# Patient Record
Sex: MALE | Race: BLACK OR AFRICAN AMERICAN | NOT HISPANIC OR LATINO | URBAN - METROPOLITAN AREA
[De-identification: names, ages, dates, MRNs, and addresses within clinical notes are randomized per-mention and may not be internally consistent; named-entity substitution may affect disease eponyms.]

---

## 2017-11-01 PROBLEM — Z00.00 ENCOUNTER FOR PREVENTIVE HEALTH EXAMINATION: Status: ACTIVE | Noted: 2017-11-01

## 2018-10-30 ENCOUNTER — INPATIENT (INPATIENT)
Facility: HOSPITAL | Age: 83
LOS: 1 days | Discharge: HOME | DRG: 243 | End: 2018-11-01
Attending: INTERNAL MEDICINE | Admitting: INTERNAL MEDICINE
Payer: MEDICARE

## 2018-10-30 VITALS
SYSTOLIC BLOOD PRESSURE: 144 MMHG | RESPIRATION RATE: 16 BRPM | TEMPERATURE: 98 F | DIASTOLIC BLOOD PRESSURE: 61 MMHG | OXYGEN SATURATION: 97 % | HEART RATE: 35 BPM

## 2018-10-30 DIAGNOSIS — R00.1 BRADYCARDIA, UNSPECIFIED: ICD-10-CM

## 2018-10-30 DIAGNOSIS — I44.2 ATRIOVENTRICULAR BLOCK, COMPLETE: ICD-10-CM

## 2018-10-30 DIAGNOSIS — R63.8 OTHER SYMPTOMS AND SIGNS CONCERNING FOOD AND FLUID INTAKE: ICD-10-CM

## 2018-10-30 DIAGNOSIS — Z98.41 CATARACT EXTRACTION STATUS, RIGHT EYE: Chronic | ICD-10-CM

## 2018-10-30 DIAGNOSIS — Z98.42 CATARACT EXTRACTION STATUS, LEFT EYE: Chronic | ICD-10-CM

## 2018-10-30 DIAGNOSIS — Z90.79 ACQUIRED ABSENCE OF OTHER GENITAL ORGAN(S): Chronic | ICD-10-CM

## 2018-10-30 DIAGNOSIS — R60.0 LOCALIZED EDEMA: ICD-10-CM

## 2018-10-30 DIAGNOSIS — R74.8 ABNORMAL LEVELS OF OTHER SERUM ENZYMES: ICD-10-CM

## 2018-10-30 DIAGNOSIS — J45.909 UNSPECIFIED ASTHMA, UNCOMPLICATED: ICD-10-CM

## 2018-10-30 DIAGNOSIS — N40.0 BENIGN PROSTATIC HYPERPLASIA WITHOUT LOWER URINARY TRACT SYMPTOMS: ICD-10-CM

## 2018-10-30 DIAGNOSIS — I24.8 OTHER FORMS OF ACUTE ISCHEMIC HEART DISEASE: ICD-10-CM

## 2018-10-30 DIAGNOSIS — E11.9 TYPE 2 DIABETES MELLITUS WITHOUT COMPLICATIONS: ICD-10-CM

## 2018-10-30 DIAGNOSIS — Z91.89 OTHER SPECIFIED PERSONAL RISK FACTORS, NOT ELSEWHERE CLASSIFIED: ICD-10-CM

## 2018-10-30 DIAGNOSIS — D72.829 ELEVATED WHITE BLOOD CELL COUNT, UNSPECIFIED: ICD-10-CM

## 2018-10-30 LAB
ALBUMIN SERPL ELPH-MCNC: 4.2 G/DL — SIGNIFICANT CHANGE UP (ref 3.3–5)
ALP SERPL-CCNC: 39 U/L — LOW (ref 40–120)
ALT FLD-CCNC: 24 U/L — SIGNIFICANT CHANGE UP (ref 10–45)
ANION GAP SERPL CALC-SCNC: 18 MMOL/L — HIGH (ref 5–17)
APTT BLD: 30.5 SEC — SIGNIFICANT CHANGE UP (ref 27.5–37.4)
AST SERPL-CCNC: 33 U/L — SIGNIFICANT CHANGE UP (ref 10–40)
BASOPHILS NFR BLD AUTO: 0.1 % — SIGNIFICANT CHANGE UP (ref 0–2)
BILIRUB SERPL-MCNC: 0.5 MG/DL — SIGNIFICANT CHANGE UP (ref 0.2–1.2)
BLD GP AB SCN SERPL QL: NEGATIVE — SIGNIFICANT CHANGE UP
BUN SERPL-MCNC: 24 MG/DL — HIGH (ref 7–23)
CALCIUM SERPL-MCNC: 9.7 MG/DL — SIGNIFICANT CHANGE UP (ref 8.4–10.5)
CHLORIDE SERPL-SCNC: 86 MMOL/L — LOW (ref 96–108)
CK MB CFR SERPL CALC: 5.3 NG/ML — SIGNIFICANT CHANGE UP (ref 0–6.7)
CK MB CFR SERPL CALC: 6.4 NG/ML — SIGNIFICANT CHANGE UP (ref 0–6.7)
CK SERPL-CCNC: 283 U/L — HIGH (ref 30–200)
CK SERPL-CCNC: 332 U/L — HIGH (ref 30–200)
CO2 SERPL-SCNC: 25 MMOL/L — SIGNIFICANT CHANGE UP (ref 22–31)
CREAT SERPL-MCNC: 1.27 MG/DL — SIGNIFICANT CHANGE UP (ref 0.5–1.3)
EOSINOPHIL NFR BLD AUTO: 0.8 % — SIGNIFICANT CHANGE UP (ref 0–6)
GLUCOSE SERPL-MCNC: 202 MG/DL — HIGH (ref 70–99)
HCT VFR BLD CALC: 35.6 % — LOW (ref 39–50)
HGB BLD-MCNC: 12.4 G/DL — LOW (ref 13–17)
INR BLD: 1.18 — HIGH (ref 0.88–1.16)
LYMPHOCYTES # BLD AUTO: 7.9 % — LOW (ref 13–44)
MAGNESIUM SERPL-MCNC: 1.6 MG/DL — SIGNIFICANT CHANGE UP (ref 1.6–2.6)
MCHC RBC-ENTMCNC: 31.2 PG — SIGNIFICANT CHANGE UP (ref 27–34)
MCHC RBC-ENTMCNC: 34.8 G/DL — SIGNIFICANT CHANGE UP (ref 32–36)
MCV RBC AUTO: 89.7 FL — SIGNIFICANT CHANGE UP (ref 80–100)
MONOCYTES NFR BLD AUTO: 8.1 % — SIGNIFICANT CHANGE UP (ref 2–14)
NEUTROPHILS NFR BLD AUTO: 83.1 % — HIGH (ref 43–77)
PLATELET # BLD AUTO: 173 K/UL — SIGNIFICANT CHANGE UP (ref 150–400)
POTASSIUM SERPL-MCNC: 4.6 MMOL/L — SIGNIFICANT CHANGE UP (ref 3.5–5.3)
POTASSIUM SERPL-SCNC: 4.6 MMOL/L — SIGNIFICANT CHANGE UP (ref 3.5–5.3)
PROT SERPL-MCNC: 7.3 G/DL — SIGNIFICANT CHANGE UP (ref 6–8.3)
PROTHROM AB SERPL-ACNC: 13.1 SEC — HIGH (ref 9.8–12.7)
RBC # BLD: 3.97 M/UL — LOW (ref 4.2–5.8)
RBC # FLD: 12.3 % — SIGNIFICANT CHANGE UP (ref 10.3–16.9)
RH IG SCN BLD-IMP: POSITIVE — SIGNIFICANT CHANGE UP
SODIUM SERPL-SCNC: 129 MMOL/L — LOW (ref 135–145)
TROPONIN T SERPL-MCNC: 0.04 NG/ML — CRITICAL HIGH (ref 0–0.01)
TROPONIN T SERPL-MCNC: 0.05 NG/ML — CRITICAL HIGH (ref 0–0.01)
WBC # BLD: 10.6 K/UL — HIGH (ref 3.8–10.5)
WBC # FLD AUTO: 10.6 K/UL — HIGH (ref 3.8–10.5)

## 2018-10-30 PROCEDURE — 93010 ELECTROCARDIOGRAM REPORT: CPT

## 2018-10-30 PROCEDURE — 99291 CRITICAL CARE FIRST HOUR: CPT

## 2018-10-30 PROCEDURE — 93306 TTE W/DOPPLER COMPLETE: CPT | Mod: 26

## 2018-10-30 PROCEDURE — 33208 INSRT HEART PM ATRIAL & VENT: CPT | Mod: KX

## 2018-10-30 PROCEDURE — 71045 X-RAY EXAM CHEST 1 VIEW: CPT | Mod: 26

## 2018-10-30 RX ORDER — IPRATROPIUM/ALBUTEROL SULFATE 18-103MCG
3 AEROSOL WITH ADAPTER (GRAM) INHALATION EVERY 6 HOURS
Qty: 0 | Refills: 0 | Status: DISCONTINUED | OUTPATIENT
Start: 2018-10-30 | End: 2018-11-01

## 2018-10-30 RX ORDER — VANCOMYCIN HCL 1 G
2250 VIAL (EA) INTRAVENOUS ONCE
Qty: 0 | Refills: 0 | Status: COMPLETED | OUTPATIENT
Start: 2018-10-31 | End: 2018-10-31

## 2018-10-30 RX ORDER — CHLORHEXIDINE GLUCONATE 213 G/1000ML
1 SOLUTION TOPICAL
Qty: 0 | Refills: 0 | Status: DISCONTINUED | OUTPATIENT
Start: 2018-10-30 | End: 2018-11-01

## 2018-10-30 RX ORDER — FINASTERIDE 5 MG/1
5 TABLET, FILM COATED ORAL DAILY
Qty: 0 | Refills: 0 | Status: DISCONTINUED | OUTPATIENT
Start: 2018-10-30 | End: 2018-11-01

## 2018-10-30 RX ORDER — MAGNESIUM SULFATE 500 MG/ML
2 VIAL (ML) INJECTION ONCE
Qty: 0 | Refills: 0 | Status: COMPLETED | OUTPATIENT
Start: 2018-10-30 | End: 2018-10-30

## 2018-10-30 RX ORDER — INSULIN LISPRO 100/ML
VIAL (ML) SUBCUTANEOUS
Qty: 0 | Refills: 0 | Status: DISCONTINUED | OUTPATIENT
Start: 2018-10-30 | End: 2018-11-01

## 2018-10-30 RX ORDER — BUDESONIDE AND FORMOTEROL FUMARATE DIHYDRATE 160; 4.5 UG/1; UG/1
2 AEROSOL RESPIRATORY (INHALATION)
Qty: 0 | Refills: 0 | Status: DISCONTINUED | OUTPATIENT
Start: 2018-10-30 | End: 2018-11-01

## 2018-10-30 RX ORDER — HEPARIN SODIUM 5000 [USP'U]/ML
7500 INJECTION INTRAVENOUS; SUBCUTANEOUS EVERY 8 HOURS
Qty: 0 | Refills: 0 | Status: DISCONTINUED | OUTPATIENT
Start: 2018-10-30 | End: 2018-10-30

## 2018-10-30 RX ORDER — DEXTROSE 50 % IN WATER 50 %
25 SYRINGE (ML) INTRAVENOUS ONCE
Qty: 0 | Refills: 0 | Status: DISCONTINUED | OUTPATIENT
Start: 2018-10-30 | End: 2018-11-01

## 2018-10-30 RX ORDER — SODIUM CHLORIDE 9 MG/ML
1000 INJECTION, SOLUTION INTRAVENOUS
Qty: 0 | Refills: 0 | Status: DISCONTINUED | OUTPATIENT
Start: 2018-10-30 | End: 2018-11-01

## 2018-10-30 RX ORDER — FUROSEMIDE 40 MG
40 TABLET ORAL ONCE
Qty: 0 | Refills: 0 | Status: COMPLETED | OUTPATIENT
Start: 2018-10-30 | End: 2018-10-30

## 2018-10-30 RX ORDER — DEXTROSE 50 % IN WATER 50 %
15 SYRINGE (ML) INTRAVENOUS ONCE
Qty: 0 | Refills: 0 | Status: DISCONTINUED | OUTPATIENT
Start: 2018-10-30 | End: 2018-11-01

## 2018-10-30 RX ORDER — DEXTROSE 50 % IN WATER 50 %
12.5 SYRINGE (ML) INTRAVENOUS ONCE
Qty: 0 | Refills: 0 | Status: DISCONTINUED | OUTPATIENT
Start: 2018-10-30 | End: 2018-11-01

## 2018-10-30 RX ORDER — GLUCAGON INJECTION, SOLUTION 0.5 MG/.1ML
1 INJECTION, SOLUTION SUBCUTANEOUS ONCE
Qty: 0 | Refills: 0 | Status: DISCONTINUED | OUTPATIENT
Start: 2018-10-30 | End: 2018-11-01

## 2018-10-30 RX ADMIN — Medication 50 GRAM(S): at 10:02

## 2018-10-30 RX ADMIN — Medication 40 MILLIGRAM(S): at 14:04

## 2018-10-30 RX ADMIN — Medication 3 MILLILITER(S): at 14:42

## 2018-10-30 RX ADMIN — Medication 2: at 21:38

## 2018-10-30 NOTE — H&P ADULT - PROBLEM SELECTOR PLAN 5
Hx of BPH on finasteride  - c/w home finasteride Pt w/ leukocytosis to 10.3 with predominant neutrophilia. Likely reactive in the setting of acute onset of complete heart block.  - CXR without obvious infiltrates, no urinary sxs, no obvious signs of infection  - continue to trend

## 2018-10-30 NOTE — H&P ADULT - PROBLEM SELECTOR PLAN 9
F: tolerating PO, no IVF  E: replete K<4, Mg<2  N: NPO for possible PPM placement    VTE Prophylaxis: SCDs  C: Full Code  D: CCU

## 2018-10-30 NOTE — CONSULT NOTE ADULT - SUBJECTIVE AND OBJECTIVE BOX
HPI:  89 year old male with asthma and non-insulin dependent DM, who presented to the ER this am with worsening weakness x2 weeks, found to be in complete heart block.    He states his ambulation is limited by knee pain and he mostly just walks <1 block.  He states for the past two weeks he has been more tired; but today he was unable to tie his shoelaces secondary to extreme fatigue.  He denies any SOB, chest pain, palpitations, syncope, near syncope.  He has mild dizziness.  He had an EKG two months ago with his PMD and was never told he had a slow heart rate or needed a pacemaker. 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   In the ER he was found to be in complete heart block with QRS 166ms RBBB morphology.  He denies any stents, stroke, MI, thyroid disorder, recent travel.  He does not know his medications, but his daughters state he does not take any heart medications at home       PAST MEDICAL & SURGICAL HISTORY:  Prostate disorder  Asthma  Diabetes  History of right cataract surgery  History of left cataract surgery  S/P TURP      Family history of diabetes mellitus (Sibling)      Social History:  no smoking, drugs, ETOH     Inpatient Medications:   ALBUTerol/ipratropium for Nebulization 3 milliLiter(s) Nebulizer every 6 hours PRN  buDESOnide 160 MICROgram(s)/formoterol 4.5 MICROgram(s) Inhaler 2 Puff(s) Inhalation two times a day  insulin lispro (HumaLOG) corrective regimen sliding scale   SubCutaneous Before meals and at bedtime      Allergy = lisinopril (Angioedema)      ROS:   CONSTITUTIONAL: No fever, weight loss + fatigue  EYES: Pt denies  RESPIRATORY: No cough, wheezing, chills or hemoptysis; No Shortness of Breath  CARDIOVASCULAR: see HPI  NEUROLOGICAL: Pt denies  SKIN: Pt denies   PSYCHIATRIC: Pt denies  HEME/LYMPH: Pt denies    PHYSICAL:  T(C): 36 (10-30-18 @ 10:17), Max: 36.6 (10-30-18 @ 08:00)  HR: 28 (10-30-18 @ 12:00) (28 - 35)  BP: 108/54 (10-30-18 @ 12:00) (108/54 - 146/66)  RR: 27 (10-30-18 @ 12:00) (16 - 35)  SpO2: 94% (10-30-18 @ 12:00) (94% - 97%)     Appearance: NAD  Cardiovascular: bradycardia  Respiratory: Lungs clear to auscultation	   Neurologic: A&O x 3, No deficit noted  Extremities: No edema       LABS:                        12.4   10.6  )-----------( 173      ( 30 Oct 2018 08:55 )             35.6     129<L>  |  86<L>  |  24<H>  ----------------------------<  202<H>  4.6   |  25  |  1.27    Ca    9.7         Mg     1.6      TPro  7.3  /  Alb  4.2  /  TBili  0.5  /  DBili  x   /  AST  33  /  ALT  24  /  AlkPhos  39<L>   PT: 13.1 sec;   INR: 1.18     PTT:30.5 sec  TSH WNL  Troponin .05     EKG: CHB, wide ventricular escape at 30    Telemetry: just got to CCU    ECHO:   pending    Prior EP procedures: denies     Assessment Plan:  89 year old male with asthma and non-insulin dependent DM, who presented to the ER this am with worsening weakness x2 weeks, found to be in complete heart block.  He has a history of RBBB and LAFB and we discussed that this is likely natural degeneration of his electrical system.  He has never experienced syncope, but admits to fatigue and dizziness.  He is on no medications that could be exacerbating this.  He has had wide QRS and would recommend a TVP.  Echo to be done to evaluate for any structural heart disease.  We have recommended a permanent pacemaker and he is in agreement with this and we anticipate doing this later today pending the schedule.  We discussed the procedure in detail including risks, benefits and alternatives.  Please facilitate that his echocardiogram gets done prior to this.  Please call [phone] with any questions or concerns.

## 2018-10-30 NOTE — H&P ADULT - PROBLEM SELECTOR PLAN 10
1) PCP Contacted on Admission: (Y/N) --> Name & Phone #:PCP: Dr. Mayito Santos   Office number: [phone]   Cell number: [phone]  2) Date of Contact with PCP: 10/30  3) PCP Contacted at Discharge: (Y/N)  4) Summary of Handoff Given to PCP:   5) Post-Discharge Appointment Date and Location:

## 2018-10-30 NOTE — H&P ADULT - NSHPSOCIALHISTORY_GEN_ALL_CORE
Previous hx of cigar smoking. Hasn't smoked in at least 20 years. Denies cigarette use. Drinks an occasional glass of wine with his daughter. Denies illicit drug use. Lives with his daughter. Does not have a home health aide although his daughter would like one as the burden of his health is too much for her. He walks with a cane.

## 2018-10-30 NOTE — H&P ADULT - NSHPPHYSICALEXAM_GEN_ALL_CORE
.  VITAL SIGNS:  T(C): 36.6 (10-30-18 @ 08:00), Max: 36.6 (10-30-18 @ 08:00)  T(F): 97.8 (10-30-18 @ 08:00), Max: 97.8 (10-30-18 @ 08:00)  HR: 35 (10-30-18 @ 09:04) (35 - 35)  BP: 146/66 (10-30-18 @ 09:04) (144/61 - 146/66)  BP(mean): --  RR: 16 (10-30-18 @ 09:04) (16 - 16)  SpO2: 97% (10-30-18 @ 09:04) (97% - 97%)  Wt(kg): --    PHYSICAL EXAM:    Constitutional: WDWN resting comfortably in bed; NAD  Head: NC/AT  Eyes: PERRL, EOMI, anicteric sclera  ENT: no nasal discharge; uvula midline, no oropharyngeal erythema or exudates; MMM  Neck: supple; no JVD or thyromegaly  Respiratory: CTA B/L; no W/R/R, no retractions  Cardiac: +S1/S2; RRR; no M/R/G; PMI non-displaced  Gastrointestinal: soft, NT/ND; no rebound or guarding; +BSx4  Genitourinary: normal external genitalia  Back: spine midline, no bony tenderness or step-offs; no CVAT B/L  Extremities: WWP, no clubbing or cyanosis; no peripheral edema. Capillary refill <2 sec  Musculoskeletal: NROM x4; no joint swelling, tenderness or erythema  Vascular: 2+ radial, femoral, DP/PT pulses B/L  Dermatologic: skin warm, dry and intact; no rashes, wounds, or scars  Lymphatic: no submandibular or cervical LAD  Neurologic: AAOx3; CNII-XII grossly intact; no focal deficits  Psychiatric: affect and characteristics of appearance, verbalizations, behaviors are appropriate .  VITAL SIGNS:  T(C): 36.6 (10-30-18 @ 08:00), Max: 36.6 (10-30-18 @ 08:00)  T(F): 97.8 (10-30-18 @ 08:00), Max: 97.8 (10-30-18 @ 08:00)  HR: 35 (10-30-18 @ 09:04) (35 - 35)  BP: 146/66 (10-30-18 @ 09:04) (144/61 - 146/66)  BP(mean): --  RR: 16 (10-30-18 @ 09:04) (16 - 16)  SpO2: 97% (10-30-18 @ 09:04) (97% - 97%)  Wt(kg): --    PHYSICAL EXAM:    Constitutional: Elderly male laying in bed in NAD  Head: NC/AT  Eyes: PERRL, EOMI, anicteric sclera  ENT: no nasal discharge; uvula midline, no oropharyngeal erythema or exudates; MMM  Neck: supple; no JVD or thyromegaly  Respiratory: CTA B/L; no W/R/R, no retractions  Cardiac: +S1/S2; RRR; no M/R/G; PMI non-displaced  Gastrointestinal: soft, NT/ND; no rebound or guarding; +BSx4  Genitourinary: normal external genitalia  Back: spine midline, no bony tenderness or step-offs; no CVAT B/L  Extremities: WWP, no clubbing or cyanosis; no peripheral edema. Capillary refill <2 sec  Musculoskeletal: NROM x4; no joint swelling, tenderness or erythema  Vascular: 2+ radial, femoral, DP/PT pulses B/L  Dermatologic: skin warm, dry and intact; no rashes, wounds, or scars  Lymphatic: no submandibular or cervical LAD  Neurologic: AAOx3; CNII-XII grossly intact; no focal deficits  Psychiatric: affect and characteristics of appearance, verbalizations, behaviors are appropriate .  VITAL SIGNS:  T(C): 36.6 (10-30-18 @ 08:00), Max: 36.6 (10-30-18 @ 08:00)  T(F): 97.8 (10-30-18 @ 08:00), Max: 97.8 (10-30-18 @ 08:00)  HR: 35 (10-30-18 @ 09:04) (35 - 35)  BP: 146/66 (10-30-18 @ 09:04) (144/61 - 146/66)  BP(mean): --  RR: 16 (10-30-18 @ 09:04) (16 - 16)  SpO2: 97% (10-30-18 @ 09:04) (97% - 97%)  Wt(kg): --    PHYSICAL EXAM:    Constitutional: Elderly obese male laying in bed in NAD  Head: NC/AT  Eyes: PERRL, EOMI, anicteric sclera  ENT: nares uneven, no nasal discharge; uvula midline, no oropharyngeal erythema or exudates; MMM  Neck: supple; no JVD or thyromegaly  Respiratory: CTA B/L; no W/R/R, no retractions  Cardiac: bradycardic, +S1/S2; RRR; no M/R/G; PMI non-displaced  Gastrointestinal: protuberant, soft, NT; no rebound or guarding; +BSx4  Back: spine midline, no bony tenderness or step-offs; no CVAT B/L  Extremities: WWP, no clubbing or cyanosis; 2+ peripheral edema. Capillary refill <2 sec  Musculoskeletal: NROM x4; no joint swelling, tenderness or erythema  Vascular: 1+ radial, femoral, DP/PT pulses B/L  Dermatologic: skin warm, dry and intact; no rashes, wounds, or scars  Lymphatic: no submandibular or cervical LAD  Neurologic: AAOx3; CNII-XII grossly intact; no focal deficits  Psychiatric: affect and characteristics of appearance, verbalizations, behaviors are appropriate

## 2018-10-30 NOTE — PROCEDURE NOTE - PROCEDURE
<<-----Click on this checkbox to enter Procedure Transvenous cardiac pacing  10/30/2018    Active  [facility]

## 2018-10-30 NOTE — H&P ADULT - PROBLEM SELECTOR PLAN 3
Pt has hx of T2 DM on metformin at home  - holding metformin; will c/w ISS  - f/u HbA1C Pt has hx of T2 DM on metformin at home. Baseline A1C 7% on 8/22/18  - A1C today 6.3%  - holding metformin; will c/w ISS

## 2018-10-30 NOTE — PATIENT PROFILE ADULT - DO YOU FEEL UNSAFE AT SCHOOL?
no
How Severe Is It?: mild
Is This A New Presentation, Or A Follow-Up?: Dandruff
Additional History: Last visit was on 07/2015.  Patient was prescribed Desonide and Ketoconazole

## 2018-10-30 NOTE — H&P ADULT - NSHPLABSRESULTS_GEN_ALL_CORE
.  LABS:                         12.4   10.6  )-----------( 173      ( 30 Oct 2018 08:55 )             35.6                         RADIOLOGY, EKG & ADDITIONAL TESTS: Reviewed. .  LABS:                         12.4   10.6  )-----------( 173      ( 30 Oct 2018 08:55 )             35.6     10-30    129<L>  |  86<L>  |  24<H>  ----------------------------<  202<H>  4.6   |  25  |  1.27    Ca    9.7      30 Oct 2018 08:55  Mg     1.6     10-30    TPro  7.3  /  Alb  4.2  /  TBili  0.5  /  DBili  x   /  AST  33  /  ALT  24  /  AlkPhos  39<L>  10-30    PT/INR - ( 30 Oct 2018 08:55 )   PT: 13.1 sec;   INR: 1.18          PTT - ( 30 Oct 2018 08:55 )  PTT:30.5 sec    CARDIAC MARKERS ( 30 Oct 2018 08:55 )  x     / 0.05 ng/mL / 332 U/L / x     / 6.4 ng/mL            RADIOLOGY, EKG & ADDITIONAL TESTS:  EC BPM. AV mar dissociation with complete heart block

## 2018-10-30 NOTE — ED PROVIDER NOTE - OBJECTIVE STATEMENT
89 y/o M hx of BPH, asthma and non-insulin dependent DM presents with weakness x1 day. According to the patient, he felt somewhat fatigued last night with mild SOB and he tried using his inhaler with no improvement. Upon awakening today pt felt increased weakness and fatigue without CP, palpitations, SOB, HA, dizziness, N/V/D/C and so called his daughter who brought pt to the ED for further evaluation.

## 2018-10-30 NOTE — ED PROVIDER NOTE - MEDICAL DECISION MAKING DETAILS
89 y/o M hx of BPH, asthma and non-insulin dependent DM presents with weakness x1 day. According to the patient, he felt somewhat fatigued last night with mild SOB and he tried using his inhaler with no improvement. Upon awakening today pt felt increased weakness and fatigue without CP, palpitations, SOB, HA, dizziness, N/V/D/C and so called his daughter who brought pt to the ED for further evaluation. ECG was performed which showed complete heart block. Pacer pads applied in ED, but pt with normal BPs and not requiring external pacing. CCU fellow called and patient admitted to CCU for further management.

## 2018-10-30 NOTE — H&P ADULT - PROBLEM SELECTOR PLAN 4
Patient w/ hx of asthma on advair  - c/w home advair  - duonebs PRN Pt with elevated troponin to 0.05. Likely type 2 in response demand ischemia from heart block  - continue to trend

## 2018-10-30 NOTE — H&P ADULT - PROBLEM SELECTOR PLAN 7
1) PCP Contacted on Admission: (Y/N) --> Name & Phone #:PCP: Dr. Mayito Santos   Office number: [phone]   Cell number: [phone]  2) Date of Contact with PCP: 10/30  3) PCP Contacted at Discharge: (Y/N)  4) Summary of Handoff Given to PCP:   5) Post-Discharge Appointment Date and Location: Patient w/ hx of asthma on advair  - c/w home advair  - duonebs PRN

## 2018-10-30 NOTE — H&P ADULT - ATTENDING COMMENTS
89M w/ Asthma, DM and BPH p/w weakness -> found to be in CHB, currently HD stable and asymptomatic    -Pt. w/ new onset CHB; denies lightheadedness or syncope; No evidence of significant end organ dysfunction; Currently has a Ventricular escape in mid 30s; No identifiable reversible causes at this time; EP consulted for possible PPM today  -Mild elevation in [place]; No ischemic changes on ekg and no CP; Will trend to peak; c/w ASA and Lipitor  -TTE today  -check TSH, Lipid panel and Hgb A1C  -Nebs PRN  -NPO  -DVT PPx: HSQ    Bull Tuttle MD  CCU Attending

## 2018-10-30 NOTE — ED PROVIDER NOTE - PROGRESS NOTE DETAILS
metfirmin, finasteride, advair, lasix 20 mg  flovent flonase In the ED, vitals T-97.8, HR-35, BP- 144/61, RR-16, SpO2-97%. ECG was performed which showed complete heart block. Pacer pads applied in ED, but pt with normal BPs and not requiring external pacing. CCU fellow called and patient admitted to CCU for further management.

## 2018-10-30 NOTE — ED ADULT NURSE NOTE - NSIMPLEMENTINTERV_GEN_ALL_ED
Implemented All Fall with Harm Risk Interventions:  [place] to call system. Call bell, personal items and telephone within reach. Instruct patient to call for assistance. Room bathroom lighting operational. Non-slip footwear when patient is off stretcher. Physically safe environment: no spills, clutter or unnecessary equipment. Stretcher in lowest position, wheels locked, appropriate side rails in place. Provide visual cue, wrist band, yellow gown, etc. Monitor gait and stability. Monitor for mental status changes and reorient to person, place, and time. Review medications for side effects contributing to fall risk. Reinforce activity limits and safety measures with patient and family. Provide visual clues: red socks.

## 2018-10-30 NOTE — H&P ADULT - HISTORY OF PRESENT ILLNESS
89 y/o M hx of BPH, asthma, and non-insulin dependent DM presents with weakness x1 day. According to the patient, he felt somewhat off last night and upon awakening today felt weak and tired. He has never felt like this before. He was brought to the ED by his daughter.    In the ED, vitals T-97.8, HR-35, BP- 144/61, RR-16, SpO2-97%. ECG was performed which showed complete heart block. Patient was transferred to CCU for further management.          PCP: Dr. Mayito Santos   Office number: [phone]   Cell number: [phone] 89 y/o M hx of BPH, asthma, and non-insulin dependent DM presents with weakness x1 day. According to the patient, he felt somewhat off last night and upon awakening today felt weak and tired. He has never felt like this before. He was brought to the ED by his daughter.    In the ED, vitals T-97.8, HR-35, BP- 144/61, RR-16, SpO2-97%. ECG was performed which showed complete heart block. Patient was transferred to CCU for further management. In CCU, PCP was contacted who provided further information. Pt has no hx of cardiac disease. Recent ECG this year showed NSR with bifascicular block, RBBB and LA deviation with HR 82. He was hospitalized in April at [facility] for cough where he was found to have an elevated troponin thought to be 2/2 Type 2 MI from demand ischemia           PCP: Dr. Mayito Santos   Office number: [phone]   Cell number: [phone] 89 y/o M hx of BPH, asthma and non-insulin dependent DM presents with weakness x1 day. According to the patient, he felt somewhat fatigued last night and he tried using his inhaler with no improvement. Upon awakening today felt increased weakness and fatigue without CP, palpitations, SOB, HA, dizziness, N/V/D/C. He has never felt like this before. He was brought to the ED by his daughter.    In the ED, vitals T-97.8, HR-35, BP- 144/61, RR-16, SpO2-97%. ECG was performed which showed complete heart block. Patient was transferred to CCU for further management. In CCU, patient denies fatigue or weakness. He continues to be bradycardic in the 30s-40 with no acute complaints or changes in mentation. PCP was contacted who provided further information. Recent ECG in September showed NSR with bifascicular block, RBBB and LA deviation with HR 82. He was hospitalized in April at [facility] for cough where he was found to have an elevated troponin thought to be 2/2 Type 2 MI from demand ischemia from respiratory infection. Echo at that time showed no abnormalities. Stress test in 2013 was negative.

## 2018-10-30 NOTE — H&P ADULT - PROBLEM SELECTOR PLAN 1
Pt w/ AV dissociation w/ complete heart block on ECG. No hx of beta blocker or calcium channel blocker usage. Current HR 40 bpm  - f/u EP recs  - NPO for possible PPM placement; if PPM does not occur today, may need TVP placed  - continue to monitor Pt w/ AV dissociation w/ complete heart block on ECG. No hx of beta blocker or calcium channel blocker usage. No electrolyte abnormalities. Current HR 40 bpm  - f/u EP recs  - NPO for possible PPM placement; if PPM does not occur today, may need TVP placed  - pacemaker pads on patient at all times  - f/u TTE today  - continue to monitor

## 2018-10-30 NOTE — H&P ADULT - PROBLEM SELECTOR PLAN 6
F: tolerating PO, no IVF  E: replete K<4, Mg<2  N: Dash/TLC    VTE Prophylaxis: SCDs  C: Full Code  D: CCU Pt w/ B/L LE edema on exam. On Lasix 20mg qd at home. No hx of CHF  - c/w lasix  - f/u TTE    #hyponatremia  - pt w/ sodium to 129 on admission likely in the setting of hypervolemic hyponatremia and chronic lasix usage  - continue to monitor Pt w/ B/L LE edema on exam. On Lasix 20mg qd at home. No hx of CHF  - holding lasix 2/2 bradycardia; will restart as tolerated  - f/u TTE    #hyponatremia  - pt w/ sodium to 129 on admission likely in the setting of hypervolemic hyponatremia and chronic lasix usage  - holding lasix  - continue to monitor

## 2018-10-31 ENCOUNTER — TRANSCRIPTION ENCOUNTER (OUTPATIENT)
Age: 83
End: 2018-10-31

## 2018-10-31 PROBLEM — Z00.00 ENCOUNTER FOR PREVENTIVE HEALTH EXAMINATION: Noted: 2018-10-31

## 2018-10-31 LAB
ANION GAP SERPL CALC-SCNC: 9 MMOL/L — SIGNIFICANT CHANGE UP (ref 5–17)
BASOPHILS NFR BLD AUTO: 0.1 % — SIGNIFICANT CHANGE UP (ref 0–2)
BUN SERPL-MCNC: 22 MG/DL — SIGNIFICANT CHANGE UP (ref 7–23)
CALCIUM SERPL-MCNC: 9.4 MG/DL — SIGNIFICANT CHANGE UP (ref 8.4–10.5)
CHLORIDE SERPL-SCNC: 96 MMOL/L — SIGNIFICANT CHANGE UP (ref 96–108)
CHOLEST SERPL-MCNC: 153 MG/DL — SIGNIFICANT CHANGE UP (ref 10–199)
CO2 SERPL-SCNC: 30 MMOL/L — SIGNIFICANT CHANGE UP (ref 22–31)
CREAT SERPL-MCNC: 1.03 MG/DL — SIGNIFICANT CHANGE UP (ref 0.5–1.3)
EOSINOPHIL NFR BLD AUTO: 1.4 % — SIGNIFICANT CHANGE UP (ref 0–6)
GLUCOSE SERPL-MCNC: 126 MG/DL — HIGH (ref 70–99)
HCT VFR BLD CALC: 36.1 % — LOW (ref 39–50)
HDLC SERPL-MCNC: 74 MG/DL — SIGNIFICANT CHANGE UP
HGB BLD-MCNC: 12.2 G/DL — LOW (ref 13–17)
LIPID PNL WITH DIRECT LDL SERPL: 65 MG/DL — SIGNIFICANT CHANGE UP
LYMPHOCYTES # BLD AUTO: 13.6 % — SIGNIFICANT CHANGE UP (ref 13–44)
MAGNESIUM SERPL-MCNC: 1.9 MG/DL — SIGNIFICANT CHANGE UP (ref 1.6–2.6)
MCHC RBC-ENTMCNC: 31 PG — SIGNIFICANT CHANGE UP (ref 27–34)
MCHC RBC-ENTMCNC: 33.8 G/DL — SIGNIFICANT CHANGE UP (ref 32–36)
MCV RBC AUTO: 91.6 FL — SIGNIFICANT CHANGE UP (ref 80–100)
MONOCYTES NFR BLD AUTO: 11.8 % — SIGNIFICANT CHANGE UP (ref 2–14)
NEUTROPHILS NFR BLD AUTO: 73.1 % — SIGNIFICANT CHANGE UP (ref 43–77)
PLATELET # BLD AUTO: 156 K/UL — SIGNIFICANT CHANGE UP (ref 150–400)
POTASSIUM SERPL-MCNC: 4.6 MMOL/L — SIGNIFICANT CHANGE UP (ref 3.5–5.3)
POTASSIUM SERPL-SCNC: 4.6 MMOL/L — SIGNIFICANT CHANGE UP (ref 3.5–5.3)
RBC # BLD: 3.94 M/UL — LOW (ref 4.2–5.8)
RBC # FLD: 13.1 % — SIGNIFICANT CHANGE UP (ref 10.3–16.9)
SODIUM SERPL-SCNC: 135 MMOL/L — SIGNIFICANT CHANGE UP (ref 135–145)
TOTAL CHOLESTEROL/HDL RATIO MEASUREMENT: 2.1 RATIO — LOW (ref 3.4–9.6)
TRIGL SERPL-MCNC: 68 MG/DL — SIGNIFICANT CHANGE UP (ref 10–149)
WBC # BLD: 8.8 K/UL — SIGNIFICANT CHANGE UP (ref 3.8–10.5)
WBC # FLD AUTO: 8.8 K/UL — SIGNIFICANT CHANGE UP (ref 3.8–10.5)

## 2018-10-31 PROCEDURE — 71045 X-RAY EXAM CHEST 1 VIEW: CPT | Mod: 26

## 2018-10-31 RX ORDER — FUROSEMIDE 40 MG
40 TABLET ORAL DAILY
Qty: 0 | Refills: 0 | Status: DISCONTINUED | OUTPATIENT
Start: 2018-10-31 | End: 2018-11-01

## 2018-10-31 RX ORDER — ASPIRIN/CALCIUM CARB/MAGNESIUM 324 MG
81 TABLET ORAL DAILY
Qty: 0 | Refills: 0 | Status: DISCONTINUED | OUTPATIENT
Start: 2018-10-31 | End: 2018-11-01

## 2018-10-31 RX ORDER — MAGNESIUM SULFATE 500 MG/ML
1 VIAL (ML) INJECTION ONCE
Qty: 0 | Refills: 0 | Status: COMPLETED | OUTPATIENT
Start: 2018-10-31 | End: 2018-10-31

## 2018-10-31 RX ORDER — HEPARIN SODIUM 5000 [USP'U]/ML
7500 INJECTION INTRAVENOUS; SUBCUTANEOUS EVERY 8 HOURS
Qty: 0 | Refills: 0 | Status: DISCONTINUED | OUTPATIENT
Start: 2018-11-01 | End: 2018-11-01

## 2018-10-31 RX ADMIN — Medication 200 MILLIGRAM(S): at 05:17

## 2018-10-31 RX ADMIN — BUDESONIDE AND FORMOTEROL FUMARATE DIHYDRATE 2 PUFF(S): 160; 4.5 AEROSOL RESPIRATORY (INHALATION) at 18:11

## 2018-10-31 RX ADMIN — CHLORHEXIDINE GLUCONATE 1 APPLICATION(S): 213 SOLUTION TOPICAL at 05:15

## 2018-10-31 RX ADMIN — Medication 100 GRAM(S): at 07:49

## 2018-10-31 RX ADMIN — FINASTERIDE 5 MILLIGRAM(S): 5 TABLET, FILM COATED ORAL at 11:24

## 2018-10-31 RX ADMIN — Medication 40 MILLIGRAM(S): at 09:44

## 2018-10-31 RX ADMIN — Medication 1: at 11:32

## 2018-10-31 RX ADMIN — Medication 81 MILLIGRAM(S): at 12:26

## 2018-10-31 RX ADMIN — Medication 1: at 16:52

## 2018-10-31 NOTE — PROGRESS NOTE ADULT - PROBLEM SELECTOR PLAN 10
1) PCP Contacted on Admission: (Y/N) --> Name & Phone #:PCP: Dr. Mayito Santos   Office number: [phone]   Cell number: [phone]  2) Date of Contact with PCP: 10/30  3) PCP Contacted at Discharge: (Y/N)  4) Summary of Handoff Given to PCP:   5) Post-Discharge Appointment Date and Location:

## 2018-10-31 NOTE — DISCHARGE NOTE ADULT - PATIENT PORTAL LINK FT
You can access the [facility] Patient Portal, offered by [facility], by registering with the following website: [email or web address]

## 2018-10-31 NOTE — PROGRESS NOTE ADULT - SUBJECTIVE AND OBJECTIVE BOX
CCU to [facility] TRANSFER ACCEPTANCE NOTE    HOSPITAL COURSE:  89M with PMH of BPH, asthma, DM2, who presented with weakness. Found to have CHB on ECG with stable BPs and was admitted to CCU for further mgmt. TVP was placed for temporary pacing. PPM was placed 10/30 without complication and TVP was removed. HR improved and symptoms improved. While in CCU, noted to have pulmonary congestion on CXR for which patient was diuresed with IV Lasix w improvement.  Patient is now stable for stepdown to [facility] for further tele monitoring.    SUBJECTIVE / INTERVAL HPI: Patient seen and examined at bedside. No complaints. Denies palpitations, weakness, fatigue, dizziness, HA, CP, SOB, N/V/D/C.     VITAL SIGNS:  Vital Signs Last 24 Hrs  T(C): 37 (31 Oct 2018 22:13), Max: 37 (31 Oct 2018 22:13)  T(F): 98.6 (31 Oct 2018 22:13), Max: 98.6 (31 Oct 2018 22:13)  HR: 82 (01 Nov 2018 00:03) (72 - 84)  BP: 161/73 (01 Nov 2018 00:03) (115/62 - 172/71)  BP(mean): 116 (01 Nov 2018 00:03) (73 - 116)  RR: 15 (01 Nov 2018 00:03) (15 - 29)  SpO2: 98% (01 Nov 2018 00:03) (92% - 99%)    PHYSICAL EXAM:  General: WDWN  HEENT: NC/AT; PERRL, anicteric sclera; MMM, adentulous  Neck: supple  Cardiovascular: +S1/S2; RRR, no MRG, PPM CDI  Respiratory: CTA B/L; no W/R/R  Gastrointestinal: soft, NT/ND; +BSx4  Extremities: WWP; no edema, clubbing or cyanosis  Vascular: 2+ radial, DP/PT pulses B/L  Neurological: AAOx3; no focal deficits    MEDICATIONS:  MEDICATIONS  (STANDING):  aspirin enteric coated 81 milliGRAM(s) Oral daily  buDESOnide 160 MICROgram(s)/formoterol 4.5 MICROgram(s) Inhaler 2 Puff(s) Inhalation two times a day  dextrose 5%. 1000 milliLiter(s) (50 mL/Hr) IV Continuous <Continuous>  dextrose 50% Injectable 12.5 Gram(s) IV Push once  dextrose 50% Injectable 25 Gram(s) IV Push once  dextrose 50% Injectable 25 Gram(s) IV Push once  finasteride 5 milliGRAM(s) Oral daily  furosemide    Tablet 40 milliGRAM(s) Oral daily  heparin  Injectable 7500 Unit(s) SubCutaneous every 8 hours  insulin lispro (HumaLOG) corrective regimen sliding scale   SubCutaneous Before meals and at bedtime    MEDICATIONS  (PRN):  ALBUTerol/ipratropium for Nebulization 3 milliLiter(s) Nebulizer every 6 hours PRN Shortness of Breath and/or Wheezing  dextrose 40% Gel 15 Gram(s) Oral once PRN Blood Glucose LESS THAN 70 milliGRAM(s)/deciliter  glucagon  Injectable 1 milliGRAM(s) IntraMuscular once PRN Glucose LESS THAN 70 milligrams/deciliter      ALLERGIES:  lisinopril (Angioedema)      LABS:                        12.2   8.8   )-----------( 156      ( 31 Oct 2018 05:07 )             36.1     10-31    135  |  96  |  22  ----------------------------<  126<H>  4.6   |  30  |  1.03    Ca    9.4      31 Oct 2018 05:06  Mg     1.9     10-31    TPro  7.3  /  Alb  4.2  /  TBili  0.5  /  DBili  x   /  AST  33  /  ALT  24  /  AlkPhos  39<L>  10-30    PT/INR - ( 30 Oct 2018 08:55 )   PT: 13.1 sec;   INR: 1.18     PTT - ( 30 Oct 2018 08:55 )  PTT:30.5 sec    POCT Blood Glucose.: 134 mg/dL (31 Oct 2018 21:25)      10-30-18 @ 07:01  -  10-31-18 @ 07:00  --------------------------------------------------------  IN: 0 mL / OUT: 3700 mL / NET: -3700 mL    10-31-18 @ 07:01  -  11-01-18 @ 01:35  --------------------------------------------------------  IN: 1060 mL / OUT: 2800 mL / NET: -1740 mL          RADIOLOGY & ADDITIONAL TESTS: Reviewed.

## 2018-10-31 NOTE — PROGRESS NOTE ADULT - PROBLEM SELECTOR PLAN 9
F: tolerating PO, no IVF  E: replete K<4, Mg<2  N: NPO for possible PPM placement    VTE Prophylaxis: SCDs  C: Full Code  D: CCU F: tolerating PO, no IVF  E: replete K<4, Mg<2  N: NPO for possible PPM placement    VTE Prophylaxis: SCDs; holding HSQ until >24 hours post PPM  C: Full Code  D: CCU

## 2018-10-31 NOTE — DISCHARGE NOTE ADULT - HOSPITAL COURSE
89 y/o M hx of BPH, asthma and non-insulin dependent DM presented with weakness x1 day.  Upon arrival to ED, vitals T-97.8, HR-35, BP- 144/61, RR-16, SpO2-97%. ECG was performed which showed complete heart block and patient was admitted to CCU for further management. In CCU, he denied fatigue, weakness, HA, dizziness or lightheadedness, CP, palpitations, Abd pain, N/V/D/C . He continued to be bradycardic in the 30s-40 with no acute complaints or changes in mentation. TVP was placed for temporary pacing. PPM was placed 10/30 and TVP was removed. HR and symptoms improved. Patient is stable for discharge with followup

## 2018-10-31 NOTE — PROGRESS NOTE ADULT - ATTENDING COMMENTS
89M w/ Asthma, DM and BPH p/w weakness -> found to be in CHB, currently HD stable and asymptomatic    -Pt. is s/p dual-chamber PPM yesterday, uncomplicated and tolerated well; Site is C/D/I  -c/w ASA/Lipitor  -Nebs PRN  -DASH diet  -PT/OT  -Dispo - Step-down to Cardiac Tele w/ possible d/c home tmrw vs Friday depending on needs  -DVT PPx: HSQ    Bull Tuttle MD  CCU Attending

## 2018-10-31 NOTE — DISCHARGE NOTE ADULT - CARE PLAN
Principal Discharge DX:	Complete heart block  Goal:	Prevent recurrence  Assessment and plan of treatment:	You came into the hospital because you were feeling weak and tired. You were found to have a very low heart rate. An EKG was performed which showed that you were in complete heart block. You had a temporary pacemaker placed to keep your heart rate up until your permanent pacemaker was placed.  Your heart is now beating at the correct rate and your symptoms have improved.     For proper healing of your pacemaker, please do not perform heavy lifting >5lbs with the left arm or lift the left arm above the level of the shoulder for six weeks. Please also do not get an MRI for six weeks. You may shower, but avoid tub baths for one month.  Secondary Diagnosis:	Diabetes  Assessment and plan of treatment:	Continue with your home medications. Your A1C this hospitalization was 6.3%.  Secondary Diagnosis:	Asthma  Assessment and plan of treatment:	Continue with your home medications  Secondary Diagnosis:	BPH (benign prostatic hyperplasia)  Assessment and plan of treatment:	Continue with your home medication Principal Discharge DX:	Complete heart block  Goal:	Prevent recurrence  Assessment and plan of treatment:	You came into the hospital because you were feeling weak and tired. You were found to have a very low heart rate. An EKG was performed which showed that you were in complete heart block. You had a temporary pacemaker placed to keep your heart rate up until your permanent pacemaker was placed.  Your heart is now beating at the correct rate and your symptoms have improved.     For proper healing of your pacemaker, please do not perform heavy lifting >5lbs with the left arm or lift the left arm above the level of the shoulder for six weeks. Please also do not get an MRI for six weeks. You may shower, but avoid tub baths for one month.  Secondary Diagnosis:	Diabetes  Assessment and plan of treatment:	Continue with your home medication of Metformin. Your A1C this hospitalization was 6.3%.  Secondary Diagnosis:	Asthma  Assessment and plan of treatment:	Continue with your home medications.  Secondary Diagnosis:	BPH (benign prostatic hyperplasia)  Assessment and plan of treatment:	Continue with your home medication of Finasteride.

## 2018-10-31 NOTE — PROGRESS NOTE ADULT - PROBLEM SELECTOR PLAN 8
1) PCP Contacted on Admission: (Y/N) --> Name & Phone #:PCP: Dr. Mayito Santos   Office number: [phone]   Cell number: [phone]  2) Date of Contact with PCP: 10/30  3) PCP Contacted at Discharge: (Y/N)  4) Summary of Handoff Given to PCP:   5) Post-Discharge Appointment Date and Location:

## 2018-10-31 NOTE — PROGRESS NOTE ADULT - SUBJECTIVE AND OBJECTIVE BOX
Overnight Events: Patient was noted to have pulmonary congestion on CXR and was given lasix 40 IVP x1. TVP was placed for temporary pacing. PPM was placed by EP yesterday afternoon with no complications.  Subjective: Patient was seen and examined at bedside. He has no complaints. He says his chicken last night was the best chicken he's ever had. Denies weakness, fatigue, HA, CP, palpitations, SOB, N/V/D/C.     VITALS  Vital Signs Last 24 Hrs  T(C): 36.5 (31 Oct 2018 06:02), Max: 36.5 (31 Oct 2018 06:02)  T(F): 97.7 (31 Oct 2018 06:02), Max: 97.7 (31 Oct 2018 06:02)  HR: 74 (31 Oct 2018 07:00) (28 - 82)  BP: 119/66 (31 Oct 2018 07:00) (108/54 - 159/65)  BP(mean): 89 (31 Oct 2018 07:00) (59 - 103)  RR: 23 (31 Oct 2018 07:00) (16 - 45)  SpO2: 97% (31 Oct 2018 07:00) (93% - 99%)    I&O's Summary    30 Oct 2018 07:01  -  31 Oct 2018 07:00  --------------------------------------------------------  IN: 0 mL / OUT: 3700 mL / NET: -3700 mL        CAPILLARY BLOOD GLUCOSE      POCT Blood Glucose.: 138 mg/dL (31 Oct 2018 06:32)  POCT Blood Glucose.: 211 mg/dL (30 Oct 2018 21:32)  POCT Blood Glucose.: 162 mg/dL (30 Oct 2018 15:59)  POCT Blood Glucose.: 151 mg/dL (30 Oct 2018 13:34)      PHYSICAL EXAM  General: Elderly obese male laying in bed in NAD  HEENT: PERRL/ EOMI, no scleral icterus, no ptosis, MMM, JVD, no thyromegaly; no teeth (pt does not have dentures with him)  Respiratory: CTA b/l, no wheezes, rales or rhonchi  Chest: PPM in place with dressing CDI; mild pain to palpation; no warmth or erythema.  Cardiovascular: Regular, +S1 + S2  Abdomen: protuberant, soft, NT, normoactive bowel sounds, no rebound, no guarding, no suprapubic tenderness  Extremities: No cyanosis, no clubbing, 2+ pitting edema, pulses equal, no calf tenderness  Skin: No rashes  Neurological: AAOx3; CN II-XII grossly intact, follows commands, moves all extremities    MEDICATIONS  (STANDING):  buDESOnide 160 MICROgram(s)/formoterol 4.5 MICROgram(s) Inhaler 2 Puff(s) Inhalation two times a day  chlorhexidine 2% Cloths 1 Application(s) Topical <User Schedule>  dextrose 5%. 1000 milliLiter(s) (50 mL/Hr) IV Continuous <Continuous>  dextrose 50% Injectable 12.5 Gram(s) IV Push once  dextrose 50% Injectable 25 Gram(s) IV Push once  dextrose 50% Injectable 25 Gram(s) IV Push once  finasteride 5 milliGRAM(s) Oral daily  insulin lispro (HumaLOG) corrective regimen sliding scale   SubCutaneous Before meals and at bedtime    MEDICATIONS  (PRN):  ALBUTerol/ipratropium for Nebulization 3 milliLiter(s) Nebulizer every 6 hours PRN Shortness of Breath and/or Wheezing  dextrose 40% Gel 15 Gram(s) Oral once PRN Blood Glucose LESS THAN 70 milliGRAM(s)/deciliter  glucagon  Injectable 1 milliGRAM(s) IntraMuscular once PRN Glucose LESS THAN 70 milligrams/deciliter      LABS                        12.2   8.8   )-----------( 156      ( 31 Oct 2018 05:07 )             36.1     10-31    135  |  96  |  22  ----------------------------<  126<H>  4.6   |  30  |  1.03    Ca    9.4      31 Oct 2018 05:06  Mg     1.9     10-31    TPro  7.3  /  Alb  4.2  /  TBili  0.5  /  DBili  x   /  AST  33  /  ALT  24  /  AlkPhos  39<L>  10-30    LIVER FUNCTIONS - ( 30 Oct 2018 08:55 )  Alb: 4.2 g/dL / Pro: 7.3 g/dL / ALK PHOS: 39 U/L / ALT: 24 U/L / AST: 33 U/L / GGT: x           PT/INR - ( 30 Oct 2018 08:55 )   PT: 13.1 sec;   INR: 1.18          PTT - ( 30 Oct 2018 08:55 )  PTT:30.5 sec    CARDIAC MARKERS ( 30 Oct 2018 15:00 )  x     / 0.04 ng/mL / 283 U/L / x     / 5.3 ng/mL  CARDIAC MARKERS ( 30 Oct 2018 08:55 )  x     / 0.05 ng/mL / 332 U/L / x     / 6.4 ng/mL        Radiology and other tests: Reviewed Hospital Course:   89 y/o M hx of BPH, asthma and non-insulin dependent DM presents with weakness x1 day.  Upon arrival to ED, vitals T-97.8, HR-35, BP- 144/61, RR-16, SpO2-97%. ECG was performed which showed complete heart block and patient was admitted to CCU for further management. In CCU, he denied fatigue, weakness, HA, dizziness or lightheadedness, CP, palpitations, Abd pain, N/V/D/C . He continued to be bradycardic in the 30s-40 with no acute complaints or changes in mentation. TVP was placed for temporary pacing. PPM was placed 10/30 pm and TVP was removed. HR improved. Patient is stable for stepdown.    Overnight Events: Patient was noted to have pulmonary congestion on CXR and was given lasix 40 IVP x1. TVP was placed for temporary pacing. PPM was placed by EP yesterday afternoon with no complications.  Subjective: Patient was seen and examined at bedside. He has no complaints. He says his chicken last night was the best chicken he's ever had. Denies weakness, fatigue, HA, CP, palpitations, SOB, N/V/D/C.     VITALS  Vital Signs Last 24 Hrs  T(C): 36.5 (31 Oct 2018 06:02), Max: 36.5 (31 Oct 2018 06:02)  T(F): 97.7 (31 Oct 2018 06:02), Max: 97.7 (31 Oct 2018 06:02)  HR: 74 (31 Oct 2018 07:) (28 - 82)  BP: 119/66 (31 Oct 2018 07:00) (108/54 - 159/65)  BP(mean): 89 (31 Oct 2018 07:00) (59 - 103)  RR: 23 (31 Oct 2018 07:00) (16 - 45)  SpO2: 97% (31 Oct 2018 07:00) (93% - 99%)    I&O's Summary    30 Oct 2018 07:01  -  31 Oct 2018 07:00  --------------------------------------------------------  IN: 0 mL / OUT: 3700 mL / NET: -3700 mL        CAPILLARY BLOOD GLUCOSE      POCT Blood Glucose.: 138 mg/dL (31 Oct 2018 06:32)  POCT Blood Glucose.: 211 mg/dL (30 Oct 2018 21:32)  POCT Blood Glucose.: 162 mg/dL (30 Oct 2018 15:59)  POCT Blood Glucose.: 151 mg/dL (30 Oct 2018 13:34)      PHYSICAL EXAM  General: Elderly obese male laying in bed in NAD  HEENT: PERRL/ EOMI, no scleral icterus, no ptosis, MMM, JVD, no thyromegaly; no teeth (pt does not have dentures with him); R cordis in place with dressing CDI  Respiratory: CTA b/l, no wheezes, rales or rhonchi  Chest: PPM in place with dressing CDI; mild pain to palpation; no warmth or erythema.  Cardiovascular: Regular, +S1 + S2  Abdomen: protuberant, soft, NT, normoactive bowel sounds, no rebound, no guarding, no suprapubic tenderness  Extremities: No cyanosis, no clubbing, 2+ pitting edema, pulses equal, no calf tenderness  Skin: No rashes  Neurological: AAOx3; CN II-XII grossly intact, follows commands, moves all extremities    MEDICATIONS  (STANDING):  buDESOnide 160 MICROgram(s)/formoterol 4.5 MICROgram(s) Inhaler 2 Puff(s) Inhalation two times a day  chlorhexidine 2% Cloths 1 Application(s) Topical <User Schedule>  dextrose 5%. 1000 milliLiter(s) (50 mL/Hr) IV Continuous <Continuous>  dextrose 50% Injectable 12.5 Gram(s) IV Push once  dextrose 50% Injectable 25 Gram(s) IV Push once  dextrose 50% Injectable 25 Gram(s) IV Push once  finasteride 5 milliGRAM(s) Oral daily  insulin lispro (HumaLOG) corrective regimen sliding scale   SubCutaneous Before meals and at bedtime    MEDICATIONS  (PRN):  ALBUTerol/ipratropium for Nebulization 3 milliLiter(s) Nebulizer every 6 hours PRN Shortness of Breath and/or Wheezing  dextrose 40% Gel 15 Gram(s) Oral once PRN Blood Glucose LESS THAN 70 milliGRAM(s)/deciliter  glucagon  Injectable 1 milliGRAM(s) IntraMuscular once PRN Glucose LESS THAN 70 milligrams/deciliter      LABS                        12.2   8.8   )-----------( 156      ( 31 Oct 2018 05:07 )             36.1     10-31    135  |  96  |  22  ----------------------------<  126<H>  4.6   |  30  |  1.03    Ca    9.4      31 Oct 2018 05:06  Mg     1.9     10    TPro  7.3  /  Alb  4.2  /  TBili  0.5  /  DBili  x   /  AST  33  /  ALT  24  /  AlkPhos  39<L>  10-30    LIVER FUNCTIONS - ( 30 Oct 2018 08:55 )  Alb: 4.2 g/dL / Pro: 7.3 g/dL / ALK PHOS: 39 U/L / ALT: 24 U/L / AST: 33 U/L / GGT: x           PT/INR - ( 30 Oct 2018 08:55 )   PT: 13.1 sec;   INR: 1.18          PTT - ( 30 Oct 2018 08:55 )  PTT:30.5 sec    CARDIAC MARKERS ( 30 Oct 2018 15:00 )  x     / 0.04 ng/mL / 283 U/L / x     / 5.3 ng/mL  CARDIAC MARKERS ( 30 Oct 2018 08:55 )  x     / 0.05 ng/mL / 332 U/L / x     / 6.4 ng/mL        Radiology and other tests:   EC BPM, atrial sense, RV paced.   CXR: PPM with R Atrial and R ventricular leads; decreased pulm vascular congestion compared to previous day

## 2018-10-31 NOTE — PROGRESS NOTE ADULT - PROBLEM SELECTOR PLAN 1
RESOLVED. Pt w/ AV dissociation w/ complete heart block on admission. S/p PPM 10/30 with resolution of bradycardia. Now asymptomatic.   - continue to monitor  -

## 2018-10-31 NOTE — DISCHARGE NOTE ADULT - CARE PROVIDER_API CALL
Elin Brown), Cardiac Electrophysiology; Cardiovascular Disease; Internal Medicine  [address] [place] [address]  Phone: [phone]  Fax: [phone]    Mayito Santos  Phone: [phone]  Fax: (       -

## 2018-10-31 NOTE — PROCEDURE NOTE - ADDITIONAL PROCEDURE DETAILS
Normal post-procedure device check. Lead measurements appropriate for set parameters. This patient is AP 0%,  100%. No AT/AF.    Pocket incision dressing removed. Incision edges approximated without bleeding or hematoma. Dermabond in place. CXR shows good lead placement and no pneumothorax.     Discussed home care instructions, including no heavy lifting >5lbs with the left arm, no lifting the left arm above the level of the shoulder and no MRI for six weeks. Discussed that he may shower, but avoid tub baths for one month.     Please have the patient follow-up with Dr. Brown on Normal post-procedure device check. Lead measurements appropriate for set parameters. This patient is AP 0%,  100%. No AT/AF.    Pocket incision dressing removed. Incision edges approximated without bleeding or hematoma. Dermabond in place. CXR shows good lead placement and no pneumothorax.     Discussed home care instructions, including no heavy lifting >5lbs with the left arm, no lifting the left arm above the level of the shoulder and no MRI for six weeks. Discussed that he may shower, but avoid tub baths for one month.     Please have the patient follow-up with Dr. Brown on November 19 at 2:10 pm.

## 2018-10-31 NOTE — DISCHARGE NOTE ADULT - PLAN OF CARE
Prevent recurrence You came into the hospital because you were feeling weak and tired. You were found to have a very low heart rate. An EKG was performed which showed that you were in complete heart block. You had a temporary pacemaker placed to keep your heart rate up until your permanent pacemaker was placed.  Your heart is now beating at the correct rate and your symptoms have improved.     For proper healing of your pacemaker, please do not perform heavy lifting >5lbs with the left arm or lift the left arm above the level of the shoulder for six weeks. Please also do not get an MRI for six weeks. You may shower, but avoid tub baths for one month. Continue with your home medications. Your A1C this hospitalization was 6.3%. Continue with your home medications Continue with your home medication Continue with your home medication of Metformin. Your A1C this hospitalization was 6.3%. Continue with your home medications. Continue with your home medication of Finasteride.

## 2018-10-31 NOTE — DISCHARGE NOTE ADULT - PROVIDER TOKENS
TOKEN:'[id number]',FREE:[LAST:[Danielle],FIRST:[Mayito],PHONE:[phone]],FAX:[(   )    -]]

## 2018-10-31 NOTE — PROGRESS NOTE ADULT - PROBLEM SELECTOR PLAN 1
RESOLVED. Pt w/ AV dissociation w/ complete heart block on ECG on admission. No hx of beta blocker or calcium channel blocker usage. No electrolyte abnormalities. HR on admission 25-40 bmp  - PPM placed 10/30  - pt now with HR 80  - continue to monitor RESOLVED. Pt w/ AV dissociation w/ complete heart block on ECG on admission. No hx of beta blocker or calcium channel blocker usage. No electrolyte abnormalities. HR on admission 25-40 bmp  - PPM placed 10/30  - pt now with HR 80  - received 1 dose of vanc 10/31 as post procedure prophylaxis  - continue to monitor

## 2018-11-01 VITALS — TEMPERATURE: 98 F

## 2018-11-01 LAB
ANION GAP SERPL CALC-SCNC: 12 MMOL/L — SIGNIFICANT CHANGE UP (ref 5–17)
BUN SERPL-MCNC: 18 MG/DL — SIGNIFICANT CHANGE UP (ref 7–23)
CALCIUM SERPL-MCNC: 9.8 MG/DL — SIGNIFICANT CHANGE UP (ref 8.4–10.5)
CHLORIDE SERPL-SCNC: 92 MMOL/L — LOW (ref 96–108)
CO2 SERPL-SCNC: 31 MMOL/L — SIGNIFICANT CHANGE UP (ref 22–31)
CREAT SERPL-MCNC: 1 MG/DL — SIGNIFICANT CHANGE UP (ref 0.5–1.3)
GLUCOSE SERPL-MCNC: 182 MG/DL — HIGH (ref 70–99)
HCT VFR BLD CALC: 38.6 % — LOW (ref 39–50)
HGB BLD-MCNC: 13 G/DL — SIGNIFICANT CHANGE UP (ref 13–17)
MCHC RBC-ENTMCNC: 31.3 PG — SIGNIFICANT CHANGE UP (ref 27–34)
MCHC RBC-ENTMCNC: 33.7 G/DL — SIGNIFICANT CHANGE UP (ref 32–36)
MCV RBC AUTO: 92.8 FL — SIGNIFICANT CHANGE UP (ref 80–100)
PLATELET # BLD AUTO: 167 K/UL — SIGNIFICANT CHANGE UP (ref 150–400)
POTASSIUM SERPL-MCNC: 4.3 MMOL/L — SIGNIFICANT CHANGE UP (ref 3.5–5.3)
POTASSIUM SERPL-SCNC: 4.3 MMOL/L — SIGNIFICANT CHANGE UP (ref 3.5–5.3)
RBC # BLD: 4.16 M/UL — LOW (ref 4.2–5.8)
RBC # FLD: 13.2 % — SIGNIFICANT CHANGE UP (ref 10.3–16.9)
SODIUM SERPL-SCNC: 135 MMOL/L — SIGNIFICANT CHANGE UP (ref 135–145)
WBC # BLD: 10.9 K/UL — HIGH (ref 3.8–10.5)
WBC # FLD AUTO: 10.9 K/UL — HIGH (ref 3.8–10.5)

## 2018-11-01 PROCEDURE — 99238 HOSP IP/OBS DSCHRG MGMT 30/<: CPT

## 2018-11-01 PROCEDURE — 71045 X-RAY EXAM CHEST 1 VIEW: CPT | Mod: 26

## 2018-11-01 RX ORDER — ASPIRIN/CALCIUM CARB/MAGNESIUM 324 MG
1 TABLET ORAL
Qty: 0 | Refills: 0 | DISCHARGE
Start: 2018-11-01

## 2018-11-01 RX ADMIN — BUDESONIDE AND FORMOTEROL FUMARATE DIHYDRATE 2 PUFF(S): 160; 4.5 AEROSOL RESPIRATORY (INHALATION) at 06:39

## 2018-11-01 RX ADMIN — Medication 81 MILLIGRAM(S): at 11:32

## 2018-11-01 RX ADMIN — FINASTERIDE 5 MILLIGRAM(S): 5 TABLET, FILM COATED ORAL at 11:32

## 2018-11-01 RX ADMIN — Medication 1: at 07:24

## 2018-11-01 RX ADMIN — Medication 1: at 11:32

## 2018-11-01 RX ADMIN — Medication 40 MILLIGRAM(S): at 06:39

## 2018-11-01 RX ADMIN — HEPARIN SODIUM 7500 UNIT(S): 5000 INJECTION INTRAVENOUS; SUBCUTANEOUS at 06:39

## 2018-11-01 NOTE — PROGRESS NOTE ADULT - PROBLEM SELECTOR PLAN 7
·  Problem: Transition of care performed with sharing of clinical summary.  Plan: 1) PCP Contacted on Admission: (Y/N) --> Name & Phone #:PCP: Dr. Mayito Santos   Office number: [phone]   Cell number: [phone]  2) Date of Contact with PCP: 10/30  3) PCP Contacted at Discharge: (Y/N)  4) Summary of Handoff Given to PCP:   5) Post-Discharge Appointment Date and Location
F: tolerating PO, no IVF  E: replete K<4, Mg<2  N:   VTE Prophylaxis: SCDs; HSQ  C: Full Code  D: Tele
Patient w/ hx of asthma on advair  - c/w home advair  - duonebs PRN

## 2018-11-01 NOTE — PHYSICAL THERAPY INITIAL EVALUATION ADULT - ADDITIONAL COMMENTS
Patient lives with family with apt building with 1 flight to enter. Patient endorses use of SC at baseline for ADL's and ambulation.

## 2018-11-01 NOTE — PROGRESS NOTE ADULT - PROBLEM SELECTOR PLAN 6
F: None  E: Replete PRN  N: DASH/TLC consistent carb
Hx of BPH on finasteride. S/p TURP.  - c/w home finasteride 5mg qd
Pt w/ B/L LE edema on exam. On Lasix 40mg qd at home. No hx of CHF  - will restart lasix 40 PO qd   - TTE performed 2/2018 w/ EF 55%  - TTE this admission poor quality w/ likely normal EF    #hyponatremia  RESOLVED  - pt w/ sodium to 129 on admission likely in the setting of hypervolemic hyponatremia and chronic lasix usage  - received lasix 40 IVP x1  - Na now 135  - continue to monitor

## 2018-11-01 NOTE — PROGRESS NOTE ADULT - PROBLEM SELECTOR PLAN 5
C/w Finasteride 5mg daily
Patient w/ hx of asthma on Advair  - c/w Symbicort  - Duonebs PRN
RESOLVED. Pt w/ leukocytosis to 10.3 on admission with predominant neutrophilia. Likely reactive in the setting of acute onset of complete heart block.  - CXR without obvious infiltrates, no urinary sxs, no obvious signs of infection  - WBC 8.8 today  - continue to trend

## 2018-11-01 NOTE — PROGRESS NOTE ADULT - PROBLEM SELECTOR PROBLEM 6
Nutrition, metabolism, and development symptoms
BPH (benign prostatic hyperplasia)
Lower extremity edema

## 2018-11-01 NOTE — PROGRESS NOTE ADULT - PROBLEM SELECTOR PLAN 3
A1C of 6.3.   -C/w ISS   -Titrate insulin as required
Pt has hx of T2 DM on metformin at home. Baseline A1C 7% on 8/22/18  - A1C this admission 6.3%  - holding metformin; will c/w ISS
RESOLVED. Pt with elevated troponin to 0.05 which peaked. Likely type 2 MI 2/2 demand ischemia from heart block.

## 2018-11-01 NOTE — PHYSICAL THERAPY INITIAL EVALUATION ADULT - CRITERIA FOR SKILLED THERAPEUTIC INTERVENTIONS
therapy frequency/anticipated discharge recommendation/rehab potential/anticipated equipment needs at discharge/predicted duration of therapy intervention/impairments found/functional limitations in following categories/risk reduction/prevention

## 2018-11-01 NOTE — PROGRESS NOTE ADULT - PROBLEM SELECTOR PLAN 2
Patient w/ baseline 1+ LE edema on Lasix 40mg daily at home. No reported hx of CHF.   -Echo w/ LVH, abnormal septal motion consistent w/ abnormal conduction. Normal LV wall motion. LVEF normal. RV size and function normal. LA and RA normal size. Calcified aortic valve. Mild AR, no aortic stenosis. Normal mitral valve. Normal tricuspid valve. The pulmonary artery systolic pressure is estimated to be   39mmHg.Structurally normal pulmonic valve. There is trace pulmonic regurgitation  -Continue w/ Lasix 40mg daily
DM2 on metformin at home. A1C 6.3%  - c/w ISS  - monitor FSG, adjust insulin regimen
RESOLVED. Pt with symptomatic bradycardia in the setting of complete heart block.  - s/p PPM placement w/ resolution as above

## 2018-11-01 NOTE — PROGRESS NOTE ADULT - ASSESSMENT
89 y/o M w/ PMHx notable for BPH, DM2, well controlled asthma who was admitted to CCU for complete heart block now s/p PPM placement on 10/31 and stepped down to tele.
89 y/o M hx of BPH, asthma, and non-insulin dependent DM admitted to CCU for complete heart block s/p PPM placement 10/30
89M with PMH of BPH, asthma, DM2, who was admitted to CCU for CHB s/p PPM placement 10/30, now stable for stepdown to tele.

## 2018-11-01 NOTE — PROGRESS NOTE ADULT - PROBLEM SELECTOR PLAN 4
Well controlled  -C/w Duonebs PRN  -C/w Symbicort BID
Baseline LE edema. On Lasix 40mg qd at home. No hx of CHF.  - TTE performed 2/2018 w/ EF 55%, TTE this admission poor quality   - c/w home Lasix 40mg daily
RESOLVED. Pt with elevated troponin to 0.05. Likely type 2 in response demand ischemia from heart block  - peaked and trended down to 0.04  - no need to continue trending

## 2018-11-01 NOTE — PROGRESS NOTE ADULT - PROBLEM SELECTOR PLAN 1
Present on admission. Patient w/ AV dissociation w/ complete heart block on admission. Status post PPM on 10/30 with resolution of bradycardia. Now in normal sinus rhythm and asymptomatic.   -PPM site clean    #Elevated troponin  -Resolved. Pt w/ troponin of 0.05 on admssion. Etiology likely secondary to demand in the setting of CHD  -Trops resolved 0.05-->0.04

## 2018-11-01 NOTE — PROGRESS NOTE ADULT - PROBLEM SELECTOR PROBLEM 7
Transition of care performed with sharing of clinical summary
Asthma
Nutrition, metabolism, and development symptoms

## 2018-11-01 NOTE — PROGRESS NOTE ADULT - SUBJECTIVE AND OBJECTIVE BOX
INTERVAL HPI/OVERNIGHT EVENTS: No acute events overnight.     Subjective: Patient seen and examined at bedside. No acute distress. Patient reports that he is feeling well. Complaining of soreness in right PPM placement. Denies chest pain, shortness of breath, abdominal pain, nausea, vomiting, fever/chills.     VITAL SIGNS:  T(F): 98.9 (11-01-18 @ 05:40)  HR: 90 (11-01-18 @ 05:27)  BP: 112/63 (11-01-18 @ 05:27)  RR: 15 (11-01-18 @ 00:03)  SpO2: 98% (11-01-18 @ 00:03)  Wt(kg): --    PHYSICAL EXAM:  General: Obese,  male sitting in chair comfortably. NAD   HEENT: Normocephalic, Atraumtic. PERRL. MMM  Respiratory: Normal breath sounds b/l. No wheezes, crackles, rhonchi.  Cardiovascular: Normal S1 and S2. RRR. No rubs, murmurs, gallops. Right PPM site clean/dry/intact. Slight erythema at suture site. No drainage. Slight tenderness to palpation.  Gastrointestinal: Soft, obese, NT, ND. Normal BS.    Extremities: Trace LE edema. 1+.   Skin: No rashes or ulcers. No clubbing/cyanosis.   Vascular: 2+ dorsalis pedis pulses b/l  Neurological: No focal deficits. AAOx3.       MEDICATIONS  (STANDING):  aspirin enteric coated 81 milliGRAM(s) Oral daily  buDESOnide 160 MICROgram(s)/formoterol 4.5 MICROgram(s) Inhaler 2 Puff(s) Inhalation two times a day  dextrose 5%. 1000 milliLiter(s) (50 mL/Hr) IV Continuous <Continuous>  dextrose 50% Injectable 12.5 Gram(s) IV Push once  dextrose 50% Injectable 25 Gram(s) IV Push once  dextrose 50% Injectable 25 Gram(s) IV Push once  finasteride 5 milliGRAM(s) Oral daily  furosemide    Tablet 40 milliGRAM(s) Oral daily  heparin  Injectable 7500 Unit(s) SubCutaneous every 8 hours  insulin lispro (HumaLOG) corrective regimen sliding scale   SubCutaneous Before meals and at bedtime    MEDICATIONS  (PRN):  ALBUTerol/ipratropium for Nebulization 3 milliLiter(s) Nebulizer every 6 hours PRN Shortness of Breath and/or Wheezing  dextrose 40% Gel 15 Gram(s) Oral once PRN Blood Glucose LESS THAN 70 milliGRAM(s)/deciliter  glucagon  Injectable 1 milliGRAM(s) IntraMuscular once PRN Glucose LESS THAN 70 milligrams/deciliter      Allergies    lisinopril (Angioedema)    Intolerances        LABS:                        12.2   8.8   )-----------( 156      ( 31 Oct 2018 05:07 )             36.1     10-31    135  |  96  |  22  ----------------------------<  126<H>  4.6   |  30  |  1.03    Ca    9.4      31 Oct 2018 05:06  Mg     1.9     10-31    TPro  7.3  /  Alb  4.2  /  TBili  0.5  /  DBili  x   /  AST  33  /  ALT  24  /  AlkPhos  39<L>  10-30    PT/INR - ( 30 Oct 2018 08:55 )   PT: 13.1 sec;   INR: 1.18          PTT - ( 30 Oct 2018 08:55 )  PTT:30.5 sec      RADIOLOGY & ADDITIONAL TESTS: Reviewed.

## 2018-11-05 RX ORDER — FLUTICASONE PROPIONATE 50 MCG
1 SPRAY, SUSPENSION NASAL
Qty: 0 | Refills: 0 | COMMUNITY

## 2018-11-05 RX ORDER — FAMOTIDINE 10 MG/ML
1 INJECTION INTRAVENOUS
Qty: 0 | Refills: 0 | COMMUNITY

## 2018-11-05 RX ORDER — ASPIRIN/CALCIUM CARB/MAGNESIUM 324 MG
1 TABLET ORAL
Qty: 0 | Refills: 0 | COMMUNITY

## 2018-11-07 DIAGNOSIS — Z79.84 LONG TERM (CURRENT) USE OF ORAL HYPOGLYCEMIC DRUGS: ICD-10-CM

## 2018-11-07 DIAGNOSIS — Z98.890 OTHER SPECIFIED POSTPROCEDURAL STATES: ICD-10-CM

## 2018-11-07 DIAGNOSIS — E87.1 HYPO-OSMOLALITY AND HYPONATREMIA: ICD-10-CM

## 2018-11-07 DIAGNOSIS — E11.9 TYPE 2 DIABETES MELLITUS WITHOUT COMPLICATIONS: ICD-10-CM

## 2018-11-07 DIAGNOSIS — Z87.891 PERSONAL HISTORY OF NICOTINE DEPENDENCE: ICD-10-CM

## 2018-11-07 DIAGNOSIS — I21.A1 MYOCARDIAL INFARCTION TYPE 2: ICD-10-CM

## 2018-11-07 DIAGNOSIS — I44.2 ATRIOVENTRICULAR BLOCK, COMPLETE: ICD-10-CM

## 2018-11-07 DIAGNOSIS — E66.01 MORBID (SEVERE) OBESITY DUE TO EXCESS CALORIES: ICD-10-CM

## 2018-11-07 DIAGNOSIS — I25.2 OLD MYOCARDIAL INFARCTION: ICD-10-CM

## 2018-11-07 DIAGNOSIS — J45.909 UNSPECIFIED ASTHMA, UNCOMPLICATED: ICD-10-CM

## 2018-11-19 ENCOUNTER — APPOINTMENT (OUTPATIENT)
Dept: HEART AND VASCULAR | Facility: CLINIC | Age: 83
End: 2018-11-19
Payer: MEDICARE

## 2018-11-19 DIAGNOSIS — J45.909 UNSPECIFIED ASTHMA, UNCOMPLICATED: ICD-10-CM

## 2018-11-19 DIAGNOSIS — E11.9 TYPE 2 DIABETES MELLITUS W/OUT COMPLICATIONS: ICD-10-CM

## 2018-11-19 DIAGNOSIS — N40.0 BENIGN PROSTATIC HYPERPLASIA WITHOUT LOWER URINARY TRACT SYMPMS: ICD-10-CM

## 2018-11-19 DIAGNOSIS — I44.2 ATRIOVENTRICULAR BLOCK, COMPLETE: ICD-10-CM

## 2018-11-19 PROBLEM — N42.9 DISORDER OF PROSTATE, UNSPECIFIED: Chronic | Status: ACTIVE | Noted: 2018-10-30

## 2018-11-19 PROCEDURE — 93280 PM DEVICE PROGR EVAL DUAL: CPT

## 2018-11-19 PROCEDURE — 99024 POSTOP FOLLOW-UP VISIT: CPT

## 2018-11-21 PROCEDURE — 94640 AIRWAY INHALATION TREATMENT: CPT

## 2018-11-21 PROCEDURE — 83735 ASSAY OF MAGNESIUM: CPT

## 2018-11-21 PROCEDURE — C1769: CPT

## 2018-11-21 PROCEDURE — 99285 EMERGENCY DEPT VISIT HI MDM: CPT | Mod: 25

## 2018-11-21 PROCEDURE — 85025 COMPLETE CBC W/AUTO DIFF WBC: CPT

## 2018-11-21 PROCEDURE — 86901 BLOOD TYPING SEROLOGIC RH(D): CPT

## 2018-11-21 PROCEDURE — 86900 BLOOD TYPING SEROLOGIC ABO: CPT

## 2018-11-21 PROCEDURE — 71045 X-RAY EXAM CHEST 1 VIEW: CPT

## 2018-11-21 PROCEDURE — C1713: CPT

## 2018-11-21 PROCEDURE — 93005 ELECTROCARDIOGRAM TRACING: CPT

## 2018-11-21 PROCEDURE — 85027 COMPLETE CBC AUTOMATED: CPT

## 2018-11-21 PROCEDURE — C1898: CPT

## 2018-11-21 PROCEDURE — 85730 THROMBOPLASTIN TIME PARTIAL: CPT

## 2018-11-21 PROCEDURE — 86850 RBC ANTIBODY SCREEN: CPT

## 2018-11-21 PROCEDURE — 80048 BASIC METABOLIC PNL TOTAL CA: CPT

## 2018-11-21 PROCEDURE — 82553 CREATINE MB FRACTION: CPT

## 2018-11-21 PROCEDURE — 80061 LIPID PANEL: CPT

## 2018-11-21 PROCEDURE — 83036 HEMOGLOBIN GLYCOSYLATED A1C: CPT

## 2018-11-21 PROCEDURE — 84443 ASSAY THYROID STIM HORMONE: CPT

## 2018-11-21 PROCEDURE — 84484 ASSAY OF TROPONIN QUANT: CPT

## 2018-11-21 PROCEDURE — 93306 TTE W/DOPPLER COMPLETE: CPT

## 2018-11-21 PROCEDURE — 85610 PROTHROMBIN TIME: CPT

## 2018-11-21 PROCEDURE — C1785: CPT

## 2018-11-21 PROCEDURE — 82962 GLUCOSE BLOOD TEST: CPT

## 2018-11-21 PROCEDURE — 80053 COMPREHEN METABOLIC PANEL: CPT

## 2018-11-21 PROCEDURE — 82550 ASSAY OF CK (CPK): CPT

## 2018-11-21 PROCEDURE — 36415 COLL VENOUS BLD VENIPUNCTURE: CPT

## 2018-11-21 PROCEDURE — C1894: CPT

## 2018-11-24 PROBLEM — I44.2 COMPLETE HEART BLOCK: Status: ACTIVE | Noted: 2018-11-24

## 2018-11-24 PROBLEM — E11.9 DIABETES MELLITUS, TYPE II: Status: ACTIVE | Noted: 2018-11-24

## 2018-11-24 PROBLEM — J45.909 ASTHMA: Status: ACTIVE | Noted: 2018-11-24

## 2018-11-24 PROBLEM — N40.0 BPH (BENIGN PROSTATIC HYPERPLASIA): Status: ACTIVE | Noted: 2018-11-24

## 2018-11-24 RX ORDER — METFORMIN HYDROCHLORIDE 625 MG/1
TABLET ORAL
Refills: 0 | Status: ACTIVE | COMMUNITY

## 2018-11-24 RX ORDER — ASPIRIN 81 MG
81 TABLET, DELAYED RELEASE (ENTERIC COATED) ORAL
Refills: 0 | Status: ACTIVE | COMMUNITY

## 2018-11-24 RX ORDER — FLUTICASONE PROPION/SALMETEROL 500-50 MCG
BLISTER, WITH INHALATION DEVICE INHALATION
Refills: 0 | Status: ACTIVE | COMMUNITY

## 2018-11-24 RX ORDER — FINASTERIDE 5 MG/1
5 TABLET, FILM COATED ORAL
Refills: 0 | Status: ACTIVE | COMMUNITY

## 2018-11-24 NOTE — HISTORY OF PRESENT ILLNESS
[de-identified] : Mr. Samuels is a pleasant 89 year-old gentleman with a past medical history significant for BPH, Asthma, DM II and complete heart block with an escape in the 30-40 bpm range.  He is s/p PPM placement 10/30/18 and presents for post procedure follow-up today.  He feels well and offers no device related complaints.

## 2018-11-24 NOTE — PHYSICAL EXAM
[Left Infraclavicular] : left infraclavicular area [Clean] : clean [Dry] : dry [Well-Healed] : well-healed

## 2018-11-24 NOTE — DISCUSSION/SUMMARY
[FreeTextEntry1] : Mr. Samuels is a pleasant 89 year-old gentleman with CHB s/p PPM placement 10/30/18.  His device is functioning appropriately as programmed and all measured data is WNL.  He is pacemaker dependent and we will check an echocardiogram at his next visit in six months to evaluate for development of pacer induced cardiomyopathy.  He is advised to call with any questions or concerns in the interim.

## 2018-11-24 NOTE — PROCEDURE
[Complete Heart Block] : complete heart block [Pacemaker] : pacemaker [Normal] : The battery status is normal. [Lead Imp:  ___ohms] : lead impedance was [unfilled] ohms [Sensing Amplitude ___mv] : sensing amplitude was [unfilled] mv [___V @] : [unfilled] V [___ ms] : [unfilled] ms [Programmed for Longevity] : output reprogrammed for improved battery longevity [de-identified] : JACK [de-identified] : CAMILLE [de-identified] : 10/30/18

## 2019-02-03 ENCOUNTER — FORM ENCOUNTER (OUTPATIENT)
Age: 84
End: 2019-02-03

## 2019-02-04 ENCOUNTER — APPOINTMENT (OUTPATIENT)
Dept: HEART AND VASCULAR | Facility: CLINIC | Age: 84
End: 2019-02-04
Payer: MEDICARE

## 2019-02-04 ENCOUNTER — OUTPATIENT (OUTPATIENT)
Dept: OUTPATIENT SERVICES | Facility: HOSPITAL | Age: 84
LOS: 1 days | End: 2019-02-04
Payer: MEDICARE

## 2019-02-04 VITALS — HEART RATE: 85 BPM | SYSTOLIC BLOOD PRESSURE: 113 MMHG | DIASTOLIC BLOOD PRESSURE: 53 MMHG

## 2019-02-04 DIAGNOSIS — I44.2 ATRIOVENTRICULAR BLOCK, COMPLETE: ICD-10-CM

## 2019-02-04 DIAGNOSIS — Z90.79 ACQUIRED ABSENCE OF OTHER GENITAL ORGAN(S): Chronic | ICD-10-CM

## 2019-02-04 DIAGNOSIS — Z98.41 CATARACT EXTRACTION STATUS, RIGHT EYE: Chronic | ICD-10-CM

## 2019-02-04 DIAGNOSIS — Z98.42 CATARACT EXTRACTION STATUS, LEFT EYE: Chronic | ICD-10-CM

## 2019-02-04 PROCEDURE — 93306 TTE W/DOPPLER COMPLETE: CPT

## 2019-02-04 PROCEDURE — 93280 PM DEVICE PROGR EVAL DUAL: CPT

## 2019-02-04 PROCEDURE — 93306 TTE W/DOPPLER COMPLETE: CPT | Mod: 26

## 2019-02-06 NOTE — DISCUSSION/SUMMARY
[FreeTextEntry1] : Mr. Samuels is a pleasant 89 year-old gentleman with CHB s/p PPM placement 10/30/18.  His device is functioning appropriately as programmed and all measured data is WNL.  He is pacemaker dependent.  Echocardiogram today with preserved LV function.  Will monitor annual echo for development of pacer induced cardiomyopathy.  He was asked to return for follow-up in six months and to call with any questions or concerns in the interim.

## 2019-02-06 NOTE — HISTORY OF PRESENT ILLNESS
[de-identified] : Mr. Samuels is a pleasant 89 year-old gentleman with a past medical history significant for BPH, Asthma, DM II and complete heart block with an escape in the 30-40 bpm range.  He is s/p PPM placement 10/30/18 and presents for routine follow-up today.  He feels well and offers no device related complaints.\par \par ECHO 2/4/19 EF 60-65%

## 2019-02-06 NOTE — PROCEDURE
[Complete Heart Block] : complete heart block [Pacemaker] : pacemaker [Normal] : The battery status is normal. [Lead Imp:  ___ohms] : lead impedance was [unfilled] ohms [Sensing Amplitude ___mv] : sensing amplitude was [unfilled] mv [___V @] : [unfilled] V [___ ms] : [unfilled] ms [Programmed for Longevity] : output reprogrammed for improved battery longevity [de-identified] : n [de-identified] : JACK [de-identified] : CAMILLE [de-identified] : 10/30/18

## 2019-08-05 ENCOUNTER — APPOINTMENT (OUTPATIENT)
Dept: HEART AND VASCULAR | Facility: CLINIC | Age: 84
End: 2019-08-05
Payer: MEDICARE

## 2019-08-05 VITALS — SYSTOLIC BLOOD PRESSURE: 133 MMHG | DIASTOLIC BLOOD PRESSURE: 63 MMHG | HEART RATE: 82 BPM

## 2019-08-05 PROCEDURE — 93280 PM DEVICE PROGR EVAL DUAL: CPT

## 2019-08-07 NOTE — DISCUSSION/SUMMARY
[FreeTextEntry1] : Mr. Samuels is a pleasant 89 year-old gentleman with CHB s/p PPM placement 10/30/18.  His device is functioning appropriately as programmed and all measured data is WNL.  He is pacemaker dependent.  Echocardiogram 2/2019 with preserved LV function.  Will monitor annual echo for development of pacer induced cardiomyopathy.  There are no contraindications from an EP perspective to his undergoing knee replacement surgery.  No aruna procedural programming changes recommended.  He was asked to return for follow-up in six months and to call with any questions or concerns in the interim.

## 2019-08-07 NOTE — PROCEDURE
[Complete Heart Block] : complete heart block [Pacemaker] : pacemaker [Normal] : The battery status is normal. [Lead Imp:  ___ohms] : lead impedance was [unfilled] ohms [Sensing Amplitude ___mv] : sensing amplitude was [unfilled] mv [___V @] : [unfilled] V [___ ms] : [unfilled] ms [Programmed for Longevity] : output reprogrammed for improved battery longevity [de-identified] : JACK [de-identified] : CAMILLE [de-identified] : 10/30/18

## 2019-08-07 NOTE — HISTORY OF PRESENT ILLNESS
[de-identified] : Mr. Samuels is a pleasant 89 year-old gentleman with a past medical history significant for BPH, Asthma, DM II and complete heart block with an escape in the 30-40 bpm range.  He is s/p PPM placement 10/30/18 and presents for routine follow-up today.  He feels well and offers no device related complaints.  He is accompanied by his family today and report he is considering undergo a knee replacement.\par \par ECHO 2/4/19 EF 60-65%

## 2020-02-02 ENCOUNTER — FORM ENCOUNTER (OUTPATIENT)
Age: 85
End: 2020-02-02

## 2020-02-03 ENCOUNTER — APPOINTMENT (OUTPATIENT)
Dept: HEART AND VASCULAR | Facility: CLINIC | Age: 85
End: 2020-02-03
Payer: MEDICARE

## 2020-02-03 ENCOUNTER — OUTPATIENT (OUTPATIENT)
Dept: OUTPATIENT SERVICES | Facility: HOSPITAL | Age: 85
LOS: 1 days | End: 2020-02-03
Payer: MEDICARE

## 2020-02-03 VITALS — SYSTOLIC BLOOD PRESSURE: 129 MMHG | DIASTOLIC BLOOD PRESSURE: 61 MMHG | HEART RATE: 88 BPM

## 2020-02-03 DIAGNOSIS — Z90.79 ACQUIRED ABSENCE OF OTHER GENITAL ORGAN(S): Chronic | ICD-10-CM

## 2020-02-03 DIAGNOSIS — I44.2 ATRIOVENTRICULAR BLOCK, COMPLETE: ICD-10-CM

## 2020-02-03 DIAGNOSIS — Z98.41 CATARACT EXTRACTION STATUS, RIGHT EYE: Chronic | ICD-10-CM

## 2020-02-03 DIAGNOSIS — Z98.42 CATARACT EXTRACTION STATUS, LEFT EYE: Chronic | ICD-10-CM

## 2020-02-03 PROCEDURE — 93306 TTE W/DOPPLER COMPLETE: CPT | Mod: 26

## 2020-02-03 PROCEDURE — 93280 PM DEVICE PROGR EVAL DUAL: CPT

## 2020-02-03 PROCEDURE — 93306 TTE W/DOPPLER COMPLETE: CPT

## 2020-02-03 NOTE — PROCEDURE
[Complete Heart Block] : complete heart block [Pacemaker] : pacemaker [Normal] : The battery status is normal. [Lead Imp:  ___ohms] : lead impedance was [unfilled] ohms [Sensing Amplitude ___mv] : sensing amplitude was [unfilled] mv [___V @] : [unfilled] V [___ ms] : [unfilled] ms [Programmed for Longevity] : output reprogrammed for improved battery longevity [de-identified] : JACK [de-identified] : CAMILLE [de-identified] : AHR events c/w oversensing/crosstalk\par One brief NSVT event 8 beats [de-identified] : 10/30/18

## 2020-02-03 NOTE — DISCUSSION/SUMMARY
[FreeTextEntry1] : Mr. Samuels is a pleasant 89 year-old gentleman with CHB s/p PPM placement 10/30/18.  His device is functioning appropriately as programmed and all measured data is WNL.  He is pacemaker dependent without a reliable escape on interrogation today.  Echocardiogram 2/2019 with preserved LV function.  Will monitor annual echo for development of pacer induced cardiomyopathy; ordered today.  He was asked to return for follow-up in six months and to call with any questions or concerns in the interim.

## 2020-02-03 NOTE — HISTORY OF PRESENT ILLNESS
[de-identified] : Mr. Samules is a pleasant 89 year-old gentleman with a past medical history significant for BPH, Asthma, DM II and complete heart block with an escape in the 30-40 bpm range.  He is s/p PPM placement 10/30/18 and presents for routine follow-up today.  He feels well and offers no device related complaints.  LE edema is unchanged recently.  He is accompanied by his family today. \par \par ECHO 2/4/19 EF 60-65%

## 2020-08-03 ENCOUNTER — APPOINTMENT (OUTPATIENT)
Dept: HEART AND VASCULAR | Facility: CLINIC | Age: 85
End: 2020-08-03

## 2020-08-10 ENCOUNTER — APPOINTMENT (OUTPATIENT)
Dept: HEART AND VASCULAR | Facility: CLINIC | Age: 85
End: 2020-08-10
Payer: MEDICARE

## 2020-08-10 PROCEDURE — 93280 PM DEVICE PROGR EVAL DUAL: CPT

## 2020-08-16 NOTE — DISCUSSION/SUMMARY
[FreeTextEntry1] : Mr. Samuels is a pleasant 89 year-old gentleman with CHB s/p PPM placement 10/30/18.  His device is functioning appropriately as programmed and all measured data is WNL.  He is pacemaker dependent without a reliable escape on interrogation today.  Echocardiogram 2/2020 with preserved LV function.  Will monitor annual echo for development of pacer induced cardiomyopathy.  He was asked to return for follow-up in six months and to call with any questions or concerns in the interim.

## 2020-08-16 NOTE — HISTORY OF PRESENT ILLNESS
[de-identified] : Mr. Samuels is a pleasant 89 year-old gentleman with a past medical history significant for BPH, Asthma, DM II and complete heart block with an escape in the 30-40 bpm range.  He is s/p PPM placement 10/30/18 and presents for routine follow-up today.  He feels well and offers no device related complaints.  LE edema is unchanged recently.  He is accompanied by his family today. \par \par ECHO 2/3/20 EF 60%

## 2020-08-16 NOTE — REASON FOR VISIT
[Home] : at home, [unfilled] , at the time of the visit. [Medical Office: ([facility])___] : at the medical office located in  [Family Member] : family member [Verbal consent obtained from patient] : the patient, [unfilled] [Follow-up Device Check] : follow-up device check visit

## 2020-08-16 NOTE — PROCEDURE
[Complete Heart Block] : complete heart block [Pacemaker] : pacemaker [Normal] : The battery status is normal. [Lead Imp:  ___ohms] : lead impedance was [unfilled] ohms [Sensing Amplitude ___mv] : sensing amplitude was [unfilled] mv [___V @] : [unfilled] V [___ ms] : [unfilled] ms [Programmed for Longevity] : output reprogrammed for improved battery longevity [de-identified] : JACK [de-identified] : CAMILLE [de-identified] : 10/30/18 [de-identified] : 10.8 years [de-identified] : AHR events c/w oversensing/crosstalk\par One brief NSVT event 8 beats

## 2020-09-21 ENCOUNTER — APPOINTMENT (OUTPATIENT)
Dept: HEART AND VASCULAR | Facility: CLINIC | Age: 85
End: 2020-09-21
Payer: MEDICARE

## 2020-09-21 VITALS — DIASTOLIC BLOOD PRESSURE: 64 MMHG | HEART RATE: 85 BPM | SYSTOLIC BLOOD PRESSURE: 132 MMHG | TEMPERATURE: 99 F

## 2020-09-21 PROCEDURE — 93280 PM DEVICE PROGR EVAL DUAL: CPT

## 2020-09-21 RX ORDER — ALBUTEROL SULFATE 90 UG/1
108 (90 BASE) AEROSOL, METERED RESPIRATORY (INHALATION)
Refills: 0 | Status: ACTIVE | COMMUNITY

## 2020-09-23 NOTE — HISTORY OF PRESENT ILLNESS
[de-identified] : 89 y/o M with a past medical history significant for BPH, Asthma, DM II and complete heart block with an escape in the 30-40 bpm range.  He is s/p PPM placement 10/30/18 (Medtronic) and presents for routine follow-up today.  He feels well and offers no device related complaints.  LE edema is unchanged recently.  He is accompanied by his family today. \par \par ECHO 2/4/19 EF 60-65%

## 2020-09-23 NOTE — PROCEDURE
[Complete Heart Block] : complete heart block [Pacemaker] : pacemaker [Normal] : The battery status is normal. [Lead Imp:  ___ohms] : lead impedance was [unfilled] ohms [Sensing Amplitude ___mv] : sensing amplitude was [unfilled] mv [___V @] : [unfilled] V [___ ms] : [unfilled] ms [Programmed for Longevity] : output reprogrammed for improved battery longevity [de-identified] : JACK [de-identified] : CAMILLE [de-identified] : [id number] [de-identified] : 10/30/18 [de-identified] : 60110 [de-identified] : 10.2 years  [de-identified] : CHanged RA sensitivity from 0.3 mv to 0.6 mv due to oversensing / crosstalk in A channel.  [de-identified] : AHR events c/w oversensing/crosstalk\par AP 8.7 %\par  100%

## 2020-09-23 NOTE — DISCUSSION/SUMMARY
[FreeTextEntry1] : 89 y/o M with CHB s/p PPM (Medtronic) 10/30/18.  There was RA oversensing, as such we decrease sensitivity to 0.6 mv.  Otherwise, device is functioning appropriately as programmed and all measured data is WNL. He is pacemaker dependent without a reliable escape on interrogation today.  Echocardiogram 2/2020 with preserved LV function.  Will monitor annual echo for development of pacer induced cardiomyopathy. \par F/u with us in 6 months.

## 2020-11-11 ENCOUNTER — INPATIENT (INPATIENT)
Facility: HOSPITAL | Age: 85
LOS: 9 days | Discharge: HOME HEALTH | DRG: 292 | End: 2020-11-21
Attending: INTERNAL MEDICINE | Admitting: INTERNAL MEDICINE
Payer: MEDICARE

## 2020-11-11 VITALS
HEART RATE: 56 BPM | HEIGHT: 71 IN | OXYGEN SATURATION: 98 % | SYSTOLIC BLOOD PRESSURE: 133 MMHG | DIASTOLIC BLOOD PRESSURE: 70 MMHG | TEMPERATURE: 98 F

## 2020-11-11 DIAGNOSIS — Z98.41 CATARACT EXTRACTION STATUS, RIGHT EYE: Chronic | ICD-10-CM

## 2020-11-11 DIAGNOSIS — Z98.42 CATARACT EXTRACTION STATUS, LEFT EYE: Chronic | ICD-10-CM

## 2020-11-11 DIAGNOSIS — Z90.79 ACQUIRED ABSENCE OF OTHER GENITAL ORGAN(S): Chronic | ICD-10-CM

## 2020-11-11 PROCEDURE — 99285 EMERGENCY DEPT VISIT HI MDM: CPT | Mod: 25

## 2020-11-11 PROCEDURE — 99223 1ST HOSP IP/OBS HIGH 75: CPT

## 2020-11-11 PROCEDURE — 93010 ELECTROCARDIOGRAM REPORT: CPT

## 2020-11-11 PROCEDURE — 71045 X-RAY EXAM CHEST 1 VIEW: CPT | Mod: 26

## 2020-11-11 RX ORDER — FUROSEMIDE 40 MG
40 TABLET ORAL ONCE
Refills: 0 | Status: COMPLETED | OUTPATIENT
Start: 2020-11-11 | End: 2020-11-11

## 2020-11-11 RX ADMIN — Medication 40 MILLIGRAM(S): at 22:22

## 2020-11-11 NOTE — ED PROVIDER NOTE - OBJECTIVE STATEMENT
89 yo m with Hx of diabetes, HTN, ? CHF, asthma, BPH  c/o increasing lower extremity edema and scrotal swelling in the past 2-3 weeks.  Chronically gets SOB climbing stairs, but hasn't climbed any stairs in the past couple of weeks.  Can walk around his apartment without SOB "if I walk slow".  Daughter notes that in the past week or so he has been sleeping sitting up in his recliner chair.  Has not had any significant cough.  Denies any chest pain.  No fevers.  Had metoprolol dose increased about a month ago.  Compliant with meds.  Pacemaker implanted apx 2 yrs ago (Dr Brown).   PCP Dr Danielle Brown (does not have a general cardiologist)

## 2020-11-11 NOTE — ED PROVIDER NOTE - NS ED ROS FT
CONSTITUTIONAL: No fever, chills, or weakness  NEURO: No headache, no dizziness, no syncope; No focal weakness/tingling/numbness  EYES: No visual changes  ENT: No rhinorrhea or sore throat  PULM: No cough  CV: + EDEMA. No chest pain or palpitations  GI: No abdominal pain, vomiting, or diarrhea  : No dysuria, hematuria, frequency  MSK: No neck pain or back pain, no joint pain  SKIN: no rash or unusual bruising

## 2020-11-11 NOTE — ED ADULT TRIAGE NOTE - ARRIVAL INFO ADDITIONAL COMMENTS
Patient with a reported PMH of Cardiac Pacemaker. Patient denies chest discomfort. Patient reported to have a 92% SpO2 on room air and place on 3L per NC. Patient noted to be non-tachypneic and non dyspneic while at rest.

## 2020-11-11 NOTE — ED PROVIDER NOTE - PHYSICAL EXAMINATION
CONSTITUTIONAL: WD,WN. NAD.    SKIN: Normal color and turgor. No rash.    HEAD: NC/AT.  EYES: Conjunctiva clear. EOMI. PERRL.    ENT: Airway patent, OP without erythema, tonsillar swelling or exudate; uvula midline without swelling. Nasal mucosa clear, no rhinorrhea.   RESPIRATORY:  Breathing non-labored. No retractions or accessory muscle use.  Lungs CTA bilat.  CARDIOVASCULAR:  RRR, S1S2. No M/R/G. + JVD     GI:  Abdomen soft, nontender.    MSK: Neck supple with painless ROM.  + 4 bilat LE edema up to thighs.  + scrotal edema.  No joint swelling or ROM limitation.  NEURO: Alert and oriented; CN II-XII grossly intact. Speech clear. 5/5 strength in all extremities.

## 2020-11-11 NOTE — ED PROVIDER NOTE - CLINICAL SUMMARY MEDICAL DECISION MAKING FREE TEXT BOX
Patient with clinical evidence of heart failure.  Noted to be hypoxic to 92% on room air at rest (in triage), improved on NC at 3 LPM.  Will get EKG, labs, CXR.  Likely admit for diuresis in hospital.

## 2020-11-11 NOTE — ED PROVIDER NOTE - DIAGNOSTIC INTERPRETATION
Study: Chest XR  Interpreted by: EVANGELINA Pulido  Interpretation: bilat interstitial infiltrates, pulm vascular congestion, pacemaker in situ

## 2020-11-11 NOTE — ED ADULT NURSE NOTE - OBJECTIVE STATEMENT
89 y/o male received into the ED, axox3, biba from home with swelling to the lower extremeties and swelling to the scrotum.  Pt. states that the swelling is a chronic issue but it is becoming worse.  Pt. denies having any pain to the legs.  Pt. states that it has also become more difficutl for him to breath and easily

## 2020-11-11 NOTE — ED PROVIDER NOTE - ATTENDING CONTRIBUTION TO CARE
Pt w/ PMHx HTN, s/p PPM, no prior hx CHF (2/2020 echo w/ normal LVEF and no diastolic dysfunction), last seen by Dr Brown in Sept - pt w/ b/l LE, was monitoring for PPM-induced CM  Pt now p/w worsening b/l LE extending into the scrotum. Denies JEONG, but pt admits he doesn't walk far to induce SOB. + orthopnea. No CP. Pt w/ PMHx HTN, s/p PPM, no prior hx CHF (2/2020 echo w/ normal LVEF and no diastolic dysfunction), last seen by Dr Brown in Sept - pt w/ b/l LE, was monitoring for PPM-induced CM  Pt now p/w worsening b/l LE extending into the scrotum. Denies JEONG, but pt admits he doesn't walk far to induce SOB. + orthopnea. No CP.  Limited PE performed in the setting of the COVID10 pandemic, in efforts to limit exposure and cross-contamination  Constitutional: Well appearing, obese, awake, alert, oriented to person, place, time/situation and in no apparent distress.  ENMT: Airway patent.   Eyes: Clear bilaterally  Cardiac: Normal rate, regular rhythm.   Respiratory: + crackles b/l to mid lung fields. No increased WOB, tachypnea, or accessory mm use. Pt speaks in full sentences. O2 sat to 92-93% on RA  Gastrointestinal: Abd soft, NT, ND. No guarding, rebound, or rigidity.   Musculoskeletal: Range of motion is not limited. 4+ pitting edema in legs, scrotum. no abd edema appreciated  Neuro: Alert and oriented x 3, face symmetric and speech fluent. Nml gross motor movement, grossly non focal   Skin: Skin normal color for race, warm, dry and intact. No evidence of rash.  Psych: Alert and oriented to person, place, time/situation. normal mood and affect. no apparent risk to self or others.  Pt p/w worsening b/l LE edema, now into scrotum, orthopnea. No CP. + congestion on XR. Sx most c/w acute decompensated HF. Start IV diuresis. R/o underlying infection, renal, hepatic, other metabolic disturbances. Anticipate admission to cardio

## 2020-11-12 DIAGNOSIS — E87.5 HYPERKALEMIA: ICD-10-CM

## 2020-11-12 DIAGNOSIS — E11.9 TYPE 2 DIABETES MELLITUS WITHOUT COMPLICATIONS: ICD-10-CM

## 2020-11-12 DIAGNOSIS — R77.8 OTHER SPECIFIED ABNORMALITIES OF PLASMA PROTEINS: ICD-10-CM

## 2020-11-12 DIAGNOSIS — N40.0 BENIGN PROSTATIC HYPERPLASIA WITHOUT LOWER URINARY TRACT SYMPTOMS: ICD-10-CM

## 2020-11-12 DIAGNOSIS — I44.2 ATRIOVENTRICULAR BLOCK, COMPLETE: ICD-10-CM

## 2020-11-12 DIAGNOSIS — I50.9 HEART FAILURE, UNSPECIFIED: ICD-10-CM

## 2020-11-12 LAB
A1C WITH ESTIMATED AVERAGE GLUCOSE RESULT: 6.9 % — HIGH (ref 4–5.6)
ANION GAP SERPL CALC-SCNC: 10 MMOL/L — SIGNIFICANT CHANGE UP (ref 5–17)
BUN SERPL-MCNC: 20 MG/DL — SIGNIFICANT CHANGE UP (ref 7–23)
CALCIUM SERPL-MCNC: 9.2 MG/DL — SIGNIFICANT CHANGE UP (ref 8.4–10.5)
CHLORIDE SERPL-SCNC: 93 MMOL/L — LOW (ref 96–108)
CHOLEST SERPL-MCNC: 141 MG/DL — SIGNIFICANT CHANGE UP
CK MB CFR SERPL CALC: 3.7 NG/ML — SIGNIFICANT CHANGE UP (ref 0–6.7)
CK MB CFR SERPL CALC: 4.1 NG/ML — SIGNIFICANT CHANGE UP (ref 0–6.7)
CK SERPL-CCNC: 147 U/L — SIGNIFICANT CHANGE UP (ref 30–200)
CK SERPL-CCNC: 167 U/L — SIGNIFICANT CHANGE UP (ref 30–200)
CO2 SERPL-SCNC: 32 MMOL/L — HIGH (ref 22–31)
CREAT SERPL-MCNC: 1.01 MG/DL — SIGNIFICANT CHANGE UP (ref 0.5–1.3)
ESTIMATED AVERAGE GLUCOSE: 151 MG/DL — HIGH (ref 68–114)
GAS PNL BLDV: 134 MMOL/L — LOW (ref 138–146)
GAS PNL BLDV: SIGNIFICANT CHANGE UP
GAS PNL BLDV: SIGNIFICANT CHANGE UP
GLUCOSE BLDC GLUCOMTR-MCNC: 110 MG/DL — HIGH (ref 70–99)
GLUCOSE BLDC GLUCOMTR-MCNC: 128 MG/DL — HIGH (ref 70–99)
GLUCOSE SERPL-MCNC: 131 MG/DL — HIGH (ref 70–99)
HCO3 BLDV-SCNC: 13 MMOL/L — LOW (ref 20–27)
HCT VFR BLD CALC: 37.7 % — LOW (ref 39–50)
HCT VFR BLD CALC: 38.7 % — LOW (ref 39–50)
HDLC SERPL-MCNC: 73 MG/DL — SIGNIFICANT CHANGE UP
HGB BLD-MCNC: 12.1 G/DL — LOW (ref 13–17)
HGB BLD-MCNC: 12.4 G/DL — LOW (ref 13–17)
LACTATE SERPL-SCNC: 0.7 MMOL/L — SIGNIFICANT CHANGE UP (ref 0.5–2)
LIPID PNL WITH DIRECT LDL SERPL: 61 MG/DL — SIGNIFICANT CHANGE UP
MAGNESIUM SERPL-MCNC: 1.9 MG/DL — SIGNIFICANT CHANGE UP (ref 1.6–2.6)
MCHC RBC-ENTMCNC: 30.8 PG — SIGNIFICANT CHANGE UP (ref 27–34)
MCHC RBC-ENTMCNC: 31.1 PG — SIGNIFICANT CHANGE UP (ref 27–34)
MCHC RBC-ENTMCNC: 32 GM/DL — SIGNIFICANT CHANGE UP (ref 32–36)
MCHC RBC-ENTMCNC: 32.1 GM/DL — SIGNIFICANT CHANGE UP (ref 32–36)
MCV RBC AUTO: 95.9 FL — SIGNIFICANT CHANGE UP (ref 80–100)
MCV RBC AUTO: 97 FL — SIGNIFICANT CHANGE UP (ref 80–100)
NON HDL CHOLESTEROL: 68 MG/DL — SIGNIFICANT CHANGE UP
NRBC # BLD: 0 /100 WBCS — SIGNIFICANT CHANGE UP (ref 0–0)
NRBC # BLD: 0 /100 WBCS — SIGNIFICANT CHANGE UP (ref 0–0)
PCO2 BLDV: 67 MMHG — HIGH (ref 41–51)
PH BLDV: 7.36 — SIGNIFICANT CHANGE UP (ref 7.32–7.43)
PLATELET # BLD AUTO: 153 K/UL — SIGNIFICANT CHANGE UP (ref 150–400)
PLATELET # BLD AUTO: 153 K/UL — SIGNIFICANT CHANGE UP (ref 150–400)
PO2 BLDV: 120 MMHG — SIGNIFICANT CHANGE UP
POTASSIUM BLDV-SCNC: 4.4 MMOL/L — SIGNIFICANT CHANGE UP (ref 3.5–4.9)
POTASSIUM SERPL-MCNC: 5 MMOL/L — SIGNIFICANT CHANGE UP (ref 3.5–5.3)
POTASSIUM SERPL-SCNC: 5 MMOL/L — SIGNIFICANT CHANGE UP (ref 3.5–5.3)
RBC # BLD: 3.93 M/UL — LOW (ref 4.2–5.8)
RBC # BLD: 3.99 M/UL — LOW (ref 4.2–5.8)
RBC # FLD: 13.4 % — SIGNIFICANT CHANGE UP (ref 10.3–14.5)
RBC # FLD: 13.5 % — SIGNIFICANT CHANGE UP (ref 10.3–14.5)
SAO2 % BLDV: 99 % — SIGNIFICANT CHANGE UP
SARS-COV-2 RNA SPEC QL NAA+PROBE: SIGNIFICANT CHANGE UP
SODIUM SERPL-SCNC: 135 MMOL/L — SIGNIFICANT CHANGE UP (ref 135–145)
TRIGL SERPL-MCNC: 36 MG/DL — SIGNIFICANT CHANGE UP
TROPONIN T SERPL-MCNC: 0.03 NG/ML — HIGH (ref 0–0.01)
TROPONIN T SERPL-MCNC: 0.04 NG/ML — CRITICAL HIGH (ref 0–0.01)
WBC # BLD: 7.88 K/UL — SIGNIFICANT CHANGE UP (ref 3.8–10.5)
WBC # BLD: 8.77 K/UL — SIGNIFICANT CHANGE UP (ref 3.8–10.5)
WBC # FLD AUTO: 7.88 K/UL — SIGNIFICANT CHANGE UP (ref 3.8–10.5)
WBC # FLD AUTO: 8.77 K/UL — SIGNIFICANT CHANGE UP (ref 3.8–10.5)

## 2020-11-12 PROCEDURE — 0042T: CPT

## 2020-11-12 PROCEDURE — 70498 CT ANGIOGRAPHY NECK: CPT | Mod: 26

## 2020-11-12 PROCEDURE — 99233 SBSQ HOSP IP/OBS HIGH 50: CPT

## 2020-11-12 PROCEDURE — 93306 TTE W/DOPPLER COMPLETE: CPT | Mod: 26

## 2020-11-12 PROCEDURE — 70450 CT HEAD/BRAIN W/O DYE: CPT | Mod: 26,59

## 2020-11-12 PROCEDURE — 70496 CT ANGIOGRAPHY HEAD: CPT | Mod: 26

## 2020-11-12 RX ORDER — FUROSEMIDE 40 MG
80 TABLET ORAL EVERY 12 HOURS
Refills: 0 | Status: DISCONTINUED | OUTPATIENT
Start: 2020-11-12 | End: 2020-11-16

## 2020-11-12 RX ORDER — METFORMIN HYDROCHLORIDE 850 MG/1
1 TABLET ORAL
Qty: 0 | Refills: 0 | DISCHARGE

## 2020-11-12 RX ORDER — ENOXAPARIN SODIUM 100 MG/ML
40 INJECTION SUBCUTANEOUS EVERY 12 HOURS
Refills: 0 | Status: DISCONTINUED | OUTPATIENT
Start: 2020-11-12 | End: 2020-11-21

## 2020-11-12 RX ORDER — FINASTERIDE 5 MG/1
5 TABLET, FILM COATED ORAL DAILY
Refills: 0 | Status: DISCONTINUED | OUTPATIENT
Start: 2020-11-12 | End: 2020-11-21

## 2020-11-12 RX ORDER — MAGNESIUM OXIDE 400 MG ORAL TABLET 241.3 MG
400 TABLET ORAL ONCE
Refills: 0 | Status: COMPLETED | OUTPATIENT
Start: 2020-11-12 | End: 2020-11-12

## 2020-11-12 RX ORDER — FUROSEMIDE 40 MG
40 TABLET ORAL ONCE
Refills: 0 | Status: COMPLETED | OUTPATIENT
Start: 2020-11-12 | End: 2020-11-12

## 2020-11-12 RX ORDER — METOPROLOL TARTRATE 50 MG
25 TABLET ORAL DAILY
Refills: 0 | Status: DISCONTINUED | OUTPATIENT
Start: 2020-11-12 | End: 2020-11-21

## 2020-11-12 RX ORDER — ALBUTEROL 90 UG/1
2 AEROSOL, METERED ORAL
Qty: 0 | Refills: 0 | DISCHARGE

## 2020-11-12 RX ORDER — CLOPIDOGREL BISULFATE 75 MG/1
75 TABLET, FILM COATED ORAL DAILY
Refills: 0 | Status: DISCONTINUED | OUTPATIENT
Start: 2020-11-12 | End: 2020-11-13

## 2020-11-12 RX ORDER — ATORVASTATIN CALCIUM 80 MG/1
80 TABLET, FILM COATED ORAL AT BEDTIME
Refills: 0 | Status: DISCONTINUED | OUTPATIENT
Start: 2020-11-12 | End: 2020-11-13

## 2020-11-12 RX ORDER — FUROSEMIDE 40 MG
1 TABLET ORAL
Qty: 0 | Refills: 0 | DISCHARGE

## 2020-11-12 RX ORDER — FLUTICASONE PROPIONATE AND SALMETEROL 50; 250 UG/1; UG/1
1 POWDER ORAL; RESPIRATORY (INHALATION)
Qty: 0 | Refills: 0 | DISCHARGE

## 2020-11-12 RX ORDER — ASPIRIN/CALCIUM CARB/MAGNESIUM 324 MG
81 TABLET ORAL DAILY
Refills: 0 | Status: DISCONTINUED | OUTPATIENT
Start: 2020-11-12 | End: 2020-11-13

## 2020-11-12 RX ORDER — FLUTICASONE PROPIONATE 220 MCG
2 AEROSOL WITH ADAPTER (GRAM) INHALATION
Qty: 0 | Refills: 0 | DISCHARGE

## 2020-11-12 RX ORDER — ALBUTEROL 90 UG/1
2 AEROSOL, METERED ORAL EVERY 6 HOURS
Refills: 0 | Status: DISCONTINUED | OUTPATIENT
Start: 2020-11-12 | End: 2020-11-21

## 2020-11-12 RX ORDER — BUDESONIDE AND FORMOTEROL FUMARATE DIHYDRATE 160; 4.5 UG/1; UG/1
2 AEROSOL RESPIRATORY (INHALATION)
Refills: 0 | Status: DISCONTINUED | OUTPATIENT
Start: 2020-11-12 | End: 2020-11-21

## 2020-11-12 RX ORDER — METOPROLOL TARTRATE 50 MG
1 TABLET ORAL
Qty: 0 | Refills: 0 | DISCHARGE

## 2020-11-12 RX ORDER — AZELASTINE 137 UG/1
2 SPRAY, METERED NASAL
Qty: 0 | Refills: 0 | DISCHARGE

## 2020-11-12 RX ADMIN — ENOXAPARIN SODIUM 40 MILLIGRAM(S): 100 INJECTION SUBCUTANEOUS at 06:08

## 2020-11-12 RX ADMIN — BUDESONIDE AND FORMOTEROL FUMARATE DIHYDRATE 2 PUFF(S): 160; 4.5 AEROSOL RESPIRATORY (INHALATION) at 22:18

## 2020-11-12 RX ADMIN — MAGNESIUM OXIDE 400 MG ORAL TABLET 400 MILLIGRAM(S): 241.3 TABLET ORAL at 10:17

## 2020-11-12 RX ADMIN — Medication 40 MILLIGRAM(S): at 02:28

## 2020-11-12 RX ADMIN — Medication 80 MILLIGRAM(S): at 21:24

## 2020-11-12 RX ADMIN — Medication 81 MILLIGRAM(S): at 12:17

## 2020-11-12 RX ADMIN — ENOXAPARIN SODIUM 40 MILLIGRAM(S): 100 INJECTION SUBCUTANEOUS at 17:44

## 2020-11-12 RX ADMIN — Medication 25 MILLIGRAM(S): at 06:08

## 2020-11-12 RX ADMIN — Medication 80 MILLIGRAM(S): at 10:10

## 2020-11-12 RX ADMIN — FINASTERIDE 5 MILLIGRAM(S): 5 TABLET, FILM COATED ORAL at 12:17

## 2020-11-12 NOTE — PHYSICAL THERAPY INITIAL EVALUATION ADULT - CRITERIA FOR SKILLED THERAPEUTIC INTERVENTIONS
therapy frequency/anticipated discharge recommendation/risk reduction/prevention/rehab potential/functional limitations in following categories/impairments found

## 2020-11-12 NOTE — PROGRESS NOTE ADULT - PROBLEM SELECTOR PLAN 2
s/p Medtronic PPM with Dr. Brown 10/30/2018, most recent device interogation 9/23/20 revealed RA oversensing, therefore sensitivity was decreased to 0.6 mv. Otherwise, device was functioning appropriately as programmed and all measured data is WNL.   - EKG paced.

## 2020-11-12 NOTE — H&P ADULT - PROBLEM SELECTOR PLAN 2
s/p Medtronic PPM with Dr. Brown 10/30/2018, most recent device interogation 9/23/20 revealed RA oversensing, therefore sensitivity was decreased to 0.6 mv. Otherwise, device was functioning appropriately as programmed and all measured data is WNL.   --will consult EP in AM.

## 2020-11-12 NOTE — H&P ADULT - PROBLEM SELECTOR PLAN 1
+JVD, 4+ bilateral LE pitting edema extending into thighs/scrotal region, O2 sat: 98% on 3L NC.  --, CXR consistent with pulmonary vascular congestion.  --Patient treated with Lasix 40mg IV x 2 doses in ED. Will continue Lasix 80mg IV q 12hrs, strict I/Os and daily weights.  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 +JVD, 4+ bilateral LE pitting edema extending into thighs/scrotal region, O2 sat: 98% on 3L NC.  --, CXR consistent with pulmonary vascular congestion.  --Patient treated with Lasix 40mg IV x 2 doses in ED. Will continue Lasix 80mg IV q 12hrs, strict I/Os and daily weights.  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      **Recent Echo 2/3/20 normal LV and RV size and function, EF:60%, no valve disease.  Trivial pericardial effusion.

## 2020-11-12 NOTE — H&P ADULT - ASSESSMENT
89 yr old morbidly obese M with PMHx of HTN, NIDDM, asthma, BPH, hx of CHB s/p Medtronic PPM 10/30/2018, ?chronic CHF (EF:60% by Echo 2/3/20) who presents to [facility] ED 11/11/20 c/o progressively worsening SOB and LE edema x 2-3 weeks, EKG revealed Vpaced rhythm, without acute ischemic changes. CXR revealed pulmonary vascular congestion, Troponin T 0.04, , UA unremarkable. COVID PCR pending. Patient treated with Lasix 40mg IV x 1 dose.    Patient currently stable, admitted to [facility] for cardiac telemetry and management of acute on chronic CHF Exacerbation.

## 2020-11-12 NOTE — PROGRESS NOTE ADULT - SUBJECTIVE AND OBJECTIVE BOX
Interventional Cardiology PA Adult Progress Note    Subjective Assessment: Patient seen and examined at bedside, still SOB with edema, no significant change  ROS negative except per HPI and subjective  	  MEDICATIONS:  furosemide   Injectable 80 milliGRAM(s) IV Push every 12 hours  metoprolol succinate ER 25 milliGRAM(s) Oral daily  ALBUTerol    90 MICROgram(s) HFA Inhaler 2 Puff(s) Inhalation every 6 hours PRN  budesonide 160 MICROgram(s)/formoterol 4.5 MICROgram(s) Inhaler 2 Puff(s) Inhalation two times a day  finasteride 5 milliGRAM(s) Oral daily  aspirin enteric coated 81 milliGRAM(s) Oral daily  enoxaparin Injectable 40 milliGRAM(s) SubCutaneous every 12 hours    [PHYSICAL EXAM:  TELEMETRY:  T(C): 36.5 (11-12-20 @ 08:16), Max: 36.8 (11-11-20 @ 21:21)  HR: 72 (11-12-20 @ 12:02) (56 - 72)  BP: 139/73 (11-12-20 @ 12:02) (130/64 - 145/80)  RR: 20 (11-12-20 @ 12:02) (17 - 20)  SpO2: 95% (11-12-20 @ 12:02) (92% - 98%)    I&O's Summary    11 Nov 2020 07:01  -  12 Nov 2020 07:00  --------------------------------------------------------  IN: 0 mL / OUT: 1785 mL / NET: -1785 mL    12 Nov 2020 07:01  -  12 Nov 2020 13:02  --------------------------------------------------------  IN: 0 mL / OUT: 1350 mL / NET: -1350 mL      Height (cm): 180.3 (11-12 @ 01:16)  Weight (kg): 145.5 (11-12 @ 01:38)  BMI (kg/m2): 44.8 (11-12 @ 01:38)  BSA (m2): 2.58 (11-12 @ 01:38)    Appearance: Normal	  HEENT:   Normal oral mucosa, PERRL, EOMI	  Neck: Supple, + JVD  Cardiovascular: Normal S1 S2, No JVD, No murmurs,   Respiratory: + rales b/l bases  Gastrointestinal:  Soft, Non-tender, + BS	  Skin: No rashes, No ecchymoses, No cyanosis  Extremities: Normal range of motion, No clubbing, cyanosis, 4+ edema b/l pitting  Vascular: Peripheral pulses palpable 2+ bilaterally  Neurologic: Non-focal  Psychiatry: A & O x 3, Mood & affect appropriate        LABS:	 	  CARDIAC MARKERS:                          12.4   7.88  )-----------( 153      ( 12 Nov 2020 06:18 )             38.7     11-12    135  |  93<L>  |  20  ----------------------------<  131<H>  5.0   |  32<H>  |  1.01    Ca    9.2      12 Nov 2020 06:18  Mg     1.9     11-12    TPro  7.2  /  Alb  3.7  /  TBili  0.3  /  DBili  x   /  AST  27  /  ALT  14  /  AlkPhos  61  11-11    proBNP: Serum Pro-Brain Natriuretic Peptide: 884 pg/mL (11-11 @ 21:36)          ASSESSMENT/PLAN: 	        DVT ppx:  Dispo:

## 2020-11-12 NOTE — PROVIDER CONTACT NOTE (CHANGE IN STATUS NOTIFICATION) - SITUATION
Pt O2 sat dropped to 79. Went in the room to check on pt. Pt was slumped over on his side. Turned him over to his back, pt did not respond to verbal or physical stimuli

## 2020-11-12 NOTE — PHYSICAL THERAPY INITIAL EVALUATION ADULT - PERTINENT HX OF CURRENT PROBLEM, REHAB EVAL
Pt is 89 yo morbid obese male presents to [facility] ED 11/11/20 c/o progressively worsening SOB, LE edema, orthopnea, admitted to cardiac tele for acute dCHF exacerbation.

## 2020-11-12 NOTE — PHYSICAL THERAPY INITIAL EVALUATION ADULT - GAIT DEVIATIONS NOTED, PT EVAL
decreased stride length/crouched posture,  decreased safety awareness requried VC to stay close to AD/decreased wilfred/decreased step length

## 2020-11-12 NOTE — PHYSICAL THERAPY INITIAL EVALUATION ADULT - ADDITIONAL COMMENTS
Pt states he lives with daughter on 2nd floor apartment has 1 flight of stairs. Pt uses RW at home for ambulation and has daughter at home for assistance when needed.

## 2020-11-12 NOTE — PHYSICAL THERAPY INITIAL EVALUATION ADULT - PLANNED THERAPY INTERVENTIONS, PT EVAL
balance training/strengthening/bed mobility training/gait training/postural re-education/transfer training

## 2020-11-12 NOTE — H&P ADULT - PROBLEM SELECTOR PLAN 3
Troponin T 0.04, patient chest pain free, likely in setting of demand from CHF.  --will F/U cardiac enzymes@1AM and 5AM.  --F/U Echo results.

## 2020-11-12 NOTE — H&P ADULT - HISTORY OF PRESENT ILLNESS
89 yr old M with PMHx of HTN, NIDDM, asthma, (denies intubations/hospitalizations), BPH, ?chronic CHF (EF:60% by Echo 2/3/20), 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 Brown) who presents to [facility] ED 11/11/20 c/o progressively worsening SOB and LE edema x 2-3 weeks. Patient reports he has chronic SOB, however recently has been able to only walk 15-20 feet around his apartment only if he walks at a slow pace. Patient further admits to significant bilateral LE edema extending into scrotal region, PND and needing to sleep sitting upright in recliner chair or the past week. Patient reports compliance with his medications. Patient denies any N/V, diaphoresis, fever, chills, chest pain, abdominal pain, recent travel or sick contacts.     In ED, BP: 133/70, HR:56, RR:20, Temp: 98.2F, O2 sat: 98% on 3L NC.  EKG revealed paced rhythm. CXR revealed pulmonary vascular congestion.  Labs notable for: K 5.6, BUN/Cr 19/1.11, Troponin T 0.04, , UA unremarkable. COVID PCR pending.    Patient treated with Lasix 40mg IV x 1 dose.    Patient currently stable, admitted to [facility] for cardiac telemetry and management of acute on chronic CHF Exacerbation.   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 Brown), ?chronic CHF (EF:60% by Echo 2/3/20) who presents to [facility] ED 11/11/20 c/o progressively worsening SOB and LE edema x 2-3 weeks. Patient reports he has chronic SOB, however recently has been able to only walk 15-20 feet around his apartment only if he walks at a slow pace. Patient further admits to significant bilateral LE edema extending into scrotal region, PND and needing to sleep sitting upright in recliner chair or the past week. Patient reports compliance with his medications. Patient denies any N/V, diaphoresis, fever, chills, chest pain, abdominal pain, recent travel or sick contacts. Patient reports he was seen by his PCP Dr. Santos a few weeks ago and his Lasix was increased to 40mg PO BID.    In ED, BP: 133/70, HR:56, RR:20, Temp: 98.2F, O2 sat: 98% on 3L NC.  EKG revealed Vpaced rhythm, without acute ischemic changes. CXR revealed pulmonary vascular congestion.  Labs notable for: K 5.6, BUN/Cr 19/1.11, Troponin T 0.04, , UA unremarkable. COVID PCR pending. Patient treated with Lasix 40mg IV x 1 dose.    Patient currently stable, [facility] for cardiac telemetry and management of acute on chronic CHF Exacerbation.   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 Brown), ?chronic CHF (EF:60% by Echo 2/3/20) who presents to [facility] ED 11/11/20 c/o progressively worsening SOB and LE edema x 2-3 weeks. Patient reports he has chronic SOB and at baseline can ambulate 15-20 feet with his walker from room to room in his apartment, however over the past few weeks has started to become SOB at rest. Patient further admits to significant bilateral LE edema extending into scrotal region and orthopnea (sleeps sitting upright in recliner chair for the past week). Patient reports compliance with his medications. Patient denies any N/V, diaphoresis, fever, chills, chest pain, abdominal pain, recent travel or sick contacts. Patient reports he was seen by his PCP Dr. Santos a few weeks ago and his Lasix dose was increased from 40mg PO daily to 40mg PO BID without any improvement in symptoms.    In ED, BP: 133/70, HR:56, RR:20, Temp: 98.2F, O2 sat: 98% on 3L NC.  EKG revealed Vpaced rhythm, without acute ischemic changes. CXR revealed pulmonary vascular congestion.  Labs notable for: K 5.6, BUN/Cr 19/1.11, Troponin T 0.04, , UA unremarkable. COVID PCR pending. Patient treated with Lasix 40mg IV x 1 dose.    Patient currently stable, admitted to [facility] for cardiac telemetry and management of acute on chronic CHF Exacerbation.

## 2020-11-12 NOTE — H&P ADULT - NSHPOUTPATIENTPROVIDERS_GEN_ALL_CORE
LISA Brown Cardiologist- patient reports he does not have a general cardiologist   EP- Dr. Brown  PCP- Dr. Mayito Santos Cardiologist- patient reports he does not have a general cardiologist   EP- Dr. Brown  PCP- Dr. Mayito Santos  Daughter/HCP- Ladonna Samuels [phone]

## 2020-11-12 NOTE — H&P ADULT - NSICDXPASTSURGICALHX_GEN_ALL_CORE_FT
PAST SURGICAL HISTORY:  History of left cataract surgery     History of right cataract surgery     S/P TURP

## 2020-11-12 NOTE — PHYSICAL THERAPY INITIAL EVALUATION ADULT - GENERAL OBSERVATIONS, REHAB EVAL
Pt received semi supine in bed +hep lock +EKG. PT received consent to treat from BOGDAN García. Pt AAOx3 follows simple commands 100% of the time. Pt was able to transfers min A x1 with RW was able to ambulate 10 ftx2 CGA x2 with RW. Pt demo decreased safey awareness and required VC to step closer to AD. Pt was left at EOB with call bell in reach, family member present, and RN aware.

## 2020-11-12 NOTE — PROGRESS NOTE ADULT - ASSESSMENT
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 Brown), ?chronic CHF (EF:60% by Echo 2/3/20, denies but has been on lasix for years) who presents to [facility] ED 11/11/20 c/o progressively worsening SOB, LE edema, orthopnea, admitted to cardiac tele for acute dCHF exacerbation.

## 2020-11-12 NOTE — PROGRESS NOTE ADULT - PROBLEM SELECTOR PLAN 3
Troponin T 0.04 x2 then downtreded.  patient chest pain free, likely in setting of demand from CHF.  - f/u Echo results.

## 2020-11-12 NOTE — PROGRESS NOTE ADULT - PROBLEM SELECTOR PLAN 1
HD stable on exam but w/ +JVD, + rales,  4+ bilateral LE pitting edema extending into thighs/scrotal region, O2 now 92-98% on RA  - , CXR consistent with pulmonary vascular congestion.  - At home was instructed by PCP to increased lasix to BID but without improvement of symptoms.   - Continue Lasix 80mg IV BID.   - Last Echo 2/2020: normal LV/RV, EF 60%, no valve disease.  Repeat Echo ordered.   - core measures, strict I/Os, daily weight.  Out 1.7L overnight.

## 2020-11-13 DIAGNOSIS — R41.89 OTHER SYMPTOMS AND SIGNS INVOLVING COGNITIVE FUNCTIONS AND AWARENESS: ICD-10-CM

## 2020-11-13 LAB
ALBUMIN SERPL ELPH-MCNC: 3.8 G/DL — SIGNIFICANT CHANGE UP (ref 3.3–5)
ALP SERPL-CCNC: 59 U/L — SIGNIFICANT CHANGE UP (ref 40–120)
ALT FLD-CCNC: 14 U/L — SIGNIFICANT CHANGE UP (ref 10–45)
ANION GAP SERPL CALC-SCNC: 10 MMOL/L — SIGNIFICANT CHANGE UP (ref 5–17)
ANION GAP SERPL CALC-SCNC: 11 MMOL/L — SIGNIFICANT CHANGE UP (ref 5–17)
ANION GAP SERPL CALC-SCNC: 12 MMOL/L — SIGNIFICANT CHANGE UP (ref 5–17)
AST SERPL-CCNC: 29 U/L — SIGNIFICANT CHANGE UP (ref 10–40)
BILIRUB SERPL-MCNC: 0.3 MG/DL — SIGNIFICANT CHANGE UP (ref 0.2–1.2)
BUN SERPL-MCNC: 25 MG/DL — HIGH (ref 7–23)
BUN SERPL-MCNC: 25 MG/DL — HIGH (ref 7–23)
BUN SERPL-MCNC: 32 MG/DL — HIGH (ref 7–23)
CALCIUM SERPL-MCNC: 9.3 MG/DL — SIGNIFICANT CHANGE UP (ref 8.4–10.5)
CALCIUM SERPL-MCNC: 9.4 MG/DL — SIGNIFICANT CHANGE UP (ref 8.4–10.5)
CALCIUM SERPL-MCNC: 9.4 MG/DL — SIGNIFICANT CHANGE UP (ref 8.4–10.5)
CHLORIDE SERPL-SCNC: 94 MMOL/L — LOW (ref 96–108)
CK MB CFR SERPL CALC: 4.7 NG/ML — SIGNIFICANT CHANGE UP (ref 0–6.7)
CK SERPL-CCNC: 416 U/L — HIGH (ref 30–200)
CO2 SERPL-SCNC: 34 MMOL/L — HIGH (ref 22–31)
CO2 SERPL-SCNC: 35 MMOL/L — HIGH (ref 22–31)
CO2 SERPL-SCNC: 37 MMOL/L — HIGH (ref 22–31)
CREAT SERPL-MCNC: 1.12 MG/DL — SIGNIFICANT CHANGE UP (ref 0.5–1.3)
CREAT SERPL-MCNC: 1.13 MG/DL — SIGNIFICANT CHANGE UP (ref 0.5–1.3)
CREAT SERPL-MCNC: 1.33 MG/DL — HIGH (ref 0.5–1.3)
GLUCOSE BLDC GLUCOMTR-MCNC: 140 MG/DL — HIGH (ref 70–99)
GLUCOSE BLDC GLUCOMTR-MCNC: 143 MG/DL — HIGH (ref 70–99)
GLUCOSE BLDC GLUCOMTR-MCNC: 148 MG/DL — HIGH (ref 70–99)
GLUCOSE BLDC GLUCOMTR-MCNC: 158 MG/DL — HIGH (ref 70–99)
GLUCOSE SERPL-MCNC: 124 MG/DL — HIGH (ref 70–99)
GLUCOSE SERPL-MCNC: 142 MG/DL — HIGH (ref 70–99)
GLUCOSE SERPL-MCNC: 173 MG/DL — HIGH (ref 70–99)
HCT VFR BLD CALC: 36.2 % — LOW (ref 39–50)
HGB BLD-MCNC: 11.7 G/DL — LOW (ref 13–17)
MAGNESIUM SERPL-MCNC: 1.9 MG/DL — SIGNIFICANT CHANGE UP (ref 1.6–2.6)
MAGNESIUM SERPL-MCNC: 2 MG/DL — SIGNIFICANT CHANGE UP (ref 1.6–2.6)
MAGNESIUM SERPL-MCNC: 2 MG/DL — SIGNIFICANT CHANGE UP (ref 1.6–2.6)
MCHC RBC-ENTMCNC: 30.9 PG — SIGNIFICANT CHANGE UP (ref 27–34)
MCHC RBC-ENTMCNC: 32.3 GM/DL — SIGNIFICANT CHANGE UP (ref 32–36)
MCV RBC AUTO: 95.5 FL — SIGNIFICANT CHANGE UP (ref 80–100)
NRBC # BLD: 0 /100 WBCS — SIGNIFICANT CHANGE UP (ref 0–0)
NT-PROBNP SERPL-SCNC: 872 PG/ML — HIGH (ref 0–300)
PHOSPHATE SERPL-MCNC: 4.5 MG/DL — SIGNIFICANT CHANGE UP (ref 2.5–4.5)
PLATELET # BLD AUTO: 110 K/UL — LOW (ref 150–400)
POTASSIUM SERPL-MCNC: 4.5 MMOL/L — SIGNIFICANT CHANGE UP (ref 3.5–5.3)
POTASSIUM SERPL-MCNC: 5 MMOL/L — SIGNIFICANT CHANGE UP (ref 3.5–5.3)
POTASSIUM SERPL-MCNC: SIGNIFICANT CHANGE UP (ref 3.5–5.3)
POTASSIUM SERPL-SCNC: 4.5 MMOL/L — SIGNIFICANT CHANGE UP (ref 3.5–5.3)
POTASSIUM SERPL-SCNC: 5 MMOL/L — SIGNIFICANT CHANGE UP (ref 3.5–5.3)
POTASSIUM SERPL-SCNC: SIGNIFICANT CHANGE UP (ref 3.5–5.3)
PROT SERPL-MCNC: 7 G/DL — SIGNIFICANT CHANGE UP (ref 6–8.3)
RBC # BLD: 3.79 M/UL — LOW (ref 4.2–5.8)
RBC # FLD: 13.5 % — SIGNIFICANT CHANGE UP (ref 10.3–14.5)
SODIUM SERPL-SCNC: 139 MMOL/L — SIGNIFICANT CHANGE UP (ref 135–145)
SODIUM SERPL-SCNC: 141 MMOL/L — SIGNIFICANT CHANGE UP (ref 135–145)
SODIUM SERPL-SCNC: 141 MMOL/L — SIGNIFICANT CHANGE UP (ref 135–145)
T4 AB SER-ACNC: 6.29 UG/DL — SIGNIFICANT CHANGE UP (ref 3.17–11.72)
TROPONIN T SERPL-MCNC: 0.04 NG/ML — CRITICAL HIGH (ref 0–0.01)
TSH SERPL-MCNC: 1.7 UIU/ML — SIGNIFICANT CHANGE UP (ref 0.35–4.94)
WBC # BLD: 7.29 K/UL — SIGNIFICANT CHANGE UP (ref 3.8–10.5)
WBC # FLD AUTO: 7.29 K/UL — SIGNIFICANT CHANGE UP (ref 3.8–10.5)

## 2020-11-13 PROCEDURE — 99231 SBSQ HOSP IP/OBS SF/LOW 25: CPT | Mod: 24

## 2020-11-13 PROCEDURE — 99233 SBSQ HOSP IP/OBS HIGH 50: CPT

## 2020-11-13 PROCEDURE — 99291 CRITICAL CARE FIRST HOUR: CPT

## 2020-11-13 RX ORDER — DEXTROSE 50 % IN WATER 50 %
15 SYRINGE (ML) INTRAVENOUS ONCE
Refills: 0 | Status: DISCONTINUED | OUTPATIENT
Start: 2020-11-13 | End: 2020-11-21

## 2020-11-13 RX ORDER — INSULIN LISPRO 100/ML
VIAL (ML) SUBCUTANEOUS
Refills: 0 | Status: DISCONTINUED | OUTPATIENT
Start: 2020-11-13 | End: 2020-11-21

## 2020-11-13 RX ORDER — SODIUM CHLORIDE 9 MG/ML
1000 INJECTION, SOLUTION INTRAVENOUS
Refills: 0 | Status: DISCONTINUED | OUTPATIENT
Start: 2020-11-13 | End: 2020-11-21

## 2020-11-13 RX ORDER — ACETAMINOPHEN 500 MG
650 TABLET ORAL ONCE
Refills: 0 | Status: COMPLETED | OUTPATIENT
Start: 2020-11-13 | End: 2020-11-13

## 2020-11-13 RX ORDER — DEXTROSE 50 % IN WATER 50 %
25 SYRINGE (ML) INTRAVENOUS ONCE
Refills: 0 | Status: DISCONTINUED | OUTPATIENT
Start: 2020-11-13 | End: 2020-11-21

## 2020-11-13 RX ORDER — CLOPIDOGREL BISULFATE 75 MG/1
75 TABLET, FILM COATED ORAL DAILY
Refills: 0 | Status: DISCONTINUED | OUTPATIENT
Start: 2020-11-13 | End: 2020-11-13

## 2020-11-13 RX ORDER — ASPIRIN/CALCIUM CARB/MAGNESIUM 324 MG
81 TABLET ORAL
Refills: 0 | Status: DISCONTINUED | OUTPATIENT
Start: 2020-11-13 | End: 2020-11-13

## 2020-11-13 RX ORDER — ATORVASTATIN CALCIUM 80 MG/1
80 TABLET, FILM COATED ORAL AT BEDTIME
Refills: 0 | Status: DISCONTINUED | OUTPATIENT
Start: 2020-11-13 | End: 2020-11-13

## 2020-11-13 RX ORDER — GLUCAGON INJECTION, SOLUTION 0.5 MG/.1ML
1 INJECTION, SOLUTION SUBCUTANEOUS ONCE
Refills: 0 | Status: DISCONTINUED | OUTPATIENT
Start: 2020-11-13 | End: 2020-11-21

## 2020-11-13 RX ORDER — DEXTROSE 50 % IN WATER 50 %
12.5 SYRINGE (ML) INTRAVENOUS ONCE
Refills: 0 | Status: DISCONTINUED | OUTPATIENT
Start: 2020-11-13 | End: 2020-11-21

## 2020-11-13 RX ORDER — ATORVASTATIN CALCIUM 80 MG/1
80 TABLET, FILM COATED ORAL AT BEDTIME
Refills: 0 | Status: DISCONTINUED | OUTPATIENT
Start: 2020-11-13 | End: 2020-11-21

## 2020-11-13 RX ORDER — ASPIRIN/CALCIUM CARB/MAGNESIUM 324 MG
81 TABLET ORAL ONCE
Refills: 0 | Status: COMPLETED | OUTPATIENT
Start: 2020-11-13 | End: 2020-11-13

## 2020-11-13 RX ORDER — ASPIRIN/CALCIUM CARB/MAGNESIUM 324 MG
81 TABLET ORAL DAILY
Refills: 0 | Status: DISCONTINUED | OUTPATIENT
Start: 2020-11-13 | End: 2020-11-21

## 2020-11-13 RX ADMIN — ENOXAPARIN SODIUM 40 MILLIGRAM(S): 100 INJECTION SUBCUTANEOUS at 06:23

## 2020-11-13 RX ADMIN — BUDESONIDE AND FORMOTEROL FUMARATE DIHYDRATE 2 PUFF(S): 160; 4.5 AEROSOL RESPIRATORY (INHALATION) at 12:13

## 2020-11-13 RX ADMIN — ATORVASTATIN CALCIUM 80 MILLIGRAM(S): 80 TABLET, FILM COATED ORAL at 21:44

## 2020-11-13 RX ADMIN — FINASTERIDE 5 MILLIGRAM(S): 5 TABLET, FILM COATED ORAL at 12:12

## 2020-11-13 RX ADMIN — Medication 81 MILLIGRAM(S): at 01:01

## 2020-11-13 RX ADMIN — Medication 2: at 12:13

## 2020-11-13 RX ADMIN — Medication 650 MILLIGRAM(S): at 23:48

## 2020-11-13 RX ADMIN — Medication 81 MILLIGRAM(S): at 18:02

## 2020-11-13 RX ADMIN — Medication 80 MILLIGRAM(S): at 21:44

## 2020-11-13 RX ADMIN — Medication 81 MILLIGRAM(S): at 12:12

## 2020-11-13 RX ADMIN — BUDESONIDE AND FORMOTEROL FUMARATE DIHYDRATE 2 PUFF(S): 160; 4.5 AEROSOL RESPIRATORY (INHALATION) at 21:44

## 2020-11-13 RX ADMIN — Medication 25 MILLIGRAM(S): at 18:02

## 2020-11-13 RX ADMIN — ATORVASTATIN CALCIUM 80 MILLIGRAM(S): 80 TABLET, FILM COATED ORAL at 01:48

## 2020-11-13 RX ADMIN — ENOXAPARIN SODIUM 40 MILLIGRAM(S): 100 INJECTION SUBCUTANEOUS at 18:02

## 2020-11-13 RX ADMIN — Medication 80 MILLIGRAM(S): at 12:12

## 2020-11-13 NOTE — CHART NOTE - NSCHARTNOTEFT_GEN_A_CORE
***Rapid Response Clinical Impact Nurse Practitioner Note***    HPI:  89 yr old morbidly obese M with PMH of HTN, NIDDM, asthma, (denies intubations/hospitalizations), BPH, 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 Brown), ?chronic CHF (EF:60% by Echo 2/3/20) who presents to [facility] ED 20 c/o progressively worsening SOB and LE edema x 2-3 weeks. Patient reports he has chronic SOB and at baseline can ambulate 15-20 feet with his walker from room to room in his apartment, however over the past few weeks has started to become SOB at rest. Patient further admits to significant bilateral LE edema extending into scrotal region and orthopnea (sleeps sitting upright in recliner chair for the past week). Patient reports compliance with his medications. Patient denies any N/V, diaphoresis, fever, chills, chest pain, abdominal pain, recent travel or sick contacts. Patient reports he was seen by his PCP Dr. Santos a few weeks ago and his Lasix dose was increased from 40mg PO daily to 40mg PO BID without any improvement in symptoms.    In ED, BP: 133/70, HR:56, RR:20, Temp: 98.2F, O2 sat: 98% on 3L NC.  EKG revealed Vpaced rhythm, without acute ischemic changes. CXR revealed pulmonary vascular congestion.  Labs notable for: K 5.6, BUN/Cr 19/1.11, Troponin T 0.04, , UA unremarkable. COVID PCR pending. Patient treated with Lasix 40mg IV x 1 dose.    Admitted to [facility] for cardiac telemetry and management of acute on chronic CHF Exacerbation.    Rapid response team called at ~22:45 for unresponsiveness. Per RN, pt noted to have decreased O2sat on telemetry. Upon arrival to bedside, RN found pt unresponsive and with new right facial droop.  BP: 119/74, HR 60s, RR:24, Temp: 98F, O2 sat:98% on 4L, Fingerstick 110. Patient was seen and examined at the bedside by the rapid response team. Stroke Code was called ~22:50. On telemetry noted to be paced, no acute events. STAT labs/ABG drawn. Neuro PA assessed patient at bedside. Pt taken to for CT Head.     Allergies    lisinopril (Angioedema)    Intolerances    PAST MEDICAL & SURGICAL HISTORY:  Prostate disorder    Asthma    Diabetes    History of right cataract surgery    History of left cataract surgery    S/P TURP      Vital Signs Last 24 Hrs  T(C): 36.1 (2020 00:57), Max: 37 (2020 14:50)  T(F): 97 (2020 00:57), Max: 98.6 (2020 14:50)  HR: 69 (2020 23:43) (64 - 75)  BP: 123/59 (2020 23:43) (108/55 - 144/63)  BP(mean): 85 (2020 23:43) (71 - 90)  RR: 20 (2020 23:43) (18 - 28)  SpO2: 97% (2020 23:43) (91% - 98%)      GENERAL: Arousable to loud voice, responsive, following commands.  HEENT: Head is normocephalic and atraumatic. Extraocular muscles are intact. Mucous membranes are moist. No throat erythema/exudates no lymphadenopathy, no JVD,   NECK: Supple.  LUNGS: Clear to auscultation BL without wheezing, rales or rhonchi; respirations unlabored  HEART: Regular rate and rhythm ,+S1/+S2, no murmurs, rubs, gallops  ABDOMEN: obese, Soft, nontender, and nondistended, no rebound, guarding rigidity, bowel sounds in all 4 quadrants  EXTREMITIES: Without any cyanosis, clubbing, rash, lesions or edema.  SKIN: No new rashes or lesions.  MSK: strength equal BL  VASCULAR: Radial and Dorsal pedal pulses palpable BL  NEUROLOGIC: R facial droop.   PSYCH: No new changes.     @ 07:  -   @ 07:00  --------------------------------------------------------  IN: 0 mL / OUT: 1785 mL / NET: -1785 mL     @ 07:  -   @ 02:21  --------------------------------------------------------  IN: 725 mL / OUT: 3200 mL / NET: -2475 mL                          12.1   8.77  )-----------( 153      ( 2020 23:08 )             37.7     11-12    139  |  94<L>  |  25<H>  ----------------------------<  124<H>  4.5   |  34<H>  |  1.12    Ca    9.3      2020 23:08  Phos  4.5       Mg     1.9         TPro  7.0  /  Alb  3.8  /  TBili  0.3  /  DBili  x   /  AST  29  /  ALT  14  /  AlkPhos  59  11-12         LIVER FUNCTIONS - ( 2020 23:08 )  Alb: 3.8 g/dL / Pro: 7.0 g/dL / ALK PHOS: 59 U/L / ALT: 14 U/L / AST: 29 U/L / GGT: x         Urinalysis Basic - ( 2020 23:19 )    Color: Yellow / Appearance: Clear / S.010 / pH: x  Gluc: x / Ketone: NEGATIVE  / Bili: Negative / Urobili: 0.2 E.U./dL   Blood: x / Protein: NEGATIVE mg/dL / Nitrite: NEGATIVE   Leuk Esterase: NEGATIVE / RBC: x / WBC x   Sq Epi: x / Non Sq Epi: x / Bacteria: x      MEDICATIONS  (STANDING):  aspirin enteric coated 81 milliGRAM(s) Oral two times a day  atorvastatin 80 milliGRAM(s) Oral at bedtime  budesonide 160 MICROgram(s)/formoterol 4.5 MICROgram(s) Inhaler 2 Puff(s) Inhalation two times a day  dextrose 40% Gel 15 Gram(s) Oral once  dextrose 5%. 1000 milliLiter(s) (50 mL/Hr) IV Continuous <Continuous>  dextrose 5%. 1000 milliLiter(s) (100 mL/Hr) IV Continuous <Continuous>  dextrose 50% Injectable 25 Gram(s) IV Push once  dextrose 50% Injectable 12.5 Gram(s) IV Push once  dextrose 50% Injectable 25 Gram(s) IV Push once  enoxaparin Injectable 40 milliGRAM(s) SubCutaneous every 12 hours  finasteride 5 milliGRAM(s) Oral daily  furosemide   Injectable 80 milliGRAM(s) IV Push every 12 hours  glucagon  Injectable 1 milliGRAM(s) IntraMuscular once  insulin lispro (ADMELOG) corrective regimen sliding scale   SubCutaneous Before meals and at bedtime  metoprolol succinate ER 25 milliGRAM(s) Oral daily    MEDICATIONS  (PRN):  ALBUTerol    90 MICROgram(s) HFA Inhaler 2 Puff(s) Inhalation every 6 hours PRN Shortness of Breath and/or Wheezing      Assessment- Rapid Response called for 89y year old Male with a past medical history morbidly obese, HTN, NIDDM, asthma, BPH, CHB s/p PPM), ?chronic CHF (EF:60% by Echo 2/3/20) admitted for cardiac telemetry and management of acute on chronic CHF Exacerbation.    Plan-  f/u CT head and stroke eval.  f/u labs.   Cont telemetry.  Monitor for apnea/hypercapnia/hypoxia.   Remainder of plan as per primary team.

## 2020-11-13 NOTE — PROGRESS NOTE ADULT - PROBLEM SELECTOR PLAN 5
continue Finasteride 5mg PO daily    VTE PPx: on Lovenox  Dispo: continue diuresis.  PT consulted. continue Finasteride 5mg PO daily    VTE PPx: on Lovenox  Dispo: continue diuresis.  PT recommends EVERARDO at this time,  will continue to reevaluate as pt diureses. oral hypoglycemics on hold, continue FS's and ICS PRN

## 2020-11-13 NOTE — PROGRESS NOTE ADULT - SUBJECTIVE AND OBJECTIVE BOX
S: Pt seen and examined bedside.  Patient denies C/P, SOB, N/V, dizziness, palpitations, and diaphoresis.  Pt denies fever/chills, dysuria, abdominal pain, diarrhea, and cough  12 Point ROS otherwise negative except as per HPI/subjective.     O: Vital Signs Last 24 Hrs  T(C): 36.4 (2020 09:17), Max: 37 (2020 14:50)  T(F): 97.5 (2020 09:17), Max: 98.6 (2020 14:50)  HR: 63 (2020 08:32) (63 - 75)  BP: 110/65 (2020 08:32) (108/55 - 138/67)  BP(mean): 83 (2020 08:32) (71 - 90)  RR: 17 (2020 08:32) (17 - 28)  SpO2: 97% (2020 08:32) (91% - 97%)    PHYSICAL EXAM:  GEN: NAD  HEENT: No JVD  PULM:  CTA B/L  CARD:  RRR, S1 and S2   ABD: +BS, NT, soft/ND	  EXT: No Edema B/L LE  NEURO: A+Ox3, no focal deficit  PSYCH: Mood Appropriate    LABS:                        11.7   7.29  )-----------( 110      ( 2020 06:02 )             36.2     11-    141  |  94<L>  |  25<H>  ----------------------------<  173<H>  5.0   |  35<H>  |  1.13    Ca    9.4      2020 10:33  Phos  4.5       Mg     2.0         TPro  7.0  /  Alb  3.8  /  TBili  0.3  /  DBili  x   /  AST  29  /  ALT  14  /  AlkPhos  59        Troponin T, Serum: 0.04 ng/mL (20 @ 23:08)  Troponin T, Serum: 0.03 ng/mL (20 @ 06:18)  Troponin T, Serum: 0.04 ng/mL (20 @ 02:09)  Troponin T, Serum: 0.04 ng/mL (20 @ 21:36)       @ 07:  -   @ 07:00  --------------------------------------------------------  IN: 725 mL / OUT: 3750 mL / NET: -3025 mL     @ 07:  -   @ 12:06  --------------------------------------------------------  IN: 180 mL / OUT: 800 mL / NET: -620 mL      Daily     Daily Weight in k.7 (2020 06:15)   S: Pt seen and examined bedside. Pt reports he is feeling "great" this morning. Pt does not remember stroke code last night or going for CT head.   Patient denies C/P, SOB, N/V, dizziness, palpitations, and diaphoresis.  Pt denies fever/chills, dysuria, abdominal pain, diarrhea, and cough  12 Point ROS otherwise negative except as per HPI/subjective.     O: Vital Signs Last 24 Hrs  T(C): 36.4 (2020 09:17), Max: 37 (2020 14:50)  T(F): 97.5 (2020 09:17), Max: 98.6 (2020 14:50)  HR: 63 (2020 08:32) (63 - 75)  BP: 110/65 (2020 08:32) (108/55 - 138/67)  BP(mean): 83 (2020 08:32) (71 - 90)  RR: 17 (2020 08:32) (17 - 28)  SpO2: 97% (2020 08:32) (91% - 97%)    PHYSICAL EXAM:  GEN: NAD  Neck: Supple, + JVD  Cardiovascular: Normal S1 S2, No murmurs,   Respiratory: + rales b/l bases  Gastrointestinal:  Soft, Non-tender, + BS	  Skin: No rashes, No ecchymoses, No cyanosis  Extremities: Normal range of motion, No clubbing, cyanosis, 4+ edema b/l pitting  Vascular: Peripheral pulses palpable 2+ bilaterally  Neurologic: Non-focal  PSYCH: Mood Appropriate    LABS:                        11.7   7.29  )-----------( 110      ( 2020 06:02 )             36.2     -    141  |  94<L>  |  25<H>  ----------------------------<  173<H>  5.0   |  35<H>  |  1.13    Ca    9.4      2020 10:33  Phos  4.5     11-12  Mg     2.0         TPro  7.0  /  Alb  3.8  /  TBili  0.3  /  DBili  x   /  AST  29  /  ALT  14  /  AlkPhos  59  11-12      Troponin T, Serum: 0.04 ng/mL (20 @ 23:08)  Troponin T, Serum: 0.03 ng/mL (20 @ 06:18)  Troponin T, Serum: 0.04 ng/mL (20 @ 02:09)  Troponin T, Serum: 0.04 ng/mL (20 @ 21:36)       @ 07:01  -   @ 07:00  --------------------------------------------------------  IN: 725 mL / OUT: 3750 mL / NET: -3025 mL     @ 07:01  -   @ 12:06  --------------------------------------------------------  IN: 180 mL / OUT: 800 mL / NET: -620 mL      Daily     Daily Weight in k.7 (2020 06:15)   S: Pt seen and examined bedside. Pt reports he is feeling "great" this morning. Pt does not remember stroke code last night or going for CT head.   Patient denies C/P, SOB, N/V, dizziness, palpitations, and diaphoresis.  Pt denies fever/chills, dysuria, abdominal pain, diarrhea, and cough  12 Point ROS otherwise negative except as per HPI/subjective.     O: Vital Signs Last 24 Hrs  T(C): 36.4 (2020 09:17), Max: 37 (2020 14:50)  T(F): 97.5 (2020 09:17), Max: 98.6 (2020 14:50)  HR: 63 (2020 08:32) (63 - 75)  BP: 110/65 (2020 08:32) (108/55 - 138/67)  BP(mean): 83 (2020 08:32) (71 - 90)  RR: 17 (2020 08:32) (17 - 28)  SpO2: 97% (2020 08:32) (91% - 97%)    PHYSICAL EXAM:  GEN: NAD  Neck: Supple, + JVD  Cardiovascular: Normal S1 S2, No murmurs,   Respiratory: + mild rales b/l bases  Gastrointestinal:  Soft, Non-tender, + BS	  Skin: No rashes, No ecchymoses, No cyanosis  Extremities: Normal range of motion, No clubbing, cyanosis, 4+ edema b/l pitting  Vascular: Peripheral pulses palpable 2+ bilaterally  Neurologic: Mild R sided facial droop and mildly dysarthric (pt has no teeth), strength 5/5 upper and lower extremities, A&O x 3  PSYCH: Mood Appropriate    LABS:                        11.7   7.29  )-----------( 110      ( 2020 06:02 )             36.2     11-13    141  |  94<L>  |  25<H>  ----------------------------<  173<H>  5.0   |  35<H>  |  1.13    Ca    9.4      2020 10:33  Phos  4.5     11-12  Mg     2.0     11-13    TPro  7.0  /  Alb  3.8  /  TBili  0.3  /  DBili  x   /  AST  29  /  ALT  14  /  AlkPhos  59        Troponin T, Serum: 0.04 ng/mL (20 @ 23:08)  Troponin T, Serum: 0.03 ng/mL (20 @ 06:18)  Troponin T, Serum: 0.04 ng/mL (20 @ 02:09)  Troponin T, Serum: 0.04 ng/mL (20 @ 21:36)       @ 07:  -   @ 07:00  --------------------------------------------------------  IN: 725 mL / OUT: 3750 mL / NET: -3025 mL     @ 07:01  -   @ 12:06  --------------------------------------------------------  IN: 180 mL / OUT: 800 mL / NET: -620 mL      Daily     Daily Weight in k.7 (2020 06:15)

## 2020-11-13 NOTE — CHART NOTE - NSCHARTNOTEFT_GEN_A_CORE
Overhead rapid response called on patient ~10:30PM, as per RN patient unresponsive and noted to have right facial droop.  BP: 119/74, HR 60s, RR:24, Temp: 98F, O2 sat:98% on 4L, Fingerstick 110. On telemetry noted to be paced, no acute events. Patient moving upper and lower extremities spontaneously, right facial droop/dysarthria, not following commands, unable to fully assess strength. EKG Vpaced. Stroke code called. STAT labs/ABG drawn. Neuro PA assessed patient at bedside.  Patient taken for CT Head which revealed no acute intracranial hemorrhage or transcortical infarction. CT Angio Head/Neck revealed the bilateral internal carotid arteries are patent. The middle cerebral and anterior cerebral arteries are patent. The anterior communicating appears to be hypoplastic within limitations of study. Evaluation of the neck arteries very limited by bolus timing resulting in faint opacification of the bilateral common carotid, internal and vertebral arteries. Within that limitation, no definite occlusion of the intracranial vessels.    CT perfusion study revealed evaluation is limited by delayed arrival of contrast bolus and also truncation of the arterial input function curve, likely due to misplacement of JENARO. Within that limitation, no evidence of CBF less than 30% or Tmax greater than 6 seconds.    Upon return from CT scan patient returned to baseline A+O x 3, with persistent right sided facial droop and mild dysarthria, strength V/V in bilateral upper extremities and III/V in bilateral lower extremities.    Neurology recommends ASA 81mg PO BID and Lipitor 80mg PO qhs. MRI brain for further evaluation of acute event.    Attempted to reach patient's daughter Ladonna (left message to call back) and Dr. Tuttle informed. Overhead rapid response called on patient ~10:30PM, as per RN patient unresponsive and noted to have right facial droop.  BP: 119/74, HR 60s, RR:24, Temp: 98F, O2 sat:98% on 4L, Fingerstick 110. On telemetry noted to be paced, no acute events. Patient moving upper and lower extremities spontaneously, right facial droop/dysarthria, not following commands, unable to fully assess strength. EKG Vpaced. Stroke code called. STAT labs/ABG drawn. Neuro PA assessed patient at bedside.  Patient taken for CT Head which revealed no acute intracranial hemorrhage or transcortical infarction. CT Angio Head/Neck revealed the bilateral internal carotid arteries are patent. The middle cerebral and anterior cerebral arteries are patent. The anterior communicating appears to be hypoplastic within limitations of study. Evaluation of the neck arteries very limited by bolus timing resulting in faint opacification of the bilateral common carotid, internal and vertebral arteries. Within that limitation, no definite occlusion of the intracranial vessels.    CT perfusion study revealed evaluation is limited by delayed arrival of contrast bolus and also truncation of the arterial input function curve, likely due to misplacement of JENARO. Within that limitation, no evidence of CBF less than 30% or Tmax greater than 6 seconds.    Upon return from CT scan patient returned to baseline A+O x 3, with persistent right sided facial droop and mild dysarthria, strength V/V in bilateral upper extremities and III/V in bilateral lower extremities.    Neurology recommends ASA 81mg PO BID and Lipitor 80mg PO qhs. MRI brain for further evaluation of acute event. Will confirm PPM MRI compatibility with EP in AM (Medtronic [place] XT DR GARAY-- implanted 10/2018)    Patient's daughter Brianna Samuels informed of event and left message for Dr. Tuttle. Will continue to monitor closely with neuro checks every 2 hours.

## 2020-11-13 NOTE — PROGRESS NOTE ADULT - PROBLEM SELECTOR PLAN 1
HD stable on exam but w/ +JVD, + rales,  4+ bilateral LE pitting edema extending into thighs/scrotal region, O2 now 92-98% on RA  - , CXR consistent with pulmonary vascular congestion.  - At home was instructed by PCP to increased lasix to BID but without improvement of symptoms.   - Continue Lasix 80mg IV BID.   - Last Echo 2/2020: normal LV/RV, EF 60%, no valve disease.  Repeat Echo ordered.   - core measures, strict I/Os, daily weight.  Out 1.7L overnight. HD stable on exam but w/ +JVD, + rales,  4+ bilateral LE pitting edema extending into thighs/scrotal region, O2 now 92-98% on 2L NC. Net negative 5 L.   - , CXR consistent with pulmonary vascular congestion.  - At home was instructed by PCP to increased Lasix to BID but without improvement of symptoms.   - Continue Lasix 80mg IV BID, consider transitioning to Lasix 40 IV TID on 11/14.   - Last Echo 2/2020: normal LV/RV, EF 60%, no valve disease.   - ECHO 11/12/20 Mild symmetric LVH, EF 55%, Probably dilated RV size, Borderline reduced RV systolic function, Device leads are noted in the right heart, Aortic sclerosis without significant stenosis, No evidence of pulmonary HTN, PASP 31 mmHg, Trivial pericardial effusion.  - core measures, strict I/Os, daily weight.

## 2020-11-13 NOTE — PROGRESS NOTE ADULT - SUBJECTIVE AND OBJECTIVE BOX
EPS Device interrogation    Indication: pt going for MRI    Device model: Medtronic [place] XT  MRI [id number]		Implanting Physician: Dr. Brown    Functioning Mode: DDD 			    Underlying Rhythm: sinus bradycardia 30's    Pacemaker dependency:  yes    Battery status: 10.1 years    Interrogating parameters:   				RA			RV			    Sense:                                       2.0  mV                     5.3 mV    Threshold:                      0.50 V@ 0.4 ms                  0.25 V @0.4 ms    Pacing Impedance:                380 ohms                        399 ohms    Shock Impedance:                  n/a                                                                                               Events/Alert:  none    Parameter change: none    EP called to interrogate pt's ppm because he was going to have an MRI. Pt placed on MRI mode for the study, but shortly after MRI was cancelled. Pt placed back on original settings. Interrogation of pt's PPM settings show normal functioning PPM and no recent events detected.

## 2020-11-13 NOTE — PROGRESS NOTE ADULT - PROBLEM SELECTOR PLAN 2
s/p Medtronic PPM with Dr. Brown 10/30/2018, most recent device interogation 9/23/20 revealed RA oversensing, therefore sensitivity was decreased to 0.6 mv. Otherwise, device was functioning appropriately as programmed and all measured data is WNL.   - EKG paced. s/p Medtronic PPM with Dr. Brown 10/30/2018, most recent device interrogation 9/23/20 revealed RA oversensing, therefore sensitivity was decreased to 0.6 mv. Otherwise, device was functioning appropriately as programmed and all measured data is WNL.   - EKG paced. Overnight on 11/12 pt was found to be unresponsive, dysarthric, R facial droop. Stroke code was called.   - CT head revealed no acute intracranial hemorrhage or transcortical infarction.  - CT Angio Head/Neck revealed the bilateral internal carotid arteries are patent. The middle cerebral and anterior cerebral arteries are patent. The anterior communicating appears to be hypoplastic within limitations of study. Evaluation of the neck arteries very limited by bolus timing resulting in faint opacification of the bilateral common carotid, internal and vertebral arteries. Within that limitation, no definite occlusion of the intracranial vessels.  - CT perfusion study revealed evaluation is limited by delayed arrival of contrast bolus and also truncation of the arterial input function curve, likely due to misplacement of JENARO. Within that limitation, no evidence of CBF less than 30% or Tmax greater than 6 seconds.  - Upon return from CT scan patient returned to baseline A+O x 3, with persistent right sided facial droop and mild dysarthria, strength V/V in bilateral upper extremities and III/V in bilateral lower extremities.  - Neurology recommended ASA 81mg PO BID, Lipitor 80mg PO qhs and MRI brain for further evaluation of acute event. Per Dr. Tuttle low suspicion for stroke, pt at risk for flash pulmonary edema if has to lie flat for the MRI, test deferred at this time.   - This AM 11/13 pt A&O x 3, still with mild R sided facial droop and dysarthric however has no teeth, upper and lower strength 5/5   - Continue with Aspirin 81mg PO QD

## 2020-11-13 NOTE — PROGRESS NOTE ADULT - PROBLEM SELECTOR PLAN 3
Troponin T 0.04 x2 then downtreded.  patient chest pain free, likely in setting of demand from CHF.  - f/u Echo results. Troponin T 0.04 x2 then downtrended.  patient chest pain free, likely in setting of demand from CHF.  - ECHO results as above. s/p Medtronic PPM with Dr. Brown 10/30/2018, most recent device interrogation 9/23/20 revealed RA oversensing, therefore sensitivity was decreased to 0.6 mv. Otherwise, device was functioning appropriately as programmed and all measured data is WNL.   - EKG paced.

## 2020-11-13 NOTE — CONSULT NOTE ADULT - SUBJECTIVE AND OBJECTIVE BOX
Neurology Stroke Consult Note  *INCOMPLETE****  Chief Complaint:   Last known well time/Time of onset of symptoms:    HPI:    PAST MEDICAL & SURGICAL HISTORY:  Prostate disorder    Asthma    Diabetes    History of right cataract surgery    History of left cataract surgery    S/P TURP        FAMILY HISTORY:  Family history of diabetes mellitus (Sibling)        SOCIAL HISTORY:  Denies smoking, drinking, or drug use    ROS:  Constitutional: No fever, weight loss or fatigue  Eyes: No eye pain, visual disturbances, or discharge  ENMT:  No difficulty hearing, tinnitus, vertigo;  No sinus or throat pain  Neck: No pain or stiffness  Respiratory: No cough, wheezing, chills or hemoptysis  Cardiovascular: No chest pain, palpitations, shortness of breath, dizziness or leg swelling  Gastrointestinal: No abdominal pain. No nausea, vomiting or hematemesis; No diarrhea or constipation. Nohematochezia.  Genitourinary: No dysuria, frequency, hematuria or incontinence  Neurological: As per HPI  Skin: No itching, burning, rashes or lesions   Endocrine: No heat or cold intolerance; No hair loss  Musculoskeletal: No joint pain or swelling; No muscle, back or extremity pain  Psychiatric: No depression, anxiety, mood swings or difficulty sleeping  Heme/Lymph: No easy bruising or bleeding gums    MEDICATIONS  (STANDING):  aspirin enteric coated 81 milliGRAM(s) Oral daily  atorvastatin 80 milliGRAM(s) Oral at bedtime  budesonide 160 MICROgram(s)/formoterol 4.5 MICROgram(s) Inhaler 2 Puff(s) Inhalation two times a day  clopidogrel Tablet 75 milliGRAM(s) Oral daily  enoxaparin Injectable 40 milliGRAM(s) SubCutaneous every 12 hours  finasteride 5 milliGRAM(s) Oral daily  furosemide   Injectable 80 milliGRAM(s) IV Push every 12 hours  metoprolol succinate ER 25 milliGRAM(s) Oral daily    MEDICATIONS  (PRN):  ALBUTerol    90 MICROgram(s) HFA Inhaler 2 Puff(s) Inhalation every 6 hours PRN Shortness of Breath and/or Wheezing      Allergies    lisinopril (Angioedema)    Intolerances        Vital Signs Last 24 Hrs  T(C): 36.1 (2020 21:05), Max: 37 (2020 14:50)  T(F): 97 (2020 21:05), Max: 98.6 (2020 14:50)  HR: 69 (2020 20:16) (64 - 75)  BP: 108/55 (2020 20:16) (108/55 - 145/80)  BP(mean): 71 (2020 20:16) (71 - 101)  RR: 20 (2020 20:16) (17 - 28)  SpO2: 96% (2020 20:16) (91% - 98%)    Physical exam:  General: No acute distress, awake and alert  Cardiovascular: Regular rate and rhythm, no murmurs, rubs, or gallops. No bruits  Pulmonary: Anterior breath sounds clear bilaterally, no crackles or wheezing. No use of accessory muscles  Extremities: Radial and DP pulses +2, no edema    Neurologic:  -Mental status: Awake, alert, oriented to person, place, and time. Speech is fluent with intact naming, repetition, and comprehension, mild dysarthria. Recent and remote memory intact. Follows commands. Attention/concentration intact. Fund of knowledge appropriate.  -Cranial nerves:   II: Visual fields are full to confrontation.  III, IV, VI: Extraocular movements are intact without nystagmus. Pupils equally round and reactive to light  V:  Facial sensation V1-V3 equal and intact   VII: Right facial droop  VIII: Hearing is bilaterally intact   XII: Tongue protrudes midline  Motor: Normal bulk and tone. No pronator drift. Strength bilateral upper extremity 5/5, bilateral lower extremities 3/5.  Sensation: Intact to light touch bilaterally. No neglect or extinction on double simultaneous testing.  Coordination: Right hand dysmetria on finger-to-nose  Reflexes: Downgoing toes bilaterally   Gait: Deferred    NIHSS: 3    LABS:                        12.1   8.77  )-----------( 153      ( 2020 23:08 )             37.7     11-12    139  |  94<L>  |  25<H>  ----------------------------<  124<H>  4.5   |  34<H>  |  1.12    Ca    9.3      2020 23:08  Phos  4.5     11-12  Mg     1.9     -12    TPro  7.0  /  Alb  3.8  /  TBili  0.3  /  DBili  x   /  AST  29  /  ALT  14  /  AlkPhos  59  11-12      Urinalysis Basic - ( 2020 23:19 )    Color: Yellow / Appearance: Clear / S.010 / pH: x  Gluc: x / Ketone: NEGATIVE  / Bili: Negative / Urobili: 0.2 E.U./dL   Blood: x / Protein: NEGATIVE mg/dL / Nitrite: NEGATIVE   Leuk Esterase: NEGATIVE / RBC: x / WBC x   Sq Epi: x / Non Sq Epi: x / Bacteria: x        RADIOLOGY & ADDITIONAL TESTS:    CT Brain Stroke Protocol (20 @ 23:42) >  No acute intracranial hemorrhage or transcortical infarction.    < from: CT Perfusion w/ Maps w/ IV Cont (20 @ 23:41) >  Evaluation is limited by delayed arrival of contrast bolus and also truncation of the arterial input function curve, likely due to misplacement of JENARO. Within that limitation, no evidence of CBF less than 30% or Tmax greater than 6 seconds.    < end of copied text >  CTA******************************      Assessment and Plan  Plan: Patient with *** on HCT with CTA showing *** and CTP showing ***. TPA was ***not given/given*** due to ***. NIHSS ***. Patient to be admitted to stroke unit for further workup of symptoms.     - Closely follow neuro checks every 2-4 hours   - Repeat HCT for acute changes in neuro exam  - Obtain MRI Brain without contrast  - Goal SBP < 200  - Bedside Dysphagia Screen  - PT/OT/SLP   - Obtain Hemoglobin A1C, Lipid Profile Panel, and TSH    Secondary Stroke Prevention Medication  - Start Aspirin 81 mg BID   - Start atorvastatin 80mg PO daily- cholesterol and stroke prevention   - Start heparin SQ and SCDs for DVT prophylaxis                     Katherin Valdes PA-C  Stroke Neurology   [phone]   Neurology Stroke Consult Note  *INCOMPLETE****  Chief Complaint:   Last known well time/Time of onset of symptoms:    HPI:    PAST MEDICAL & SURGICAL HISTORY:  Prostate disorder    Asthma    Diabetes    History of right cataract surgery    History of left cataract surgery    S/P TURP        FAMILY HISTORY:  Family history of diabetes mellitus (Sibling)        SOCIAL HISTORY:  Denies smoking, drinking, or drug use    ROS:  Constitutional: No fever, weight loss or fatigue  Eyes: No eye pain, visual disturbances, or discharge  ENMT:  No difficulty hearing, tinnitus, vertigo;  No sinus or throat pain  Neck: No pain or stiffness  Respiratory: No cough, wheezing, chills or hemoptysis  Cardiovascular: No chest pain, palpitations, shortness of breath, dizziness or leg swelling  Gastrointestinal: No abdominal pain. No nausea, vomiting or hematemesis; No diarrhea or constipation. Nohematochezia.  Genitourinary: No dysuria, frequency, hematuria or incontinence  Neurological: As per HPI  Skin: No itching, burning, rashes or lesions   Endocrine: No heat or cold intolerance; No hair loss  Musculoskeletal: No joint pain or swelling; No muscle, back or extremity pain  Psychiatric: No depression, anxiety, mood swings or difficulty sleeping  Heme/Lymph: No easy bruising or bleeding gums    MEDICATIONS  (STANDING):  aspirin enteric coated 81 milliGRAM(s) Oral daily  atorvastatin 80 milliGRAM(s) Oral at bedtime  budesonide 160 MICROgram(s)/formoterol 4.5 MICROgram(s) Inhaler 2 Puff(s) Inhalation two times a day  clopidogrel Tablet 75 milliGRAM(s) Oral daily  enoxaparin Injectable 40 milliGRAM(s) SubCutaneous every 12 hours  finasteride 5 milliGRAM(s) Oral daily  furosemide   Injectable 80 milliGRAM(s) IV Push every 12 hours  metoprolol succinate ER 25 milliGRAM(s) Oral daily    MEDICATIONS  (PRN):  ALBUTerol    90 MICROgram(s) HFA Inhaler 2 Puff(s) Inhalation every 6 hours PRN Shortness of Breath and/or Wheezing      Allergies    lisinopril (Angioedema)    Intolerances        Vital Signs Last 24 Hrs  T(C): 36.1 (2020 21:05), Max: 37 (2020 14:50)  T(F): 97 (2020 21:05), Max: 98.6 (2020 14:50)  HR: 69 (2020 20:16) (64 - 75)  BP: 108/55 (2020 20:16) (108/55 - 145/80)  BP(mean): 71 (2020 20:16) (71 - 101)  RR: 20 (2020 20:16) (17 - 28)  SpO2: 96% (2020 20:16) (91% - 98%)    Physical exam:  General: No acute distress, awake and alert  Cardiovascular: Regular rate and rhythm, no murmurs, rubs, or gallops. No bruits  Pulmonary: Anterior breath sounds clear bilaterally, no crackles or wheezing. No use of accessory muscles  Extremities: Radial and DP pulses +2, no edema    Neurologic:  -Mental status: Awake, alert, oriented to person, place, and time. Speech is fluent with intact naming, repetition, and comprehension, mild dysarthria. Recent and remote memory intact. Follows commands. Attention/concentration intact. Fund of knowledge appropriate.  -Cranial nerves:   II: Visual fields are full to confrontation.  III, IV, VI: Extraocular movements are intact without nystagmus. Pupils equally round and reactive to light  V:  Facial sensation V1-V3 equal and intact   VII: Right facial droop  VIII: Hearing is bilaterally intact   XII: Tongue protrudes midline  Motor: Normal bulk and tone. No pronator drift. Strength bilateral upper extremity 5/5, bilateral lower extremities 3/5.  Sensation: Intact to light touch bilaterally. No neglect or extinction on double simultaneous testing.  Coordination: Right hand dysmetria on finger-to-nose  Reflexes: Downgoing toes bilaterally   Gait: Deferred    NIHSS: 3    LABS:                        12.1   8.77  )-----------( 153      ( 2020 23:08 )             37.7     11-12    139  |  94<L>  |  25<H>  ----------------------------<  124<H>  4.5   |  34<H>  |  1.12    Ca    9.3      2020 23:08  Phos  4.5     11-12  Mg     1.9     -12    TPro  7.0  /  Alb  3.8  /  TBili  0.3  /  DBili  x   /  AST  29  /  ALT  14  /  AlkPhos  59  11-12      Urinalysis Basic - ( 2020 23:19 )    Color: Yellow / Appearance: Clear / S.010 / pH: x  Gluc: x / Ketone: NEGATIVE  / Bili: Negative / Urobili: 0.2 E.U./dL   Blood: x / Protein: NEGATIVE mg/dL / Nitrite: NEGATIVE   Leuk Esterase: NEGATIVE / RBC: x / WBC x   Sq Epi: x / Non Sq Epi: x / Bacteria: x        RADIOLOGY & ADDITIONAL TESTS:    CT Brain Stroke Protocol (20 @ 23:42) >  No acute intracranial hemorrhage or transcortical infarction.     CT Perfusion w/ Maps w/ IV Cont (20 @ 23:41) >  Evaluation is limited by delayed arrival of contrast bolus and also truncation of the arterial input function curve, likely due to misplacement of JENARO. Within that limitation, no evidence of CBF less than 30% or Tmax greater than 6 seconds.     CT Angio Neck w/ IV Cont (20 @ 23:45) >  Evaluation of the neck arteries very limited by bolus timing resulting in faint opacification of the bilateral common carotid, internal and vertebral arteries. Within thatlimitation, no definite occlusion of the intracranial vessels.    from: CT Angio Head w/ IV Cont (20 @ 23:42) >  Evaluation of the neck arteries very limited by bolus timing resulting in faint opacification of the bilateral common carotid, internal and vertebral arteries. Within thatlimitation, no definite occlusion of the intracranial vessels.            Assessment and Plan  Plan: Patient with *** on HCT with CTA showing *** and CTP showing ***. TPA was ***not given/given*** due to ***. NIHSS ***. Patient to be admitted to stroke unit for further workup of symptoms.     - Closely follow neuro checks every 2-4 hours   - Repeat HCT for acute changes in neuro exam  - Obtain MRI Brain without contrast  - Goal SBP < 200  - Bedside Dysphagia Screen  - PT/OT/SLP   - Obtain Hemoglobin A1C, Lipid Profile Panel, and TSH    Secondary Stroke Prevention Medication  - Start Aspirin 81 mg BID   - Start atorvastatin 80mg PO daily- cholesterol and stroke prevention   - Start heparin SQ and SCDs for DVT prophylaxis                     Katherin Valdes PA-C  Stroke Neurology   [phone]   Neurology Stroke Consult Note  *INCOMPLETE****  Chief Complaint:   Last known well time/Time of onset of symptoms:    HPI:  89 yr old morbidly obese M with PMHx of HTN, NIDDM, asthma, (denies intubations/hospitalizations), BPH, 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 Brown), ?chronic CHF (EF:60% by Echo 2/3/20) who presents to [facility] ED 20 c/o progressively worsening SOB and LE edema x 2-3 weeks. Patient reports he has chronic SOB and at baseline can ambulate 15-20 feet with his walker from room to room in his apartment, however over the past few weeks has started to become SOB at rest. Patient further admits to significant bilateral LE edema extending into scrotal region and orthopnea (sleeps sitting upright in recliner chair for the past week). Patient reports compliance with his medications. Patient denies any N/V, diaphoresis, fever, chills, chest pain, abdominal pain, recent travel or sick contacts. Patient reports he was seen by his PCP Dr. Santos a few weeks ago and his Lasix dose was increased from 40mg PO daily to 40mg PO BID without any improvement in symptoms. Patient was stable, admitted to [facility] for cardiac telemetry and management of acute on chronic CHF Exacerbation.   Patient was seen by nurse in his normal baseline at 21:54 then around 30 min later nurse found patient unresponsive leaning towards his right side with right facial. SC was called at 22:54. Upon arrival to code patient was not responding to questions had right facial and was leaning towards his right side. About 3 minutes into the code patient was responsive and following all commands.      AST MEDICAL & SURGICAL HISTORY:  Prostate disorder    Asthma    Diabetes    History of right cataract surgery    History of left cataract surgery    S/P TURP        FAMILY HISTORY:  Family history of diabetes mellitus (Sibling)        SOCIAL HISTORY:  Denies smoking, drinking, or drug use    ROS:  Constitutional: No fever, weight loss or fatigue  Eyes: No eye pain, visual disturbances, or discharge  ENMT:  No difficulty hearing   Neck: No pain or stiffness  Respiratory: No cough, wheezing, chills or hemoptysis  Cardiovascular: acute on chronic CHF Exacerbation.  Gastrointestinal: No abdominal pain. No nausea, vomiting or hematemesis;   Neurological: As per HPI  Musculoskeletal: LE edema      MEDICATIONS  (STANDING):  aspirin enteric coated 81 milliGRAM(s) Oral daily  atorvastatin 80 milliGRAM(s) Oral at bedtime  budesonide 160 MICROgram(s)/formoterol 4.5 MICROgram(s) Inhaler 2 Puff(s) Inhalation two times a day  clopidogrel Tablet 75 milliGRAM(s) Oral daily  enoxaparin Injectable 40 milliGRAM(s) SubCutaneous every 12 hours  finasteride 5 milliGRAM(s) Oral daily  furosemide   Injectable 80 milliGRAM(s) IV Push every 12 hours  metoprolol succinate ER 25 milliGRAM(s) Oral daily    MEDICATIONS  (PRN):  ALBUTerol    90 MICROgram(s) HFA Inhaler 2 Puff(s) Inhalation every 6 hours PRN Shortness of Breath and/or Wheezing      Allergies    lisinopril (Angioedema)    Intolerances        Vital Signs Last 24 Hrs  T(C): 36.1 (2020 21:05), Max: 37 (2020 14:50)  T(F): 97 (2020 21:05), Max: 98.6 (2020 14:50)  HR: 69 (2020 20:16) (64 - 75)  BP: 108/55 (2020 20:16) (108/55 - 145/80)  BP(mean): 71 (2020 20:16) (71 - 101)  RR: 20 (2020 20:16) (17 - 28)  SpO2: 96% (2020 20:16) (91% - 98%)    Physical exam:  General: No acute distress, awake and alert  Cardiovascular: Regular rate and rhythm, no murmurs, rubs, or gallops. No bruits  Pulmonary: Anterior breath sounds clear bilaterally, no crackles or wheezing. No use of accessory muscles  Extremities: Radial and DP pulses +2, no edema    Neurologic:  -Mental status: Awake, alert, oriented to person, place, and time. Speech is fluent with intact naming, repetition, and comprehension, mild dysarthria. Recent and remote memory intact. Follows commands. Attention/concentration intact. Fund of knowledge appropriate.  -Cranial nerves:   II: Visual fields are full to confrontation.  III, IV, VI: Extraocular movements are intact without nystagmus. Pupils equally round and reactive to light  V:  Facial sensation V1-V3 equal and intact   VII: Right facial droop  VIII: Hearing is bilaterally intact   XII: Tongue protrudes midline  Motor: Normal bulk and tone. No pronator drift. Strength bilateral upper extremity 5/5, bilateral lower extremities 3/5.  Sensation: Intact to light touch bilaterally. No neglect or extinction on double simultaneous testing.  Coordination: Right hand dysmetria on finger-to-nose  Reflexes: Downgoing toes bilaterally   Gait: Deferred    NIHSS: 3    LABS:                        12.1   8.77  )-----------( 153      ( 2020 23:08 )             37.7     11-12    139  |  94<L>  |  25<H>  ----------------------------<  124<H>  4.5   |  34<H>  |  1.12    Ca    9.3      2020 23:08  Phos  4.5     12  Mg     1.9         TPro  7.0  /  Alb  3.8  /  TBili  0.3  /  DBili  x   /  AST  29  /  ALT  14  /  AlkPhos  59  11-12      Urinalysis Basic - ( 2020 23:19 )    Color: Yellow / Appearance: Clear / S.010 / pH: x  Gluc: x / Ketone: NEGATIVE  / Bili: Negative / Urobili: 0.2 E.U./dL   Blood: x / Protein: NEGATIVE mg/dL / Nitrite: NEGATIVE   Leuk Esterase: NEGATIVE / RBC: x / WBC x   Sq Epi: x / Non Sq Epi: x / Bacteria: x        RADIOLOGY & ADDITIONAL TESTS:    CT Brain Stroke Protocol (20 @ 23:42) >  No acute intracranial hemorrhage or transcortical infarction.     CT Perfusion w/ Maps w/ IV Cont (20 @ 23:41) >  Evaluation is limited by delayed arrival of contrast bolus and also truncation of the arterial input function curve, likely due to misplacement of JENARO. Within that limitation, no evidence of CBF less than 30% or Tmax greater than 6 seconds.     CT Angio Neck w/ IV Cont (20 @ 23:45) >  Evaluation of the neck arteries very limited by bolus timing resulting in faint opacification of the bilateral common carotid, internal and vertebral arteries. Within that limitation, no definite occlusion of the intracranial vessels.    from: CT Angio Head w/ IV Cont (20 @ 23:42) >  Evaluation of the neck arteries very limited by bolus timing resulting in faint opacification of the bilateral common carotid, internal and vertebral arteries. Within that limitation, no definite occlusion of the intracranial vessels.            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 Brown), ?chronic CHF (EF:60% by Echo 2/3/20) who presents to [facility] ED 20 c/o progressively worsening SOB and LE edema x 2-3 weeks admitted for CHF exacerbation. SC called for right facial droop and unresponsiveness.  Patient HCT negative for acute/hemorrhagic stroke. CTA limited study with showing no large vessel occlusion. CTP showing limited study with no abnormal perfusion. TPA was not given due to rapidly improving neurological symptoms. NIHSS 3. Stroke team will follow patient for further stroke evaluation patient likely had a TIA.     - Closely follow neuro checks every 2-4 hours   - Repeat HCT for acute changes in neuro exam  - Obtain MRI Brain without contrast  - Goal SBP < 200  - Bedside Dysphagia Screen  - PT/OT/SLP   - Obtain Hemoglobin A1C, Lipid Profile Panel, and TSH    Secondary Stroke Prevention Medication  - Start Aspirin 81 mg BID   - Start atorvastatin 80mg PO daily- cholesterol and stroke prevention   - Start heparin SQ and SCDs for DVT prophylaxis   Please call with any questions or concerns   Katherin Valdes PA-C  Stroke Neurology   [phone]   Neurology Stroke Consult Note    Chief Complaint:   Last known well time/Time of onset of symptoms:    HPI:  89 yr old morbidly obese M with PMHx of HTN, NIDDM, asthma, (denies intubations/hospitalizations), BPH, 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 Brown), ?chronic CHF (EF:60% by Echo 2/3/20) who presents to [facility] ED 20 c/o progressively worsening SOB and LE edema x 2-3 weeks. Patient reports he has chronic SOB and at baseline can ambulate 15-20 feet with his walker from room to room in his apartment, however over the past few weeks has started to become SOB at rest. Patient further admits to significant bilateral LE edema extending into scrotal region and orthopnea (sleeps sitting upright in recliner chair for the past week). Patient reports compliance with his medications. Patient denies any N/V, diaphoresis, fever, chills, chest pain, abdominal pain, recent travel or sick contacts. Patient reports he was seen by his PCP Dr. Santos a few weeks ago and his Lasix dose was increased from 40mg PO daily to 40mg PO BID without any improvement in symptoms. Patient was stable, admitted to [facility] for cardiac telemetry and management of acute on chronic CHF Exacerbation.   Patient was seen by nurse in his normal baseline at 21:54 then around 30 min later nurse found patient unresponsive leaning towards his right side with right facial. SC was called at 22:54. Upon arrival to code patient was not responding to questions had right facial and was leaning towards his right side. About 3 minutes into the code patient was responsive and following all commands.      AST MEDICAL & SURGICAL HISTORY:  Prostate disorder    Asthma    Diabetes    History of right cataract surgery    History of left cataract surgery    S/P TURP        FAMILY HISTORY:  Family history of diabetes mellitus (Sibling)        SOCIAL HISTORY:  Denies smoking, drinking, or drug use    ROS:  Constitutional: No fever, weight loss or fatigue  Eyes: No eye pain, visual disturbances, or discharge  ENMT:  No difficulty hearing   Neck: No pain or stiffness  Respiratory: No cough, wheezing, chills or hemoptysis  Cardiovascular: acute on chronic CHF Exacerbation.  Gastrointestinal: No abdominal pain. No nausea, vomiting or hematemesis;   Neurological: As per HPI  Musculoskeletal: LE edema      MEDICATIONS  (STANDING):  aspirin enteric coated 81 milliGRAM(s) Oral daily  atorvastatin 80 milliGRAM(s) Oral at bedtime  budesonide 160 MICROgram(s)/formoterol 4.5 MICROgram(s) Inhaler 2 Puff(s) Inhalation two times a day  clopidogrel Tablet 75 milliGRAM(s) Oral daily  enoxaparin Injectable 40 milliGRAM(s) SubCutaneous every 12 hours  finasteride 5 milliGRAM(s) Oral daily  furosemide   Injectable 80 milliGRAM(s) IV Push every 12 hours  metoprolol succinate ER 25 milliGRAM(s) Oral daily    MEDICATIONS  (PRN):  ALBUTerol    90 MICROgram(s) HFA Inhaler 2 Puff(s) Inhalation every 6 hours PRN Shortness of Breath and/or Wheezing      Allergies    lisinopril (Angioedema)    Intolerances        Vital Signs Last 24 Hrs  T(C): 36.1 (2020 21:05), Max: 37 (2020 14:50)  T(F): 97 (2020 21:05), Max: 98.6 (2020 14:50)  HR: 69 (2020 20:16) (64 - 75)  BP: 108/55 (2020 20:16) (108/55 - 145/80)  BP(mean): 71 (2020 20:16) (71 - 101)  RR: 20 (2020 20:16) (17 - 28)  SpO2: 96% (2020 20:16) (91% - 98%)    Physical exam:  General: No acute distress, awake and alert  Cardiovascular: Regular rate and rhythm, no murmurs, rubs, or gallops. No bruits  Pulmonary: Anterior breath sounds clear bilaterally, no crackles or wheezing. No use of accessory muscles  Extremities: Radial and DP pulses +2, no edema    Neurologic:  -Mental status: Awake, alert, oriented to person, place, and time. Speech is fluent with intact naming, repetition, and comprehension, mild dysarthria. Recent and remote memory intact. Follows commands. Attention/concentration intact. Fund of knowledge appropriate.  -Cranial nerves:   II: Visual fields are full to confrontation.  III, IV, VI: Extraocular movements are intact without nystagmus. Pupils equally round and reactive to light  V:  Facial sensation V1-V3 equal and intact   VII: Right facial droop  VIII: Hearing is bilaterally intact   XII: Tongue protrudes midline  Motor: Normal bulk and tone. No pronator drift. Strength bilateral upper extremity 5/5, bilateral lower extremities 3/5.  Sensation: Intact to light touch bilaterally. No neglect or extinction on double simultaneous testing.  Coordination: Right hand dysmetria on finger-to-nose  Reflexes: Downgoing toes bilaterally   Gait: Deferred    NIHSS: 3    LABS:                        12.1   8.77  )-----------( 153      ( 2020 23:08 )             37.7         139  |  94<L>  |  25<H>  ----------------------------<  124<H>  4.5   |  34<H>  |  1.12    Ca    9.3      2020 23:08  Phos  4.5     12  Mg     1.9         TPro  7.0  /  Alb  3.8  /  TBili  0.3  /  DBili  x   /  AST  29  /  ALT  14  /  AlkPhos  59  11-12      Urinalysis Basic - ( 2020 23:19 )    Color: Yellow / Appearance: Clear / S.010 / pH: x  Gluc: x / Ketone: NEGATIVE  / Bili: Negative / Urobili: 0.2 E.U./dL   Blood: x / Protein: NEGATIVE mg/dL / Nitrite: NEGATIVE   Leuk Esterase: NEGATIVE / RBC: x / WBC x   Sq Epi: x / Non Sq Epi: x / Bacteria: x        RADIOLOGY & ADDITIONAL TESTS:    CT Brain Stroke Protocol (20 @ 23:42) >  No acute intracranial hemorrhage or transcortical infarction.     CT Perfusion w/ Maps w/ IV Cont (20 @ 23:41) >  Evaluation is limited by delayed arrival of contrast bolus and also truncation of the arterial input function curve, likely due to misplacement of JENARO. Within that limitation, no evidence of CBF less than 30% or Tmax greater than 6 seconds.     CT Angio Neck w/ IV Cont (20 @ 23:45) >  Evaluation of the neck arteries very limited by bolus timing resulting in faint opacification of the bilateral common carotid, internal and vertebral arteries. Within that limitation, no definite occlusion of the intracranial vessels.    from: CT Angio Head w/ IV Cont (20 @ 23:42) >  Evaluation of the neck arteries very limited by bolus timing resulting in faint opacification of the bilateral common carotid, internal and vertebral arteries. Within that limitation, no definite occlusion of the intracranial vessels.            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 Brown), ?chronic CHF (EF:60% by Echo 2/3/20) who presents to [facility] ED 20 c/o progressively worsening SOB and LE edema x 2-3 weeks admitted for CHF exacerbation. SC called for right facial droop and unresponsiveness.  Patient HCT negative for acute/hemorrhagic stroke. CTA limited study with showing no large vessel occlusion. CTP showing limited study with no abnormal perfusion. TPA was not given due to rapidly improving neurological symptoms. NIHSS 3. Stroke team will follow patient for further stroke evaluation patient likely had a TIA.     - Closely follow neuro checks every 2-4 hours   - Repeat HCT for acute changes in neuro exam  - Obtain MRI Brain without contrast  - Goal SBP < 200  - Bedside Dysphagia Screen  - PT/OT/SLP   - Obtain Hemoglobin A1C, Lipid Profile Panel, and TSH    Secondary Stroke Prevention Medication  - Start Aspirin 81 mg BID   - Start atorvastatin 80mg PO daily- cholesterol and stroke prevention   - Start heparin SQ and SCDs for DVT prophylaxis   Please call with any questions or concerns   Katherin Valdes PA-C  Stroke Neurology   [phone]

## 2020-11-13 NOTE — PROGRESS NOTE ADULT - ASSESSMENT
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 Brown), chronic CHF (EF:60% by Echo 2/3/20) who presents to [facility] ED 11/11/20 c/o progressively worsening SOB and LE edema x 2-3 weeks admitted for CHF exacerbation. Stroke code called for right facial droop and unresponsiveness. NIHSS 3. HCT negative for acute/hemorrhagic stroke. CTA limited study showing no large vessel occlusion. CTP limited study with no abnormal perfusion. tPA was not given due to rapidly improving neurological symptoms. Stroke team will follow patient for further stroke evaluation patient likely had a TIA.     ****************************INCOMPLETE**************************    - Neuro stroke check Q4 hours   - Repeat HCT for acute changes in neuro exam  - Obtain MRI Brain without contrast  - Continue Aspirin 81 mg BID   - Continue Atorvastatin 80mg PO daily - cholesterol and stroke prevention   - Goal SBP < 160  - PT/OT/SLP   - Continue Lovenox 40 mg SQ daily and SCDs for DVT prophylaxis     Lisette Jacobs PA-C 89 yr old morbidly obese M with PMHx of HTN, NIDDM, asthma, (denies intubations/hospitalizations), BPH, 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 Brown), chronic CHF (EF:60% by Echo 2/3/20) who presents to [facility] ED 11/11/20 c/o progressively worsening SOB and LE edema x 2-3 weeks admitted for CHF exacerbation. Stroke code called for right facial droop and unresponsiveness. NIHSS 3. HCT negative for acute/hemorrhagic stroke. CTA limited study showing no large vessel occlusion. CTP limited study with no abnormal perfusion. tPA was not given due to rapidly improving neurological symptoms. Stroke team will follow patient for further stroke evaluation patient likely had a TIA.    - Neuro stroke check Q4 hours   - Repeat HCT for acute changes in neuro exam  - Obtain MRI Brain without contrast - attempted 11/13 but canceled by primary team 2/2 concern for flash pulmonary edema; please reorder when stable  - Change to Aspirin 81 mg daily  - Start Plavix 75 mg daily   - Continue Atorvastatin 80mg PO daily - cholesterol and stroke prevention   - Goal SBP < 160  - PT/OT/SLP   - Continue Lovenox 40 mg SQ daily and SCDs for DVT prophylaxis   - Will follow up after MR Brain performed     Lisette Jacobs PA-C

## 2020-11-13 NOTE — PROGRESS NOTE ADULT - PROBLEM SELECTOR PLAN 6
continue Finasteride 5mg PO daily    VTE PPx: on Lovenox  Dispo: continue diuresis. PT recommends EVERARDO at this time, will continue to reevaluate as pt diureses. Pt's family refusing EVERARDO.

## 2020-11-13 NOTE — PROGRESS NOTE ADULT - PROBLEM SELECTOR PLAN 4
oral hypoglycemics on hold, continue FS's and ICS PRN Troponin T 0.04 x 2 then downtrended.  patient chest pain free, likely in setting of demand from CHF.  - ECHO results as above.

## 2020-11-13 NOTE — PROGRESS NOTE ADULT - ASSESSMENT
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 Brown), ?chronic CHF (EF:60% by Echo 2/3/20, denies but has been on lasix for years) who presents to [facility] ED 11/11/20 c/o progressively worsening SOB, LE edema, orthopnea, admitted to cardiac tele for acute dCHF exacerbation.       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 Brown), ?chronic CHF (EF 60% by Echo 2/3/20, pt denies but has been on lasix for years) who presents to [facility] ED 11/11/20 c/o progressively worsening SOB, LE edema and orthopnea. Pt was admitted to cardiac tele for acute dCHF exacerbation currently being diuresed with Lasix 80 IV BID. Hospital course significant for stroke code called 11/12 overnight, CT head negative, neurology following. PT currently recommending EVERARDO.

## 2020-11-13 NOTE — PROGRESS NOTE ADULT - ATTENDING COMMENTS
89M w/ morbid obesity, chronic dCHF, HTN, HLD, and DM p/w acute on chronic dCHF exacerbation    -CHF - acute on chronic diastolic CHF exacerbation; Volume overloaded on exam - c/w IV Lasix 40 BID, diuresing well but likely needs to diurese 15-20L off Monitor strict Is & Os; Core Measures  -Neuro -Pt. awoken late at night while asleep w/ subsequent concern for stroke d/t Lethargy. Patient did not have a stroke. He does have baseline dysarthric speech; CT Head is negative; Will hold off MRI for now; c/w ASA/Lipitor; Reschedule Meds to not occur while patient is asleep  -Renal  - Renal fxn remains stable w/ diuresis  -DASH diet; Pt. counseled on low-salt diet  -OOB to chair daily / PT  -DVT PPx  -Dispo: Cardiac Tele; Here thru the weekend for diuresis  -Code Status: Full Code    Bull Tuttle MD  Cardiology Attending 89M w/ morbid obesity, chronic dCHF, HTN, HLD, and DM p/w acute on chronic dCHF exacerbation    -CHF - acute on chronic diastolic CHF exacerbation; Volume overloaded on exam - c/w IV Lasix 40 BID, diuresing well but likely needs to diurese 15-20L off Monitor strict Is & Os; Core Measures  -Neuro -Pt. awoken late at night while asleep to be given PO meds w/ subsequent concern for stroke d/t Lethargy. Patient did not have a stroke. He does have baseline dysarthric speech; CT Head is negative; Will hold off MRI for now; c/w ASA/Lipitor; Reschedule Meds to not occur while patient is asleep  -Renal  - Renal fxn remains stable w/ diuresis  -DASH diet; Pt. counseled on low-salt diet  -OOB to chair daily / PT  -DVT PPx  -Dispo: Cardiac Tele; Here thru the weekend for diuresis  -Code Status: Full Code    Bull Tuttle MD  Cardiology Attending

## 2020-11-13 NOTE — PROGRESS NOTE ADULT - SUBJECTIVE AND OBJECTIVE BOX
Neurology Stroke Progress Note    INTERVAL HPI/OVERNIGHT EVENTS: Patient seen and examined. Pt reports feeling well this morning. Denies headache, blurry vision, chest pain, nausea, vomiting, dizziness, extremity numbness/tingling.     MEDICATIONS  (STANDING):  aspirin enteric coated 81 milliGRAM(s) Oral two times a day  atorvastatin 80 milliGRAM(s) Oral at bedtime  budesonide 160 MICROgram(s)/formoterol 4.5 MICROgram(s) Inhaler 2 Puff(s) Inhalation two times a day  dextrose 40% Gel 15 Gram(s) Oral once  dextrose 5%. 1000 milliLiter(s) (50 mL/Hr) IV Continuous <Continuous>  dextrose 5%. 1000 milliLiter(s) (100 mL/Hr) IV Continuous <Continuous>  dextrose 50% Injectable 25 Gram(s) IV Push once  dextrose 50% Injectable 12.5 Gram(s) IV Push once  dextrose 50% Injectable 25 Gram(s) IV Push once  enoxaparin Injectable 40 milliGRAM(s) SubCutaneous every 12 hours  finasteride 5 milliGRAM(s) Oral daily  furosemide   Injectable 80 milliGRAM(s) IV Push every 12 hours  glucagon  Injectable 1 milliGRAM(s) IntraMuscular once  insulin lispro (ADMELOG) corrective regimen sliding scale   SubCutaneous Before meals and at bedtime  metoprolol succinate ER 25 milliGRAM(s) Oral daily    MEDICATIONS  (PRN):  ALBUTerol    90 MICROgram(s) HFA Inhaler 2 Puff(s) Inhalation every 6 hours PRN Shortness of Breath and/or Wheezing    Allergies:  lisinopril (Angioedema)    ROS: As per HPI, otherwise negative    Vital Signs Last 24 Hrs  T(C): 36.4 (2020 09:17), Max: 37 (2020 14:50)  T(F): 97.5 (2020 09:17), Max: 98.6 (2020 14:50)  HR: 63 (2020 08:32) (63 - 75)  BP: 110/65 (2020 08:32) (108/55 - 139/73)  BP(mean): 83 (2020 08:32) (71 - 90)  RR: 17 (2020 08:32) (17 - 28)  SpO2: 97% (2020 08:32) (91% - 97%)    Physical exam:  General: Awake, sitting comfortably in a chair, in no acute distress   Neurologic:  Mental status: Awake, alert, oriented to person, place, and time. Slight dysarthria. Follows commands. Attention/concentration intact. No aphasia  Cranial nerves:   II: visual fields are full to confrontation. pupils equally round and reactive to light  III, IV, VI: EOMI without nystagmus  V:  V1-V3 sensation intact   VII: Right facial droop   VIII: Gross hearing intact  Motor: Normal bulk and tone, strength 5/5 in b/l UE and LE,  strength 5/5. No drift.   Sensation: intact to light touch. No neglect.  Coordination: Right dysmetria on finger-to-nose.     LABS:                 11.7   7.29  )-----------( 110      ( 2020 06:02 )             36.2     -    139  |  94<L>  |  25<H>  ----------------------------<  124<H>  4.5   |  34<H>  |  1.12    Ca    9.3      2020 23:08  Phos  4.5     11-12  Mg     1.9     -    TPro  7.0  /  Alb  3.8  /  TBili  0.3  /  DBili  x   /  AST  29  /  ALT  14  /  AlkPhos  59  11-12    Urinalysis Basic - ( 2020 23:19 )    Color: Yellow / Appearance: Clear / S.010 / pH: x  Gluc: x / Ketone: NEGATIVE  / Bili: Negative / Urobili: 0.2 E.U./dL   Blood: x / Protein: NEGATIVE mg/dL / Nitrite: NEGATIVE   Leuk Esterase: NEGATIVE / RBC: x / WBC x   Sq Epi: x / Non Sq Epi: x / Bacteria: x    RADIOLOGY & ADDITIONAL TESTS:  < from: CT Brain Stroke Protocol (20 @ 23:42) >  IMPRESSION:  No acute intracranial hemorrhage or transcortical infarction.    < from: CT Angio Head & Neck w/ IV Cont (20 @ 23:42) >  CTA NECK:  Evaluation of the neck arteries very limited by bolus timing resulting in faint opacification of the bilateral common carotid, internal and vertebral arteries. Within that limitation:  There is minimal carotid bulb calcified plaque without hemodynamically significant stenosis of the right internal carotid artery. There is minimal carotid bulb calcified plaque without hemodynamically significant stenosis of the left internal carotid artery. The included soft tissues of the neck demonstrates an incidentally noted punctate stone in the left parotid gland. Evaluation of the upper chest demonstrates a partially visualized left-sided cardiac conduction device. Dilated main pulmonary artery measuring up to 3.6 cm which may be seen in pulmonary hypertension. There is multilevel disc height loss, most evident at C5-C6 and C6-C7  CTA HEAD:  The bilateral internal carotid arteries are patent. The middle cerebral and anterior cerebral arteries are patent.  The anterior communicating appears to be hypoplastic within limitations of study.  The P1 segment of the left posterior cerebral artery is hypoplastic with a prominent left posterior communicating artery, fetal PCA normal variant. The right posterior communicating artery is patent.  There is faint opacification of the distal vertebral arteries and basilar artery. The bilateral SCA and PCA arteries are patent.  The included portions of the cerebral parenchyma are grossly normal in appearance.  IMPRESSION:  Evaluation of the neck arteries very limited by bolus timing resulting in faint opacification of the bilateral common carotid, internal and vertebral arteries. Within that limitation, no definite occlusion of the intracranial vessels.    < from: CT Perfusion w/ Maps w/ IV Cont (20 @ 23:41) >  IMPRESSION:  Evaluation is limited by delayed arrival of contrast bolus and also truncation of the arterial input function curve, likely due to misplacement of JENARO. Within that limitation, no evidence of CBF less than 30% or Tmax greater than 6 seconds.    < from: TTE Echo Complete w/o Contrast w/ Doppler (20 @ 12:55) >  CONCLUSIONS:   1. Mild symmetric left ventricular hypertrophy.   2. Normal left ventricular size and systolic function.   3. Probably dilated right ventricular size.   4. Borderline reduced right ventricular systolic function.   5. Device leads are noted in the right heart.   6. Aortic sclerosis without significant stenosis.   7. No evidence of pulmonary hypertension, pulmonary artery systolic pressure is 31 mmHg.   8. Trivial pericardial effusion.

## 2020-11-14 ENCOUNTER — TRANSCRIPTION ENCOUNTER (OUTPATIENT)
Age: 85
End: 2020-11-14

## 2020-11-14 LAB
ANION GAP SERPL CALC-SCNC: 12 MMOL/L — SIGNIFICANT CHANGE UP (ref 5–17)
BUN SERPL-MCNC: 29 MG/DL — HIGH (ref 7–23)
CALCIUM SERPL-MCNC: 9.5 MG/DL — SIGNIFICANT CHANGE UP (ref 8.4–10.5)
CHLORIDE SERPL-SCNC: 95 MMOL/L — LOW (ref 96–108)
CO2 SERPL-SCNC: 36 MMOL/L — HIGH (ref 22–31)
CREAT SERPL-MCNC: 1.13 MG/DL — SIGNIFICANT CHANGE UP (ref 0.5–1.3)
GLUCOSE BLDC GLUCOMTR-MCNC: 129 MG/DL — HIGH (ref 70–99)
GLUCOSE BLDC GLUCOMTR-MCNC: 135 MG/DL — HIGH (ref 70–99)
GLUCOSE BLDC GLUCOMTR-MCNC: 138 MG/DL — HIGH (ref 70–99)
GLUCOSE BLDC GLUCOMTR-MCNC: 149 MG/DL — HIGH (ref 70–99)
GLUCOSE SERPL-MCNC: 138 MG/DL — HIGH (ref 70–99)
HCT VFR BLD CALC: 38.7 % — LOW (ref 39–50)
HGB BLD-MCNC: 12.2 G/DL — LOW (ref 13–17)
MAGNESIUM SERPL-MCNC: 2 MG/DL — SIGNIFICANT CHANGE UP (ref 1.6–2.6)
MCHC RBC-ENTMCNC: 30.7 PG — SIGNIFICANT CHANGE UP (ref 27–34)
MCHC RBC-ENTMCNC: 31.5 GM/DL — LOW (ref 32–36)
MCV RBC AUTO: 97.5 FL — SIGNIFICANT CHANGE UP (ref 80–100)
NRBC # BLD: 0 /100 WBCS — SIGNIFICANT CHANGE UP (ref 0–0)
PLATELET # BLD AUTO: 152 K/UL — SIGNIFICANT CHANGE UP (ref 150–400)
POTASSIUM SERPL-MCNC: 4.9 MMOL/L — SIGNIFICANT CHANGE UP (ref 3.5–5.3)
POTASSIUM SERPL-SCNC: 4.9 MMOL/L — SIGNIFICANT CHANGE UP (ref 3.5–5.3)
RBC # BLD: 3.97 M/UL — LOW (ref 4.2–5.8)
RBC # FLD: 13.5 % — SIGNIFICANT CHANGE UP (ref 10.3–14.5)
SODIUM SERPL-SCNC: 143 MMOL/L — SIGNIFICANT CHANGE UP (ref 135–145)
WBC # BLD: 6.44 K/UL — SIGNIFICANT CHANGE UP (ref 3.8–10.5)
WBC # FLD AUTO: 6.44 K/UL — SIGNIFICANT CHANGE UP (ref 3.8–10.5)

## 2020-11-14 PROCEDURE — 99232 SBSQ HOSP IP/OBS MODERATE 35: CPT

## 2020-11-14 RX ORDER — ACETAMINOPHEN 500 MG
650 TABLET ORAL EVERY 6 HOURS
Refills: 0 | Status: DISCONTINUED | OUTPATIENT
Start: 2020-11-14 | End: 2020-11-21

## 2020-11-14 RX ADMIN — ATORVASTATIN CALCIUM 80 MILLIGRAM(S): 80 TABLET, FILM COATED ORAL at 21:14

## 2020-11-14 RX ADMIN — ENOXAPARIN SODIUM 40 MILLIGRAM(S): 100 INJECTION SUBCUTANEOUS at 17:41

## 2020-11-14 RX ADMIN — Medication 80 MILLIGRAM(S): at 10:21

## 2020-11-14 RX ADMIN — BUDESONIDE AND FORMOTEROL FUMARATE DIHYDRATE 2 PUFF(S): 160; 4.5 AEROSOL RESPIRATORY (INHALATION) at 10:21

## 2020-11-14 RX ADMIN — Medication 80 MILLIGRAM(S): at 21:13

## 2020-11-14 RX ADMIN — Medication 81 MILLIGRAM(S): at 14:20

## 2020-11-14 RX ADMIN — Medication 25 MILLIGRAM(S): at 05:12

## 2020-11-14 RX ADMIN — Medication 650 MILLIGRAM(S): at 00:40

## 2020-11-14 RX ADMIN — FINASTERIDE 5 MILLIGRAM(S): 5 TABLET, FILM COATED ORAL at 14:20

## 2020-11-14 RX ADMIN — ENOXAPARIN SODIUM 40 MILLIGRAM(S): 100 INJECTION SUBCUTANEOUS at 05:12

## 2020-11-14 RX ADMIN — BUDESONIDE AND FORMOTEROL FUMARATE DIHYDRATE 2 PUFF(S): 160; 4.5 AEROSOL RESPIRATORY (INHALATION) at 21:14

## 2020-11-14 RX ADMIN — Medication 650 MILLIGRAM(S): at 23:39

## 2020-11-14 NOTE — DISCHARGE NOTE PROVIDER - NSDCCPCAREPLAN_GEN_ALL_CORE_FT
PRINCIPAL DISCHARGE DIAGNOSIS  Diagnosis: Diastolic CHF, acute on chronic  Assessment and Plan of Treatment: -You have a history of weakened heart muscle called congestive heart failure.    -Please make sure you follow up with your cardiologist within one week of discharge.    -Please weigh yourself daily: if you have gained more than 2-3 lbs in one day or 5 lbs in one week contact your doctor immediately as you may be retaining water weight   -In addition, restrict your salt intake to less than 2 grams a day   -If you develop worsening shortening of breath, leg swelling, fatigue, chest pain, difficulty sleeping at night due to shortness of breath, contact your cardiologist immediately.   - Please continue taking Metoprolol succinate 25mg once daily and Lasix.........         PRINCIPAL DISCHARGE DIAGNOSIS  Diagnosis: Diastolic CHF, acute on chronic  Assessment and Plan of Treatment: -You have a history of weakened heart muscle called congestive heart failure, which cuases you to develop water in your lungs and legs cuasing shortness of breath and swelling.   -Please weigh yourself daily: if you have gained more than 2-3 lbs in one day or 5 lbs in one week contact your doctor immediately as you may be retaining water weight   -In addition, restrict your salt intake to less than 2 grams a day   -If you develop worsening shortening of breath, leg swelling, fatigue, chest pain, difficulty sleeping at night due to shortness of breath, contact your cardiologist immediately.   - Please wrap your legs w/ ACE wraps every day to pevent fluid build up in your legs  - please START taking Torsemide 20mg Twice daily  - Continue to take metoprolol XL 25mg daily.   - Follow up with the Cardiologist Dr. Tuttle within 1 week.  Call the office to make an appointment.         SECONDARY DISCHARGE DIAGNOSES  Diagnosis: CHB (complete heart block)  Assessment and Plan of Treatment: Your pacemake is working well.  Continue to follow up with Dr. Brown.    Diagnosis: Diabetes  Assessment and Plan of Treatment: Continue your diabetes medications as usual.    Diagnosis: Unresponsiveness  Assessment and Plan of Treatment: During your hospital stay you had an episode of unresponsivness concerning for stroke but your work up was negative for stroke, and the symptoms resolved  - To prevent Storke please take aspirin 81mg daily and Atorvastatin 40mg daily.

## 2020-11-14 NOTE — PROGRESS NOTE ADULT - PROBLEM SELECTOR PLAN 2
Overnight on 11/12 pt was found to be unresponsive, dysarthric, R facial droop. Stroke code was called.   - CT head revealed no acute intracranial hemorrhage or transcortical infarction.  - CT Angio Head/Neck revealed the bilateral internal carotid arteries are patent. The middle cerebral and anterior cerebral arteries are patent. The anterior communicating appears to be hypoplastic within limitations of study. Evaluation of the neck arteries very limited by bolus timing resulting in faint opacification of the bilateral common carotid, internal and vertebral arteries. Within that limitation, no definite occlusion of the intracranial vessels.  - CT perfusion study revealed evaluation is limited by delayed arrival of contrast bolus and also truncation of the arterial input function curve, likely due to misplacement of JENARO. Within that limitation, no evidence of CBF less than 30% or Tmax greater than 6 seconds.  - Upon return from CT scan patient returned to baseline A+O x 3, with persistent right sided facial droop and mild dysarthria, strength V/V in bilateral upper extremities and III/V in bilateral lower extremities.  - Neurology recommended ASA 81mg PO BID, Lipitor 80mg PO qhs and MRI brain for further evaluation of acute event. Per Dr. Tuttle low suspicion for stroke, pt at risk for flash pulmonary edema if has to lie flat for the MRI, test deferred at this time.   - This AM 11/13 pt A&O x 3, still with mild R sided facial droop and dysarthric however has no teeth, upper and lower strength 5/5   - Continue with Aspirin 81mg PO QD Overnight on 11/12 pt was found to be unresponsive, dysarthric, R facial droop, LE weakness. Stroke code was called.   - CT head revealed no acute intracranial hemorrhage or transcortical infarction.  - CT Angio Head/Neck revealed the B/L ICA are patent. The middle cerebral and anterior cerebral arteries are patent. 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 Evaluation of the neck arteries very limited, no definite occlusion of the intracranial vessels.  - CT perfusion study revealed evaluation is limited. Within that limitation, no evidence of CBF less than 30% or Tmax greater than 6 seconds.  - Upon return from CT scan patient returned to baseline A+O x 3, with persistent right sided facial droop and mild dysarthria, strength V/V in bilateral upper extremities and III/V in bilateral lower extremities.  - Neurology recommended ASA 81mg PO BID, Lipitor 80mg PO qhs and MRI brain for further evaluation of acute event. Per Dr. Tuttle low suspicion for stroke, pt at risk for flash pulmonary edema if has to lie flat for the MRI, test deferred at this time.   - Pt currently A&O x 3, no facial droop noted, speech is clearer than yesterday however pt has no teeth, upper and lower strength 5/5 B/L    - Continue with Aspirin 81mg PO QD

## 2020-11-14 NOTE — DISCHARGE NOTE PROVIDER - CARE PROVIDERS DIRECT ADDRESSES
,DirectAddress_Unknown ,DirectAddress_Unknown,[email or web address]

## 2020-11-14 NOTE — DISCHARGE NOTE PROVIDER - NSDCMRMEDTOKEN_GEN_ALL_CORE_FT
Advair Diskus 250 mcg-50 mcg inhalation powder: 1 puff(s) inhaled 2 times a day  Albuterol (Eqv-Proventil HFA) 90 mcg/inh inhalation aerosol: 2 puff(s) inhaled every 6 hours, As Needed  aspirin 81 mg oral delayed release tablet: 1 tab(s) orally once a day  azelastine 137 mcg/inh (0.1%) nasal spray: 2 spray(s) nasal 2 times a day  finasteride 5 mg oral tablet: 1 tab(s) orally once a day  Lasix 40 mg oral tablet: 1 tab(s) orally 2 times a day  metFORMIN 1000 mg oral tablet: 1 tab(s) orally 2 times a day  Toprol-XL 25 mg oral tablet, extended release: 1 tab(s) orally once a day   Advair Diskus 250 mcg-50 mcg inhalation powder: 1 puff(s) inhaled 2 times a day  Albuterol (Eqv-Proventil HFA) 90 mcg/inh inhalation aerosol: 2 puff(s) inhaled every 6 hours, As Needed  aspirin 81 mg oral delayed release tablet: 1 tab(s) orally once a day  atorvastatin 40 mg oral tablet: 1 tab(s) orally once a day   azelastine 137 mcg/inh (0.1%) nasal spray: 2 spray(s) nasal 2 times a day  finasteride 5 mg oral tablet: 1 tab(s) orally once a day  metFORMIN 1000 mg oral tablet: 1 tab(s) orally 2 times a day  Toprol-XL 25 mg oral tablet, extended release: 1 tab(s) orally once a day  torsemide 20 mg oral tablet: 1 tab(s) orally 2 times a day

## 2020-11-14 NOTE — DISCHARGE NOTE PROVIDER - NSDCHHHOMEBOUND_GEN_ALL_CORE
Fall risk/Ataxic gait/Shortness of breath with minimal ambulation/Chest pain/weakness during/after ambulation   greater than 20 feet

## 2020-11-14 NOTE — DISCHARGE NOTE PROVIDER - CARE PROVIDER_API CALL
Rashawn Gonzalez  CARDIOLOGY  [address] [address]  Phone: [phone]  Fax: [phone]  Scheduled Appointment: 11/20/2020 11:00 AM   Bull Tuttle)  Internal Medicine  [address], [address] [place] [id number]  Phone: [phone]  Fax: [phone]  Follow Up Time: 1 week    Elin Brown  CARDIAC ELECTROPHYSIOLOGY  [address] [address]  Phone: [phone]  Fax: [phone]  Follow Up Time:

## 2020-11-14 NOTE — DISCHARGE NOTE PROVIDER - HOSPITAL COURSE
INCOMPLETE  89 yr old morbidly obese M with PMHx of HTN, NIDDM, asthma, (denies intubations/hospitalizations), BPH, 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 Brown), ?chronic CHF (EF:60% by Echo 2/3/20) who presents to [facility] ED 11/11/20 c/o progressively worsening SOB and LE edema x 2-3 weeks. Patient reports he has chronic SOB and at baseline can ambulate 15-20 feet with his walker from room to room in his apartment, however over the past few weeks has started to become SOB at rest. Patient further admits to significant bilateral LE edema extending into scrotal region and orthopnea (sleeps sitting upright in recliner chair for the past week). Patient reports compliance with his medications. Patient denies any N/V, diaphoresis, fever, chills, chest pain, abdominal pain, recent travel or sick contacts. Patient reports he was seen by his PCP Dr. Santos a few weeks ago and his Lasix dose was increased from 40mg PO daily to 40mg PO BID without any improvement in symptoms. In ED, BP: 133/70, HR:56, RR:20, Temp: 98.2F, O2 sat: 98% on 3L NC.  EKG revealed Vpaced rhythm, without acute ischemic changes. CXR revealed pulmonary vascular congestion.  Labs notable for: K 5.6, BUN/Cr 19/1.11, Troponin T 0.04, , UA unremarkable. COVID PCR pending. Patient treated with Lasix 40mg IV x 1 dose in the ED. Patient was admitted to [facility] for cardiac telemetry and management of acute on chronic CHF Exacerbation.    ECHO 11/12/20 revealed Mild symmetric LVH, EF 55%, Probably dilated RV size, Borderline reduced RV systolic function, Device leads are noted in the right heart, Aortic sclerosis without significant stenosis, No evidence of pulmonary HTN, PASP 31 mmHg, Trivial pericardial effusion. Hospital course complicated by stroke code called overnight 11/12/20 as pt was awoken by the nurse and found to be unresponsive, dysarthric, R sided facial droop and LE weakness. CT scans all negative for acute intracranial hemorrhage or infarct. Pt's mental status quickly improved, R facial droop resolved and strength B/L upper and lower extremities 5/5. Pt was started on Atorvastatin 80mg PO QD and Aspirin 81mg PO QD. Pt was continued on Lasix 80 IV BID for diuresis, later transitioned to ...... with improvement in SOB and LE edema.     Today pt was seen and examined at bedside, denies any complaints of chest pain, dizziness, SOB, palpitations, pain, fever and/or chills. No events on tele overnight, VSS, Labs unremarkable/stable, Physical exam WNL. Pt was seen and examined by cardiology attending as well and is deemed stable for discharge per  .   Pt is to continue ASA 81mg PO QD, Atorvastatin 80mg PO QD, Toprol XL 25mg PO QD and Lasix...... Pt is to follow up with cardiologist    in 1-2 weeks for post discharge check-up. Pt instructed to return to ED/seek immediate medical attention if symptoms of chest pain, SOB, and/or LOC. Pt agrees with the discharge plan, verbalizes understanding of the information given. All medications explained risks/side effects and e-prescribed to patient preferred pharmacy. 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 Brown), ?chronic CHF (EF:60% by Echo 2/3/20) who presents to [facility] ED 11/11/20 c/o progressively worsening SOB and LE edema x 2-3 weeks.  CXR revealing Congestion.  Patient admitted to [facility] for cardiac telemetry and management of acute on chronic CHF Exacerbation.      Patient started on Lasix 80mg IV BID.  ECHO 11/12/20 revealed Mild symmetric LVH, EF 55%, Probably dilated RV size, Borderline reduced RV systolic function, Device leads are noted in the right heart, Aortic sclerosis without significant stenosis, No evidence of pulmonary HTN, PASP 31 mmHg, Trivial pericardial effusion.  Paient w/ contraction alkalosis for which he was started on Diamox w/ improvement.  He is net negative 28L and euvolemic w/ improvement of LE swelling.  Patient transitioned to Lasix 40mg PO BID.      Hospital course complicated by stroke code called overnight 11/12/20 as pt was awoken by the nurse and found to be unresponsive, dysarthric, R sided facial droop and LE weakness. CT scans all negative for acute intracranial hemorrhage or infarct. Pt's mental status quickly improved, R facial droop resolved and strength B/L upper and lower extremities 5/5. Pt was started on Atorvastatin 80mg PO QD and Aspirin 81mg PO QD.  He is discharged home on ASA 81mg daily and Atorvastatin 40mg QHS.      EP consulted and PPM interrogated on 11/13 and working normally.    Patient evaluated at bedside and doing well.  Patient is satting 95% on room air now.  Labs, vitals, Meds reviewed with Dr. Troy and patient deemed stable for discharge home.  He is discharged on cardiac medications Lasix 40mg twice daily, metoprolol 25mg daily, ASA 81mg daily, atorvastatin 40mg daily.  New prescriptions are E prescribed to pharmacy of choice.  Patient will follow up with the cardiologist Dr. Thomas instructed to return to ED/seek immediate medical attention if symptoms of chest pain, SOB, and/or LOC. Pt agrees with the discharge plan, verbalizes understanding of the information given. All medications explained risks/side effects and e-prescribed to patient preferred pharmacy. 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 Brown), ?chronic CHF (EF:60% by Echo 2/3/20) who presents to [facility] ED 11/11/20 c/o progressively worsening SOB and LE edema x 2-3 weeks.  CXR revealing Congestion.  Patient admitted to [facility] for cardiac telemetry and management of acute on chronic CHF Exacerbation.      Patient started on Lasix 80mg IV BID.  ECHO 11/12/20 revealed Mild symmetric LVH, EF 55%, Probably dilated RV size, Borderline reduced RV systolic function, Device leads are noted in the right heart, Aortic sclerosis without significant stenosis, No evidence of pulmonary HTN, PASP 31 mmHg, Trivial pericardial effusion.  Paient w/ contraction alkalosis for which he was started on Diamox w/ improvement.  He is net negative 28L and euvolemic w/ improvement of LE swelling.  Patient transitioned to Lasix 40mg PO BID w/ plan to discharge on Torsemide 20mg PO BID.     Hospital course complicated by stroke code called overnight 11/12/20 as pt was awoken by the nurse and found to be unresponsive, dysarthric, R sided facial droop and LE weakness. CT scans all negative for acute intracranial hemorrhage or infarct. Pt's mental status quickly improved, R facial droop resolved and strength B/L upper and lower extremities 5/5. Pt was started on Atorvastatin 80mg PO QD and Aspirin 81mg PO QD.  He is discharged home on ASA 81mg daily and Atorvastatin 40mg QHS.      EP consulted and PPM interrogated on 11/13 and working normally.    Patient evaluated at bedside and doing well.  Patient is satting 95% on room air now.  Labs, vitals, Meds reviewed with Dr. Troy and patient deemed stable for discharge home.  He is discharged on cardiac medications Torsemide 20mg PO BID, metoprolol 25mg daily, ASA 81mg daily, atorvastatin 40mg daily.  New prescriptions are E prescribed to pharmacy of choice.  Patient will follow up with the cardiologist Dr. Tuttle in one week.  Pt instructed to return to ED/seek immediate medical attention if symptoms of chest pain, SOB, and/or LOC. Pt agrees with the discharge plan, verbalizes understanding of the information given. All medications explained risks/side effects and e-prescribed to patient preferred pharmacy.

## 2020-11-14 NOTE — PROGRESS NOTE ADULT - PROBLEM SELECTOR PLAN 1
HD stable on exam but w/ +JVD, + rales,  4+ bilateral LE pitting edema extending into thighs/scrotal region, O2 now 92-98% on 2L NC. Net negative 5 L.   - , CXR consistent with pulmonary vascular congestion.  - At home was instructed by PCP to increased Lasix to BID but without improvement of symptoms.   - Continue Lasix 80mg IV BID, consider transitioning to Lasix 40 IV TID on 11/14.   - Last Echo 2/2020: normal LV/RV, EF 60%, no valve disease.   - ECHO 11/12/20 Mild symmetric LVH, EF 55%, Probably dilated RV size, Borderline reduced RV systolic function, Device leads are noted in the right heart, Aortic sclerosis without significant stenosis, No evidence of pulmonary HTN, PASP 31 mmHg, Trivial pericardial effusion.  - core measures, strict I/Os, daily weight. HD stable on exam but w/ +JVD,  4+ bilateral LE pitting edema extending into thighs/scrotal region, O2 now 93-98% on 2L NC. Net negative 7.5L.   - , CXR consistent with pulmonary vascular congestion.  - At home was instructed by PCP to increased Lasix to BID but without improvement of symptoms.   - Continue Lasix 80mg IV BID  - Last Echo 2/2020: normal LV/RV, EF 60%, no valve disease.   - ECHO 11/12/20 Mild symmetric LVH, EF 55%, Probably dilated RV size, Borderline reduced RV systolic function, Device leads are noted in the right heart, Aortic sclerosis without significant stenosis, No evidence of pulmonary HTN, PASP 31 mmHg, Trivial pericardial effusion.  - core measures, strict I/Os, daily weight. HD stable on exam but w/ +JVD,  4+ bilateral LE pitting edema extending into thighs/scrotal region, O2 now 93-98% on 2L NC. Net negative 9.5L.   - , CXR consistent with pulmonary vascular congestion.  - At home was instructed by PCP to increased Lasix to BID but without improvement of symptoms.   - Continue Lasix 80mg IV BID  - Last Echo 2/2020: normal LV/RV, EF 60%, no valve disease.   - ECHO 11/12/20 Mild symmetric LVH, EF 55%, Probably dilated RV size, Borderline reduced RV systolic function, Device leads are noted in the right heart, Aortic sclerosis without significant stenosis, No evidence of pulmonary HTN, PASP 31 mmHg, Trivial pericardial effusion.  - core measures, strict I/Os, daily weight.

## 2020-11-14 NOTE — PROGRESS NOTE ADULT - PROBLEM SELECTOR PLAN 4
Troponin T 0.04 x 2 then downtrended.  patient chest pain free, likely in setting of demand from CHF.  - ECHO results as above.

## 2020-11-14 NOTE — PROGRESS NOTE ADULT - ASSESSMENT
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 Brown), ?chronic CHF (EF 60% by Echo 2/3/20, pt denies but has been on lasix for years) who presents to [facility] ED 11/11/20 c/o progressively worsening SOB, LE edema and orthopnea. Pt was admitted to cardiac tele for acute dCHF exacerbation currently being diuresed with Lasix 80 IV BID. Hospital course significant for stroke code called 11/12 overnight, CT head negative, neurology following. PT currently recommending EVERARDO.

## 2020-11-14 NOTE — DISCHARGE NOTE PROVIDER - NSDCFUSCHEDAPPT_GEN_ALL_CORE_FT
CORY CLIFTON ; 11/23/2020 ; NPP Cardio [facility] [address] CORY CLIFTON ; 11/20/2020 ; NPP HeartVasc [address]  CORY CLIFTON ; 11/23/2020 ; NPP Cardio [facility]asc [address]

## 2020-11-14 NOTE — PROGRESS NOTE ADULT - SUBJECTIVE AND OBJECTIVE BOX
S: Pt seen and examined bedside.  Patient denies C/P, SOB, N/V, dizziness, palpitations, and diaphoresis.  Pt denies fever/chills, dysuria, abdominal pain, diarrhea, and cough  12 Point ROS otherwise negative except as per HPI/subjective.     O: Vital Signs Last 24 Hrs  T(C): 37.3 (14 Nov 2020 10:05), Max: 37.3 (14 Nov 2020 10:05)  T(F): 99.2 (14 Nov 2020 10:05), Max: 99.2 (14 Nov 2020 10:05)  HR: 69 (14 Nov 2020 10:05) (62 - 80)  BP: 145/75 (14 Nov 2020 10:05) (97/50 - 145/75)  BP(mean): 102 (14 Nov 2020 10:05) (70 - 102)  RR: 22 (14 Nov 2020 10:05) (16 - 22)  SpO2: 93% (14 Nov 2020 10:05) (93% - 99%)    PHYSICAL EXAM:  GEN: NAD  HEENT: No JVD  PULM:  CTA B/L  CARD:  RRR, S1 and S2   ABD: +BS, NT, soft/ND	  EXT: No Edema B/L LE  NEURO: A+Ox3, no focal deficit  PSYCH: Mood Appropriate    LABS:                        12.2   6.44  )-----------( 152      ( 14 Nov 2020 08:23 )             38.7     11-14    143  |  95<L>  |  29<H>  ----------------------------<  138<H>  4.9   |  36<H>  |  1.13    Ca    9.5      14 Nov 2020 08:23  Phos  4.5     11-12  Mg     2.0     11-14    TPro  7.0  /  Alb  3.8  /  TBili  0.3  /  DBili  x   /  AST  29  /  ALT  14  /  AlkPhos  59  11-12      Troponin T, Serum: 0.04 ng/mL (11-12-20 @ 23:08)  Troponin T, Serum: 0.03 ng/mL (11-12-20 @ 06:18)  Troponin T, Serum: 0.04 ng/mL (11-12-20 @ 02:09)  Troponin T, Serum: 0.04 ng/mL (11-11-20 @ 21:36)      11-13 @ 07:01  -  11-14 @ 07:00  --------------------------------------------------------  IN: 520 mL / OUT: 3125 mL / NET: -2605 mL    11-14 @ 07:01  -  11-14 @ 13:30  --------------------------------------------------------  IN: 350 mL / OUT: 1550 mL / NET: -1200 mL      Daily     Daily    S: Pt seen and examined bedside. Pt reports he is feeling great this morning, reports his legs feel better to him and he is urinating a lot. Otherwise denies any complaints.   Patient denies C/P, SOB, N/V, dizziness, palpitations, and diaphoresis.  Pt denies fever/chills, dysuria, abdominal pain, diarrhea, and cough  12 Point ROS otherwise negative except as per HPI/subjective.     O: Vital Signs Last 24 Hrs  T(C): 37.3 (14 Nov 2020 10:05), Max: 37.3 (14 Nov 2020 10:05)  T(F): 99.2 (14 Nov 2020 10:05), Max: 99.2 (14 Nov 2020 10:05)  HR: 69 (14 Nov 2020 10:05) (62 - 80)  BP: 145/75 (14 Nov 2020 10:05) (97/50 - 145/75)  BP(mean): 102 (14 Nov 2020 10:05) (70 - 102)  RR: 22 (14 Nov 2020 10:05) (16 - 22)  SpO2: 93% (14 Nov 2020 10:05) (93% - 99%)    PHYSICAL EXAM:  GEN: NAD, on 2L NC,  Neck: Supple, + JVD  Cardiovascular: Normal S1 S2, No murmurs,   Respiratory: Diminished breath sounds B/L, no WRR  Gastrointestinal:  Soft, Non-tender, + BS	  Skin: No rashes, No ecchymoses, No cyanosis  Extremities: Normal range of motion, No clubbing, cyanosis, 4+ edema b/l pitting  Vascular: Peripheral pulses palpable 2+ bilaterally  Neurologic: No facial droop noted, speech is clearer than yesterday (pt has no teeth), strength 5/5 upper and lower extremities, A&O x 3  PSYCH: Mood Appropriate      LABS:                        12.2   6.44  )-----------( 152      ( 14 Nov 2020 08:23 )             38.7     11-14    143  |  95<L>  |  29<H>  ----------------------------<  138<H>  4.9   |  36<H>  |  1.13    Ca    9.5      14 Nov 2020 08:23  Phos  4.5     11-12  Mg     2.0     11-14    TPro  7.0  /  Alb  3.8  /  TBili  0.3  /  DBili  x   /  AST  29  /  ALT  14  /  AlkPhos  59  11-12      Troponin T, Serum: 0.04 ng/mL (11-12-20 @ 23:08)  Troponin T, Serum: 0.03 ng/mL (11-12-20 @ 06:18)  Troponin T, Serum: 0.04 ng/mL (11-12-20 @ 02:09)  Troponin T, Serum: 0.04 ng/mL (11-11-20 @ 21:36)      11-13 @ 07:01  -  11-14 @ 07:00  --------------------------------------------------------  IN: 520 mL / OUT: 3125 mL / NET: -2605 mL    11-14 @ 07:01  -  11-14 @ 13:30  --------------------------------------------------------  IN: 350 mL / OUT: 1550 mL / NET: -1200 mL      Daily     Daily

## 2020-11-14 NOTE — PROGRESS NOTE ADULT - PROBLEM SELECTOR PLAN 3
s/p Medtronic PPM with Dr. Brown 10/30/2018, most recent device interrogation 9/23/20 revealed RA oversensing, therefore sensitivity was decreased to 0.6 mv. Otherwise, device was functioning appropriately as programmed and all measured data is WNL.   - EKG paced.

## 2020-11-14 NOTE — DISCHARGE NOTE PROVIDER - NSDCFUADDAPPT_GEN_ALL_CORE_FT
Please follow up with your Cardiology Provider, Dr. Rashawn Gonzalez at [address] [place] [address] on 11/20/2020 at 11:00am.    Please bring your Insurance card(s), photo ID and discharge paperwork to your appointment.    Appointment was scheduled by Ms. KAILEY Sotelo, Referral Coordinator. Please follow up with the cardiologist Dr. Tuttle in 1 week.  Call to make an appointment.

## 2020-11-14 NOTE — DISCHARGE NOTE PROVIDER - PROVIDER TOKENS
FREE:[LAST:[Carlos],FIRST:[Rashawn],PHONE:[phone]],FAX:[phone]],ADDRESS:[[place] [address]],SCHEDULEDAPPT:[11/20/2020],SCHEDULEDAPPTTIME:[11:00 AM]] PROVIDER:[TOKEN:[[id number]],FOLLOWUP:[1 week]],PROVIDER:[TOKEN:[[id number]]]

## 2020-11-15 LAB
ANION GAP SERPL CALC-SCNC: 11 MMOL/L — SIGNIFICANT CHANGE UP (ref 5–17)
BUN SERPL-MCNC: 33 MG/DL — HIGH (ref 7–23)
CALCIUM SERPL-MCNC: 9.6 MG/DL — SIGNIFICANT CHANGE UP (ref 8.4–10.5)
CHLORIDE SERPL-SCNC: 94 MMOL/L — LOW (ref 96–108)
CO2 SERPL-SCNC: 37 MMOL/L — HIGH (ref 22–31)
CREAT SERPL-MCNC: 1.12 MG/DL — SIGNIFICANT CHANGE UP (ref 0.5–1.3)
GLUCOSE BLDC GLUCOMTR-MCNC: 109 MG/DL — HIGH (ref 70–99)
GLUCOSE BLDC GLUCOMTR-MCNC: 129 MG/DL — HIGH (ref 70–99)
GLUCOSE BLDC GLUCOMTR-MCNC: 146 MG/DL — HIGH (ref 70–99)
GLUCOSE BLDC GLUCOMTR-MCNC: 176 MG/DL — HIGH (ref 70–99)
GLUCOSE SERPL-MCNC: 139 MG/DL — HIGH (ref 70–99)
HCT VFR BLD CALC: 36.9 % — LOW (ref 39–50)
HGB BLD-MCNC: 11.8 G/DL — LOW (ref 13–17)
MAGNESIUM SERPL-MCNC: 2 MG/DL — SIGNIFICANT CHANGE UP (ref 1.6–2.6)
MCHC RBC-ENTMCNC: 30.6 PG — SIGNIFICANT CHANGE UP (ref 27–34)
MCHC RBC-ENTMCNC: 32 GM/DL — SIGNIFICANT CHANGE UP (ref 32–36)
MCV RBC AUTO: 95.6 FL — SIGNIFICANT CHANGE UP (ref 80–100)
NRBC # BLD: 0 /100 WBCS — SIGNIFICANT CHANGE UP (ref 0–0)
PLATELET # BLD AUTO: 150 K/UL — SIGNIFICANT CHANGE UP (ref 150–400)
POTASSIUM SERPL-MCNC: 4.4 MMOL/L — SIGNIFICANT CHANGE UP (ref 3.5–5.3)
POTASSIUM SERPL-SCNC: 4.4 MMOL/L — SIGNIFICANT CHANGE UP (ref 3.5–5.3)
RBC # BLD: 3.86 M/UL — LOW (ref 4.2–5.8)
RBC # FLD: 13.4 % — SIGNIFICANT CHANGE UP (ref 10.3–14.5)
SODIUM SERPL-SCNC: 142 MMOL/L — SIGNIFICANT CHANGE UP (ref 135–145)
WBC # BLD: 7.03 K/UL — SIGNIFICANT CHANGE UP (ref 3.8–10.5)
WBC # FLD AUTO: 7.03 K/UL — SIGNIFICANT CHANGE UP (ref 3.8–10.5)

## 2020-11-15 PROCEDURE — 99232 SBSQ HOSP IP/OBS MODERATE 35: CPT

## 2020-11-15 RX ADMIN — ENOXAPARIN SODIUM 40 MILLIGRAM(S): 100 INJECTION SUBCUTANEOUS at 17:03

## 2020-11-15 RX ADMIN — Medication 80 MILLIGRAM(S): at 21:40

## 2020-11-15 RX ADMIN — ATORVASTATIN CALCIUM 80 MILLIGRAM(S): 80 TABLET, FILM COATED ORAL at 21:40

## 2020-11-15 RX ADMIN — BUDESONIDE AND FORMOTEROL FUMARATE DIHYDRATE 2 PUFF(S): 160; 4.5 AEROSOL RESPIRATORY (INHALATION) at 21:40

## 2020-11-15 RX ADMIN — ENOXAPARIN SODIUM 40 MILLIGRAM(S): 100 INJECTION SUBCUTANEOUS at 05:52

## 2020-11-15 RX ADMIN — BUDESONIDE AND FORMOTEROL FUMARATE DIHYDRATE 2 PUFF(S): 160; 4.5 AEROSOL RESPIRATORY (INHALATION) at 10:50

## 2020-11-15 RX ADMIN — Medication 81 MILLIGRAM(S): at 10:50

## 2020-11-15 RX ADMIN — FINASTERIDE 5 MILLIGRAM(S): 5 TABLET, FILM COATED ORAL at 10:50

## 2020-11-15 RX ADMIN — Medication 25 MILLIGRAM(S): at 05:52

## 2020-11-15 RX ADMIN — Medication 2: at 12:08

## 2020-11-15 RX ADMIN — Medication 80 MILLIGRAM(S): at 10:49

## 2020-11-15 RX ADMIN — Medication 650 MILLIGRAM(S): at 01:00

## 2020-11-15 NOTE — PROGRESS NOTE ADULT - SUBJECTIVE AND OBJECTIVE BOX
Interventional Cardiology PA Adult Progress Note    CC: acute on chronic CHF exacerbation  Subjective Assessment: Pt seen and examined at bedside today am. Pt comfortable; watching TV in bed, denies any chest pain, SOB, dizziness, palpitations, N/V    ROS neg except as per subjective HPI  	   MEDICATIONS:  furosemide   Injectable 80 milliGRAM(s) IV Push every 12 hours  metoprolol succinate ER 25 milliGRAM(s) Oral daily  ALBUTerol    90 MICROgram(s) HFA Inhaler 2 Puff(s) Inhalation every 6 hours PRN  budesonide 160 MICROgram(s)/formoterol 4.5 MICROgram(s) Inhaler 2 Puff(s) Inhalation two times a day  acetaminophen   Tablet .. 650 milliGRAM(s) Oral every 6 hours PRN  atorvastatin 80 milliGRAM(s) Oral at bedtime  dextrose 40% Gel 15 Gram(s) Oral once  dextrose 50% Injectable 25 Gram(s) IV Push once  dextrose 50% Injectable 12.5 Gram(s) IV Push once  dextrose 50% Injectable 25 Gram(s) IV Push once  finasteride 5 milliGRAM(s) Oral daily  glucagon  Injectable 1 milliGRAM(s) IntraMuscular once  insulin lispro (ADMELOG) corrective regimen sliding scale   SubCutaneous Before meals and at bedtime    aspirin enteric coated 81 milliGRAM(s) Oral daily  dextrose 5%. 1000 milliLiter(s) IV Continuous <Continuous>  dextrose 5%. 1000 milliLiter(s) IV Continuous <Continuous>  enoxaparin Injectable 40 milliGRAM(s) SubCutaneous every 12 hours      	    [PHYSICAL EXAM:  TELEMETRY:  T(C): 36.2 (11-15-20 @ 09:22), Max: 37.3 (11-15-20 @ 05:35)  HR: 67 (11-15-20 @ 09:18) (61 - 68)  BP: 115/69 (11-15-20 @ 09:18) (101/51 - 124/56)  RR: 20 (11-15-20 @ 09:18) (15 - 20)  SpO2: 96% (11-15-20 @ 09:18) (91% - 98%)  Wt(kg): --  I&O's Summary    14 Nov 2020 07:01  -  15 Nov 2020 07:00  --------------------------------------------------------  IN: 650 mL / OUT: 4925 mL / NET: -4275 mL    15 Nov 2020 07:01  -  15 Nov 2020 10:28  --------------------------------------------------------  IN: 0 mL / OUT: 300 mL / NET: -300 mL        Stoll:  Central/PICC/Mid Line:                                           GEN: NAD, on 2L NC,  Neck: Supple, + JVD  Cardiovascular: Normal S1 S2, No murmurs,   Respiratory: Diminished breath sounds B/L, no WRR  Gastrointestinal:  Soft, Non-tender, + BS	  Skin: No rashes, No ecchymoses, No cyanosis  Extremities: Normal range of motion, No clubbing, cyanosis, 4+ edema b/l pitting  Vascular: Peripheral pulses palpable 2+ bilaterally  Neurologic: No facial droop noted,, strength 5/5 upper and lower extremities, A&O x 3  PSYCH: Mood Appropriate    	    ECG:  	  RADIOLOGY:   DIAGNOSTIC TESTING:  [ ] Echocardiogram:  [ ]  Catheterization:  [ ] Stress Test:    [ ] KOREY  OTHER: 	    LABS:	 	  CARDIAC MARKERS:                                  11.8   7.03  )-----------( 150      ( 15 Nov 2020 07:33 )             36.9     11-15    142  |  94<L>  |  33<H>  ----------------------------<  139<H>  4.4   |  37<H>  |  1.12    Ca    9.6      15 Nov 2020 07:33  Mg     2.0     11-15      proBNP:   Lipid Profile:   HgA1c:   TSH:       ASSESSMENT/PLAN: 	        DVT ppx:  Dispo:

## 2020-11-15 NOTE — PROGRESS NOTE ADULT - ASSESSMENT
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 Brown), ?chronic CHF (EF 60% by Echo 2/3/20, pt denies but has been on lasix for years) who presents to [facility] ED 11/11/20 c/o progressively worsening SOB, LE edema and orthopnea. Pt was admitted to cardiac tele for acute dCHF exacerbation currently being diuresed with Lasix 80 IV BID. Hospital course significant for stroke code called 11/12 overnight, CT head negative, neurology following. PT currently recommending EVERARDO,  will continue to reevaluate as pt diureses. Pt's family refusing EVERARDO.

## 2020-11-15 NOTE — PROGRESS NOTE ADULT - PROBLEM SELECTOR PLAN 2
Overnight on 11/12 pt was found to be unresponsive, dysarthric, R facial droop, LE weakness. Stroke code was called.   - CT head revealed no acute intracranial hemorrhage or transcortical infarction.  - CT Angio Head/Neck revealed the B/L ICA are patent. The middle cerebral and anterior cerebral arteries are patent. 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 Evaluation of the neck arteries very limited, no definite occlusion of the intracranial vessels.  - CT perfusion study revealed evaluation is limited. Within that limitation, no evidence of CBF less than 30% or Tmax greater than 6 seconds.  - Upon return from CT scan patient returned to baseline A+O x 3, with persistent right sided facial droop and mild dysarthria, strength V/V in bilateral upper extremities and III/V in bilateral lower extremities.  - Neurology recommended ASA 81mg PO BID, Lipitor 80mg PO qhs and MRI brain for further evaluation of acute event. Per Dr. Tuttle low suspicion for stroke, pt at risk for flash pulmonary edema if has to lie flat for the MRI, test deferred at this time.   - Pt currently A&O x 3, no facial droop noted, speech is clearer than yesterday however pt has no teeth, upper and lower strength 5/5 B/L    - Continue with Aspirin 81mg PO QD

## 2020-11-15 NOTE — PROGRESS NOTE ADULT - PROBLEM SELECTOR PLAN 1
HD stable on exam but w/ +JVD,  4+ bilateral LE pitting edema extending into thighs/scrotal region, O2 now 93-98% on 2L NC. Net negative 11 L  - , CXR consistent with pulmonary vascular congestion. f/u repeat CXR am  - At home was instructed by PCP to increased Lasix to BID but without improvement of symptoms.   - Continue Lasix 80mg IV BID  - Last Echo 2/2020: normal LV/RV, EF 60%, no valve disease.   - ECHO 11/12/20 Mild symmetric LVH, EF 55%, Probably dilated RV size, Borderline reduced RV systolic function, Device leads are noted in the right heart, Aortic sclerosis without significant stenosis, No evidence of pulmonary HTN, PASP 31 mmHg, Trivial pericardial effusion.  - core measures, strict I/Os, daily weight.

## 2020-11-16 LAB
ANION GAP SERPL CALC-SCNC: 10 MMOL/L — SIGNIFICANT CHANGE UP (ref 5–17)
BUN SERPL-MCNC: 27 MG/DL — HIGH (ref 7–23)
CA-I SERPL-SCNC: SIGNIFICANT CHANGE UP MMOL/L (ref 1.12–1.3)
CALCIUM SERPL-MCNC: 9.9 MG/DL — SIGNIFICANT CHANGE UP (ref 8.4–10.5)
CHLORIDE SERPL-SCNC: 93 MMOL/L — LOW (ref 96–108)
CO2 SERPL-SCNC: 42 MMOL/L — HIGH (ref 22–31)
CREAT SERPL-MCNC: 1.02 MG/DL — SIGNIFICANT CHANGE UP (ref 0.5–1.3)
GLUCOSE BLDC GLUCOMTR-MCNC: 103 MG/DL — HIGH (ref 70–99)
GLUCOSE BLDC GLUCOMTR-MCNC: 138 MG/DL — HIGH (ref 70–99)
GLUCOSE BLDC GLUCOMTR-MCNC: 141 MG/DL — HIGH (ref 70–99)
GLUCOSE BLDC GLUCOMTR-MCNC: 161 MG/DL — HIGH (ref 70–99)
GLUCOSE SERPL-MCNC: 141 MG/DL — HIGH (ref 70–99)
HCT VFR BLD CALC: 37 % — LOW (ref 39–50)
HGB BLD-MCNC: 11.8 G/DL — LOW (ref 13–17)
MAGNESIUM SERPL-MCNC: 2 MG/DL — SIGNIFICANT CHANGE UP (ref 1.6–2.6)
MCHC RBC-ENTMCNC: 30.6 PG — SIGNIFICANT CHANGE UP (ref 27–34)
MCHC RBC-ENTMCNC: 31.9 GM/DL — LOW (ref 32–36)
MCV RBC AUTO: 95.9 FL — SIGNIFICANT CHANGE UP (ref 80–100)
NRBC # BLD: 0 /100 WBCS — SIGNIFICANT CHANGE UP (ref 0–0)
PLATELET # BLD AUTO: 146 K/UL — LOW (ref 150–400)
POTASSIUM SERPL-MCNC: 4.3 MMOL/L — SIGNIFICANT CHANGE UP (ref 3.5–5.3)
POTASSIUM SERPL-SCNC: 4.3 MMOL/L — SIGNIFICANT CHANGE UP (ref 3.5–5.3)
RBC # BLD: 3.86 M/UL — LOW (ref 4.2–5.8)
RBC # FLD: 13.3 % — SIGNIFICANT CHANGE UP (ref 10.3–14.5)
SODIUM SERPL-SCNC: 145 MMOL/L — SIGNIFICANT CHANGE UP (ref 135–145)
WBC # BLD: 7.44 K/UL — SIGNIFICANT CHANGE UP (ref 3.8–10.5)
WBC # FLD AUTO: 7.44 K/UL — SIGNIFICANT CHANGE UP (ref 3.8–10.5)

## 2020-11-16 PROCEDURE — 71045 X-RAY EXAM CHEST 1 VIEW: CPT | Mod: 26

## 2020-11-16 PROCEDURE — 99232 SBSQ HOSP IP/OBS MODERATE 35: CPT

## 2020-11-16 RX ORDER — FUROSEMIDE 40 MG
80 TABLET ORAL
Refills: 0 | Status: DISCONTINUED | OUTPATIENT
Start: 2020-11-16 | End: 2020-11-19

## 2020-11-16 RX ORDER — ACETAZOLAMIDE 250 MG/1
500 TABLET ORAL EVERY 12 HOURS
Refills: 0 | Status: DISCONTINUED | OUTPATIENT
Start: 2020-11-16 | End: 2020-11-19

## 2020-11-16 RX ORDER — FUROSEMIDE 40 MG
40 TABLET ORAL
Refills: 0 | Status: DISCONTINUED | OUTPATIENT
Start: 2020-11-16 | End: 2020-11-16

## 2020-11-16 RX ADMIN — BUDESONIDE AND FORMOTEROL FUMARATE DIHYDRATE 2 PUFF(S): 160; 4.5 AEROSOL RESPIRATORY (INHALATION) at 08:47

## 2020-11-16 RX ADMIN — FINASTERIDE 5 MILLIGRAM(S): 5 TABLET, FILM COATED ORAL at 11:58

## 2020-11-16 RX ADMIN — Medication 40 MILLIGRAM(S): at 10:35

## 2020-11-16 RX ADMIN — ATORVASTATIN CALCIUM 80 MILLIGRAM(S): 80 TABLET, FILM COATED ORAL at 21:23

## 2020-11-16 RX ADMIN — Medication 25 MILLIGRAM(S): at 05:24

## 2020-11-16 RX ADMIN — Medication 2: at 21:23

## 2020-11-16 RX ADMIN — Medication 81 MILLIGRAM(S): at 11:58

## 2020-11-16 RX ADMIN — BUDESONIDE AND FORMOTEROL FUMARATE DIHYDRATE 2 PUFF(S): 160; 4.5 AEROSOL RESPIRATORY (INHALATION) at 21:22

## 2020-11-16 RX ADMIN — Medication 80 MILLIGRAM(S): at 18:21

## 2020-11-16 RX ADMIN — ACETAZOLAMIDE 500 MILLIGRAM(S): 250 TABLET ORAL at 18:21

## 2020-11-16 RX ADMIN — ENOXAPARIN SODIUM 40 MILLIGRAM(S): 100 INJECTION SUBCUTANEOUS at 18:21

## 2020-11-16 RX ADMIN — ENOXAPARIN SODIUM 40 MILLIGRAM(S): 100 INJECTION SUBCUTANEOUS at 05:24

## 2020-11-16 NOTE — PROGRESS NOTE ADULT - PROBLEM SELECTOR PLAN 1
HD stable on exam but w/ +JVD,  3+ bilateral LE pitting edema extending into thighs/scrotal region, O2 now 93-98% on 2L NC. Net negative 15 L  - ace wrap b/l LE  - , CXR consistent with pulmonary vascular congestion. repeat CXR 11/16 Stable position left ICD. Stable cardiomegaly, left pleural effusion. Pulmonary vascular congestion, decreased.  - At home was instructed by PCP to increased Lasix to BID but without improvement of symptoms.   - Continue Lasix 80mg IV BID; pt CO2 increased to 42 today; also started on diamox 500 IV BID 11/16/20  - Last Echo 2/2020: normal LV/RV, EF 60%, no valve disease.   - ECHO 11/12/20 Mild symmetric LVH, EF 55%, Probably dilated RV size, Borderline reduced RV systolic function, Device leads are noted in the right heart, Aortic sclerosis without significant stenosis, No evidence of pulmonary HTN, PASP 31 mmHg, Trivial pericardial effusion.  - core measures, strict I/Os, daily weight.

## 2020-11-16 NOTE — DIETITIAN INITIAL EVALUATION ADULT. - NSPROEDALEARNER_GEN_A_NUR
[facility]  [address] [address]  [phone]      12/23/2019       Bijan MONA Hannah  [address] [facility] [address]        Dear Bijan,    I am pleased to inform you that your routine blood work including your hemoglobin, sodium, potassium, calcium and liver function tests are all normal.    Your LDL cholesterol is slightly high and could be treated more aggressively with better diet, exercise and weight loss.  Please follow-up with me to discuss your further medication options/changes if you are interested.    Your kidney function tests are slightly abnormal but stable and should be rechecked here in the clinic in 6 months with a follow-up visit with me.  I will look forward to seeing you at that time and please call to make an appointment.    In addition, your PSA or prostate screening antigen is normal and should be repeated annually.    Sincerely,      Jarocho Tellez MD  Internal Medicine      
daughter

## 2020-11-16 NOTE — DIETITIAN INITIAL EVALUATION ADULT. - OTHER INFO
89M, with morbid obesity, PMHx of HTN, NIDDM, asthma, (denies intubations/hospitalizations), BPH, hx of CHB s/p Medtronic PPM 10/30/2018, chronic CHF (EF:60% by Echo 2/3/20) who presents to [facility] ED 11/11/20 c/o progressively worsening SOB and LE edema x 2-3 weeks. P reports he has chronic SOB and at baseline can ambulate 15-20 feet with his walker from room to room in his apartment, however over the past few weeks has started to become SOB at rest. Pt further admits to significant bilateral LE edema extending into scrotal region and orthopnea (sleeps sitting upright in recliner chair for the past week). Pt reports compliance with his medications. EKG in ED revealed Vpaced rhythm, without acute ischemic changes. CXR revealed pulmonary vascular congestion. Pt was admitted to cardiac tele for acute  on chronic CHF exacerbation currently being diuresed with Lasix 40 IV BID. Hospital course significant for stroke code called 11/12 overnight, CT head negative, neurology following. PT currently recommending EVERARDO,  will continue to reevaluate as pt diureses. Pt's family refusing EVERARDO.     Visited pt in room, sitting comfortably in bed. Pt is currently ordered for DASH/TLC (Sodium + CHOL restriction) diet + 1L fluid restriction and reports tolerating diet well, eating fish for lunch and dinner and eggs for breakfast during this hospital course. Pt denies difficulty chewing/swallowing with NKFA, no N/V/C/D, +BM noted 11/15. PTA, pt did not follow any diet restrictions, consuming a regular diet for all his meal. Pt reports a UBW of 345 lbs, consistent with admit weight of 348.3lbs(11/12) and has not noticed any weight loss PTA. Pt is noted with 2+ generalized edema, 3+ edema to b/l legs, 4+ edema to scrotum and is being diuresed, noting a 18 lb(330.6lbs on 11/15) weight change this hospital course. Encouraged a reduced-sodium diet with lower sodium food options and recording weights daily and provided handouts. Pt and daughter receptive. Skin noted with no pressure ulcers with Fabrizio Score: 17. Please see below for recs. RD to f/u per protocol.

## 2020-11-16 NOTE — DIETITIAN INITIAL EVALUATION ADULT. - OTHER CALCULATIONS
IBW used for calculations as pt >120% of IBW(192%). Nutrient needs based on [facility] standards of care for maintenance in adults, adjusted for age, CHF. Fluids per team.

## 2020-11-16 NOTE — PROGRESS NOTE ADULT - ATTENDING COMMENTS
See PA note written above, for details. I reviewed the PA documentation.  I have personally seen and examined this patient today. I reviewed vitals, labs, medications, cardiac studies and additional imaging.  I agree with the PA's findings and plans as written above with the following additions/amendments:  Patient markedly volume overloaded with 3+ DARIEN, remains in NC O2  Initiate Diamox, aggressive IV Lasix 80 BID for volume offloading  Potential dispo Wednesday at earliest after further improvement in volume status  KOURTNEY Cantrell.  Cardiology Attending

## 2020-11-16 NOTE — DIETITIAN INITIAL EVALUATION ADULT. - PROBLEM SELECTOR PLAN 1
+JVD, 4+ bilateral LE pitting edema extending into thighs/scrotal region, O2 sat: 98% on 3L NC.  --, CXR consistent with pulmonary vascular congestion.  --Patient treated with Lasix 40mg IV x 2 doses in ED. Will continue Lasix 80mg IV q 12hrs, strict I/Os and daily weights.  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      **Recent Echo 2/3/20 normal LV and RV size and function, EF:60%, no valve disease.  Trivial pericardial effusion.

## 2020-11-16 NOTE — PROGRESS NOTE ADULT - SUBJECTIVE AND OBJECTIVE BOX
Interventional Cardiology PA Adult Progress Note    CC: acute on chronic CHF exacerbation  Subjective Assessment: Pt seen and examined at bedside sitting on chair; reports breathing lot better, denies any chest pain, SOB, dizziness, palpitations, N/V.    ROS neg except as per subjective HPI.   	  MEDICATIONS:  acetaZOLAMIDE Injectable 500 milliGRAM(s) IV Push every 12 hours  furosemide   Injectable 80 milliGRAM(s) IV Push two times a day  metoprolol succinate ER 25 milliGRAM(s) Oral daily  ALBUTerol    90 MICROgram(s) HFA Inhaler 2 Puff(s) Inhalation every 6 hours PRN  budesonide 160 MICROgram(s)/formoterol 4.5 MICROgram(s) Inhaler 2 Puff(s) Inhalation two times a day  acetaminophen   Tablet .. 650 milliGRAM(s) Oral every 6 hours PRN  atorvastatin 80 milliGRAM(s) Oral at bedtime  dextrose 40% Gel 15 Gram(s) Oral once  dextrose 50% Injectable 25 Gram(s) IV Push once  dextrose 50% Injectable 12.5 Gram(s) IV Push once  dextrose 50% Injectable 25 Gram(s) IV Push once  finasteride 5 milliGRAM(s) Oral daily  glucagon  Injectable 1 milliGRAM(s) IntraMuscular once  insulin lispro (ADMELOG) corrective regimen sliding scale   SubCutaneous Before meals and at bedtime  aspirin enteric coated 81 milliGRAM(s) Oral daily  dextrose 5%. 1000 milliLiter(s) IV Continuous <Continuous>  dextrose 5%. 1000 milliLiter(s) IV Continuous <Continuous>  enoxaparin Injectable 40 milliGRAM(s) SubCutaneous every 12 hours      	    [PHYSICAL EXAM:  TELEMETRY:  T(C): 36.5 (11-16-20 @ 14:17), Max: 37.1 (11-15-20 @ 21:57)  HR: 79 (11-16-20 @ 12:25) (63 - 79)  BP: 131/60 (11-16-20 @ 12:25) (117/58 - 137/62)  RR: 28 (11-16-20 @ 12:25) (18 - 49)  SpO2: 95% (11-16-20 @ 12:25) (93% - 96%)  Wt(kg): --  I&O's Summary    15 Nov 2020 07:01  -  16 Nov 2020 07:00  --------------------------------------------------------  IN: 1000 mL / OUT: 4550 mL / NET: -3550 mL    16 Nov 2020 07:01  -  16 Nov 2020 14:26  --------------------------------------------------------  IN: 540 mL / OUT: 900 mL / NET: -360 mL        Stoll:  Central/PICC/Mid Line:                                           GEN: NAD, on 2L NC,  Neck: Supple, + JVD  Cardiovascular: Normal S1 S2, No murmurs,   Respiratory: Diminished breath sounds B/L, no WRR  Gastrointestinal:  Soft, Non-tender, + BS	  Skin: No rashes, No ecchymoses, No cyanosis  Extremities: Normal range of motion, No clubbing, cyanosis, 3+ edema b/l pitting  Vascular: Peripheral pulses palpable 2+ bilaterally  Neurologic: No facial droop noted,, strength 5/5 upper and lower extremities, A&O x 3  PSYCH: Mood Appropriate    	    ECG:  	  RADIOLOGY:   DIAGNOSTIC TESTING:  [ ] Echocardiogram:  [ ]  Catheterization:  [ ] Stress Test:    [ ] KOREY  OTHER: 	    LABS:	 	  CARDIAC MARKERS:                                  11.8   7.44  )-----------( 146      ( 16 Nov 2020 05:50 )             37.0     11-16    145  |  93<L>  |  27<H>  ----------------------------<  141<H>  4.3   |  42<H>  |  1.02    Ca    9.9      16 Nov 2020 05:50  Mg     2.0     11-16      proBNP:   Lipid Profile:   HgA1c:   TSH:       ASSESSMENT/PLAN: 	        DVT ppx:  Dispo:

## 2020-11-16 NOTE — DIETITIAN INITIAL EVALUATION ADULT. - ADD RECOMMEND
1) Continue with DASH +TLC (Sodium+ CHOL restricted) diet 2) Monitor diet for %PO intake 3) Trend weights 4) Monitor skin/GI 5) Fluids, pain and bowel regimen per team 6) RD to remain available prn.

## 2020-11-16 NOTE — CHART NOTE - TREATMENT: THE FOLLOWING DIET HAS BEEN RECOMMENDED
Diet, DASH/TLC:   Sodium & Cholesterol Restricted  Consistent Carbohydrate {No Snacks} (CSTCHO) (11-12-20 @ 00:17) [Active]    · Height 	5 Feet 11 Inch(s)  · Weight for BMI:	330 lb/149.7 kg  · Body Mass Index	46 kg/m2      Recommendations	  1) Continue with DASH +TLC (Sodium+ CHOL restricted) diet   2) Monitor diet for %PO intake   3) Trend weights   4) Monitor skin/GI   5) Fluids, pain and bowel regimen per team   6) RD to remain available prn.

## 2020-11-16 NOTE — DIETITIAN INITIAL EVALUATION ADULT. - ORAL INTAKE PTA/DIET HISTORY
Pt's daughter, Irene stated that pt did not follow any diet restrictions PTA, enjoying fish , eggs in his meals. Daughter has been careful to not give him fried foods.

## 2020-11-16 NOTE — PROGRESS NOTE ADULT - ASSESSMENT
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 Brown), ?chronic CHF (EF 60% by Echo 2/3/20, pt denies but has been on lasix for years) who presents to [facility] ED 11/11/20 c/o progressively worsening SOB, LE edema and orthopnea. Pt was admitted to cardiac tele for acute dCHF exacerbation currently being diuresed with Lasix 80 IV BID and diamox 500 IV BID . Hospital course significant for stroke code called 11/12 overnight, CT head negative, neurology following. PT currently recommending EVERARDO,  will continue to reevaluate as pt diureses. Pt's family refusing EVERARDO.

## 2020-11-16 NOTE — PROGRESS NOTE ADULT - PROBLEM SELECTOR PLAN 6
continue Finasteride 5mg PO daily    VTE PPx: on Lovenox  Dispo: continue diuresis. PT recommends EVERARDO at this time, will continue to reevaluate as pt diureses. Pt's family refusing EVERARDO.   Family requesting ambulette service on d/c    Case d/w Dr. You.

## 2020-11-17 LAB
ANION GAP SERPL CALC-SCNC: 12 MMOL/L — SIGNIFICANT CHANGE UP (ref 5–17)
BUN SERPL-MCNC: 34 MG/DL — HIGH (ref 7–23)
CALCIUM SERPL-MCNC: 10 MG/DL — SIGNIFICANT CHANGE UP (ref 8.4–10.5)
CHLORIDE SERPL-SCNC: 92 MMOL/L — LOW (ref 96–108)
CO2 SERPL-SCNC: 39 MMOL/L — HIGH (ref 22–31)
CREAT SERPL-MCNC: 1.15 MG/DL — SIGNIFICANT CHANGE UP (ref 0.5–1.3)
GLUCOSE BLDC GLUCOMTR-MCNC: 118 MG/DL — HIGH (ref 70–99)
GLUCOSE BLDC GLUCOMTR-MCNC: 124 MG/DL — HIGH (ref 70–99)
GLUCOSE BLDC GLUCOMTR-MCNC: 158 MG/DL — HIGH (ref 70–99)
GLUCOSE BLDC GLUCOMTR-MCNC: 281 MG/DL — HIGH (ref 70–99)
GLUCOSE SERPL-MCNC: 134 MG/DL — HIGH (ref 70–99)
HCT VFR BLD CALC: 40.8 % — SIGNIFICANT CHANGE UP (ref 39–50)
HGB BLD-MCNC: 12.8 G/DL — LOW (ref 13–17)
MAGNESIUM SERPL-MCNC: 2 MG/DL — SIGNIFICANT CHANGE UP (ref 1.6–2.6)
MCHC RBC-ENTMCNC: 30.6 PG — SIGNIFICANT CHANGE UP (ref 27–34)
MCHC RBC-ENTMCNC: 31.4 GM/DL — LOW (ref 32–36)
MCV RBC AUTO: 97.6 FL — SIGNIFICANT CHANGE UP (ref 80–100)
NRBC # BLD: 0 /100 WBCS — SIGNIFICANT CHANGE UP (ref 0–0)
PLATELET # BLD AUTO: 152 K/UL — SIGNIFICANT CHANGE UP (ref 150–400)
POTASSIUM SERPL-MCNC: 4.1 MMOL/L — SIGNIFICANT CHANGE UP (ref 3.5–5.3)
POTASSIUM SERPL-SCNC: 4.1 MMOL/L — SIGNIFICANT CHANGE UP (ref 3.5–5.3)
RBC # BLD: 4.18 M/UL — LOW (ref 4.2–5.8)
RBC # FLD: 13.2 % — SIGNIFICANT CHANGE UP (ref 10.3–14.5)
SODIUM SERPL-SCNC: 143 MMOL/L — SIGNIFICANT CHANGE UP (ref 135–145)
WBC # BLD: 8.15 K/UL — SIGNIFICANT CHANGE UP (ref 3.8–10.5)
WBC # FLD AUTO: 8.15 K/UL — SIGNIFICANT CHANGE UP (ref 3.8–10.5)

## 2020-11-17 PROCEDURE — 99232 SBSQ HOSP IP/OBS MODERATE 35: CPT

## 2020-11-17 RX ORDER — SODIUM CHLORIDE 0.65 %
1 AEROSOL, SPRAY (ML) NASAL EVERY 6 HOURS
Refills: 0 | Status: DISCONTINUED | OUTPATIENT
Start: 2020-11-17 | End: 2020-11-21

## 2020-11-17 RX ADMIN — Medication 25 MILLIGRAM(S): at 05:46

## 2020-11-17 RX ADMIN — ATORVASTATIN CALCIUM 80 MILLIGRAM(S): 80 TABLET, FILM COATED ORAL at 21:09

## 2020-11-17 RX ADMIN — Medication 2: at 12:34

## 2020-11-17 RX ADMIN — Medication 1 SPRAY(S): at 23:07

## 2020-11-17 RX ADMIN — ENOXAPARIN SODIUM 40 MILLIGRAM(S): 100 INJECTION SUBCUTANEOUS at 05:46

## 2020-11-17 RX ADMIN — Medication 80 MILLIGRAM(S): at 18:04

## 2020-11-17 RX ADMIN — ACETAZOLAMIDE 500 MILLIGRAM(S): 250 TABLET ORAL at 05:46

## 2020-11-17 RX ADMIN — Medication 650 MILLIGRAM(S): at 18:49

## 2020-11-17 RX ADMIN — BUDESONIDE AND FORMOTEROL FUMARATE DIHYDRATE 2 PUFF(S): 160; 4.5 AEROSOL RESPIRATORY (INHALATION) at 09:52

## 2020-11-17 RX ADMIN — ACETAZOLAMIDE 500 MILLIGRAM(S): 250 TABLET ORAL at 18:04

## 2020-11-17 RX ADMIN — Medication 1 SPRAY(S): at 18:04

## 2020-11-17 RX ADMIN — Medication 81 MILLIGRAM(S): at 12:34

## 2020-11-17 RX ADMIN — BUDESONIDE AND FORMOTEROL FUMARATE DIHYDRATE 2 PUFF(S): 160; 4.5 AEROSOL RESPIRATORY (INHALATION) at 21:08

## 2020-11-17 RX ADMIN — ENOXAPARIN SODIUM 40 MILLIGRAM(S): 100 INJECTION SUBCUTANEOUS at 18:04

## 2020-11-17 RX ADMIN — FINASTERIDE 5 MILLIGRAM(S): 5 TABLET, FILM COATED ORAL at 12:34

## 2020-11-17 RX ADMIN — Medication 6: at 21:08

## 2020-11-17 RX ADMIN — Medication 80 MILLIGRAM(S): at 05:45

## 2020-11-17 NOTE — PROGRESS NOTE ADULT - PROBLEM SELECTOR PLAN 1
HD stable on exam but w/ +JVD,  2+ bilateral LE pitting edema extending into thighs/scrotal region (improved), satting 93-95% on 2L  - ace wrap b/l LE  - , CXR consistent with pulmonary vascular congestion. repeat CXR 11/16 Stable position left ICD. Stable cardiomegaly, left pleural effusion. Pulmonary vascular congestion, decreased.  - Continue Lasix 80mg IV BID  - Started on Diamox 500mg IV BID for elevated Co2 on 11/16.  CO2 now improving today 39.  Continue to monitor.  - ECHO 11/12/20 Mild symmetric LVH, EF 55%, Probably dilated RV size, Borderline reduced RV systolic function, Device leads are noted in the right heart, Aortic sclerosis without significant stenosis, No evidence of pulmonary HTN, PASP 31 mmHg, Trivial pericardial effusion.  - core measures, strict I/Os, daily weight.

## 2020-11-17 NOTE — PROGRESS NOTE ADULT - PROBLEM SELECTOR PLAN 3
s/p Medtronic PPM with Dr. Brown 10/30/2018, most recent device interrogation 9/23/20 revealed RA oversensing, therefore sensitivity was decreased to 0.6 mv. Otherwise, device was functioning appropriately as programmed and all measured data is WNL.   - EKG paced.  - Of note: Pt supposed to have o/p f/u with Dr. Brown on 11/23; daughter requesting to be seen as inpatient; EP aware.

## 2020-11-17 NOTE — PROGRESS NOTE ADULT - PROBLEM SELECTOR PLAN 2
on 11/12 overnight pt was found to be unresponsive, dysarthric, R facial droop, LE weakness. Stroke code was called.   - CT head revealed no acute intracranial hemorrhage or transcortical infarction.  - CT Angio Head/Neck revealed the B/L ICA are patent. The middle cerebral and anterior cerebral arteries are patent. 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 Evaluation of the neck arteries very limited, no definite occlusion of the intracranial vessels.  - CT perfusion study revealed evaluation is limited. Within that limitation, no evidence of CBF less than 30% or Tmax greater than 6 seconds.  - Upon return from CT scan patient returned to baseline A+O x 3, with persistent right sided facial droop and mild dysarthria, strength V/V in bilateral upper extremities and III/V in bilateral lower extremities.  - Neurology recommended ASA 81mg PO BID, Lipitor 80mg PO qhs and MRI brain for further evaluation of acute event.  - low suspicion for stroke, pt at risk for flash pulmonary edema if has to lie flat for the MRI, test deferred at this time.   - Pt currently A&O x 3, no facial droop noted, speech is clear, however pt has no teeth, upper and lower strength 5/5 B/L    - Continue with Aspirin 81mg PO QD

## 2020-11-17 NOTE — PROGRESS NOTE ADULT - PROBLEM SELECTOR PLAN 6
continue Finasteride 5mg PO daily    VTE PPx: on Lovenox  - PT recs EVERARDO, family prefers patient to go home, Continuing to work w/ PT and reevaluate.   Dispo: continue diuresis.

## 2020-11-17 NOTE — PROGRESS NOTE ADULT - SUBJECTIVE AND OBJECTIVE BOX
Interventional Cardiology PA Adult Progress Note    Subjective Assessment: Patient seen and examined at bedside, his breathing and swelling have improved  ROS negative except per HPI and subjective  	  MEDICATIONS:  acetaZOLAMIDE Injectable 500 milliGRAM(s) IV Push every 12 hours  furosemide   Injectable 80 milliGRAM(s) IV Push two times a day  metoprolol succinate ER 25 milliGRAM(s) Oral daily  ALBUTerol    90 MICROgram(s) HFA Inhaler 2 Puff(s) Inhalation every 6 hours PRN  budesonide 160 MICROgram(s)/formoterol 4.5 MICROgram(s) Inhaler 2 Puff(s) Inhalation two times a day  acetaminophen   Tablet .. 650 milliGRAM(s) Oral every 6 hours PRN  atorvastatin 80 milliGRAM(s) Oral at bedtime  once  finasteride 5 milliGRAM(s) Oral daily  glucagon  Injectable 1 milliGRAM(s) IntraMuscular once  insulin lispro (ADMELOG) corrective regimen sliding scale   SubCutaneous Before meals and at bedtime  aspirin enteric coated 81 milliGRAM(s) Oral daily  dextrose 5%. 1000 milliLiter(s) IV Continuous <Continuous>  dextrose 5%. 1000 milliLiter(s) IV Continuous <Continuous>  enoxaparin Injectable 40 milliGRAM(s) SubCutaneous every 12 hours  sodium chloride 0.65% Nasal 1 Spray(s) Both Nostrils every 6 hours  	    [PHYSICAL EXAM:  TELEMETRY:  T(C): 35.8 (11-17-20 @ 13:44), Max: 37.2 (11-16-20 @ 21:00)  HR: 74 (11-17-20 @ 16:30) (61 - 75)  BP: 110/59 (11-17-20 @ 16:30) (91/52 - 126/58)  RR: 20 (11-17-20 @ 16:30) (15 - 34)  SpO2: 94% (11-17-20 @ 16:30) (88% - 99%)  Wt(kg): --  I&O's Summary    16 Nov 2020 07:01  -  17 Nov 2020 07:00  --------------------------------------------------------  IN: 540 mL / OUT: 4180 mL / NET: -3640 mL    17 Nov 2020 07:01  -  17 Nov 2020 17:23  --------------------------------------------------------  IN: 300 mL / OUT: 1670 mL / NET: -1370 mL                                       Appearance: Normal	  HEENT:   Normal oral mucosa, PERRL, EOMI	  Neck: Supple, + JVD  Cardiovascular: Normal S1 S2, No JVD, No murmurs,   Respiratory: + rales on exam throughout  Gastrointestinal:  Soft, Non-tender, + BS	  Skin: No rashes, No ecchymoses, No cyanosis  Extremities: Normal range of motion, No clubbing, cyanosis, 2+ edema, up legs and scrotum  Vascular: Peripheral pulses palpable 2+ bilaterally  Neurologic: Non-focal  Psychiatry: A & O x 3, Mood & affect appropriate    	    LABS:	 	  CARDIAC MARKERS:                        12.8   8.15  )-----------( 152      ( 17 Nov 2020 07:56 )             40.8     11-17    143  |  92<L>  |  34<H>  ----------------------------<  134<H>  4.1   |  39<H>  |  1.15    Ca    10.0      17 Nov 2020 07:56  Mg     2.0     11-17      proBNP:   Lipid Profile:   HgA1c:   TSH:       ASSESSMENT/PLAN: 	        DVT ppx:  Dispo:

## 2020-11-17 NOTE — PROGRESS NOTE ADULT - ASSESSMENT
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 Brown), ?chronic CHF (EF 60% by Echo 2/3/20, pt denies but has been on lasix for years) who presents to [facility] ED 11/11/20 c/o progressively worsening SOB, LE edema and orthopnea, admitted to cardiac tele for acute dCHF exacerbation currently being diuresed with Lasix 80 IV BID and diamox 500 IV BID . Hospital course significant for stroke code called 11/12 overnight, CT head negative, neurology following. PT currently recommending EVERARDO, but family refusing.  Continued on diuresis.

## 2020-11-17 NOTE — PROGRESS NOTE ADULT - NUTRITIONAL ASSESSMENT
This patient has been assessed with a concern for Malnutrition and has been determined to have a diagnosis/diagnoses of Morbid obesity/BMI > 40.    This patient is being managed with:   Diet DASH/TLC-  Sodium & Cholesterol Restricted  Consistent Carbohydrate {No Snacks} (CSTCHO)  Entered: Nov 12 2020 12:17AM

## 2020-11-18 DIAGNOSIS — I50.33 ACUTE ON CHRONIC DIASTOLIC (CONGESTIVE) HEART FAILURE: ICD-10-CM

## 2020-11-18 LAB
ANION GAP SERPL CALC-SCNC: 12 MMOL/L — SIGNIFICANT CHANGE UP (ref 5–17)
BUN SERPL-MCNC: 44 MG/DL — HIGH (ref 7–23)
CALCIUM SERPL-MCNC: 9.7 MG/DL — SIGNIFICANT CHANGE UP (ref 8.4–10.5)
CHLORIDE SERPL-SCNC: 96 MMOL/L — SIGNIFICANT CHANGE UP (ref 96–108)
CO2 SERPL-SCNC: 37 MMOL/L — HIGH (ref 22–31)
CREAT SERPL-MCNC: 1.28 MG/DL — SIGNIFICANT CHANGE UP (ref 0.5–1.3)
GLUCOSE BLDC GLUCOMTR-MCNC: 114 MG/DL — HIGH (ref 70–99)
GLUCOSE BLDC GLUCOMTR-MCNC: 150 MG/DL — HIGH (ref 70–99)
GLUCOSE BLDC GLUCOMTR-MCNC: 194 MG/DL — HIGH (ref 70–99)
GLUCOSE BLDC GLUCOMTR-MCNC: 229 MG/DL — HIGH (ref 70–99)
GLUCOSE SERPL-MCNC: 122 MG/DL — HIGH (ref 70–99)
HCT VFR BLD CALC: 38.9 % — LOW (ref 39–50)
HGB BLD-MCNC: 12.4 G/DL — LOW (ref 13–17)
MAGNESIUM SERPL-MCNC: 2 MG/DL — SIGNIFICANT CHANGE UP (ref 1.6–2.6)
MCHC RBC-ENTMCNC: 30.3 PG — SIGNIFICANT CHANGE UP (ref 27–34)
MCHC RBC-ENTMCNC: 31.9 GM/DL — LOW (ref 32–36)
MCV RBC AUTO: 95.1 FL — SIGNIFICANT CHANGE UP (ref 80–100)
NRBC # BLD: 0 /100 WBCS — SIGNIFICANT CHANGE UP (ref 0–0)
PLATELET # BLD AUTO: 144 K/UL — LOW (ref 150–400)
POTASSIUM SERPL-MCNC: 4 MMOL/L — SIGNIFICANT CHANGE UP (ref 3.5–5.3)
POTASSIUM SERPL-SCNC: 4 MMOL/L — SIGNIFICANT CHANGE UP (ref 3.5–5.3)
RBC # BLD: 4.09 M/UL — LOW (ref 4.2–5.8)
RBC # FLD: 13.1 % — SIGNIFICANT CHANGE UP (ref 10.3–14.5)
SODIUM SERPL-SCNC: 145 MMOL/L — SIGNIFICANT CHANGE UP (ref 135–145)
WBC # BLD: 9.22 K/UL — SIGNIFICANT CHANGE UP (ref 3.8–10.5)
WBC # FLD AUTO: 9.22 K/UL — SIGNIFICANT CHANGE UP (ref 3.8–10.5)

## 2020-11-18 PROCEDURE — 99232 SBSQ HOSP IP/OBS MODERATE 35: CPT

## 2020-11-18 RX ADMIN — FINASTERIDE 5 MILLIGRAM(S): 5 TABLET, FILM COATED ORAL at 11:13

## 2020-11-18 RX ADMIN — Medication 25 MILLIGRAM(S): at 06:52

## 2020-11-18 RX ADMIN — Medication 1 SPRAY(S): at 06:53

## 2020-11-18 RX ADMIN — Medication 1 SPRAY(S): at 18:55

## 2020-11-18 RX ADMIN — ACETAZOLAMIDE 500 MILLIGRAM(S): 250 TABLET ORAL at 06:52

## 2020-11-18 RX ADMIN — ENOXAPARIN SODIUM 40 MILLIGRAM(S): 100 INJECTION SUBCUTANEOUS at 06:52

## 2020-11-18 RX ADMIN — Medication 80 MILLIGRAM(S): at 06:53

## 2020-11-18 RX ADMIN — BUDESONIDE AND FORMOTEROL FUMARATE DIHYDRATE 2 PUFF(S): 160; 4.5 AEROSOL RESPIRATORY (INHALATION) at 21:44

## 2020-11-18 RX ADMIN — ATORVASTATIN CALCIUM 80 MILLIGRAM(S): 80 TABLET, FILM COATED ORAL at 21:44

## 2020-11-18 RX ADMIN — Medication 2: at 21:44

## 2020-11-18 RX ADMIN — BUDESONIDE AND FORMOTEROL FUMARATE DIHYDRATE 2 PUFF(S): 160; 4.5 AEROSOL RESPIRATORY (INHALATION) at 10:52

## 2020-11-18 RX ADMIN — Medication 81 MILLIGRAM(S): at 11:13

## 2020-11-18 RX ADMIN — Medication 80 MILLIGRAM(S): at 19:12

## 2020-11-18 RX ADMIN — Medication 4: at 11:44

## 2020-11-18 RX ADMIN — ENOXAPARIN SODIUM 40 MILLIGRAM(S): 100 INJECTION SUBCUTANEOUS at 18:57

## 2020-11-18 RX ADMIN — Medication 1 SPRAY(S): at 23:53

## 2020-11-18 RX ADMIN — Medication 1 SPRAY(S): at 11:14

## 2020-11-18 RX ADMIN — ACETAZOLAMIDE 500 MILLIGRAM(S): 250 TABLET ORAL at 19:11

## 2020-11-18 NOTE — PROGRESS NOTE ADULT - PROBLEM SELECTOR PLAN 2
on 11/12 overnight pt was found to be unresponsive, dysarthric, R facial droop, LE weakness. Stroke code was called.   - CT head revealed no acute intracranial hemorrhage or transcortical infarction.  - CT Angio Head/Neck revealed the B/L ICA are patent. The middle cerebral and anterior cerebral arteries are patent. 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 Evaluation of the neck arteries very limited, no definite occlusion of the intracranial vessels.  - CT perfusion study revealed evaluation is limited. Within that limitation, no evidence of CBF less than 30% or Tmax greater than 6 seconds.  - Upon return from CT scan patient returned to baseline A+O x 3, with persistent right sided facial droop and mild dysarthria, strength V/V in bilateral upper extremities and III/V in bilateral lower extremities.  - Neurology recommended ASA 81mg PO BID, Lipitor 80mg PO qhs and MRI brain for further evaluation of acute event.  - low suspicion for stroke, pt at risk for flash pulmonary edema if has to lie flat for the MRI, test deferred at this time.   - Pt currently A&O x 3, no facial droop noted, speech is clear, however pt has no teeth, upper and lower strength 5/5 B/L    - Continue with Aspirin 81mg PO QD On 11/12 overnight pt was found to be unresponsive, dysarthric, R facial droop, LE weakness. Stroke code was called.   - CT head revealed no acute intracranial hemorrhage or transcortical infarction.  - CT Angio Head/Neck revealed the B/L ICA are patent. The middle cerebral and anterior cerebral arteries are patent. 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 Evaluation of the neck arteries very limited, no definite occlusion of the intracranial vessels.  - CT perfusion study revealed evaluation is limited. Within that limitation, no evidence of CBF less than 30% or Tmax greater than 6 seconds.  - Upon return from CT scan patient returned to baseline A+O x 3, with persistent right sided facial droop and mild dysarthria, strength V/V in bilateral upper extremities and III/V in bilateral lower extremities.  - Neurology recommended ASA 81mg PO BID, Lipitor 80mg PO qHS and MRI brain for further evaluation of acute event.  - Low suspicion for stroke, pt at risk for flash pulmonary edema if has to lie flat for the MRI, test deferred at this time.   - Pt currently A&O x 3, no facial droop noted, speech is clear, however pt has no teeth, upper and lower strength 5/5 B/L    - Continue with Aspirin 81mg PO QD

## 2020-11-18 NOTE — PROGRESS NOTE ADULT - PROBLEM SELECTOR PLAN 3
s/p Medtronic PPM with Dr. Brown 10/30/2018, most recent device interrogation 9/23/20 revealed RA oversensing, therefore sensitivity was decreased to 0.6 mv. Otherwise, device was functioning appropriately as programmed and all measured data is WNL.   - EKG paced.  - Of note: Pt supposed to have o/p f/u with Dr. Brown on 11/23; daughter requesting to be seen as inpatient; EP aware. s/p Medtronic PPM with Dr. Brown 10/30/2018, most recent device interrogation 9/23/20 revealed RA oversensing, therefore sensitivity was decreased to 0.6 mv. Otherwise, device was functioning appropriately as programmed and all measured data is WNL.   - EKG paced.  - Of note: Pt supposed to have o/p f/u with Dr. Brown on 11/23; daughter requesting to be seen as inpatient; Seen by EP on 11/13 w/ normal PPM interrogation

## 2020-11-18 NOTE — PROGRESS NOTE ADULT - NUTRITIONAL ASSESSMENT
This patient has been assessed with a concern for Malnutrition and has been determined to have a diagnosis/diagnoses of Morbid obesity/BMI > 40.    This patient is being managed with:   Diet DASH/TLC-  Sodium & Cholesterol Restricted  1500mL Fluid Restriction ([id number])  Entered: Nov 17 2020  5:28PM

## 2020-11-18 NOTE — PROGRESS NOTE ADULT - PROBLEM SELECTOR PLAN 6
continue Finasteride 5mg PO daily    VTE PPx: on Lovenox  - PT recs EVERARDO, family prefers patient to go home, Continuing to work w/ PT and reevaluate.   Dispo: continue diuresis. continue Finasteride 5mg PO daily    VTE PPx: on Lovenox  - PT recs EVERARDO, family prefers patient to go home, Continuing to work w/ PT and reevaluate.   Dispo: continue diuresis

## 2020-11-18 NOTE — PROGRESS NOTE ADULT - ASSESSMENT
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 Brown), ?chronic CHF (EF 60% by Echo 2/3/20, pt denies but has been on lasix for years) who presents to [facility] ED 11/11/20 c/o progressively worsening SOB, LE edema and orthopnea, admitted to cardiac tele for acute dCHF exacerbation currently being diuresed with Lasix 80 IV BID and diamox 500 IV BID . Hospital course significant for stroke code called 11/12 overnight, CT head negative, neurology following. PT currently recommending EVERARDO, but family refusing.  Continued on diuresis. 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 Brown), ?chronic diastolic CHF (EF 60% by Echo 2/3/20, pt denies but has been on lasix for years) who presents to [facility] ED 11/11/20 c/o progressively worsening SOB, LE edema and orthopnea. Admitted to cardiac tele [facility] for acute on chronic dCHF exacerbation, currently on IV Lasix 80 BID and Diamox 500 IV BID for contraction alkalosis. Hospital course significant for stroke code called 11/12 overnight, CT head negative, Neurology following. PT currently recommending EVERARDO, but family refusing and request home PT.

## 2020-11-18 NOTE — CHART NOTE - NSCHARTNOTEFT_GEN_A_CORE
Admitting Diagnosis:   Patient is a 89y old  Male who presents with a chief complaint of acute on chronic CHF exacerbation (18 Nov 2020 12:33)      PAST MEDICAL & SURGICAL HISTORY:  Prostate disorder    Asthma    Diabetes    History of right cataract surgery    History of left cataract surgery    S/P TURP        Current Nutrition Order:   Diet, DASH/TLC:   Sodium & Cholesterol Restricted  1500mL Fluid Restriction ([id number]) (11-17-20 @ 17:28)    PO Intake: Good (%) [  X ]  Fair (50-75%) [   ] Poor (<25%) [   ]    GI Issues:   +Bm 11/17     Pain:  None    Skin Integrity:  Fabrizio 20  +3BL leg/foot edema, Varying during admission   No pressure ulcers     Labs:   11-18    145  |  96  |  44<H>  ----------------------------<  122<H>  4.0   |  37<H>  |  1.28    Ca    9.7      18 Nov 2020 06:31  Mg     2.0     11-18      CAPILLARY BLOOD GLUCOSE      POCT Blood Glucose.: 229 mg/dL (18 Nov 2020 11:27)  POCT Blood Glucose.: 114 mg/dL (18 Nov 2020 06:17)  POCT Blood Glucose.: 281 mg/dL (17 Nov 2020 21:01)  POCT Blood Glucose.: 118 mg/dL (17 Nov 2020 16:31)      Medications:  MEDICATIONS  (STANDING):  acetaZOLAMIDE Injectable 500 milliGRAM(s) IV Push every 12 hours  aspirin enteric coated 81 milliGRAM(s) Oral daily  atorvastatin 80 milliGRAM(s) Oral at bedtime  budesonide 160 MICROgram(s)/formoterol 4.5 MICROgram(s) Inhaler 2 Puff(s) Inhalation two times a day  dextrose 40% Gel 15 Gram(s) Oral once  dextrose 5%. 1000 milliLiter(s) (50 mL/Hr) IV Continuous <Continuous>  dextrose 5%. 1000 milliLiter(s) (100 mL/Hr) IV Continuous <Continuous>  dextrose 50% Injectable 25 Gram(s) IV Push once  dextrose 50% Injectable 12.5 Gram(s) IV Push once  dextrose 50% Injectable 25 Gram(s) IV Push once  enoxaparin Injectable 40 milliGRAM(s) SubCutaneous every 12 hours  finasteride 5 milliGRAM(s) Oral daily  furosemide   Injectable 80 milliGRAM(s) IV Push two times a day  glucagon  Injectable 1 milliGRAM(s) IntraMuscular once  insulin lispro (ADMELOG) corrective regimen sliding scale   SubCutaneous Before meals and at bedtime  metoprolol succinate ER 25 milliGRAM(s) Oral daily  sodium chloride 0.65% Nasal 1 Spray(s) Both Nostrils every 6 hours    MEDICATIONS  (PRN):  acetaminophen   Tablet .. 650 milliGRAM(s) Oral every 6 hours PRN Mild Pain (1 - 3)  ALBUTerol    90 MICROgram(s) HFA Inhaler 2 Puff(s) Inhalation every 6 hours PRN Shortness of Breath and/or Wheezing    5'11''  pounds +-10%  Weight 330 pounds  BMI 46.1  %HGW=477%     11/16 330.6 pounds   11/15 330.6 pounds   11/12 348.3 pounds   pounds, Admission wt Higher, assume in setting of Fluids given admission for CHF (+edema o/a). Pt Wts Trending down during admission - assume d/t diuresis.     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 EKG in ED revealed Vpaced rhythm, without acute ischemic changes. CXR revealed pulmonary vascular congestion. Pt was admitted to cardiac tele for acute on chronic CHF exacerbation; EF 55% per ECHO 11/12. Pt Hospital course significant for stroke code called 11/12 overnight, CT head negative, neurology following - low suspsion for [place]oke note; pt returned back to baseline upon return from CT. Started on Diamox 500mg IV BID for contraction alkalosis on 11/16. PT currently recommending EVERARDO However pt family refusing EVERARDO. Pt Continuing to work with PT and reevaluate.    PTA pt did not follow any diet restrictions, consuming a regular diet for all his meal. During RD initial visit, RD Encouraged a reduced-sodium diet with lower sodium food options and recording weights daily - provided handouts. Pt visited today for f/u however sleeping soundly, RN requesting pt be left to rest. Noted bedside dys screen passed 11/13. Pt ordered for DASH TLC diet/1500ml FR. RN reports pt has been eating well, denies issues chewing/swallowing. Last 3 POCT 229, 114, 281 However overall have been less then 200 during admission (281 is Highest); A1c 6.9% - A1c goal is Higher in older adults. Should BG/POCT cont to spike to 200 range consier adding consCHO to diet order.   Please see Below for RD Recs.     Food & Nutrition Related Knowledge Deficit Etiology R/T limited prior education Signs/Symptoms AEB pt in need of diet education.   Goal/Expected Outcome Pt to name 2 teach back points.    Recommendations:  1. DASH TLC; FR per team.  3. Monitor PO intake/appetite, GI distress, diet tolerance, Daily weights.  4. Should pt be noted with s/s of issues swallowing, recommend NPO/SLP.   5. Labs: monitor BMP, CBC, glucose, lytes, trend renal indices, POCT.  6. RD to remain available for additional nutrition interventions as needed.       Education: NA, pt sleeping, Please see Prior RD note  Risk Level: High [  x ] Moderate [   ] Low [   ]

## 2020-11-18 NOTE — PROGRESS NOTE ADULT - PROBLEM SELECTOR PLAN 1
HD stable on exam but w/ +JVD,  2+ bilateral LE pitting edema extending into thighs/scrotal region (improved), satting 93-95% on 2L  - ace wrap b/l LE  - , CXR consistent with pulmonary vascular congestion. repeat CXR 11/16 Stable position left ICD. Stable cardiomegaly, left pleural effusion. Pulmonary vascular congestion, decreased.  - Continue Lasix 80mg IV BID  - Started on Diamox 500mg IV BID for elevated Co2 on 11/16.  CO2 now improving today 39.  Continue to monitor.  - ECHO 11/12/20 Mild symmetric LVH, EF 55%, Probably dilated RV size, Borderline reduced RV systolic function, Device leads are noted in the right heart, Aortic sclerosis without significant stenosis, No evidence of pulmonary HTN, PASP 31 mmHg, Trivial pericardial effusion.  - core measures, strict I/Os, daily weight. HD stable on exam but w/ +JVD,  2+ bilateral LE pitting edema extending into thighs/scrotal region (improved), satting 93-95% on 2L.  - ACE wrap b/l LE  - , CXR consistent with pulmonary vascular congestion. repeat CXR 11/16 Stable position left ICD. Stable cardiomegaly, left pleural effusion. Pulmonary vascular congestion, decreased.  - Continue Lasix 80mg IV BID  - Started on Diamox 500mg IV BID for contraction alkalosis on 11/16. Cl and CO2 improving. Continue to monitor.  - ECHO 11/12/20 Mild symmetric LVH, EF 55%, Probably dilated RV size, Borderline reduced RV systolic function, Device leads are noted in the right heart, Aortic sclerosis without significant stenosis, No evidence of pulmonary HTN, PASP 31 mmHg, Trivial pericardial effusion.  - HF core measures, strict I/Os, daily weight. HD stable on exam but w/ +JVD,  2+ bilateral LE pitting edema extending into thighs/scrotal region (improved), satting 93-95% on 2L.  - ACE wrap b/l LE  - , CXR consistent with pulmonary vascular congestion. repeat CXR 11/16 Stable position left ICD. Stable cardiomegaly, left pleural effusion. Pulmonary vascular congestion, decreased.  - Continue Lasix 80mg IV BID  - Started on Diamox 500mg IV BID for contraction alkalosis on 11/16. Cl and CO2 improving. Continue to monitor.  -Wean off O2 as tolerates, currently on 2L NC satting 94%, 87% on RA  - ECHO 11/12/20 Mild symmetric LVH, EF 55%, Probably dilated RV size, Borderline reduced RV systolic function, Device leads are noted in the right heart, Aortic sclerosis without significant stenosis, No evidence of pulmonary HTN, PASP 31 mmHg, Trivial pericardial effusion.  - HF core measures, strict I/Os, daily weight.

## 2020-11-19 LAB
ANION GAP SERPL CALC-SCNC: 13 MMOL/L — SIGNIFICANT CHANGE UP (ref 5–17)
BUN SERPL-MCNC: 49 MG/DL — HIGH (ref 7–23)
CALCIUM SERPL-MCNC: 9.6 MG/DL — SIGNIFICANT CHANGE UP (ref 8.4–10.5)
CHLORIDE SERPL-SCNC: 99 MMOL/L — SIGNIFICANT CHANGE UP (ref 96–108)
CO2 SERPL-SCNC: 29 MMOL/L — SIGNIFICANT CHANGE UP (ref 22–31)
CREAT SERPL-MCNC: 1.31 MG/DL — HIGH (ref 0.5–1.3)
GLUCOSE BLDC GLUCOMTR-MCNC: 126 MG/DL — HIGH (ref 70–99)
GLUCOSE BLDC GLUCOMTR-MCNC: 163 MG/DL — HIGH (ref 70–99)
GLUCOSE BLDC GLUCOMTR-MCNC: 195 MG/DL — HIGH (ref 70–99)
GLUCOSE BLDC GLUCOMTR-MCNC: 199 MG/DL — HIGH (ref 70–99)
GLUCOSE BLDC GLUCOMTR-MCNC: 225 MG/DL — HIGH (ref 70–99)
GLUCOSE SERPL-MCNC: 152 MG/DL — HIGH (ref 70–99)
HCT VFR BLD CALC: 41.4 % — SIGNIFICANT CHANGE UP (ref 39–50)
HGB BLD-MCNC: 13.1 G/DL — SIGNIFICANT CHANGE UP (ref 13–17)
MAGNESIUM SERPL-MCNC: 2.2 MG/DL — SIGNIFICANT CHANGE UP (ref 1.6–2.6)
MCHC RBC-ENTMCNC: 30.5 PG — SIGNIFICANT CHANGE UP (ref 27–34)
MCHC RBC-ENTMCNC: 31.6 GM/DL — LOW (ref 32–36)
MCV RBC AUTO: 96.3 FL — SIGNIFICANT CHANGE UP (ref 80–100)
NRBC # BLD: 0 /100 WBCS — SIGNIFICANT CHANGE UP (ref 0–0)
PLATELET # BLD AUTO: 138 K/UL — LOW (ref 150–400)
POTASSIUM SERPL-MCNC: 4.5 MMOL/L — SIGNIFICANT CHANGE UP (ref 3.5–5.3)
POTASSIUM SERPL-SCNC: 4.5 MMOL/L — SIGNIFICANT CHANGE UP (ref 3.5–5.3)
RBC # BLD: 4.3 M/UL — SIGNIFICANT CHANGE UP (ref 4.2–5.8)
RBC # FLD: 13.2 % — SIGNIFICANT CHANGE UP (ref 10.3–14.5)
SODIUM SERPL-SCNC: 141 MMOL/L — SIGNIFICANT CHANGE UP (ref 135–145)
WBC # BLD: 8.82 K/UL — SIGNIFICANT CHANGE UP (ref 3.8–10.5)
WBC # FLD AUTO: 8.82 K/UL — SIGNIFICANT CHANGE UP (ref 3.8–10.5)

## 2020-11-19 PROCEDURE — 99232 SBSQ HOSP IP/OBS MODERATE 35: CPT

## 2020-11-19 RX ADMIN — BUDESONIDE AND FORMOTEROL FUMARATE DIHYDRATE 2 PUFF(S): 160; 4.5 AEROSOL RESPIRATORY (INHALATION) at 09:41

## 2020-11-19 RX ADMIN — Medication 81 MILLIGRAM(S): at 11:29

## 2020-11-19 RX ADMIN — Medication 2: at 22:40

## 2020-11-19 RX ADMIN — FINASTERIDE 5 MILLIGRAM(S): 5 TABLET, FILM COATED ORAL at 11:29

## 2020-11-19 RX ADMIN — ENOXAPARIN SODIUM 40 MILLIGRAM(S): 100 INJECTION SUBCUTANEOUS at 18:24

## 2020-11-19 RX ADMIN — Medication 80 MILLIGRAM(S): at 07:03

## 2020-11-19 RX ADMIN — Medication 1 SPRAY(S): at 07:03

## 2020-11-19 RX ADMIN — Medication 1 SPRAY(S): at 22:41

## 2020-11-19 RX ADMIN — ATORVASTATIN CALCIUM 80 MILLIGRAM(S): 80 TABLET, FILM COATED ORAL at 22:40

## 2020-11-19 RX ADMIN — Medication 1 SPRAY(S): at 11:29

## 2020-11-19 RX ADMIN — ACETAZOLAMIDE 500 MILLIGRAM(S): 250 TABLET ORAL at 07:03

## 2020-11-19 RX ADMIN — Medication 1 SPRAY(S): at 18:24

## 2020-11-19 RX ADMIN — BUDESONIDE AND FORMOTEROL FUMARATE DIHYDRATE 2 PUFF(S): 160; 4.5 AEROSOL RESPIRATORY (INHALATION) at 22:40

## 2020-11-19 RX ADMIN — Medication 2: at 12:27

## 2020-11-19 RX ADMIN — ENOXAPARIN SODIUM 40 MILLIGRAM(S): 100 INJECTION SUBCUTANEOUS at 07:01

## 2020-11-19 RX ADMIN — Medication 2: at 07:01

## 2020-11-19 RX ADMIN — Medication 25 MILLIGRAM(S): at 07:02

## 2020-11-19 NOTE — PROGRESS NOTE ADULT - NUTRITIONAL ASSESSMENT
This patient has been assessed with a concern for Malnutrition and has been determined to have a diagnosis/diagnoses of Morbid obesity/BMI > 40.    This patient is being managed with:   Diet DASH/TLC-  Sodium & Cholesterol Restricted  1500mL Fluid Restriction ([id number])  Entered: Nov 17 2020  5:28PM

## 2020-11-19 NOTE — PROGRESS NOTE ADULT - SUBJECTIVE AND OBJECTIVE BOX
CARDIOLOGY NP PROGRESS NOTE    Subjective: Pt seen and examined at bedside. Reports feeling okay. Walked in hallway with PT today, felt weakness in knees. Denies chest pain, sob, lightheadedness, dizziness, palpitations, fever, chills.  Remainder ROS otherwise negative.    Overnight Events: Net negative 3.4L over last 24hrs. Contraction alkalosis improving, BUN/crea uptrending.    TELEMETRY: AV paced 70s           VITAL SIGNS:  T(C): 36.6 (11-19-20 @ 09:45), Max: 37.1 (11-18-20 @ 21:34)  HR: 60 (11-19-20 @ 08:20) (60 - 87)  BP: 112/53 (11-19-20 @ 08:20) (96/50 - 127/62)  RR: 23 (11-19-20 @ 08:20) (17 - 32)  SpO2: 95% (11-19-20 @ 08:20) (90% - 97%)  Wt(kg): --    I&O's Summary    18 Nov 2020 07:01  -  19 Nov 2020 07:00  --------------------------------------------------------  IN: 220 mL / OUT: 3655 mL / NET: -3435 mL    19 Nov 2020 07:01  -  19 Nov 2020 11:07  --------------------------------------------------------  IN: 275 mL / OUT: 825 mL / NET: -550 mL          PHYSICAL EXAM:    General: A/ox 3, No acute Distress  Neck: Supple, + JVD  Cardiac: S1 S2, No M/R/G  Pulmonary: Lungs w/ diminished breath sounds at marie bases,  Breathing unlabored on 2L NC, No Rhonchi/Rales/Wheezing  Abdomen: Soft, Non -tender, +BS x 4 quads  Extremities: No Rashes, marie 2+ LE edema to shins, ACE wraps in place marie LE  Neuro: A/o x 3, No focal deficits        LABS:                          13.1   8.82  )-----------( 138      ( 19 Nov 2020 06:40 )             41.4                              11-19    141  |  99  |  49<H>  ----------------------------<  152<H>  4.5   |  29  |  1.31<H>    Ca    9.6      19 Nov 2020 06:40  Mg     2.2     11-19                                CAPILLARY BLOOD GLUCOSE      POCT Blood Glucose.: 163 mg/dL (19 Nov 2020 06:24)  POCT Blood Glucose.: 194 mg/dL (18 Nov 2020 21:09)  POCT Blood Glucose.: 150 mg/dL (18 Nov 2020 17:18)  POCT Blood Glucose.: 229 mg/dL (18 Nov 2020 11:27)            Allergies:  lisinopril (Angioedema)    MEDICATIONS  (STANDING):  aspirin enteric coated 81 milliGRAM(s) Oral daily  atorvastatin 80 milliGRAM(s) Oral at bedtime  budesonide 160 MICROgram(s)/formoterol 4.5 MICROgram(s) Inhaler 2 Puff(s) Inhalation two times a day  dextrose 40% Gel 15 Gram(s) Oral once  dextrose 5%. 1000 milliLiter(s) (50 mL/Hr) IV Continuous <Continuous>  dextrose 5%. 1000 milliLiter(s) (100 mL/Hr) IV Continuous <Continuous>  dextrose 50% Injectable 25 Gram(s) IV Push once  dextrose 50% Injectable 12.5 Gram(s) IV Push once  dextrose 50% Injectable 25 Gram(s) IV Push once  enoxaparin Injectable 40 milliGRAM(s) SubCutaneous every 12 hours  finasteride 5 milliGRAM(s) Oral daily  glucagon  Injectable 1 milliGRAM(s) IntraMuscular once  insulin lispro (ADMELOG) corrective regimen sliding scale   SubCutaneous Before meals and at bedtime  metoprolol succinate ER 25 milliGRAM(s) Oral daily  sodium chloride 0.65% Nasal 1 Spray(s) Both Nostrils every 6 hours    MEDICATIONS  (PRN):  acetaminophen   Tablet .. 650 milliGRAM(s) Oral every 6 hours PRN Mild Pain (1 - 3)  ALBUTerol    90 MICROgram(s) HFA Inhaler 2 Puff(s) Inhalation every 6 hours PRN Shortness of Breath and/or Wheezing        DIAGNOSTIC TESTS:

## 2020-11-19 NOTE — PROGRESS NOTE ADULT - PROBLEM SELECTOR PLAN 1
HD stable on exam but w/ +JVD,  2+ bilateral LE pitting edema extending into thighs/scrotal region (improved), satting 93-95% on 2L.  - ACE wrap b/l LE  - , CXR consistent with pulmonary vascular congestion. repeat CXR 11/16 Stable position left ICD. Stable cardiomegaly, left pleural effusion. Pulmonary vascular congestion, decreased.  -Cl and CO2 improving, BUN/crea uptrending  - S/p Lasix 80mg IV BID and Diamox 500mg IV BID for contraction alkalosis   - HOLD further diuresis today, reassess volume status in AM, likely resume Lasix 40mg IV BID in AM  -Wean off O2 as tolerates, currently on 2L NC satting 94%, 87% on RA. Encourage ICS  - ECHO 11/12/20 Mild symmetric LVH, EF 55%, Probably dilated RV size, Borderline reduced RV systolic function, Device leads are noted in the right heart, Aortic sclerosis without significant stenosis, No evidence of pulmonary HTN, PASP 31 mmHg, Trivial pericardial effusion.  - HF core measures, strict I/Os, daily weight.

## 2020-11-19 NOTE — PROGRESS NOTE ADULT - PROBLEM SELECTOR PLAN 6
continue Finasteride 5mg PO daily    VTE PPx: on Lovenox  - PT recs EVERARDO, family prefers patient to go home, Continuing to work w/ PT and reevaluate.   Dispo: continue diuresis

## 2020-11-19 NOTE — PROGRESS NOTE ADULT - ASSESSMENT
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 Brown), ?chronic diastolic CHF (EF 60% by Echo 2/3/20, pt denies but has been on lasix for years) who presents to [facility] ED 11/11/20 c/o progressively worsening SOB, LE edema and orthopnea. Admitted to cardiac tele [facility] for acute on chronic dCHF exacerbation, s/p IV Lasix 80 BID and Diamox 500 IV BID for contraction alkalosis, now improving. Hospital course significant for stroke code called 11/12 overnight, CT head negative, Neurology following. PT currently recommending EVERARDO, but family refusing and request home PT.

## 2020-11-19 NOTE — PROGRESS NOTE ADULT - PROBLEM SELECTOR PLAN 3
s/p Medtronic PPM with Dr. Brown 10/30/2018, most recent device interrogation 9/23/20 revealed RA oversensing, therefore sensitivity was decreased to 0.6 mv. Otherwise, device was functioning appropriately as programmed and all measured data is WNL.   - EKG paced.  - Of note: Pt supposed to have o/p f/u with Dr. Brown on 11/23; daughter requesting to be seen as inpatient; Seen by EP on 11/13 w/ normal PPM interrogation

## 2020-11-20 ENCOUNTER — APPOINTMENT (OUTPATIENT)
Dept: HEART AND VASCULAR | Facility: CLINIC | Age: 85
End: 2020-11-20

## 2020-11-20 LAB
ANION GAP SERPL CALC-SCNC: 12 MMOL/L — SIGNIFICANT CHANGE UP (ref 5–17)
BUN SERPL-MCNC: 45 MG/DL — HIGH (ref 7–23)
CALCIUM SERPL-MCNC: 9.4 MG/DL — SIGNIFICANT CHANGE UP (ref 8.4–10.5)
CHLORIDE SERPL-SCNC: 98 MMOL/L — SIGNIFICANT CHANGE UP (ref 96–108)
CO2 SERPL-SCNC: 33 MMOL/L — HIGH (ref 22–31)
CREAT SERPL-MCNC: 1.24 MG/DL — SIGNIFICANT CHANGE UP (ref 0.5–1.3)
GLUCOSE BLDC GLUCOMTR-MCNC: 108 MG/DL — HIGH (ref 70–99)
GLUCOSE BLDC GLUCOMTR-MCNC: 151 MG/DL — HIGH (ref 70–99)
GLUCOSE BLDC GLUCOMTR-MCNC: 236 MG/DL — HIGH (ref 70–99)
GLUCOSE BLDC GLUCOMTR-MCNC: 243 MG/DL — HIGH (ref 70–99)
GLUCOSE SERPL-MCNC: 156 MG/DL — HIGH (ref 70–99)
HCT VFR BLD CALC: 39.1 % — SIGNIFICANT CHANGE UP (ref 39–50)
HGB BLD-MCNC: 12.5 G/DL — LOW (ref 13–17)
MAGNESIUM SERPL-MCNC: 2.3 MG/DL — SIGNIFICANT CHANGE UP (ref 1.6–2.6)
MCHC RBC-ENTMCNC: 30.2 PG — SIGNIFICANT CHANGE UP (ref 27–34)
MCHC RBC-ENTMCNC: 32 GM/DL — SIGNIFICANT CHANGE UP (ref 32–36)
MCV RBC AUTO: 94.4 FL — SIGNIFICANT CHANGE UP (ref 80–100)
NRBC # BLD: 0 /100 WBCS — SIGNIFICANT CHANGE UP (ref 0–0)
PLATELET # BLD AUTO: 146 K/UL — LOW (ref 150–400)
POTASSIUM SERPL-MCNC: 4 MMOL/L — SIGNIFICANT CHANGE UP (ref 3.5–5.3)
POTASSIUM SERPL-SCNC: 4 MMOL/L — SIGNIFICANT CHANGE UP (ref 3.5–5.3)
RBC # BLD: 4.14 M/UL — LOW (ref 4.2–5.8)
RBC # FLD: 13.2 % — SIGNIFICANT CHANGE UP (ref 10.3–14.5)
SODIUM SERPL-SCNC: 143 MMOL/L — SIGNIFICANT CHANGE UP (ref 135–145)
WBC # BLD: 8.61 K/UL — SIGNIFICANT CHANGE UP (ref 3.8–10.5)
WBC # FLD AUTO: 8.61 K/UL — SIGNIFICANT CHANGE UP (ref 3.8–10.5)

## 2020-11-20 PROCEDURE — 99232 SBSQ HOSP IP/OBS MODERATE 35: CPT

## 2020-11-20 PROCEDURE — 71045 X-RAY EXAM CHEST 1 VIEW: CPT | Mod: 26

## 2020-11-20 RX ORDER — FUROSEMIDE 40 MG
40 TABLET ORAL
Refills: 0 | Status: DISCONTINUED | OUTPATIENT
Start: 2020-11-20 | End: 2020-11-21

## 2020-11-20 RX ADMIN — FINASTERIDE 5 MILLIGRAM(S): 5 TABLET, FILM COATED ORAL at 11:47

## 2020-11-20 RX ADMIN — Medication 81 MILLIGRAM(S): at 11:46

## 2020-11-20 RX ADMIN — ATORVASTATIN CALCIUM 80 MILLIGRAM(S): 80 TABLET, FILM COATED ORAL at 22:29

## 2020-11-20 RX ADMIN — BUDESONIDE AND FORMOTEROL FUMARATE DIHYDRATE 2 PUFF(S): 160; 4.5 AEROSOL RESPIRATORY (INHALATION) at 22:30

## 2020-11-20 RX ADMIN — Medication 4: at 22:29

## 2020-11-20 RX ADMIN — Medication 650 MILLIGRAM(S): at 19:39

## 2020-11-20 RX ADMIN — Medication 2: at 07:00

## 2020-11-20 RX ADMIN — Medication 4: at 11:36

## 2020-11-20 RX ADMIN — Medication 1 SPRAY(S): at 19:39

## 2020-11-20 RX ADMIN — Medication 40 MILLIGRAM(S): at 11:46

## 2020-11-20 RX ADMIN — Medication 25 MILLIGRAM(S): at 06:37

## 2020-11-20 RX ADMIN — ENOXAPARIN SODIUM 40 MILLIGRAM(S): 100 INJECTION SUBCUTANEOUS at 19:38

## 2020-11-20 RX ADMIN — Medication 40 MILLIGRAM(S): at 19:39

## 2020-11-20 RX ADMIN — ENOXAPARIN SODIUM 40 MILLIGRAM(S): 100 INJECTION SUBCUTANEOUS at 06:37

## 2020-11-20 RX ADMIN — Medication 1 SPRAY(S): at 11:47

## 2020-11-20 RX ADMIN — BUDESONIDE AND FORMOTEROL FUMARATE DIHYDRATE 2 PUFF(S): 160; 4.5 AEROSOL RESPIRATORY (INHALATION) at 11:47

## 2020-11-20 NOTE — PROGRESS NOTE ADULT - NUTRITIONAL ASSESSMENT
This patient has been assessed with a concern for Malnutrition and has been determined to have a diagnosis/diagnoses of Morbid obesity/BMI > 40.    This patient is being managed with:   Diet DASH/TLC-  Sodium & Cholesterol Restricted  1500mL Fluid Restriction ([id number])  Entered: Nov 17 2020  5:28PM

## 2020-11-20 NOTE — PROGRESS NOTE ADULT - REASON FOR ADMISSION
acute on chronic CHF exacerbation

## 2020-11-20 NOTE — PROGRESS NOTE ADULT - PROBLEM SELECTOR PLAN 1
HD stable on exam but w/ +JVD,  2+ bilateral LE pitting edema extending into thighs/scrotal region (improved), satting 93-95% on 2L.  - ACE wrap b/l LE  - , CXR consistent with pulmonary vascular congestion. repeat CXR 11/16 Stable position left ICD. Stable cardiomegaly, left pleural effusion. Pulmonary vascular congestion, decreased.  -Cl and CO2 improving, BUN/crea uptrending  - S/p Lasix 80mg IV BID and Diamox 500mg IV BID for contraction alkalosis   - HOLD further diuresis today, reassess volume status in AM, likely resume Lasix 40mg IV BID in AM  -Wean off O2 as tolerates, currently on 2L NC satting 94%, 87% on RA. Encourage ICS  - ECHO 11/12/20 Mild symmetric LVH, EF 55%, Probably dilated RV size, Borderline reduced RV systolic function, Device leads are noted in the right heart, Aortic sclerosis without significant stenosis, No evidence of pulmonary HTN, PASP 31 mmHg, Trivial pericardial effusion.  - HF core measures, strict I/Os, daily weight. HD stable on exam but w/ +JVD,  2+ bilateral LE pitting edema extending into thighs/scrotal region (improved), satting 93-95% on 2L.  - ACE wrap b/l LE  - , CXR consistent with pulmonary vascular congestion. repeat CXR 11/16 Stable position left ICD. Stable cardiomegaly, left pleural effusion. Pulmonary vascular congestion, decreased.  -Improving  - S/p Lasix 80mg IV BID and Diamox 500mg IV BID for contraction alkalosis   - Transition to Lasix 40mg PO BID today as pt appears close to euvolemic  -Wean off O2 as tolerates, currently on 2L NC satting 94%, 87% on RA. Encourage ICS, will need home O2 for discharge ( aware, awaiting set up)  - ECHO 11/12/20 Mild symmetric LVH, EF 55%, Probably dilated RV size, Borderline reduced RV systolic function, Device leads are noted in the right heart, Aortic sclerosis without significant stenosis, No evidence of pulmonary HTN, PASP 31 mmHg, Trivial pericardial effusion.  - HF core measures, strict I/Os, daily weight.

## 2020-11-20 NOTE — PROGRESS NOTE ADULT - SUBJECTIVE AND OBJECTIVE BOX
CARDIOLOGY NP PROGRESS NOTE    Subjective: Pt seen and examined at bedside. Reports feeling well. Denies chest pain, SOB, lightheadedness, dizziness, palpitations, fever, chills.  Remainder ROS otherwise negative.    Overnight Events: Net negative 2.2L over last 24hrs    TELEMETRY: APaced 60s           VITAL SIGNS:  T(C): 36.1 (11-20-20 @ 14:25), Max: 36.9 (11-19-20 @ 21:41)  HR: 74 (11-20-20 @ 16:05) (60 - 74)  BP: 126/52 (11-20-20 @ 16:05) (108/54 - 131/58)  RR: 20 (11-20-20 @ 15:22) (19 - 20)  SpO2: 93% (11-20-20 @ 16:05) (91% - 97%)  Wt(kg): --    I&O's Summary    19 Nov 2020 07:01  -  20 Nov 2020 07:00  --------------------------------------------------------  IN: 715 mL / OUT: 2925 mL / NET: -2210 mL    20 Nov 2020 07:01  -  20 Nov 2020 17:16  --------------------------------------------------------  IN: 360 mL / OUT: 800 mL / NET: -440 mL          PHYSICAL EXAM:    General: A/ox 3, No acute Distress  Neck: Supple, NO JVD  Cardiac: S1 S2, No M/R/G  Pulmonary: CTAB, Breathing unlabored, No Rhonchi/Rales/Wheezing  Abdomen: Soft, Non -tender, +BS x 4 quads  Extremities: No Rashes, No edema  Neuro: A/o x 3, No focal deficits          LABS:                          12.5   8.61  )-----------( 146      ( 20 Nov 2020 07:54 )             39.1                              11-20    143  |  98  |  45<H>  ----------------------------<  156<H>  4.0   |  33<H>  |  1.24    Ca    9.4      20 Nov 2020 07:53  Mg     2.3     11-20                                CAPILLARY BLOOD GLUCOSE      POCT Blood Glucose.: 243 mg/dL (20 Nov 2020 11:30)  POCT Blood Glucose.: 151 mg/dL (20 Nov 2020 06:44)  POCT Blood Glucose.: 199 mg/dL (19 Nov 2020 22:33)  POCT Blood Glucose.: 225 mg/dL (19 Nov 2020 20:53)            Allergies:  lisinopril (Angioedema)    MEDICATIONS  (STANDING):  aspirin enteric coated 81 milliGRAM(s) Oral daily  atorvastatin 80 milliGRAM(s) Oral at bedtime  budesonide 160 MICROgram(s)/formoterol 4.5 MICROgram(s) Inhaler 2 Puff(s) Inhalation two times a day  dextrose 40% Gel 15 Gram(s) Oral once  dextrose 5%. 1000 milliLiter(s) (50 mL/Hr) IV Continuous <Continuous>  dextrose 5%. 1000 milliLiter(s) (100 mL/Hr) IV Continuous <Continuous>  dextrose 50% Injectable 25 Gram(s) IV Push once  dextrose 50% Injectable 12.5 Gram(s) IV Push once  dextrose 50% Injectable 25 Gram(s) IV Push once  enoxaparin Injectable 40 milliGRAM(s) SubCutaneous every 12 hours  finasteride 5 milliGRAM(s) Oral daily  furosemide    Tablet 40 milliGRAM(s) Oral two times a day  glucagon  Injectable 1 milliGRAM(s) IntraMuscular once  insulin lispro (ADMELOG) corrective regimen sliding scale   SubCutaneous Before meals and at bedtime  metoprolol succinate ER 25 milliGRAM(s) Oral daily  sodium chloride 0.65% Nasal 1 Spray(s) Both Nostrils every 6 hours    MEDICATIONS  (PRN):  acetaminophen   Tablet .. 650 milliGRAM(s) Oral every 6 hours PRN Mild Pain (1 - 3)  ALBUTerol    90 MICROgram(s) HFA Inhaler 2 Puff(s) Inhalation every 6 hours PRN Shortness of Breath and/or Wheezing        DIAGNOSTIC TESTS:

## 2020-11-20 NOTE — PROGRESS NOTE ADULT - PROBLEM SELECTOR PLAN 6
continue Finasteride 5mg PO daily    VTE PPx: on Lovenox  - PT recs [facility], family prefers patient to go home, Continuing to work w/ PT and reevaluate.   Dispo: continue diuresis continue Finasteride 5mg PO daily    VTE PPx: on Lovenox  - PT recs [facility], family prefers patient to go home w/ home PT, Continuing to work w/ PT and reevaluate.   Dispo: dc pending tomorrow, Home O2 to be set up by

## 2020-11-20 NOTE — PROGRESS NOTE ADULT - ASSESSMENT
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 Brown), ?chronic diastolic CHF (EF 60% by Echo 2/3/20, pt denies but has been on lasix for years) who presents to [facility] ED 11/11/20 c/o progressively worsening SOB, LE edema and orthopnea. Admitted to cardiac tele [facility] for acute on chronic dCHF exacerbation, s/p IV Lasix 80 BID and Diamox 500 IV BID for contraction alkalosis, now improving. Hospital course significant for stroke code called 11/12 overnight, CT head negative, Neurology following. PT currently recommending EVERARDO, but family refusing and request home PT. 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 Brown), ?chronic diastolic CHF (EF 60% by Echo 2/3/20, pt denies but has been on lasix for years) who presents to [facility] ED 11/11/20 c/o progressively worsening SOB, LE edema and orthopnea. Admitted to cardiac tele [facility] for acute on chronic dCHF exacerbation, s/p IV Lasix 80 BID and Diamox 500 IV BID for contraction alkalosis, now improving and transitioned to PO Lasix. Hospital course significant for stroke code called 11/12 overnight, CT head negative, Neurology following. PT currently recommending [facility]ER[facility]O, but family refusing and request home PT.

## 2020-11-20 NOTE — PROGRESS NOTE ADULT - PROBLEM SELECTOR PLAN 2
On 11/12 overnight pt was found to be unresponsive, dysarthric, R facial droop, LE weakness. Stroke code was called.   - CT head revealed no acute intracranial hemorrhage or transcortical infarction.  - CT Angio Head/Neck revealed the B/L ICA are patent. The middle cerebral and anterior cerebral arteries are patent. 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 Evaluation of the neck arteries very limited, no definite occlusion of the intracranial vessels.  - CT perfusion study revealed evaluation is limited. Within that limitation, no evidence of CBF less than 30% or Tmax greater than 6 seconds.  - Upon return from CT scan patient returned to baseline A+O x 3, with persistent right sided facial droop and mild dysarthria, strength V/V in bilateral upper extremities and III/V in bilateral lower extremities.  - Neurology recommended ASA 81mg PO BID, Lipitor 80mg PO qHS and MRI brain for further evaluation of acute event.  - Low suspicion for stroke, pt at risk for flash pulmonary edema if has to lie flat for the MRI, test deferred at this time.   - Pt currently A&O x 3, no facial droop noted, speech is clear, however pt has no teeth, upper and lower strength 5/5 B/L    - Continue with Aspirin 81mg PO QD On 11/12 overnight pt was found to be unresponsive, dysarthric, R facial droop, LE weakness. Stroke code was called.   - CT head revealed no acute intracranial hemorrhage or transcortical infarction.  - CT Angio Head/Neck revealed the B/L ICA are patent. The middle cerebral and anterior cerebral arteries are patent. 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 Evaluation of the neck arteries very limited, no definite occlusion of the intracranial vessels.  - CT perfusion study revealed evaluation is limited. Within that limitation, no evidence of CBF less than 30% or Tmax greater than 6 seconds.  - Upon return from CT scan patient returned to baseline A+O x 3, with persistent right sided facial droop and mild dysarthria, strength V/V in bilateral upper extremities and III/V in bilateral lower extremities.  - Neurology recommended ASA 81mg PO BID, Lipitor 80mg PO qHS and MRI brain for further evaluation of acute event.  - Low suspicion for stroke, pt at risk for flash pulmonary edema if has to lie flat for the MRI, test deferred    - Pt currently A&O x 3, no facial droop noted, speech is clear, however pt has no teeth, upper and lower strength 5/5 B/L    - Continue with Aspirin 81mg PO QD

## 2020-11-21 ENCOUNTER — TRANSCRIPTION ENCOUNTER (OUTPATIENT)
Age: 85
End: 2020-11-21

## 2020-11-21 VITALS — TEMPERATURE: 99 F

## 2020-11-21 LAB
ANION GAP SERPL CALC-SCNC: 13 MMOL/L — SIGNIFICANT CHANGE UP (ref 5–17)
APPEARANCE UR: CLEAR — SIGNIFICANT CHANGE UP
BILIRUB UR-MCNC: NEGATIVE — SIGNIFICANT CHANGE UP
BUN SERPL-MCNC: 41 MG/DL — HIGH (ref 7–23)
CALCIUM SERPL-MCNC: 9.2 MG/DL — SIGNIFICANT CHANGE UP (ref 8.4–10.5)
CHLORIDE SERPL-SCNC: 98 MMOL/L — SIGNIFICANT CHANGE UP (ref 96–108)
CO2 SERPL-SCNC: 30 MMOL/L — SIGNIFICANT CHANGE UP (ref 22–31)
COLOR SPEC: YELLOW — SIGNIFICANT CHANGE UP
CREAT SERPL-MCNC: 1.1 MG/DL — SIGNIFICANT CHANGE UP (ref 0.5–1.3)
DIFF PNL FLD: NEGATIVE — SIGNIFICANT CHANGE UP
GLUCOSE BLDC GLUCOMTR-MCNC: 174 MG/DL — HIGH (ref 70–99)
GLUCOSE BLDC GLUCOMTR-MCNC: 221 MG/DL — HIGH (ref 70–99)
GLUCOSE SERPL-MCNC: 175 MG/DL — HIGH (ref 70–99)
GLUCOSE UR QL: NEGATIVE — SIGNIFICANT CHANGE UP
HCT VFR BLD CALC: 39.5 % — SIGNIFICANT CHANGE UP (ref 39–50)
HGB BLD-MCNC: 12.9 G/DL — LOW (ref 13–17)
KETONES UR-MCNC: NEGATIVE — SIGNIFICANT CHANGE UP
LEUKOCYTE ESTERASE UR-ACNC: NEGATIVE — SIGNIFICANT CHANGE UP
MAGNESIUM SERPL-MCNC: 2.2 MG/DL — SIGNIFICANT CHANGE UP (ref 1.6–2.6)
MCHC RBC-ENTMCNC: 30.8 PG — SIGNIFICANT CHANGE UP (ref 27–34)
MCHC RBC-ENTMCNC: 32.7 GM/DL — SIGNIFICANT CHANGE UP (ref 32–36)
MCV RBC AUTO: 94.3 FL — SIGNIFICANT CHANGE UP (ref 80–100)
NITRITE UR-MCNC: NEGATIVE — SIGNIFICANT CHANGE UP
NRBC # BLD: 0 /100 WBCS — SIGNIFICANT CHANGE UP (ref 0–0)
PH UR: 6 — SIGNIFICANT CHANGE UP (ref 5–8)
PLATELET # BLD AUTO: 148 K/UL — LOW (ref 150–400)
POTASSIUM SERPL-MCNC: 4 MMOL/L — SIGNIFICANT CHANGE UP (ref 3.5–5.3)
POTASSIUM SERPL-SCNC: 4 MMOL/L — SIGNIFICANT CHANGE UP (ref 3.5–5.3)
PROT UR-MCNC: NEGATIVE MG/DL — SIGNIFICANT CHANGE UP
RBC # BLD: 4.19 M/UL — LOW (ref 4.2–5.8)
RBC # FLD: 13.1 % — SIGNIFICANT CHANGE UP (ref 10.3–14.5)
SODIUM SERPL-SCNC: 141 MMOL/L — SIGNIFICANT CHANGE UP (ref 135–145)
SP GR SPEC: 1.01 — SIGNIFICANT CHANGE UP (ref 1–1.03)
UROBILINOGEN FLD QL: 0.2 E.U./DL — SIGNIFICANT CHANGE UP
WBC # BLD: 7.88 K/UL — SIGNIFICANT CHANGE UP (ref 3.8–10.5)
WBC # FLD AUTO: 7.88 K/UL — SIGNIFICANT CHANGE UP (ref 3.8–10.5)

## 2020-11-21 PROCEDURE — 93005 ELECTROCARDIOGRAM TRACING: CPT

## 2020-11-21 PROCEDURE — 70498 CT ANGIOGRAPHY NECK: CPT

## 2020-11-21 PROCEDURE — 97162 PT EVAL MOD COMPLEX 30 MIN: CPT

## 2020-11-21 PROCEDURE — 97116 GAIT TRAINING THERAPY: CPT

## 2020-11-21 PROCEDURE — 86901 BLOOD TYPING SEROLOGIC RH(D): CPT

## 2020-11-21 PROCEDURE — 80061 LIPID PANEL: CPT

## 2020-11-21 PROCEDURE — 70496 CT ANGIOGRAPHY HEAD: CPT

## 2020-11-21 PROCEDURE — 83036 HEMOGLOBIN GLYCOSYLATED A1C: CPT

## 2020-11-21 PROCEDURE — 83735 ASSAY OF MAGNESIUM: CPT

## 2020-11-21 PROCEDURE — 86900 BLOOD TYPING SEROLOGIC ABO: CPT

## 2020-11-21 PROCEDURE — 82962 GLUCOSE BLOOD TEST: CPT

## 2020-11-21 PROCEDURE — 80053 COMPREHEN METABOLIC PANEL: CPT

## 2020-11-21 PROCEDURE — 97110 THERAPEUTIC EXERCISES: CPT

## 2020-11-21 PROCEDURE — 84443 ASSAY THYROID STIM HORMONE: CPT

## 2020-11-21 PROCEDURE — 86850 RBC ANTIBODY SCREEN: CPT

## 2020-11-21 PROCEDURE — 84295 ASSAY OF SERUM SODIUM: CPT

## 2020-11-21 PROCEDURE — 96374 THER/PROPH/DIAG INJ IV PUSH: CPT

## 2020-11-21 PROCEDURE — 97164 PT RE-EVAL EST PLAN CARE: CPT

## 2020-11-21 PROCEDURE — 83880 ASSAY OF NATRIURETIC PEPTIDE: CPT

## 2020-11-21 PROCEDURE — 97530 THERAPEUTIC ACTIVITIES: CPT

## 2020-11-21 PROCEDURE — 82550 ASSAY OF CK (CPK): CPT

## 2020-11-21 PROCEDURE — 84484 ASSAY OF TROPONIN QUANT: CPT

## 2020-11-21 PROCEDURE — 94640 AIRWAY INHALATION TREATMENT: CPT

## 2020-11-21 PROCEDURE — 87086 URINE CULTURE/COLONY COUNT: CPT

## 2020-11-21 PROCEDURE — U0003: CPT

## 2020-11-21 PROCEDURE — 82803 BLOOD GASES ANY COMBINATION: CPT

## 2020-11-21 PROCEDURE — 85025 COMPLETE CBC W/AUTO DIFF WBC: CPT

## 2020-11-21 PROCEDURE — 99239 HOSP IP/OBS DSCHRG MGMT >30: CPT

## 2020-11-21 PROCEDURE — 93306 TTE W/DOPPLER COMPLETE: CPT

## 2020-11-21 PROCEDURE — 36415 COLL VENOUS BLD VENIPUNCTURE: CPT

## 2020-11-21 PROCEDURE — 80048 BASIC METABOLIC PNL TOTAL CA: CPT

## 2020-11-21 PROCEDURE — 82330 ASSAY OF CALCIUM: CPT

## 2020-11-21 PROCEDURE — 0042T: CPT

## 2020-11-21 PROCEDURE — 70450 CT HEAD/BRAIN W/O DYE: CPT

## 2020-11-21 PROCEDURE — 83605 ASSAY OF LACTIC ACID: CPT

## 2020-11-21 PROCEDURE — 84132 ASSAY OF SERUM POTASSIUM: CPT

## 2020-11-21 PROCEDURE — 84100 ASSAY OF PHOSPHORUS: CPT

## 2020-11-21 PROCEDURE — 85027 COMPLETE CBC AUTOMATED: CPT

## 2020-11-21 PROCEDURE — 71045 X-RAY EXAM CHEST 1 VIEW: CPT

## 2020-11-21 PROCEDURE — 82553 CREATINE MB FRACTION: CPT

## 2020-11-21 PROCEDURE — 99285 EMERGENCY DEPT VISIT HI MDM: CPT | Mod: 25

## 2020-11-21 PROCEDURE — 81003 URINALYSIS AUTO W/O SCOPE: CPT

## 2020-11-21 PROCEDURE — 84436 ASSAY OF TOTAL THYROXINE: CPT

## 2020-11-21 RX ORDER — ATORVASTATIN CALCIUM 80 MG/1
1 TABLET, FILM COATED ORAL
Qty: 30 | Refills: 3
Start: 2020-11-21 | End: 2021-03-20

## 2020-11-21 RX ORDER — FUROSEMIDE 40 MG
1 TABLET ORAL
Qty: 0 | Refills: 0 | DISCHARGE

## 2020-11-21 RX ADMIN — Medication 81 MILLIGRAM(S): at 11:01

## 2020-11-21 RX ADMIN — Medication 40 MILLIGRAM(S): at 05:54

## 2020-11-21 RX ADMIN — Medication 650 MILLIGRAM(S): at 04:38

## 2020-11-21 RX ADMIN — Medication 4: at 12:06

## 2020-11-21 RX ADMIN — Medication 1 SPRAY(S): at 11:01

## 2020-11-21 RX ADMIN — FINASTERIDE 5 MILLIGRAM(S): 5 TABLET, FILM COATED ORAL at 11:01

## 2020-11-21 RX ADMIN — Medication 1 SPRAY(S): at 05:54

## 2020-11-21 RX ADMIN — BUDESONIDE AND FORMOTEROL FUMARATE DIHYDRATE 2 PUFF(S): 160; 4.5 AEROSOL RESPIRATORY (INHALATION) at 09:42

## 2020-11-21 RX ADMIN — ENOXAPARIN SODIUM 40 MILLIGRAM(S): 100 INJECTION SUBCUTANEOUS at 05:54

## 2020-11-21 RX ADMIN — Medication 25 MILLIGRAM(S): at 05:54

## 2020-11-21 RX ADMIN — Medication 2: at 06:45

## 2020-11-21 RX ADMIN — Medication 650 MILLIGRAM(S): at 03:49

## 2020-11-21 NOTE — DISCHARGE NOTE NURSING/CASE MANAGEMENT/SOCIAL WORK - PATIENT PORTAL LINK FT
You can access the FollowMyHealth Patient Portal offered by [facility] by registering at the following website: [email or web address]. By joining [facility] FollowMyHealth portal, you will also be able to view your health information using other applications (apps) compatible with our system.

## 2020-11-27 DIAGNOSIS — J45.909 UNSPECIFIED ASTHMA, UNCOMPLICATED: ICD-10-CM

## 2020-11-27 DIAGNOSIS — E11.9 TYPE 2 DIABETES MELLITUS WITHOUT COMPLICATIONS: ICD-10-CM

## 2020-11-27 DIAGNOSIS — I11.0 HYPERTENSIVE HEART DISEASE WITH HEART FAILURE: ICD-10-CM

## 2020-11-27 DIAGNOSIS — I44.2 ATRIOVENTRICULAR BLOCK, COMPLETE: ICD-10-CM

## 2020-11-27 DIAGNOSIS — E66.01 MORBID (SEVERE) OBESITY DUE TO EXCESS CALORIES: ICD-10-CM

## 2020-11-27 DIAGNOSIS — I31.3 PERICARDIAL EFFUSION (NONINFLAMMATORY): ICD-10-CM

## 2020-11-27 DIAGNOSIS — Z79.84 LONG TERM (CURRENT) USE OF ORAL HYPOGLYCEMIC DRUGS: ICD-10-CM

## 2020-11-27 DIAGNOSIS — I50.33 ACUTE ON CHRONIC DIASTOLIC (CONGESTIVE) HEART FAILURE: ICD-10-CM

## 2020-11-27 DIAGNOSIS — N40.0 BENIGN PROSTATIC HYPERPLASIA WITHOUT LOWER URINARY TRACT SYMPTOMS: ICD-10-CM

## 2020-11-27 DIAGNOSIS — G45.9 TRANSIENT CEREBRAL ISCHEMIC ATTACK, UNSPECIFIED: ICD-10-CM

## 2020-11-27 DIAGNOSIS — R47.1 DYSARTHRIA AND ANARTHRIA: ICD-10-CM

## 2020-11-27 DIAGNOSIS — R29.810 FACIAL WEAKNESS: ICD-10-CM

## 2020-11-27 DIAGNOSIS — E87.5 HYPERKALEMIA: ICD-10-CM

## 2020-11-27 DIAGNOSIS — Z79.899 OTHER LONG TERM (CURRENT) DRUG THERAPY: ICD-10-CM

## 2020-11-27 DIAGNOSIS — E87.3 ALKALOSIS: ICD-10-CM

## 2020-12-01 ENCOUNTER — APPOINTMENT (OUTPATIENT)
Dept: HEART AND VASCULAR | Facility: CLINIC | Age: 85
End: 2020-12-01
Payer: MEDICARE

## 2020-12-01 VITALS
BODY MASS INDEX: 44.1 KG/M2 | SYSTOLIC BLOOD PRESSURE: 112 MMHG | WEIGHT: 315 LBS | HEART RATE: 78 BPM | TEMPERATURE: 97.7 F | OXYGEN SATURATION: 92 % | DIASTOLIC BLOOD PRESSURE: 55 MMHG | HEIGHT: 71 IN

## 2020-12-01 PROCEDURE — 99215 OFFICE O/P EST HI 40 MIN: CPT

## 2020-12-08 RX ORDER — FUROSEMIDE 40 MG/1
40 TABLET ORAL
Refills: 0 | Status: DISCONTINUED | COMMUNITY
End: 2020-12-08

## 2020-12-08 NOTE — DISCUSSION/SUMMARY
[FreeTextEntry1] : -Chronic diastolic CHF\par -Currently w/ significantly improved volume status; Currently euvolemic\par -c/w Torsemide 20 PO BID\par -c/w Toprol XL 25 daily\par -Counseled pt. on low salt diet and general CHF education\par -RTC in 2-3months

## 2020-12-08 NOTE — PHYSICAL EXAM
[General Appearance - Well Developed] : well developed [] : no respiratory distress [Respiration, Rhythm And Depth] : normal respiratory rhythm and effort [Auscultation Breath Sounds / Voice Sounds] : lungs were clear to auscultation bilaterally [Heart Rate And Rhythm] : heart rate and rhythm were normal [Heart Sounds] : normal S1 and S2 [Oriented To Time, Place, And Person] : oriented to person, place, and time

## 2020-12-08 NOTE — HISTORY OF PRESENT ILLNESS
[FreeTextEntry1] : 89M, morbidly obese w/ h/o CHB s/p PPM, chronic dCHF presenting for follow up after recent hospitalization. Mr. Samuels was admitted for acute on chronic dCHF exacerbation w/ significant volume overload. He was successfully diuresed w/ IV Lasix around net negative 25-30L and he was discharged on Torsemide 20 PO BID.\par \par Today he states he feels great, "like a new man". He states his breathing and lower extremity edema has significantly improved. He hasn’t seen any increase in weight, SOB or ower extremity edema since discharge.

## 2020-12-08 NOTE — REVIEW OF SYSTEMS
[Fever] : no fever [Recent Weight Gain (___ Lbs)] : no recent weight gain [Chills] : no chills [Feeling Fatigued] : not feeling fatigued [Shortness Of Breath] : no shortness of breath [Dyspnea on exertion] : not dyspnea during exertion [Lower Ext Edema] : lower extremity edema [Cough] : no cough [Abdominal Pain] : no abdominal pain [Nausea] : no nausea [Vomiting] : no vomiting [Heartburn] : no heartburn [Change in Appetite] : no change in appetite [Urinary Frequency] : no change in urinary frequency [Dizziness] : no dizziness

## 2021-01-01 ENCOUNTER — APPOINTMENT (OUTPATIENT)
Dept: HEART AND VASCULAR | Facility: CLINIC | Age: 86
End: 2021-01-01
Payer: MEDICARE

## 2021-01-01 ENCOUNTER — NON-APPOINTMENT (OUTPATIENT)
Age: 86
End: 2021-01-01

## 2021-01-01 ENCOUNTER — APPOINTMENT (OUTPATIENT)
Dept: HEART AND VASCULAR | Facility: CLINIC | Age: 86
End: 2021-01-01

## 2021-01-01 ENCOUNTER — EMERGENCY (EMERGENCY)
Facility: HOSPITAL | Age: 86
LOS: 1 days | Discharge: HOME | End: 2021-01-01
Attending: EMERGENCY MEDICINE | Admitting: EMERGENCY MEDICINE
Payer: MEDICARE

## 2021-01-01 VITALS
SYSTOLIC BLOOD PRESSURE: 111 MMHG | OXYGEN SATURATION: 90 % | HEART RATE: 70 BPM | HEIGHT: 71 IN | DIASTOLIC BLOOD PRESSURE: 55 MMHG

## 2021-01-01 VITALS
HEART RATE: 78 BPM | DIASTOLIC BLOOD PRESSURE: 79 MMHG | SYSTOLIC BLOOD PRESSURE: 129 MMHG | TEMPERATURE: 98 F | OXYGEN SATURATION: 94 % | HEIGHT: 71 IN | RESPIRATION RATE: 18 BRPM | WEIGHT: 315 LBS

## 2021-01-01 VITALS
DIASTOLIC BLOOD PRESSURE: 81 MMHG | SYSTOLIC BLOOD PRESSURE: 180 MMHG | HEART RATE: 78 BPM | TEMPERATURE: 98 F | OXYGEN SATURATION: 94 % | RESPIRATION RATE: 18 BRPM

## 2021-01-01 VITALS
SYSTOLIC BLOOD PRESSURE: 109 MMHG | HEART RATE: 73 BPM | OXYGEN SATURATION: 92 % | DIASTOLIC BLOOD PRESSURE: 48 MMHG | HEIGHT: 71 IN

## 2021-01-01 VITALS — SYSTOLIC BLOOD PRESSURE: 103 MMHG | HEART RATE: 72 BPM | DIASTOLIC BLOOD PRESSURE: 53 MMHG

## 2021-01-01 DIAGNOSIS — I10 ESSENTIAL (PRIMARY) HYPERTENSION: ICD-10-CM

## 2021-01-01 DIAGNOSIS — R39.11 HESITANCY OF MICTURITION: ICD-10-CM

## 2021-01-01 DIAGNOSIS — E11.9 TYPE 2 DIABETES MELLITUS WITHOUT COMPLICATIONS: ICD-10-CM

## 2021-01-01 DIAGNOSIS — Z88.8 ALLERGY STATUS TO OTHER DRUGS, MEDICAMENTS AND BIOLOGICAL SUBSTANCES: ICD-10-CM

## 2021-01-01 DIAGNOSIS — J45.909 UNSPECIFIED ASTHMA, UNCOMPLICATED: ICD-10-CM

## 2021-01-01 DIAGNOSIS — Z79.84 LONG TERM (CURRENT) USE OF ORAL HYPOGLYCEMIC DRUGS: ICD-10-CM

## 2021-01-01 DIAGNOSIS — Z90.79 ACQUIRED ABSENCE OF OTHER GENITAL ORGAN(S): Chronic | ICD-10-CM

## 2021-01-01 DIAGNOSIS — Z98.42 CATARACT EXTRACTION STATUS, LEFT EYE: Chronic | ICD-10-CM

## 2021-01-01 DIAGNOSIS — R35.0 FREQUENCY OF MICTURITION: ICD-10-CM

## 2021-01-01 DIAGNOSIS — Z98.41 CATARACT EXTRACTION STATUS, RIGHT EYE: Chronic | ICD-10-CM

## 2021-01-01 LAB
ALBUMIN SERPL ELPH-MCNC: 4.3 G/DL — SIGNIFICANT CHANGE UP (ref 3.3–5)
ALP SERPL-CCNC: 70 U/L — SIGNIFICANT CHANGE UP (ref 40–120)
ALT FLD-CCNC: 18 U/L — SIGNIFICANT CHANGE UP (ref 10–45)
ANION GAP SERPL CALC-SCNC: 13 MMOL/L — SIGNIFICANT CHANGE UP (ref 5–17)
APPEARANCE UR: CLEAR — SIGNIFICANT CHANGE UP
AST SERPL-CCNC: 30 U/L — SIGNIFICANT CHANGE UP (ref 10–40)
BASOPHILS # BLD AUTO: 0.03 K/UL — SIGNIFICANT CHANGE UP (ref 0–0.2)
BASOPHILS NFR BLD AUTO: 0.3 % — SIGNIFICANT CHANGE UP (ref 0–2)
BILIRUB SERPL-MCNC: 0.3 MG/DL — SIGNIFICANT CHANGE UP (ref 0.2–1.2)
BILIRUB UR-MCNC: NEGATIVE — SIGNIFICANT CHANGE UP
BUN SERPL-MCNC: 38 MG/DL — HIGH (ref 7–23)
CALCIUM SERPL-MCNC: 9.7 MG/DL — SIGNIFICANT CHANGE UP (ref 8.4–10.5)
CHLORIDE SERPL-SCNC: 97 MMOL/L — SIGNIFICANT CHANGE UP (ref 96–108)
CO2 SERPL-SCNC: 31 MMOL/L — SIGNIFICANT CHANGE UP (ref 22–31)
COLOR SPEC: YELLOW — SIGNIFICANT CHANGE UP
CREAT SERPL-MCNC: 1.32 MG/DL — HIGH (ref 0.5–1.3)
CULTURE RESULTS: NO GROWTH — SIGNIFICANT CHANGE UP
DIFF PNL FLD: NEGATIVE — SIGNIFICANT CHANGE UP
EOSINOPHIL # BLD AUTO: 0.21 K/UL — SIGNIFICANT CHANGE UP (ref 0–0.5)
EOSINOPHIL NFR BLD AUTO: 1.9 % — SIGNIFICANT CHANGE UP (ref 0–6)
GLUCOSE SERPL-MCNC: 164 MG/DL — HIGH (ref 70–99)
GLUCOSE UR QL: NEGATIVE — SIGNIFICANT CHANGE UP
HCT VFR BLD CALC: 38.6 % — LOW (ref 39–50)
HGB BLD-MCNC: 12.6 G/DL — LOW (ref 13–17)
IMM GRANULOCYTES NFR BLD AUTO: 0.9 % — SIGNIFICANT CHANGE UP (ref 0–1.5)
KETONES UR-MCNC: NEGATIVE — SIGNIFICANT CHANGE UP
LEUKOCYTE ESTERASE UR-ACNC: NEGATIVE — SIGNIFICANT CHANGE UP
LYMPHOCYTES # BLD AUTO: 0.91 K/UL — LOW (ref 1–3.3)
LYMPHOCYTES # BLD AUTO: 8.3 % — LOW (ref 13–44)
MCHC RBC-ENTMCNC: 30.4 PG — SIGNIFICANT CHANGE UP (ref 27–34)
MCHC RBC-ENTMCNC: 32.6 GM/DL — SIGNIFICANT CHANGE UP (ref 32–36)
MCV RBC AUTO: 93 FL — SIGNIFICANT CHANGE UP (ref 80–100)
MONOCYTES # BLD AUTO: 1.01 K/UL — HIGH (ref 0–0.9)
MONOCYTES NFR BLD AUTO: 9.2 % — SIGNIFICANT CHANGE UP (ref 2–14)
NEUTROPHILS # BLD AUTO: 8.7 K/UL — HIGH (ref 1.8–7.4)
NEUTROPHILS NFR BLD AUTO: 79.4 % — HIGH (ref 43–77)
NITRITE UR-MCNC: NEGATIVE — SIGNIFICANT CHANGE UP
NRBC # BLD: 0 /100 WBCS — SIGNIFICANT CHANGE UP (ref 0–0)
PH UR: 6 — SIGNIFICANT CHANGE UP (ref 5–8)
PLATELET # BLD AUTO: 192 K/UL — SIGNIFICANT CHANGE UP (ref 150–400)
POTASSIUM SERPL-MCNC: 4.4 MMOL/L — SIGNIFICANT CHANGE UP (ref 3.5–5.3)
POTASSIUM SERPL-SCNC: 4.4 MMOL/L — SIGNIFICANT CHANGE UP (ref 3.5–5.3)
PROT SERPL-MCNC: 7.8 G/DL — SIGNIFICANT CHANGE UP (ref 6–8.3)
PROT UR-MCNC: NEGATIVE MG/DL — SIGNIFICANT CHANGE UP
RBC # BLD: 4.15 M/UL — LOW (ref 4.2–5.8)
RBC # FLD: 13.4 % — SIGNIFICANT CHANGE UP (ref 10.3–14.5)
SODIUM SERPL-SCNC: 141 MMOL/L — SIGNIFICANT CHANGE UP (ref 135–145)
SP GR SPEC: 1.01 — SIGNIFICANT CHANGE UP (ref 1–1.03)
SPECIMEN SOURCE: SIGNIFICANT CHANGE UP
UROBILINOGEN FLD QL: 0.2 E.U./DL — SIGNIFICANT CHANGE UP
WBC # BLD: 10.96 K/UL — HIGH (ref 3.8–10.5)
WBC # FLD AUTO: 10.96 K/UL — HIGH (ref 3.8–10.5)

## 2021-01-01 PROCEDURE — 93296 REM INTERROG EVL PM/IDS: CPT

## 2021-01-01 PROCEDURE — 80053 COMPREHEN METABOLIC PANEL: CPT

## 2021-01-01 PROCEDURE — 81003 URINALYSIS AUTO W/O SCOPE: CPT

## 2021-01-01 PROCEDURE — 99215 OFFICE O/P EST HI 40 MIN: CPT

## 2021-01-01 PROCEDURE — 93280 PM DEVICE PROGR EVAL DUAL: CPT

## 2021-01-01 PROCEDURE — 36415 COLL VENOUS BLD VENIPUNCTURE: CPT

## 2021-01-01 PROCEDURE — 93294 REM INTERROG EVL PM/LDLS PM: CPT

## 2021-01-01 PROCEDURE — 99284 EMERGENCY DEPT VISIT MOD MDM: CPT

## 2021-01-01 PROCEDURE — 99283 EMERGENCY DEPT VISIT LOW MDM: CPT

## 2021-01-01 PROCEDURE — 85025 COMPLETE CBC W/AUTO DIFF WBC: CPT

## 2021-01-01 PROCEDURE — 87086 URINE CULTURE/COLONY COUNT: CPT

## 2021-01-01 RX ORDER — ATORVASTATIN CALCIUM 10 MG/1
10 TABLET, FILM COATED ORAL
Refills: 0 | Status: ACTIVE | COMMUNITY
Start: 2020-11-21

## 2021-01-01 RX ORDER — SODIUM CHLORIDE 9 MG/ML
500 INJECTION INTRAMUSCULAR; INTRAVENOUS; SUBCUTANEOUS ONCE
Refills: 0 | Status: COMPLETED | OUTPATIENT
Start: 2021-01-01 | End: 2021-01-01

## 2021-01-01 RX ADMIN — SODIUM CHLORIDE 1000 MILLILITER(S): 9 INJECTION INTRAMUSCULAR; INTRAVENOUS; SUBCUTANEOUS at 15:18

## 2021-02-09 ENCOUNTER — APPOINTMENT (OUTPATIENT)
Dept: HEART AND VASCULAR | Facility: CLINIC | Age: 86
End: 2021-02-09
Payer: MEDICARE

## 2021-02-09 VITALS — OXYGEN SATURATION: 89 % | HEART RATE: 77 BPM | DIASTOLIC BLOOD PRESSURE: 65 MMHG | SYSTOLIC BLOOD PRESSURE: 128 MMHG

## 2021-02-09 PROCEDURE — 99214 OFFICE O/P EST MOD 30 MIN: CPT

## 2021-02-16 RX ORDER — METOPROLOL SUCCINATE 25 MG/1
25 TABLET, EXTENDED RELEASE ORAL DAILY
Qty: 30 | Refills: 0 | Status: ACTIVE | COMMUNITY
Start: 2021-02-16

## 2021-02-16 NOTE — REVIEW OF SYSTEMS
[Fever] : no fever [Recent Weight Gain (___ Lbs)] : no recent weight gain [Chills] : no chills [Feeling Fatigued] : not feeling fatigued [Shortness Of Breath] : no shortness of breath [Dyspnea on exertion] : not dyspnea during exertion [Lower Ext Edema] : no extremity edema [Cough] : no cough [Abdominal Pain] : no abdominal pain [Nausea] : no nausea [Vomiting] : no vomiting [Heartburn] : no heartburn [Change in Appetite] : no change in appetite [Urinary Frequency] : no change in urinary frequency [Dizziness] : no dizziness

## 2021-02-16 NOTE — PHYSICAL EXAM
[General Appearance - Well Developed] : well developed [] : no respiratory distress [Respiration, Rhythm And Depth] : normal respiratory rhythm and effort [Auscultation Breath Sounds / Voice Sounds] : lungs were clear to auscultation bilaterally [Heart Sounds] : normal S1 and S2 [Heart Rate And Rhythm] : heart rate and rhythm were normal [Oriented To Time, Place, And Person] : oriented to person, place, and time

## 2021-02-16 NOTE — HISTORY OF PRESENT ILLNESS
[FreeTextEntry1] : 89M, morbidly obese w/ h/o CHB s/p PPM, chronic dCHF presenting for routine follow\par \par Today he continues to feel well on his current medication regimen. He notes no further volume retention since his last visit. He denies any F/C, CP, SOB, orthopnea, wt. gain. LE edema, NVD, dysuria lightheadedness or syncope.

## 2021-02-16 NOTE — DISCUSSION/SUMMARY
[FreeTextEntry1] : -Chronic diastolic CHF\par -Currently near euvolemic on exam\par -Will decrease Torsemide to 10mg daily from 20mg; Mild JUVE on recent labs from Dec\par -c/w Toprol XL 25 daily\par -Counseled pt. on low salt diet and general CHF education\par -RTC in 3-4months

## 2021-02-22 NOTE — PATIENT PROFILE ADULT - NSPROEXTENSIONSOFSELF_GEN_A_NUR
Patient notified of test results via [employer].  Postponed for read.  Future labs have been ordered.  Med list updated. none

## 2021-04-12 ENCOUNTER — APPOINTMENT (OUTPATIENT)
Dept: HEART AND VASCULAR | Facility: CLINIC | Age: 86
End: 2021-04-12
Payer: MEDICARE

## 2021-04-12 VITALS
SYSTOLIC BLOOD PRESSURE: 120 MMHG | TEMPERATURE: 96.7 F | DIASTOLIC BLOOD PRESSURE: 58 MMHG | HEART RATE: 68 BPM | HEIGHT: 71 IN

## 2021-04-12 PROCEDURE — 93280 PM DEVICE PROGR EVAL DUAL: CPT

## 2021-04-12 NOTE — PROCEDURE
[Complete Heart Block] : complete heart block [Pacemaker] : pacemaker [Normal] : The battery status is normal. [Sensing Amplitude ___mv] : sensing amplitude was [unfilled] mv [Lead Imp:  ___ohms] : lead impedance was [unfilled] ohms [___V @] : [unfilled] V [___ ms] : [unfilled] ms [None] : none [de-identified] : no ventricualr escape till 35  [de-identified] : JACK [de-identified] : CAMILLE [de-identified] : [id number] [de-identified] : 10/30/18 [de-identified] : [id number] [de-identified] : 9.6 years  [de-identified] : No ventricular escape > 35 bpm\par AP 29.9 %\par  100%

## 2021-04-12 NOTE — HISTORY OF PRESENT ILLNESS
[de-identified] : 89 y/o M with a past medical history significant for BPH, Asthma, DM II and complete heart block with an escape in the 30-40 bpm range.  He is s/p PPM placement 10/30/18 (Medtronic) and presents for routine follow-up today.  He feels well and offers no device related complaints. He is accompanied by his daughter today. \par \par ECHO 11/2020  EF 55 %, mild RV dilatation.

## 2021-04-12 NOTE — DISCUSSION/SUMMARY
[FreeTextEntry1] : 89 y/o M with CHB s/p PPM (Medtronic) 10/30/18.  Device is functioning appropriately as programmed and all measured data is WNL. He is pacemaker dependent without a reliable escape > 35 bpm on interrogation today.  Echocardiogram 11/2020 with EF of 55 %, mild RV dilatation.  Will monitor annual echo for development of pacer induced cardiomyopathy. \par F/u with us in 6 months.

## 2021-05-05 ENCOUNTER — APPOINTMENT (OUTPATIENT)
Dept: HEART AND VASCULAR | Facility: CLINIC | Age: 86
End: 2021-05-05
Payer: MEDICARE

## 2021-05-05 ENCOUNTER — NON-APPOINTMENT (OUTPATIENT)
Age: 86
End: 2021-05-05

## 2021-05-05 PROCEDURE — 93296 REM INTERROG EVL PM/IDS: CPT

## 2021-05-05 PROCEDURE — 93294 REM INTERROG EVL PM/LDLS PM: CPT

## 2021-05-11 ENCOUNTER — APPOINTMENT (OUTPATIENT)
Dept: HEART AND VASCULAR | Facility: CLINIC | Age: 86
End: 2021-05-11
Payer: MEDICARE

## 2021-05-11 VITALS
HEIGHT: 71 IN | OXYGEN SATURATION: 90 % | HEART RATE: 78 BPM | SYSTOLIC BLOOD PRESSURE: 126 MMHG | DIASTOLIC BLOOD PRESSURE: 63 MMHG

## 2021-05-11 PROCEDURE — 99215 OFFICE O/P EST HI 40 MIN: CPT

## 2021-05-11 RX ORDER — MONTELUKAST 10 MG/1
10 TABLET, FILM COATED ORAL
Qty: 90 | Refills: 0 | Status: ACTIVE | COMMUNITY
Start: 2020-08-26

## 2021-05-11 RX ORDER — TORSEMIDE 10 MG/1
10 TABLET ORAL DAILY
Qty: 30 | Refills: 2 | Status: ACTIVE | COMMUNITY
Start: 2021-05-11

## 2021-05-11 RX ORDER — TORSEMIDE 20 MG/1
20 TABLET ORAL
Qty: 180 | Refills: 2 | Status: DISCONTINUED | COMMUNITY
Start: 2020-12-08 | End: 2021-05-11

## 2021-05-11 NOTE — PHYSICAL EXAM
[Well Developed] : well developed [Well Nourished] : well nourished [No Acute Distress] : no acute distress [Obese] : obese [Normal Venous Pressure] : normal venous pressure [Normal S1, S2] : normal S1, S2 [Clear Lung Fields] : clear lung fields [No Respiratory Distress] : no respiratory distress  [Soft] : abdomen soft [Non Tender] : non-tender [No Masses/organomegaly] : no masses/organomegaly [No Edema] : no edema [Moves all extremities] : moves all extremities [Alert and Oriented] : alert and oriented [Normal memory] : normal memory

## 2021-05-11 NOTE — HISTORY OF PRESENT ILLNESS
[FreeTextEntry1] : 89M, morbidly obese w/ h/o CHB s/p PPM, chronic dCHF presenting for routine follow up\par \par Today he continues to feel well on his current medication regimen. At his last visit he was noted to have mild JUVE on blood work from December. We decreased his Torsemide from 20 to 10mg daily. Today he shows no signs of volume retention since that change and blood work from last month(4/2021) shows that his kidney function has returned to baseline. He only notes chronic bilateral knee pain as his only complaint. He otherwise denies CP, SOB, NVD, change in appetite, orthopnea, PND or syncope.

## 2021-05-11 NOTE — REVIEW OF SYSTEMS
[Fever] : no fever [Weight Gain (___ Lbs)] : no recent weight gain [Chills] : no chills [SOB] : no shortness of breath [Dyspnea on exertion] : not dyspnea during exertion [Chest Discomfort] : no chest discomfort [Lower Ext Edema] : no extremity edema [Palpitations] : no palpitations [Orthopnea] : no orthopnea [PND] : no PND [Syncope] : no syncope [Cough] : no cough [Wheezing] : no wheezing [Abdominal Pain] : no abdominal pain [Nausea] : no nausea [Vomiting] : no vomiting [Change in Appetite] : no change in appetite [Diarrhea] : diarrhea [Joint Pain] : joint pain [Confusion] : no confusion was observed [Depression] : no depression

## 2021-05-11 NOTE — DISCUSSION/SUMMARY
[FreeTextEntry1] : -Chronic diastolic CHF\par -Currently euvolemic on exam\par -c/w Torsemide to 10mg daily \par -c/w Toprol XL 25 daily \par -Counseled pt. on low salt diet and increasing activity as tolerated\par -RTC in 4 months

## 2021-08-09 NOTE — ED PROVIDER NOTE - NSFOLLOWUPINSTRUCTIONS_ED_ALL_ED_FT
Please continue to take daily finasteride and twice daily torsemide.    Can take tylenol every 6hrs as needed for pain.  Stay well hydrated.  Return for fevers, persistent vomit, uncontrolled pain, worsening breathing, worsening lightheaded, unable to urinate, spreading redness.  Follow up with primary doctor within 1-2 days.   Follow up with urologist within 2 days to assess for castro catheter removal (prolonged use can cause infections and other complications). Can call [phone] to schedule appointment.     What is urinary retention?   Urinary retention is a condition that develops when your bladder does not empty completely when you urinate.    What causes urinary retention?     An enlarged prostate  Blockages, such as a stone, growth, or narrowing of your urethra  A weak bladder muscle  Nerve damage from diabetes, stroke, or spinal cord injury  Bladder diverticula, which are pockets of urine that form in your bladder and do not empty  Certain medicines, such as narcotics, antihistamines, or antidepressants    What are the signs and symptoms of urinary retention?     Frequent urination, or the urge to urinate right after you finish  An urge to urinate, but your urine does not come out or dribbles out slowly and weakly  Frequent urine leaks that happen during the day or while you sleep  Pain or pressure when you urinate  Pain or stiffness in your abdomen, lower back, hips, or upper thighs  Blood in your urine    How is urinary retention diagnosed? Your healthcare provider will ask about your health history and the medicines you take. He will press or tap on your lower abdomen. You may need any of the following tests:   A digital rectal exam is when healthcare providers carefully feel the size of your prostate.  A post void residual test will show how much urine is left in your bladder after you urinate. You will be asked to urinate and then healthcare providers will use a small ultrasound machine to check how much urine is left in your bladder.  Blood or urine tests may show infection or prostate specific antigen (PSA) levels. PSA may be elevated in prostate cancer.  An ultrasound uses sound waves to show pictures on a monitor. An ultrasound may be done to show bladder stones, infection, or other problems.  A CT scan, or CAT scan, is a type of x-ray that is taken of your prostate, kidneys, and bladder. The pictures may show what is causing your urinary retention. You may be given a dye before the pictures are taken to help healthcare providers see the pictures better. Tell the healthcare provider if you have ever had an allergic reaction to contrast dye.    How is urinary retention treated?     A Castro catheter is a tube put into your bladder to drain urine into a bag. Keep the bag below your waist. This will prevent urine from flowing back into your bladder and causing an infection or other problems. Also, keep the tube free of kinks so the urine will drain properly. Do not pull on the catheter. This can cause pain and bleeding, and may cause the catheter to come out.     Medicines can help decrease the size of your prostate, fight infection, and help you urinate more easily.  Surgery may be needed to treat the condition that is causing your urinary retention.     When should I contact my healthcare provider?     You have a fever.  You have pain when you urinate.  You have blood in your urine.  You have problems with your catheter.  You have questions or concerns about your condition or care.    When should I seek immediate care or call 911?   You have severe abdominal pain.  You are breathing faster than usual.  Your heartbeat is faster than usual.  Your face, hands, feet, or ankles are swollen.

## 2021-08-09 NOTE — ED ADULT NURSE NOTE - NSIMPLEMENTINTERV_GEN_ALL_ED
Implemented All Fall with Harm Risk Interventions:  [employer] to call system. Call bell, personal items and telephone within reach. Instruct patient to call for assistance. Room bathroom lighting operational. Non-slip footwear when patient is off stretcher. Physically safe environment: no spills, clutter or unnecessary equipment. Stretcher in lowest position, wheels locked, appropriate side rails in place. Provide visual cue, wrist band, yellow gown, etc. Monitor gait and stability. Monitor for mental status changes and reorient to person, place, and time. Review medications for side effects contributing to fall risk. Reinforce activity limits and safety measures with patient and family. Provide visual clues: red socks.

## 2021-08-09 NOTE — ED ADULT NURSE NOTE - OBJECTIVE STATEMENT
PAtient arrives via EMS from home, accompanied by daughter, for evaluation of difficulty with urination "for a few weeks now - I think its my prostate."  Daughter is primary caretaker and reports patient was admitted to the hospital in 11/20 for a similar issue "he had a lot of fluid in him and they gave him diuretics."  Daughter reports patient is taking torsemide as prescribed.  Patient denies any pain, last urinated this morning, "it doesn't look like enough."  Has marie knee compression socks in place, "it helps with my arthritis I'm more comfortable."

## 2021-08-09 NOTE — ED PROVIDER NOTE - PATIENT PORTAL LINK FT
You can access the FollowMyHealth Patient Portal offered by [facility] by registering at the following website: [email or web address]. By joining [facility]’s FollowMyHealth portal, you will also be able to view your health information using other applications (apps) compatible with our system.

## 2021-08-09 NOTE — ED PROVIDER NOTE - OBJECTIVE STATEMENT
89 yo PMHx of HTN, NIDDM, asthma, (denies intubations/hospitalizations), BPH, 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 Brown), ?chronic CHF w urinary retention, still being being able to make small drops since last night. states enlarged testicles but no pain. no dysuria, flank pain. Pt has not tried anything for symptoms, no other aggravating or relieving factors. on finasteride. states thinks he is dehydrated. 89 yo PMHx of HTN, NIDDM, asthma, (denies intubations/hospitalizations), BPH, 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 Brown), ?chronic CHF w urinary retention, still being being able to make small drops since last night. states enlarged testicles but no pain. no dysuria, flank pain. Pt has not tried anything for symptoms, no other aggravating or relieving factors. on finasteride but unclear compliance. states thinks he is dehydrated.

## 2021-08-09 NOTE — ED PROVIDER NOTE - PROGRESS NOTE DETAILS
labs at baseline, no  UTI, urinating in ED >250ml, pt to continue finasteride, unclear compliance and Torsemide BID, relayed to daughter.

## 2021-08-09 NOTE — ED ADULT NURSE NOTE - NS_NURSE_DISC_TEACHING_YN_ED_ALL_ED
----- Message from Jaqueline Jones sent at 8/23/2019  9:07 AM EDT -----  Contact: [phone] patient  Chucky    Patient needs script fill before Sunday.  Patient was to see Dr. Locke today and appt had to be cancelled due to being out sick.    She is in need of script before Sunday for:  alendronate (FOSAMAX) 70 MG tablet     
Medication sent to pharmacy   
Yes

## 2021-08-09 NOTE — ED PROVIDER NOTE - CLINICAL SUMMARY MEDICAL DECISION MAKING FREE TEXT BOX
Pt w cc of retention/hesitancy, urinating in ED 100ml, will obtain labs, UA, reassess. no scrotal pain/tenderness/abdominal pain, fever although concerned about enlarged testicles.

## 2021-08-09 NOTE — ED ADULT NURSE NOTE - NSFALLRSKASSESSTYPE_ED_ALL_ED
Patient with h/o COVID in March, c/o lower ext pain and swelling and SOB. D dimer elevated. Possible DVT/PE of concern. Pending doppler lower ext U/S and CT PE study. Dispo pending results. Initial (On Arrival)

## 2021-08-19 NOTE — ED ADULT NURSE NOTE - NEURO SENSATION
Dr Jackson's note    Patient's instructions / PLAN:                                                        Plan:  1. Echocardiogram -- To schedule this test please call [facility] at: [phone]   2.   Labs today - [address]   3. If the blood pressure is lower then 100 decrease the Lisinopril to 10 mg daily   4. Continue the other meds, same doses for now.  5. if questions about the blood pressure and the meds -- please schedule video appointment         ASSESSMENT & PLAN:                                                      (Z00.00) Routine general medical examination at a health care facility  (primary encounter diagnosis)  Comment:   Plan: Lipid panel reflex to direct LDL Fasting, CBC         with platelets, Comprehensive metabolic panel,         Albumin Random Urine Quantitative with Creat         Ratio            (I10) HTN, goal below 140/90  Comment: low side at home  Plan: Lipid panel reflex to direct LDL Fasting, CBC         with platelets, Comprehensive metabolic panel,         Albumin Random Urine Quantitative with Creat         Ratio, carvedilol (COREG) 12.5 MG tablet,         lisinopril (ZESTRIL) 10 MG tablet        as above     (B00.1) Cold sore  Comment:   Plan: valACYclovir (VALTREX) 1000 mg tablet            (D12.6) Adenomatous polyp of colon,needs colonoscopy 2025  Comment:   Plan:     (Z98.890,  Z86.79) H/O aortic aneurysm repair  Comment:   Plan: Echocardiogram Complete            (Z95.2) H/O aortic valve replacement  Comment:   Plan: Echocardiogram Complete            (C50.911) R breast Ca, 2014 s/p chemo, Rx and lumpectomy  (H)  Comment:   Plan: f/u w onco        Chief Complaint:                                                        Annual exam  Follow up chronic medical problems      SUBJECTIVE:                                                    History of present illness     We reviewed the chronic medical problems as above.   I reviewed the recent tests results in Epic       Arthritis  --  pain severe in spring  -- labs neg for RA    Cardio  -- she received a call from cardio that she needs to f/u heart problems w dr Jackson -- echo..  --no CP, SOB, leg edema  -- she takes Arimidex  -- s/p Ao v replac and Ao Aneu repair    R breast Ca   -- dr Felder is the onco     HTN:  -- BP 90s/50 s in the evening. No lightheadedness     ROS:     See below        PMHx: - reviewed  Past Medical History:   Diagnosis Date     Adenomatous colon polyp 2015     Aortic stenosis 11    bicuspid AV with severe stenosis - 2011 mechanical AVR with 23 mm St Yordan AV conduit, composite graft placement     Arthritis     knees and hands     Bicuspid aortic valve      Breast cancer (H) 2014    R breast     H/O aortic valve replacement     St Yordan     Heart murmur     Valve repalcement .     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    AAA repair with av23 mm tube graft     PONV (postoperative nausea and vomiting)     n/v morphine vs anesthesia, vomiting x24 hrs     Subclinical hyperthyroidism 2017     Thrombosis of leg     after  of daughter     Uncomplicated asthma        PSHx: reviewed  Past Surgical History:   Procedure Laterality Date     BIOPSY NODE SENTINEL Right 9/10/2014    Procedure: BIOPSY NODE SENTINEL;  Surgeon: Ila Romano MD;  Location: [facility] OR     CARDIAC SURGERY  2011    aortic valve replacement     ENT SURGERY      tonsillectomy     HRW PORT A CATH       INSERT PORT VASCULAR ACCESS Left 10/22/2014    Procedure: INSERT PORT VASCULAR ACCESS;  Surgeon: Ila Romano MD;  Location: [facility] OR     LUMPECTOMY BREAST WITH SEED LOCALIZATION Right 9/10/2014    Procedure: LUMPECTOMY BREAST WITH SEED LOCALIZATION;  Surgeon: Ila Romano MD;  Location: [facility] OR     ORTHOPEDIC SURGERY      shoulder, thumb surgery     REMOVE PORT VASCULAR ACCESS  Left 4/8/2015    Procedure: REMOVE PORT VASCULAR ACCESS;  Surgeon: Ila Romano MD;  Location: [facility] OR     REPAIR ANEURYSM ASCENDING AORTA  6/23/2011    Procedure:REPAIR ANEURYSM ASCENDING AORTA; Medial Sternotomy, Aortic Valve Replacement, ([employer]. Yordan [employer] HP Valved Graft, size 23 mm) on pump oxygenation, intraoperative transesophageal cardiogram; Surgeon:JOHN PAUL ULLOA; Location:[facility] OR     REPLACE VALVE AORTIC  6/23/11    bicuspid AV with severe stenosis - 6/2011 mechanical AVR with 23 mm St Yordan AV conduit, composite graft placement        Soc Hx: No daily alcohol, no smoking  Social History     Socioeconomic History     Marital status:      Spouse name: Not on file     Number of children: Not on file     Years of education: Not on file     Highest education level: Not on file   Occupational History     Not on file   Tobacco Use     Smoking status: Never Smoker     Smokeless tobacco: Never Used   Vaping Use     Vaping Use: Never used   Substance and Sexual Activity     Alcohol use: Yes     Comment: 2 drinks per week -      Drug use: No     Sexual activity: Not Currently   Other Topics Concern     Parent/sibling w/ CABG, MI or angioplasty before 65F 55M? Not Asked      Service Not Asked     Blood Transfusions Not Asked     Caffeine Concern Yes     Comment: 1-2 cups caffeine per day     Occupational Exposure Not Asked     Hobby Hazards Not Asked     Sleep Concern No     Stress Concern No     Weight Concern No     Special Diet Yes     Comment: low fat daily , low red meats     Back Care No     Exercise Yes     Comment: walks daily/ 3x a week exercise class     Bike Helmet Not Asked     Seat Belt Not Asked     Self-Exams Not Asked   Social History Narrative     Not on file     Social Determinants of Health     Financial Resource Strain:      Difficulty of Paying Living Expenses:    Food Insecurity:      Worried About Running Out of Food in the Last Year:      Ran Out of Food  "in the Last Year:    Transportation Needs:      Lack of Transportation (Medical):      Lack of Transportation (Non-Medical):    Physical Activity:      Days of Exercise per Week:      Minutes of Exercise per Session:    Stress:      Feeling of Stress :    Social Connections:      Frequency of Communication with Friends and Family:      Frequency of Social Gatherings with Friends and Family:      Attends [employer] Services:      Active Member of Clubs or Organizations:      Attends Club or Organization Meetings:      Marital Status:    Intimate Partner Violence:      Fear of Current or Ex-Partner:      Emotionally Abused:      Physically Abused:      Sexually Abused:         Fam Hx: reviewed  Family History   Problem Relation Age of Onset     Hypertension Mother      Thyroid Disease Mother         \"Toxic thyroid\"     Prostate Cancer Father 70     Cancer Father 80        Lung cancer, Not caused from smoking     Cerebrovascular Disease Father 80     Hypertension Father      Cardiovascular Brother         half brother      Cardiovascular Son         Puentes-Parkinsons White Syndrome     Cardiovascular Daughter         Heart murmur     Breast Cancer Paternal Aunt 80     Cardiovascular Paternal Aunt      Arthritis Brother 40        RA - half brother      Cancer Maternal Grandfather      Colon Cancer No family hx of          Screening: reviewed      All: reviewed    Meds: reviewed  Current Outpatient Medications   Medication Sig Dispense Refill     alendronate (FOSAMAX) 70 MG tablet Take 70 mg by mouth every 7 days       anastrozole (ARIMIDEX) 1 MG tablet Take by mouth daily 30 tablet      ASPIRIN EC PO Take 81 mg by mouth daily       carvedilol (COREG) 12.5 MG tablet Take 1 tablet (12.5 mg) by mouth 2 times daily (with meals) 180 tablet 3     lisinopril (ZESTRIL) 10 MG tablet Take 1 tablet (10 mg) by mouth 2 times daily -- For further refills patient needs appointment 180 tablet 0     methimazole (TAPAZOLE) 5 MG tablet Take " "2.5 mg/day 45 tablet 2     valACYclovir (VALTREX) 1000 mg tablet Take 2 tablets (2,000 mg) by mouth 2 times daily 4 tablet 3     warfarin ANTICOAGULANT (COUMADIN) 5 MG tablet TAKE 1 TABLET DAILY EXCEPT ONE AND ONE-HALF TABLETS ON MONDAY WEDNESDAY AND SATURDAY OR AS DIRECTED BY THE INR CLINIC 105 tablet 1     zolpidem (AMBIEN) 5 MG tablet Take 0.5 tablets (2.5 mg) by mouth nightly as needed for sleep 30 tablet 0       OBJECTIVE:                                                    Physical Exam :  Blood pressure 124/70, pulse 69, temperature 97.9  F (36.6  C), temperature source Oral, resp. rate 18, height 1.626 m (5' 4\"), weight 56.2 kg (123 lb 12.8 oz), SpO2 99 %, not currently breastfeeding.     NAD, appears comfortable  Skin clear, no rashes  Neck: supple, no JVD,  no thyroidmegaly  Lymph nodes non palpable in the cervical, supraclavicular axillaries,   Chest: clear to auscultation with good respiratory effort  Cardiac: S1S2, RRR, no mgr appreciated  Abdomen: soft, not tender, not distended, audible bowel sound, no hepatosplenomegaly, no palpable masses, no abdominal bruits  Extremities: no cyanosis, clubbing or edema.   Neuro: A, Ox3, no focal signs.  Breast exam in supine and erect position: s/p R lumpectomy no skin changes, no tenderness or nodes on palpation. Nipples are erect, no skin lesions, no discharge on pressure.    Pelvic exam: deferred, s/p menopause, no symptoms, no hx of abnormal pap         Brook Jackson MD  Internal Medicine       SUBJECTIVE:   Annabella Bolanos is a 65 year old female who presents for Preventive Visit.      Patient has been advised of split billing requirements and indicates understanding: Yes   Are you in the first 12 months of your Medicare coverage?  No    Healthy Habits:     In general, how would you rate your overall health?  Good    Frequency of exercise:  6-7 days/week    Duration of exercise:  30-45 minutes    Do you usually eat at least 4 servings of fruit and vegetables a " "day, include whole grains    & fiber and avoid regularly eating high fat or \"junk\" foods?  Yes    Taking medications regularly:  Yes    Medication side effects:  None    Ability to successfully perform activities of daily living:  No assistance needed    Home Safety:  No safety concerns identified    Hearing Impairment:  Need to ask people to speak up or repeat themselves    In the past 6 months, have you been bothered by leaking of urine? Yes    In general, how would you rate your overall mental or emotional health?  Excellent      PHQ-2 Total Score: 0    Additional concerns today:  No    Do you feel safe in your environment? Yes    Have you ever done Advance Care Planning? (For example, a Health Directive, POLST, or a discussion with a medical provider or your loved ones about your wishes): No, advance care planning information given to patient to review.  Patient declined advance care planning discussion at this time.     Fall risk  No  No       Cognitive Screening   1) Repeat 3 items (Leader, Season, Table)    2) Clock draw: NORMAL  3) 3 item recall: Recalls 3 objects  Results: 3 items recalled: COGNITIVE IMPAIRMENT LESS LIKELY    Mini-CogTM Copyright AURELIANO Ch. Licensed by the author for use in [facility]; reprinted with permission (soob@[facility]). All rights reserved.      Do you have sleep apnea, excessive snoring or daytime drowsiness?: no    Reviewed and updated as needed this visit by clinical staff  Tobacco  Allergies  Meds   Med Hx  Surg Hx  Fam Hx  Soc Hx        Reviewed and updated as needed this visit by Provider                Social History     Tobacco Use     Smoking status: Never Smoker     Smokeless tobacco: Never Used   Substance Use Topics     Alcohol use: Yes     Comment: 2 drinks per week -          Alcohol Use 8/16/2021   Prescreen: >3 drinks/day or >7 drinks/week? No   Prescreen: >3 drinks/day or >7 drinks/week? -           Hypertension Follow-up      Do you check your " blood pressure regularly outside of the clinic? Yes     Are you following a low salt diet? Yes    Are your blood pressures ever more than 140 on the top number (systolic) OR more   than 90 on the bottom number (diastolic), for example 140/90? No      Current providers sharing in care for this patient include:   Patient Care Team:  Brook Echavarria MD as PCP - General (Internal Medicine)  Brook Echavarria MD as Assigned PCP  Merle Baxter MD as Assigned Endocrinology Provider  Dae Strauss MD as Assigned Rheumatology Provider    The following health maintenance items are reviewed in Epic and correct as of today:  Health Maintenance Due   Topic Date Due     ANNUAL REVIEW OF  ORDERS  Never done     HIV SCREENING  Never done     MEDICARE ANNUAL WELLNESS VISIT  08/18/2021     Labs reviewed in EPIC    Any new diagnosis of family breast, ovarian, or bowel cancer? No    FHS-7: No flowsheet data found.      Pertinent mammograms are reviewed under the imaging tab.    Review of Systems   Constitutional: Negative for chills and fever.   HENT: Negative for congestion, ear pain, hearing loss and sore throat.    Eyes: Negative for pain and visual disturbance.   Respiratory: Negative for cough and shortness of breath.    Cardiovascular: Negative for chest pain, palpitations and peripheral edema.   Gastrointestinal: Negative for abdominal pain, constipation, diarrhea, heartburn, hematochezia and nausea.   Breasts:  Negative for tenderness, breast mass and discharge.   Genitourinary: Positive for frequency and urgency. Negative for dysuria, genital sores, hematuria, pelvic pain, vaginal bleeding and vaginal discharge.   Musculoskeletal: Positive for arthralgias, joint swelling and myalgias.   Skin: Negative for rash.   Neurological: Negative for dizziness, weakness, headaches and paresthesias.   Psychiatric/Behavioral: Negative for mood changes. The patient is not nervous/anxious.   "      Patient has been advised of split billing requirements and indicates understanding: Yes At the check in, at the    COUNSELING:  Reviewed preventive health counseling, as reflected in patient instructions       Regular exercise       Healthy diet/nutrition    Estimated body mass index is 21.65 kg/m  as calculated from the following:    Height as of this encounter: 1.626 m (5' 4\").    Weight as of 7/12/21: 57.2 kg (126 lb 1.6 oz).        She reports that she has never smoked. She has never used smokeless tobacco.      Appropriate preventive services were discussed with this patient, including applicable screening as appropriate for cardiovascular disease, diabetes, osteopenia/osteoporosis, and glaucoma.  As appropriate for age/gender, discussed screening for colorectal cancer, prostate cancer, breast cancer, and cervical cancer. Checklist reviewing preventive services available has been given to the patient.    Reviewed patients plan of care and provided an AVS. The Basic Care Plan (routine screening as documented in Health Maintenance) for Annabella meets the Care Plan requirement. This Care Plan has been established and reviewed with the Patient.    Counseling Resources:  ATP IV Guidelines  Pooled Cohorts Equation Calculator  Breast Cancer Risk Calculator  Breast Cancer: Medication to Reduce Risk  FRAX Risk Assessment  ICSI Preventive Guidelines  Dietary Guidelines for Americans, 2010  [employer] MyPlate  ASA Prophylaxis  Lung CA Screening    Brook Echavarira MD  [facility]    Identified Health Risks:  " sensory intact

## 2021-09-16 NOTE — PHYSICAL EXAM
[Well Developed] : well developed [Well Nourished] : well nourished [Obese] : obese [Normal Venous Pressure] : normal venous pressure [Normal S1, S2] : normal S1, S2 [S3] : S3 [Normal] : clear lung fields, good air entry, no respiratory distress [Clear Lung Fields] : clear lung fields [Good Air Entry] : good air entry [No Respiratory Distress] : no respiratory distress  [Soft] : abdomen soft [Non Tender] : non-tender [No Edema] : no edema

## 2021-09-27 NOTE — REASON FOR VISIT
[FreeTextEntry1] : 89 yo Male, morbidly obese w/ Hx of chronic dCHF, CHB s/p PPM, DM, presenting for routine follow up.\par \par Patient accompanied by daughter. Both report that patient has been feeling well except for continued knee pain that has limited his ADLS. Patient is frustrated that he is not able to ambulate freely due to his adavnced OA. He was recently evaluated at [facility] for knee surgery and not deemed a good candidate. He has been having knee injections unsuccessfully as well.\par \par Patient has been very strict with his water restrictions and reports feeling thirsty at night\par Patient saw his PMD today where he had labs drawn

## 2021-09-27 NOTE — DISCUSSION/SUMMARY
[FreeTextEntry1] : 89 yo Male, morbidly obese w/ Hx of chronic dCHF, CHB s/p PPM, DM, presenting for routine follow up.\par \par 1)dCHF, well compensated\par -Clinically patient remains euvolemic.\par -He denies SOB, JEONG or worsening LE edema.\par -We discussed liberalizing his diet and increasing his PO intake to avoid dehydration.\par -Will reach out to patient's PMD to discuss discontinuing his Metformin and adding Empagliglozin (SGLT2) intsead for mortality benefit.\par -in the interim continue with Toprol 25 mg daily as well as Torsemide 10\par -Labs obtained at PMD's office today to be faxed over\par -Echo in 3 months to monitor for PPM cardiomyopathy given he is paced\par \par RTC in 3 months or sooner if unwell

## 2021-10-11 NOTE — REASON FOR VISIT
[Family Member] : family member [Follow-up Device Check] : is here today for a follow-up device check visit for

## 2021-10-11 NOTE — PROCEDURE
[Complete Heart Block] : complete heart block [Pacemaker] : pacemaker [Lead Imp:  ___ohms] : lead impedance was [unfilled] ohms [Sensing Amplitude ___mv] : sensing amplitude was [unfilled] mv [___V @] : [unfilled] V [___ ms] : [unfilled] ms [de-identified] : Medtronic  [de-identified] : 9 years to PHONG [de-identified] :  99.9\par ap 28.9\par \par No events

## 2021-10-11 NOTE — DISCUSSION/SUMMARY
[FreeTextEntry1] : 89 y/o M with CHB s/p PPM (Medtronic) 10/30/18.  Device is functioning appropriately as programmed and all measured data is WNL. He is pacemaker dependent without a reliable escape > 35 bpm on interrogation today.  Echocardiogram 11/2020 with EF of 55 %, mild RV dilatation.  Repeat echocardiogram ordered to monitor for development of pacer induced cardiomyopathy.  He was asked to return for follow-up in six months or sooner as needed. \par

## 2021-10-11 NOTE — HISTORY OF PRESENT ILLNESS
[de-identified] : 89 y/o M with a past medical history significant for BPH, Asthma, DM II and complete heart block with an escape in the 30-40 bpm range.  He is s/p PPM placement 10/30/18 (Medtronic) and presents for routine follow-up today.  He feels well and offers no device related complaints. He is accompanied by his daughter today. \par \par ECHO 11/2020  EF 55 %, mild RV dilatation.

## 2021-12-27 NOTE — DISCUSSION/SUMMARY
[FreeTextEntry1] : 1.Chronic diastolic CHF\par -Recent hospitalization for dCHF exacerbation, underwent significant IV diuresis. Discharged on Torsemide 30 PO daily. Currently appears dry on exam w/ most recent labs showing an JUVE (54/1.26) consistent w/ likely overdiuresis\par -Pt. instructed to hold Torsemide for 48hrs and restart at Torsemide 10mg PO daily\par -c/w Toprol XL 25 daily \par -Counseled pt. on low salt diet and increasing activity as tolerated\par -RTC in 1 month, will repeat labs at that visit\par -All instructions written down for [facility] facility and given to his daughter

## 2021-12-27 NOTE — HISTORY OF PRESENT ILLNESS
[FreeTextEntry1] : 89M, morbidly obese w/ h/o CHB s/p PPM, chronic dCHF presenting for follow up after recent hospitalization\par \par Mr. Samuels presents today from [facility] accompanied by his daughter after a recent hospitalization at [facility] for acute on chronic diastolic CHF exacerbation. During that hospitalization he underwent significant IV diuresis w/ clincal improvement and was discharged on Torsemide 30 PO daily to [facility]. He currently feels well other than his chronic b/l knee pain.  He otherwise denies CP, SOB, NVD, change in appetite, orthopnea, PND or syncope.

## 2022-01-01 ENCOUNTER — APPOINTMENT (OUTPATIENT)
Dept: HEART AND VASCULAR | Facility: CLINIC | Age: 87
End: 2022-01-01
Payer: MEDICARE

## 2022-01-01 ENCOUNTER — TRANSCRIPTION ENCOUNTER (OUTPATIENT)
Age: 87
End: 2022-01-01

## 2022-01-01 ENCOUNTER — FORM ENCOUNTER (OUTPATIENT)
Age: 87
End: 2022-01-01

## 2022-01-01 ENCOUNTER — INPATIENT (INPATIENT)
Facility: HOSPITAL | Age: 87
LOS: 11 days | Discharge: HOSPICE | DRG: 291 | End: 2022-06-15
Attending: INTERNAL MEDICINE | Admitting: HOSPITALIST
Payer: MEDICARE

## 2022-01-01 ENCOUNTER — APPOINTMENT (OUTPATIENT)
Dept: HEART AND VASCULAR | Facility: CLINIC | Age: 87
End: 2022-01-01

## 2022-01-01 ENCOUNTER — NON-APPOINTMENT (OUTPATIENT)
Age: 87
End: 2022-01-01

## 2022-01-01 VITALS
DIASTOLIC BLOOD PRESSURE: 37 MMHG | HEART RATE: 72 BPM | HEIGHT: 71 IN | SYSTOLIC BLOOD PRESSURE: 100 MMHG | OXYGEN SATURATION: 87 %

## 2022-01-01 VITALS
RESPIRATION RATE: 20 BRPM | HEART RATE: 68 BPM | TEMPERATURE: 98 F | DIASTOLIC BLOOD PRESSURE: 81 MMHG | SYSTOLIC BLOOD PRESSURE: 148 MMHG | OXYGEN SATURATION: 96 % | HEIGHT: 71 IN

## 2022-01-01 VITALS — TEMPERATURE: 98 F

## 2022-01-01 DIAGNOSIS — I24.8 OTHER FORMS OF ACUTE ISCHEMIC HEART DISEASE: ICD-10-CM

## 2022-01-01 DIAGNOSIS — Z95.0 PRESENCE OF CARDIAC PACEMAKER: ICD-10-CM

## 2022-01-01 DIAGNOSIS — I11.0 HYPERTENSIVE HEART DISEASE WITH HEART FAILURE: ICD-10-CM

## 2022-01-01 DIAGNOSIS — K22.89 OTHER SPECIFIED DISEASE OF ESOPHAGUS: ICD-10-CM

## 2022-01-01 DIAGNOSIS — I82.409 ACUTE EMBOLISM AND THROMBOSIS OF UNSPECIFIED DEEP VEINS OF UNSPECIFIED LOWER EXTREMITY: ICD-10-CM

## 2022-01-01 DIAGNOSIS — Z87.891 PERSONAL HISTORY OF NICOTINE DEPENDENCE: ICD-10-CM

## 2022-01-01 DIAGNOSIS — Z98.42 CATARACT EXTRACTION STATUS, LEFT EYE: Chronic | ICD-10-CM

## 2022-01-01 DIAGNOSIS — J69.0 PNEUMONITIS DUE TO INHALATION OF FOOD AND VOMIT: ICD-10-CM

## 2022-01-01 DIAGNOSIS — E11.9 TYPE 2 DIABETES MELLITUS WITHOUT COMPLICATIONS: ICD-10-CM

## 2022-01-01 DIAGNOSIS — R04.2 HEMOPTYSIS: ICD-10-CM

## 2022-01-01 DIAGNOSIS — R53.81 OTHER MALAISE: ICD-10-CM

## 2022-01-01 DIAGNOSIS — I44.2 ATRIOVENTRICULAR BLOCK, COMPLETE: ICD-10-CM

## 2022-01-01 DIAGNOSIS — I07.1 RHEUMATIC TRICUSPID INSUFFICIENCY: ICD-10-CM

## 2022-01-01 DIAGNOSIS — J45.909 UNSPECIFIED ASTHMA, UNCOMPLICATED: ICD-10-CM

## 2022-01-01 DIAGNOSIS — Z71.89 OTHER SPECIFIED COUNSELING: ICD-10-CM

## 2022-01-01 DIAGNOSIS — Z66 DO NOT RESUSCITATE: ICD-10-CM

## 2022-01-01 DIAGNOSIS — A41.9 SEPSIS, UNSPECIFIED ORGANISM: ICD-10-CM

## 2022-01-01 DIAGNOSIS — I82.403 ACUTE EMBOLISM AND THROMBOSIS OF UNSPECIFIED DEEP VEINS OF LOWER EXTREMITY, BILATERAL: ICD-10-CM

## 2022-01-01 DIAGNOSIS — R13.10 DYSPHAGIA, UNSPECIFIED: ICD-10-CM

## 2022-01-01 DIAGNOSIS — N40.0 BENIGN PROSTATIC HYPERPLASIA WITHOUT LOWER URINARY TRACT SYMPTOMS: ICD-10-CM

## 2022-01-01 DIAGNOSIS — Z98.41 CATARACT EXTRACTION STATUS, RIGHT EYE: Chronic | ICD-10-CM

## 2022-01-01 DIAGNOSIS — Z79.84 LONG TERM (CURRENT) USE OF ORAL HYPOGLYCEMIC DRUGS: ICD-10-CM

## 2022-01-01 DIAGNOSIS — E66.01 MORBID (SEVERE) OBESITY DUE TO EXCESS CALORIES: ICD-10-CM

## 2022-01-01 DIAGNOSIS — Z90.79 ACQUIRED ABSENCE OF OTHER GENITAL ORGAN(S): Chronic | ICD-10-CM

## 2022-01-01 DIAGNOSIS — E78.5 HYPERLIPIDEMIA, UNSPECIFIED: ICD-10-CM

## 2022-01-01 DIAGNOSIS — D63.8 ANEMIA IN OTHER CHRONIC DISEASES CLASSIFIED ELSEWHERE: ICD-10-CM

## 2022-01-01 DIAGNOSIS — I50.33 ACUTE ON CHRONIC DIASTOLIC (CONGESTIVE) HEART FAILURE: ICD-10-CM

## 2022-01-01 DIAGNOSIS — Z51.5 ENCOUNTER FOR PALLIATIVE CARE: ICD-10-CM

## 2022-01-01 DIAGNOSIS — I50.32 CHRONIC DIASTOLIC (CONGESTIVE) HEART FAILURE: ICD-10-CM

## 2022-01-01 DIAGNOSIS — E87.4 MIXED DISORDER OF ACID-BASE BALANCE: ICD-10-CM

## 2022-01-01 DIAGNOSIS — N17.9 ACUTE KIDNEY FAILURE, UNSPECIFIED: ICD-10-CM

## 2022-01-01 DIAGNOSIS — J96.02 ACUTE RESPIRATORY FAILURE WITH HYPERCAPNIA: ICD-10-CM

## 2022-01-01 DIAGNOSIS — I25.2 OLD MYOCARDIAL INFARCTION: ICD-10-CM

## 2022-01-01 DIAGNOSIS — J96.90 RESPIRATORY FAILURE, UNSPECIFIED, UNSPECIFIED WHETHER WITH HYPOXIA OR HYPERCAPNIA: ICD-10-CM

## 2022-01-01 DIAGNOSIS — K59.00 CONSTIPATION, UNSPECIFIED: ICD-10-CM

## 2022-01-01 DIAGNOSIS — Z95.0 PRESENCE OF CARDIAC PACEMAKER: Chronic | ICD-10-CM

## 2022-01-01 DIAGNOSIS — Z79.82 LONG TERM (CURRENT) USE OF ASPIRIN: ICD-10-CM

## 2022-01-01 DIAGNOSIS — I50.9 HEART FAILURE, UNSPECIFIED: ICD-10-CM

## 2022-01-01 DIAGNOSIS — G47.33 OBSTRUCTIVE SLEEP APNEA (ADULT) (PEDIATRIC): ICD-10-CM

## 2022-01-01 DIAGNOSIS — I27.20 PULMONARY HYPERTENSION, UNSPECIFIED: ICD-10-CM

## 2022-01-01 DIAGNOSIS — Z91.19 PATIENT'S NONCOMPLIANCE WITH OTHER MEDICAL TREATMENT AND REGIMEN: ICD-10-CM

## 2022-01-01 LAB
A1C WITH ESTIMATED AVERAGE GLUCOSE RESULT: 6.9 % — HIGH (ref 4–5.6)
ALBUMIN SERPL ELPH-MCNC: 2.7 G/DL — LOW (ref 3.3–5)
ALBUMIN SERPL ELPH-MCNC: 2.7 G/DL — LOW (ref 3.3–5)
ALBUMIN SERPL ELPH-MCNC: 2.8 G/DL — LOW (ref 3.3–5)
ALBUMIN SERPL ELPH-MCNC: 3.1 G/DL — LOW (ref 3.3–5)
ALBUMIN SERPL ELPH-MCNC: 3.5 G/DL — SIGNIFICANT CHANGE UP (ref 3.3–5)
ALBUMIN SERPL ELPH-MCNC: 3.5 G/DL — SIGNIFICANT CHANGE UP (ref 3.3–5)
ALBUMIN SERPL ELPH-MCNC: 4.1 G/DL
ALP BLD-CCNC: 69 U/L
ALP SERPL-CCNC: 55 U/L — SIGNIFICANT CHANGE UP (ref 40–120)
ALP SERPL-CCNC: 56 U/L — SIGNIFICANT CHANGE UP (ref 40–120)
ALP SERPL-CCNC: 60 U/L — SIGNIFICANT CHANGE UP (ref 40–120)
ALP SERPL-CCNC: 63 U/L — SIGNIFICANT CHANGE UP (ref 40–120)
ALP SERPL-CCNC: 64 U/L — SIGNIFICANT CHANGE UP (ref 40–120)
ALP SERPL-CCNC: 68 U/L — SIGNIFICANT CHANGE UP (ref 40–120)
ALT FLD-CCNC: 15 U/L — SIGNIFICANT CHANGE UP (ref 10–45)
ALT FLD-CCNC: 16 U/L — SIGNIFICANT CHANGE UP (ref 10–45)
ALT FLD-CCNC: 17 U/L — SIGNIFICANT CHANGE UP (ref 10–45)
ALT FLD-CCNC: 17 U/L — SIGNIFICANT CHANGE UP (ref 10–45)
ALT FLD-CCNC: 18 U/L — SIGNIFICANT CHANGE UP (ref 10–45)
ALT FLD-CCNC: 22 U/L — SIGNIFICANT CHANGE UP (ref 10–45)
ALT SERPL-CCNC: 13 U/L
ANION GAP SERPL CALC-SCNC: 10 MMOL/L — SIGNIFICANT CHANGE UP (ref 5–17)
ANION GAP SERPL CALC-SCNC: 11 MMOL/L — SIGNIFICANT CHANGE UP (ref 5–17)
ANION GAP SERPL CALC-SCNC: 14 MMOL/L
ANION GAP SERPL CALC-SCNC: 6 MMOL/L — SIGNIFICANT CHANGE UP (ref 5–17)
ANION GAP SERPL CALC-SCNC: 7 MMOL/L — SIGNIFICANT CHANGE UP (ref 5–17)
ANION GAP SERPL CALC-SCNC: 8 MMOL/L — SIGNIFICANT CHANGE UP (ref 5–17)
ANION GAP SERPL CALC-SCNC: 8 MMOL/L — SIGNIFICANT CHANGE UP (ref 5–17)
ANION GAP SERPL CALC-SCNC: 9 MMOL/L — SIGNIFICANT CHANGE UP (ref 5–17)
ANION GAP SERPL CALC-SCNC: SIGNIFICANT CHANGE UP MMOL/L (ref 5–17)
ANISOCYTOSIS BLD QL: SLIGHT — SIGNIFICANT CHANGE UP
APPEARANCE UR: CLEAR — SIGNIFICANT CHANGE UP
APPEARANCE UR: CLEAR — SIGNIFICANT CHANGE UP
APTT BLD: 29.6 SEC — SIGNIFICANT CHANGE UP (ref 27.5–35.5)
APTT BLD: 47.2 SEC — HIGH (ref 27.5–35.5)
AST SERPL-CCNC: 23 U/L — SIGNIFICANT CHANGE UP (ref 10–40)
AST SERPL-CCNC: 24 U/L — SIGNIFICANT CHANGE UP (ref 10–40)
AST SERPL-CCNC: 25 U/L
AST SERPL-CCNC: 25 U/L — SIGNIFICANT CHANGE UP (ref 10–40)
AST SERPL-CCNC: 25 U/L — SIGNIFICANT CHANGE UP (ref 10–40)
AST SERPL-CCNC: 27 U/L — SIGNIFICANT CHANGE UP (ref 10–40)
AST SERPL-CCNC: 31 U/L — SIGNIFICANT CHANGE UP (ref 10–40)
BACTERIA # UR AUTO: PRESENT /HPF
BASE EXCESS BLDA CALC-SCNC: 16 MMOL/L — HIGH (ref -2–3)
BASE EXCESS BLDA CALC-SCNC: 16.3 MMOL/L — HIGH (ref -2–3)
BASE EXCESS BLDA CALC-SCNC: 18.6 MMOL/L — HIGH (ref -2–3)
BASE EXCESS BLDA CALC-SCNC: 19.3 MMOL/L — HIGH (ref -2–3)
BASE EXCESS BLDA CALC-SCNC: 21.8 MMOL/L — HIGH (ref -2–3)
BASE EXCESS BLDA CALC-SCNC: 22.7 MMOL/L — HIGH (ref -2–3)
BASE EXCESS BLDA CALC-SCNC: 26.7 MMOL/L — HIGH (ref -2–3)
BASE EXCESS BLDV CALC-SCNC: 16.4 MMOL/L — HIGH (ref -2–3)
BASOPHILS # BLD AUTO: 0 K/UL — SIGNIFICANT CHANGE UP (ref 0–0.2)
BASOPHILS # BLD AUTO: 0.02 K/UL — SIGNIFICANT CHANGE UP (ref 0–0.2)
BASOPHILS # BLD AUTO: 0.04 K/UL — SIGNIFICANT CHANGE UP (ref 0–0.2)
BASOPHILS # BLD AUTO: 0.04 K/UL — SIGNIFICANT CHANGE UP (ref 0–0.2)
BASOPHILS # BLD AUTO: 0.05 K/UL
BASOPHILS NFR BLD AUTO: 0 % — SIGNIFICANT CHANGE UP (ref 0–2)
BASOPHILS NFR BLD AUTO: 0.2 % — SIGNIFICANT CHANGE UP (ref 0–2)
BASOPHILS NFR BLD AUTO: 0.4 %
BASOPHILS NFR BLD AUTO: 0.5 % — SIGNIFICANT CHANGE UP (ref 0–2)
BASOPHILS NFR BLD AUTO: 0.5 % — SIGNIFICANT CHANGE UP (ref 0–2)
BILIRUB SERPL-MCNC: 0.4 MG/DL
BILIRUB SERPL-MCNC: 0.6 MG/DL — SIGNIFICANT CHANGE UP (ref 0.2–1.2)
BILIRUB SERPL-MCNC: 0.6 MG/DL — SIGNIFICANT CHANGE UP (ref 0.2–1.2)
BILIRUB SERPL-MCNC: 0.8 MG/DL — SIGNIFICANT CHANGE UP (ref 0.2–1.2)
BILIRUB SERPL-MCNC: 0.9 MG/DL — SIGNIFICANT CHANGE UP (ref 0.2–1.2)
BILIRUB SERPL-MCNC: 1.1 MG/DL — SIGNIFICANT CHANGE UP (ref 0.2–1.2)
BILIRUB SERPL-MCNC: 1.2 MG/DL — SIGNIFICANT CHANGE UP (ref 0.2–1.2)
BILIRUB UR-MCNC: NEGATIVE — SIGNIFICANT CHANGE UP
BILIRUB UR-MCNC: NEGATIVE — SIGNIFICANT CHANGE UP
BLD GP AB SCN SERPL QL: NEGATIVE — SIGNIFICANT CHANGE UP
BLD GP AB SCN SERPL QL: NEGATIVE — SIGNIFICANT CHANGE UP
BUN SERPL-MCNC: 23 MG/DL
BUN SERPL-MCNC: 33 MG/DL — HIGH (ref 7–23)
BUN SERPL-MCNC: 34 MG/DL — HIGH (ref 7–23)
BUN SERPL-MCNC: 37 MG/DL — HIGH (ref 7–23)
BUN SERPL-MCNC: 38 MG/DL — HIGH (ref 7–23)
BUN SERPL-MCNC: 38 MG/DL — HIGH (ref 7–23)
BUN SERPL-MCNC: 40 MG/DL — HIGH (ref 7–23)
BUN SERPL-MCNC: 40 MG/DL — HIGH (ref 7–23)
BUN SERPL-MCNC: 41 MG/DL — HIGH (ref 7–23)
BUN SERPL-MCNC: SIGNIFICANT CHANGE UP MG/DL (ref 7–23)
CALCIUM SERPL-MCNC: 8.8 MG/DL — SIGNIFICANT CHANGE UP (ref 8.4–10.5)
CALCIUM SERPL-MCNC: 8.8 MG/DL — SIGNIFICANT CHANGE UP (ref 8.4–10.5)
CALCIUM SERPL-MCNC: 8.9 MG/DL — SIGNIFICANT CHANGE UP (ref 8.4–10.5)
CALCIUM SERPL-MCNC: 9 MG/DL — SIGNIFICANT CHANGE UP (ref 8.4–10.5)
CALCIUM SERPL-MCNC: 9.2 MG/DL — SIGNIFICANT CHANGE UP (ref 8.4–10.5)
CALCIUM SERPL-MCNC: 9.2 MG/DL — SIGNIFICANT CHANGE UP (ref 8.4–10.5)
CALCIUM SERPL-MCNC: 9.3 MG/DL
CALCIUM SERPL-MCNC: 9.3 MG/DL — SIGNIFICANT CHANGE UP (ref 8.4–10.5)
CALCIUM SERPL-MCNC: 9.4 MG/DL — SIGNIFICANT CHANGE UP (ref 8.4–10.5)
CALCIUM SERPL-MCNC: 9.5 MG/DL — SIGNIFICANT CHANGE UP (ref 8.4–10.5)
CALCIUM SERPL-MCNC: 9.5 MG/DL — SIGNIFICANT CHANGE UP (ref 8.4–10.5)
CALCIUM SERPL-MCNC: 9.6 MG/DL — SIGNIFICANT CHANGE UP (ref 8.4–10.5)
CALCIUM SERPL-MCNC: SIGNIFICANT CHANGE UP (ref 8.4–10.5)
CHLORIDE SERPL-SCNC: 89 MMOL/L — LOW (ref 96–108)
CHLORIDE SERPL-SCNC: 90 MMOL/L — LOW (ref 96–108)
CHLORIDE SERPL-SCNC: 91 MMOL/L — LOW (ref 96–108)
CHLORIDE SERPL-SCNC: 92 MMOL/L — LOW (ref 96–108)
CHLORIDE SERPL-SCNC: 97 MMOL/L — SIGNIFICANT CHANGE UP (ref 96–108)
CHLORIDE SERPL-SCNC: 99 MMOL/L
CHOLEST SERPL-MCNC: 86 MG/DL — SIGNIFICANT CHANGE UP
CK MB CFR SERPL CALC: 1.4 NG/ML — SIGNIFICANT CHANGE UP (ref 0–6.7)
CK MB CFR SERPL CALC: 2.2 NG/ML — SIGNIFICANT CHANGE UP (ref 0–6.7)
CK MB CFR SERPL CALC: 3 NG/ML — SIGNIFICANT CHANGE UP (ref 0–6.7)
CK MB CFR SERPL CALC: 3.2 NG/ML — SIGNIFICANT CHANGE UP (ref 0–6.7)
CK SERPL-CCNC: 32 U/L — SIGNIFICANT CHANGE UP (ref 30–200)
CK SERPL-CCNC: 35 U/L — SIGNIFICANT CHANGE UP (ref 30–200)
CK SERPL-CCNC: 40 U/L — SIGNIFICANT CHANGE UP (ref 30–200)
CK SERPL-CCNC: 43 U/L — SIGNIFICANT CHANGE UP (ref 30–200)
CO2 BLDA-SCNC: 44 MMOL/L — HIGH (ref 19–24)
CO2 BLDA-SCNC: 48 MMOL/L — HIGH (ref 19–24)
CO2 BLDA-SCNC: 48 MMOL/L — HIGH (ref 19–24)
CO2 BLDA-SCNC: 49 MMOL/L — HIGH (ref 19–24)
CO2 BLDA-SCNC: 51 MMOL/L — HIGH (ref 19–24)
CO2 BLDA-SCNC: 51 MMOL/L — HIGH (ref 19–24)
CO2 BLDA-SCNC: 56 MMOL/L — HIGH (ref 19–24)
CO2 BLDV-SCNC: 49 MMOL/L — HIGH (ref 22–26)
CO2 SERPL-SCNC: 22 MMOL/L — SIGNIFICANT CHANGE UP (ref 22–31)
CO2 SERPL-SCNC: 26 MMOL/L
CO2 SERPL-SCNC: 34 MMOL/L — HIGH (ref 22–31)
CO2 SERPL-SCNC: 37 MMOL/L — HIGH (ref 22–31)
CO2 SERPL-SCNC: 38 MMOL/L — HIGH (ref 22–31)
CO2 SERPL-SCNC: 39 MMOL/L — HIGH (ref 22–31)
CO2 SERPL-SCNC: 40 MMOL/L — HIGH (ref 22–31)
CO2 SERPL-SCNC: 43 MMOL/L — HIGH (ref 22–31)
CO2 SERPL-SCNC: 43 MMOL/L — HIGH (ref 22–31)
CO2 SERPL-SCNC: 44 MMOL/L — HIGH (ref 22–31)
COLOR SPEC: YELLOW — SIGNIFICANT CHANGE UP
COLOR SPEC: YELLOW — SIGNIFICANT CHANGE UP
CREAT SERPL-MCNC: 0.85 MG/DL — SIGNIFICANT CHANGE UP (ref 0.5–1.3)
CREAT SERPL-MCNC: 0.91 MG/DL — SIGNIFICANT CHANGE UP (ref 0.5–1.3)
CREAT SERPL-MCNC: 0.94 MG/DL — SIGNIFICANT CHANGE UP (ref 0.5–1.3)
CREAT SERPL-MCNC: 0.96 MG/DL — SIGNIFICANT CHANGE UP (ref 0.5–1.3)
CREAT SERPL-MCNC: 0.97 MG/DL — SIGNIFICANT CHANGE UP (ref 0.5–1.3)
CREAT SERPL-MCNC: 1 MG/DL — SIGNIFICANT CHANGE UP (ref 0.5–1.3)
CREAT SERPL-MCNC: 1.01 MG/DL — SIGNIFICANT CHANGE UP (ref 0.5–1.3)
CREAT SERPL-MCNC: 1.08 MG/DL
CREAT SERPL-MCNC: 1.1 MG/DL — SIGNIFICANT CHANGE UP (ref 0.5–1.3)
CREAT SERPL-MCNC: 1.16 MG/DL — SIGNIFICANT CHANGE UP (ref 0.5–1.3)
CREAT SERPL-MCNC: 1.17 MG/DL — SIGNIFICANT CHANGE UP (ref 0.5–1.3)
CREAT SERPL-MCNC: 1.2 MG/DL — SIGNIFICANT CHANGE UP (ref 0.5–1.3)
CREAT SERPL-MCNC: 1.22 MG/DL — SIGNIFICANT CHANGE UP (ref 0.5–1.3)
CREAT SERPL-MCNC: 1.28 MG/DL — SIGNIFICANT CHANGE UP (ref 0.5–1.3)
CREAT SERPL-MCNC: 1.3 MG/DL — SIGNIFICANT CHANGE UP (ref 0.5–1.3)
CRP SERPL-MCNC: 32.2 MG/L — HIGH (ref 0–4)
CULTURE RESULTS: SIGNIFICANT CHANGE UP
CULTURE RESULTS: SIGNIFICANT CHANGE UP
DIFF PNL FLD: ABNORMAL
DIFF PNL FLD: NEGATIVE — SIGNIFICANT CHANGE UP
EGFR: 51 ML/MIN/1.73M2 — LOW
EGFR: 52 ML/MIN/1.73M2 — LOW
EGFR: 55 ML/MIN/1.73M2 — LOW
EGFR: 56 ML/MIN/1.73M2 — LOW
EGFR: 58 ML/MIN/1.73M2 — LOW
EGFR: 58 ML/MIN/1.73M2 — LOW
EGFR: 62 ML/MIN/1.73M2 — SIGNIFICANT CHANGE UP
EGFR: 69 ML/MIN/1.73M2 — SIGNIFICANT CHANGE UP
EGFR: 70 ML/MIN/1.73M2 — SIGNIFICANT CHANGE UP
EGFR: 72 ML/MIN/1.73M2 — SIGNIFICANT CHANGE UP
EGFR: 73 ML/MIN/1.73M2 — SIGNIFICANT CHANGE UP
EGFR: 75 ML/MIN/1.73M2 — SIGNIFICANT CHANGE UP
EGFR: 78 ML/MIN/1.73M2 — SIGNIFICANT CHANGE UP
EGFR: 81 ML/MIN/1.73M2 — SIGNIFICANT CHANGE UP
EOSINOPHIL # BLD AUTO: 0 K/UL — SIGNIFICANT CHANGE UP (ref 0–0.5)
EOSINOPHIL # BLD AUTO: 0.06 K/UL — SIGNIFICANT CHANGE UP (ref 0–0.5)
EOSINOPHIL # BLD AUTO: 0.07 K/UL — SIGNIFICANT CHANGE UP (ref 0–0.5)
EOSINOPHIL # BLD AUTO: 0.07 K/UL — SIGNIFICANT CHANGE UP (ref 0–0.5)
EOSINOPHIL # BLD AUTO: 0.08 K/UL
EOSINOPHIL # BLD AUTO: 0.08 K/UL — SIGNIFICANT CHANGE UP (ref 0–0.5)
EOSINOPHIL # BLD AUTO: 0.09 K/UL — SIGNIFICANT CHANGE UP (ref 0–0.5)
EOSINOPHIL NFR BLD AUTO: 0 % — SIGNIFICANT CHANGE UP (ref 0–6)
EOSINOPHIL NFR BLD AUTO: 0.6 % — SIGNIFICANT CHANGE UP (ref 0–6)
EOSINOPHIL NFR BLD AUTO: 0.7 %
EOSINOPHIL NFR BLD AUTO: 0.7 % — SIGNIFICANT CHANGE UP (ref 0–6)
EOSINOPHIL NFR BLD AUTO: 0.7 % — SIGNIFICANT CHANGE UP (ref 0–6)
EOSINOPHIL NFR BLD AUTO: 1 % — SIGNIFICANT CHANGE UP (ref 0–6)
EOSINOPHIL NFR BLD AUTO: 1 % — SIGNIFICANT CHANGE UP (ref 0–6)
EPI CELLS # UR: SIGNIFICANT CHANGE UP /HPF (ref 0–5)
ERYTHROCYTE [SEDIMENTATION RATE] IN BLOOD: 76 MM/HR — HIGH
ESTIMATED AVERAGE GLUCOSE: 151 MG/DL — HIGH (ref 68–114)
FERRITIN SERPL-MCNC: 144 NG/ML — SIGNIFICANT CHANGE UP (ref 30–400)
GAS PNL BLDA: SIGNIFICANT CHANGE UP
GAS PNL BLDV: SIGNIFICANT CHANGE UP
GIANT PLATELETS BLD QL SMEAR: PRESENT — SIGNIFICANT CHANGE UP
GLUCOSE BLDC GLUCOMTR-MCNC: 111 MG/DL — HIGH (ref 70–99)
GLUCOSE BLDC GLUCOMTR-MCNC: 113 MG/DL — HIGH (ref 70–99)
GLUCOSE BLDC GLUCOMTR-MCNC: 125 MG/DL — HIGH (ref 70–99)
GLUCOSE BLDC GLUCOMTR-MCNC: 127 MG/DL — HIGH (ref 70–99)
GLUCOSE BLDC GLUCOMTR-MCNC: 129 MG/DL — HIGH (ref 70–99)
GLUCOSE BLDC GLUCOMTR-MCNC: 135 MG/DL — HIGH (ref 70–99)
GLUCOSE BLDC GLUCOMTR-MCNC: 136 MG/DL — HIGH (ref 70–99)
GLUCOSE BLDC GLUCOMTR-MCNC: 137 MG/DL — HIGH (ref 70–99)
GLUCOSE BLDC GLUCOMTR-MCNC: 137 MG/DL — HIGH (ref 70–99)
GLUCOSE BLDC GLUCOMTR-MCNC: 138 MG/DL — HIGH (ref 70–99)
GLUCOSE BLDC GLUCOMTR-MCNC: 143 MG/DL — HIGH (ref 70–99)
GLUCOSE BLDC GLUCOMTR-MCNC: 145 MG/DL — HIGH (ref 70–99)
GLUCOSE BLDC GLUCOMTR-MCNC: 147 MG/DL — HIGH (ref 70–99)
GLUCOSE BLDC GLUCOMTR-MCNC: 148 MG/DL — HIGH (ref 70–99)
GLUCOSE BLDC GLUCOMTR-MCNC: 148 MG/DL — HIGH (ref 70–99)
GLUCOSE BLDC GLUCOMTR-MCNC: 156 MG/DL — HIGH (ref 70–99)
GLUCOSE BLDC GLUCOMTR-MCNC: 158 MG/DL — HIGH (ref 70–99)
GLUCOSE BLDC GLUCOMTR-MCNC: 159 MG/DL — HIGH (ref 70–99)
GLUCOSE BLDC GLUCOMTR-MCNC: 162 MG/DL — HIGH (ref 70–99)
GLUCOSE BLDC GLUCOMTR-MCNC: 163 MG/DL — HIGH (ref 70–99)
GLUCOSE BLDC GLUCOMTR-MCNC: 163 MG/DL — HIGH (ref 70–99)
GLUCOSE BLDC GLUCOMTR-MCNC: 164 MG/DL — HIGH (ref 70–99)
GLUCOSE BLDC GLUCOMTR-MCNC: 164 MG/DL — HIGH (ref 70–99)
GLUCOSE BLDC GLUCOMTR-MCNC: 166 MG/DL — HIGH (ref 70–99)
GLUCOSE BLDC GLUCOMTR-MCNC: 167 MG/DL — HIGH (ref 70–99)
GLUCOSE BLDC GLUCOMTR-MCNC: 170 MG/DL — HIGH (ref 70–99)
GLUCOSE BLDC GLUCOMTR-MCNC: 172 MG/DL — HIGH (ref 70–99)
GLUCOSE BLDC GLUCOMTR-MCNC: 172 MG/DL — HIGH (ref 70–99)
GLUCOSE BLDC GLUCOMTR-MCNC: 179 MG/DL — HIGH (ref 70–99)
GLUCOSE BLDC GLUCOMTR-MCNC: 181 MG/DL — HIGH (ref 70–99)
GLUCOSE BLDC GLUCOMTR-MCNC: 182 MG/DL — HIGH (ref 70–99)
GLUCOSE BLDC GLUCOMTR-MCNC: 183 MG/DL — HIGH (ref 70–99)
GLUCOSE BLDC GLUCOMTR-MCNC: 187 MG/DL — HIGH (ref 70–99)
GLUCOSE BLDC GLUCOMTR-MCNC: 198 MG/DL — HIGH (ref 70–99)
GLUCOSE BLDC GLUCOMTR-MCNC: 210 MG/DL — HIGH (ref 70–99)
GLUCOSE BLDC GLUCOMTR-MCNC: 227 MG/DL — HIGH (ref 70–99)
GLUCOSE BLDC GLUCOMTR-MCNC: 227 MG/DL — HIGH (ref 70–99)
GLUCOSE BLDC GLUCOMTR-MCNC: 230 MG/DL — HIGH (ref 70–99)
GLUCOSE BLDC GLUCOMTR-MCNC: 232 MG/DL — HIGH (ref 70–99)
GLUCOSE BLDC GLUCOMTR-MCNC: 237 MG/DL — HIGH (ref 70–99)
GLUCOSE BLDC GLUCOMTR-MCNC: 244 MG/DL — HIGH (ref 70–99)
GLUCOSE BLDC GLUCOMTR-MCNC: 265 MG/DL — HIGH (ref 70–99)
GLUCOSE BLDC GLUCOMTR-MCNC: 269 MG/DL — HIGH (ref 70–99)
GLUCOSE BLDC GLUCOMTR-MCNC: 296 MG/DL — HIGH (ref 70–99)
GLUCOSE SERPL-MCNC: 120 MG/DL — HIGH (ref 70–99)
GLUCOSE SERPL-MCNC: 121 MG/DL — HIGH (ref 70–99)
GLUCOSE SERPL-MCNC: 131 MG/DL — HIGH (ref 70–99)
GLUCOSE SERPL-MCNC: 133 MG/DL — HIGH (ref 70–99)
GLUCOSE SERPL-MCNC: 136 MG/DL
GLUCOSE SERPL-MCNC: 137 MG/DL — HIGH (ref 70–99)
GLUCOSE SERPL-MCNC: 140 MG/DL — HIGH (ref 70–99)
GLUCOSE SERPL-MCNC: 140 MG/DL — HIGH (ref 70–99)
GLUCOSE SERPL-MCNC: 141 MG/DL — HIGH (ref 70–99)
GLUCOSE SERPL-MCNC: 164 MG/DL — HIGH (ref 70–99)
GLUCOSE SERPL-MCNC: 165 MG/DL — HIGH (ref 70–99)
GLUCOSE SERPL-MCNC: 181 MG/DL — HIGH (ref 70–99)
GLUCOSE SERPL-MCNC: 185 MG/DL — HIGH (ref 70–99)
GLUCOSE SERPL-MCNC: 193 MG/DL — HIGH (ref 70–99)
GLUCOSE SERPL-MCNC: 219 MG/DL — HIGH (ref 70–99)
GLUCOSE UR QL: NEGATIVE — SIGNIFICANT CHANGE UP
GLUCOSE UR QL: NEGATIVE — SIGNIFICANT CHANGE UP
HCO3 BLDA-SCNC: 31 MMOL/L — HIGH (ref 21–28)
HCO3 BLDA-SCNC: 46 MMOL/L — CRITICAL HIGH (ref 21–28)
HCO3 BLDA-SCNC: 46 MMOL/L — CRITICAL HIGH (ref 21–28)
HCO3 BLDA-SCNC: 47 MMOL/L — CRITICAL HIGH (ref 21–28)
HCO3 BLDA-SCNC: 49 MMOL/L — CRITICAL HIGH (ref 21–28)
HCO3 BLDA-SCNC: 49 MMOL/L — CRITICAL HIGH (ref 21–28)
HCO3 BLDA-SCNC: 54 MMOL/L — CRITICAL HIGH (ref 21–28)
HCO3 BLDV-SCNC: 46 MMOL/L — CRITICAL HIGH (ref 22–29)
HCT VFR BLD CALC: 27.9 % — LOW (ref 39–50)
HCT VFR BLD CALC: 28.6 % — LOW (ref 39–50)
HCT VFR BLD CALC: 28.9 % — LOW (ref 39–50)
HCT VFR BLD CALC: 29.7 % — LOW (ref 39–50)
HCT VFR BLD CALC: 30.2 % — LOW (ref 39–50)
HCT VFR BLD CALC: 30.4 % — LOW (ref 39–50)
HCT VFR BLD CALC: 30.9 % — LOW (ref 39–50)
HCT VFR BLD CALC: 31.3 % — LOW (ref 39–50)
HCT VFR BLD CALC: 31.6 % — LOW (ref 39–50)
HCT VFR BLD CALC: 31.8 % — LOW (ref 39–50)
HCT VFR BLD CALC: 35.6 %
HDLC SERPL-MCNC: 48 MG/DL — SIGNIFICANT CHANGE UP
HGB BLD-MCNC: 10 G/DL — LOW (ref 13–17)
HGB BLD-MCNC: 11.4 G/DL
HGB BLD-MCNC: 8.9 G/DL — LOW (ref 13–17)
HGB BLD-MCNC: 9.1 G/DL — LOW (ref 13–17)
HGB BLD-MCNC: 9.1 G/DL — LOW (ref 13–17)
HGB BLD-MCNC: 9.4 G/DL — LOW (ref 13–17)
HGB BLD-MCNC: 9.5 G/DL — LOW (ref 13–17)
HGB BLD-MCNC: 9.5 G/DL — LOW (ref 13–17)
HGB BLD-MCNC: 9.6 G/DL — LOW (ref 13–17)
HGB BLD-MCNC: 9.7 G/DL — LOW (ref 13–17)
HGB BLD-MCNC: 9.8 G/DL — LOW (ref 13–17)
HYPOCHROMIA BLD QL: SLIGHT — SIGNIFICANT CHANGE UP
IMM GRANULOCYTES NFR BLD AUTO: 0.7 %
IMM GRANULOCYTES NFR BLD AUTO: 0.9 % — SIGNIFICANT CHANGE UP (ref 0–1.5)
IMM GRANULOCYTES NFR BLD AUTO: 1.2 % — SIGNIFICANT CHANGE UP (ref 0–1.5)
IMM GRANULOCYTES NFR BLD AUTO: 1.2 % — SIGNIFICANT CHANGE UP (ref 0–1.5)
IMM GRANULOCYTES NFR BLD AUTO: 1.3 % — SIGNIFICANT CHANGE UP (ref 0–1.5)
IMM GRANULOCYTES NFR BLD AUTO: 1.5 % — SIGNIFICANT CHANGE UP (ref 0–1.5)
INR BLD: 1.32 — HIGH (ref 0.88–1.16)
INR BLD: 1.57 — HIGH (ref 0.88–1.16)
IRON SATN MFR SERPL: 16 % — SIGNIFICANT CHANGE UP (ref 16–55)
IRON SATN MFR SERPL: 43 UG/DL — LOW (ref 45–165)
KETONES UR-MCNC: NEGATIVE — SIGNIFICANT CHANGE UP
KETONES UR-MCNC: NEGATIVE — SIGNIFICANT CHANGE UP
LACTATE SERPL-SCNC: 1.2 MMOL/L — SIGNIFICANT CHANGE UP (ref 0.5–2)
LACTATE SERPL-SCNC: 1.7 MMOL/L — SIGNIFICANT CHANGE UP (ref 0.5–2)
LACTATE SERPL-SCNC: 2 MMOL/L — SIGNIFICANT CHANGE UP (ref 0.5–2)
LEUKOCYTE ESTERASE UR-ACNC: ABNORMAL
LEUKOCYTE ESTERASE UR-ACNC: NEGATIVE — SIGNIFICANT CHANGE UP
LIPID PNL WITH DIRECT LDL SERPL: 28 MG/DL — SIGNIFICANT CHANGE UP
LYMPHOCYTES # BLD AUTO: 0.12 K/UL — LOW (ref 1–3.3)
LYMPHOCYTES # BLD AUTO: 0.7 K/UL — LOW (ref 1–3.3)
LYMPHOCYTES # BLD AUTO: 0.72 K/UL — LOW (ref 1–3.3)
LYMPHOCYTES # BLD AUTO: 0.78 K/UL — LOW (ref 1–3.3)
LYMPHOCYTES # BLD AUTO: 0.85 K/UL — LOW (ref 1–3.3)
LYMPHOCYTES # BLD AUTO: 0.88 K/UL
LYMPHOCYTES # BLD AUTO: 0.9 % — LOW (ref 13–44)
LYMPHOCYTES # BLD AUTO: 0.94 K/UL — LOW (ref 1–3.3)
LYMPHOCYTES # BLD AUTO: 10.8 % — LOW (ref 13–44)
LYMPHOCYTES # BLD AUTO: 7.6 % — LOW (ref 13–44)
LYMPHOCYTES # BLD AUTO: 8.3 % — LOW (ref 13–44)
LYMPHOCYTES # BLD AUTO: 8.6 % — LOW (ref 13–44)
LYMPHOCYTES # BLD AUTO: 8.9 % — LOW (ref 13–44)
LYMPHOCYTES NFR BLD AUTO: 7.7 %
MACROCYTES BLD QL: SLIGHT — SIGNIFICANT CHANGE UP
MAGNESIUM SERPL-MCNC: 1.8 MG/DL — SIGNIFICANT CHANGE UP (ref 1.6–2.6)
MAGNESIUM SERPL-MCNC: 1.9 MG/DL — SIGNIFICANT CHANGE UP (ref 1.6–2.6)
MAGNESIUM SERPL-MCNC: 2 MG/DL — SIGNIFICANT CHANGE UP (ref 1.6–2.6)
MAGNESIUM SERPL-MCNC: 2 MG/DL — SIGNIFICANT CHANGE UP (ref 1.6–2.6)
MAGNESIUM SERPL-MCNC: 2.1 MG/DL — SIGNIFICANT CHANGE UP (ref 1.6–2.6)
MAGNESIUM SERPL-MCNC: 2.2 MG/DL — SIGNIFICANT CHANGE UP (ref 1.6–2.6)
MAGNESIUM SERPL-MCNC: 2.3 MG/DL — SIGNIFICANT CHANGE UP (ref 1.6–2.6)
MAGNESIUM SERPL-MCNC: 2.3 MG/DL — SIGNIFICANT CHANGE UP (ref 1.6–2.6)
MAGNESIUM SERPL-MCNC: 2.5 MG/DL — SIGNIFICANT CHANGE UP (ref 1.6–2.6)
MAGNESIUM SERPL-MCNC: SIGNIFICANT CHANGE UP MG/DL (ref 1.6–2.6)
MAN DIFF?: NORMAL
MANUAL SMEAR VERIFICATION: SIGNIFICANT CHANGE UP
MCHC RBC-ENTMCNC: 29.4 PG — SIGNIFICANT CHANGE UP (ref 27–34)
MCHC RBC-ENTMCNC: 29.5 PG — SIGNIFICANT CHANGE UP (ref 27–34)
MCHC RBC-ENTMCNC: 29.6 PG — SIGNIFICANT CHANGE UP (ref 27–34)
MCHC RBC-ENTMCNC: 29.7 PG — SIGNIFICANT CHANGE UP (ref 27–34)
MCHC RBC-ENTMCNC: 29.8 PG — SIGNIFICANT CHANGE UP (ref 27–34)
MCHC RBC-ENTMCNC: 30 PG — SIGNIFICANT CHANGE UP (ref 27–34)
MCHC RBC-ENTMCNC: 30.2 GM/DL — LOW (ref 32–36)
MCHC RBC-ENTMCNC: 30.3 PG — SIGNIFICANT CHANGE UP (ref 27–34)
MCHC RBC-ENTMCNC: 30.3 PG — SIGNIFICANT CHANGE UP (ref 27–34)
MCHC RBC-ENTMCNC: 30.4 GM/DL — LOW (ref 32–36)
MCHC RBC-ENTMCNC: 30.4 PG — SIGNIFICANT CHANGE UP (ref 27–34)
MCHC RBC-ENTMCNC: 30.7 PG
MCHC RBC-ENTMCNC: 30.7 PG — SIGNIFICANT CHANGE UP (ref 27–34)
MCHC RBC-ENTMCNC: 31.3 GM/DL — LOW (ref 32–36)
MCHC RBC-ENTMCNC: 31.3 GM/DL — LOW (ref 32–36)
MCHC RBC-ENTMCNC: 31.5 GM/DL — LOW (ref 32–36)
MCHC RBC-ENTMCNC: 31.6 GM/DL — LOW (ref 32–36)
MCHC RBC-ENTMCNC: 31.8 GM/DL — LOW (ref 32–36)
MCHC RBC-ENTMCNC: 31.9 GM/DL — LOW (ref 32–36)
MCHC RBC-ENTMCNC: 32 GM/DL
MCHC RBC-ENTMCNC: 32 GM/DL — SIGNIFICANT CHANGE UP (ref 32–36)
MCHC RBC-ENTMCNC: 32.1 GM/DL — SIGNIFICANT CHANGE UP (ref 32–36)
MCV RBC AUTO: 93.5 FL — SIGNIFICANT CHANGE UP (ref 80–100)
MCV RBC AUTO: 94 FL — SIGNIFICANT CHANGE UP (ref 80–100)
MCV RBC AUTO: 94 FL — SIGNIFICANT CHANGE UP (ref 80–100)
MCV RBC AUTO: 94.1 FL — SIGNIFICANT CHANGE UP (ref 80–100)
MCV RBC AUTO: 94.6 FL — SIGNIFICANT CHANGE UP (ref 80–100)
MCV RBC AUTO: 94.9 FL — SIGNIFICANT CHANGE UP (ref 80–100)
MCV RBC AUTO: 95.6 FL — SIGNIFICANT CHANGE UP (ref 80–100)
MCV RBC AUTO: 96 FL
MCV RBC AUTO: 96.9 FL — SIGNIFICANT CHANGE UP (ref 80–100)
MCV RBC AUTO: 97.4 FL — SIGNIFICANT CHANGE UP (ref 80–100)
MCV RBC AUTO: 99.4 FL — SIGNIFICANT CHANGE UP (ref 80–100)
MONOCYTES # BLD AUTO: 0.34 K/UL — SIGNIFICANT CHANGE UP (ref 0–0.9)
MONOCYTES # BLD AUTO: 0.73 K/UL — SIGNIFICANT CHANGE UP (ref 0–0.9)
MONOCYTES # BLD AUTO: 0.74 K/UL — SIGNIFICANT CHANGE UP (ref 0–0.9)
MONOCYTES # BLD AUTO: 0.77 K/UL
MONOCYTES # BLD AUTO: 0.84 K/UL — SIGNIFICANT CHANGE UP (ref 0–0.9)
MONOCYTES # BLD AUTO: 0.85 K/UL — SIGNIFICANT CHANGE UP (ref 0–0.9)
MONOCYTES # BLD AUTO: 0.92 K/UL — HIGH (ref 0–0.9)
MONOCYTES NFR BLD AUTO: 10.4 % — SIGNIFICANT CHANGE UP (ref 2–14)
MONOCYTES NFR BLD AUTO: 2.6 % — SIGNIFICANT CHANGE UP (ref 2–14)
MONOCYTES NFR BLD AUTO: 6.7 %
MONOCYTES NFR BLD AUTO: 7.8 % — SIGNIFICANT CHANGE UP (ref 2–14)
MONOCYTES NFR BLD AUTO: 8.2 % — SIGNIFICANT CHANGE UP (ref 2–14)
MONOCYTES NFR BLD AUTO: 9.1 % — SIGNIFICANT CHANGE UP (ref 2–14)
MONOCYTES NFR BLD AUTO: 9.7 % — SIGNIFICANT CHANGE UP (ref 2–14)
MRSA PCR RESULT.: NEGATIVE — SIGNIFICANT CHANGE UP
NEUTROPHILS # BLD AUTO: 12.58 K/UL — HIGH (ref 1.8–7.4)
NEUTROPHILS # BLD AUTO: 6.36 K/UL — SIGNIFICANT CHANGE UP (ref 1.8–7.4)
NEUTROPHILS # BLD AUTO: 6.42 K/UL — SIGNIFICANT CHANGE UP (ref 1.8–7.4)
NEUTROPHILS # BLD AUTO: 6.7 K/UL — SIGNIFICANT CHANGE UP (ref 1.8–7.4)
NEUTROPHILS # BLD AUTO: 7.66 K/UL — HIGH (ref 1.8–7.4)
NEUTROPHILS # BLD AUTO: 9.28 K/UL — HIGH (ref 1.8–7.4)
NEUTROPHILS # BLD AUTO: 9.56 K/UL
NEUTROPHILS NFR BLD AUTO: 76.7 % — SIGNIFICANT CHANGE UP (ref 43–77)
NEUTROPHILS NFR BLD AUTO: 78.6 % — HIGH (ref 43–77)
NEUTROPHILS NFR BLD AUTO: 79.3 % — HIGH (ref 43–77)
NEUTROPHILS NFR BLD AUTO: 81.8 % — HIGH (ref 43–77)
NEUTROPHILS NFR BLD AUTO: 82.5 % — HIGH (ref 43–77)
NEUTROPHILS NFR BLD AUTO: 83.8 %
NEUTROPHILS NFR BLD AUTO: 96.5 % — HIGH (ref 43–77)
NITRITE UR-MCNC: NEGATIVE — SIGNIFICANT CHANGE UP
NITRITE UR-MCNC: NEGATIVE — SIGNIFICANT CHANGE UP
NON HDL CHOLESTEROL: 38 MG/DL — SIGNIFICANT CHANGE UP
NRBC # BLD: 0 /100 WBCS — SIGNIFICANT CHANGE UP (ref 0–0)
NT-PROBNP SERPL-SCNC: 5655 PG/ML — HIGH (ref 0–300)
OVALOCYTES BLD QL SMEAR: SLIGHT — SIGNIFICANT CHANGE UP
PCO2 BLDA: 59 MMHG — HIGH (ref 35–48)
PCO2 BLDA: 61 MMHG — HIGH (ref 35–48)
PCO2 BLDA: 61 MMHG — HIGH (ref 35–48)
PCO2 BLDA: 63 MMHG — HIGH (ref 35–48)
PCO2 BLDA: 65 MMHG — HIGH (ref 35–48)
PCO2 BLDA: 68 MMHG — HIGH (ref 35–48)
PCO2 BLDA: 79 MMHG — CRITICAL HIGH (ref 35–48)
PCO2 BLDV: 84 MMHG — HIGH (ref 42–55)
PH BLDA: 7.37 — SIGNIFICANT CHANGE UP (ref 7.35–7.45)
PH BLDA: 7.45 — SIGNIFICANT CHANGE UP (ref 7.35–7.45)
PH BLDA: 7.45 — SIGNIFICANT CHANGE UP (ref 7.35–7.45)
PH BLDA: 7.47 — HIGH (ref 7.35–7.45)
PH BLDA: 7.51 — HIGH (ref 7.35–7.45)
PH BLDA: 7.53 — HIGH (ref 7.35–7.45)
PH BLDA: 7.53 — HIGH (ref 7.35–7.45)
PH BLDV: 7.35 — SIGNIFICANT CHANGE UP (ref 7.32–7.43)
PH UR: 6 — SIGNIFICANT CHANGE UP (ref 5–8)
PH UR: 8.5 — HIGH (ref 5–8)
PHOSPHATE SERPL-MCNC: 3.2 MG/DL — SIGNIFICANT CHANGE UP (ref 2.5–4.5)
PHOSPHATE SERPL-MCNC: 3.4 MG/DL — SIGNIFICANT CHANGE UP (ref 2.5–4.5)
PHOSPHATE SERPL-MCNC: 3.6 MG/DL — SIGNIFICANT CHANGE UP (ref 2.5–4.5)
PHOSPHATE SERPL-MCNC: 4.2 MG/DL — SIGNIFICANT CHANGE UP (ref 2.5–4.5)
PHOSPHATE SERPL-MCNC: 4.2 MG/DL — SIGNIFICANT CHANGE UP (ref 2.5–4.5)
PHOSPHATE SERPL-MCNC: 4.3 MG/DL — SIGNIFICANT CHANGE UP (ref 2.5–4.5)
PHOSPHATE SERPL-MCNC: 4.9 MG/DL — HIGH (ref 2.5–4.5)
PHOSPHATE SERPL-MCNC: 5 MG/DL — HIGH (ref 2.5–4.5)
PLAT MORPH BLD: ABNORMAL
PLATELET # BLD AUTO: 119 K/UL — LOW (ref 150–400)
PLATELET # BLD AUTO: 122 K/UL — LOW (ref 150–400)
PLATELET # BLD AUTO: 129 K/UL — LOW (ref 150–400)
PLATELET # BLD AUTO: 130 K/UL — LOW (ref 150–400)
PLATELET # BLD AUTO: 148 K/UL — LOW (ref 150–400)
PLATELET # BLD AUTO: 148 K/UL — LOW (ref 150–400)
PLATELET # BLD AUTO: 153 K/UL — SIGNIFICANT CHANGE UP (ref 150–400)
PLATELET # BLD AUTO: 155 K/UL — SIGNIFICANT CHANGE UP (ref 150–400)
PLATELET # BLD AUTO: 158 K/UL — SIGNIFICANT CHANGE UP (ref 150–400)
PLATELET # BLD AUTO: 180 K/UL
PLATELET # BLD AUTO: 197 K/UL — SIGNIFICANT CHANGE UP (ref 150–400)
PO2 BLDA: 109 MMHG — HIGH (ref 83–108)
PO2 BLDA: 123 MMHG — HIGH (ref 83–108)
PO2 BLDA: 148 MMHG — HIGH (ref 83–108)
PO2 BLDA: 56 MMHG — LOW (ref 83–108)
PO2 BLDA: 62 MMHG — LOW (ref 83–108)
PO2 BLDA: 82 MMHG — LOW (ref 83–108)
PO2 BLDA: 95 MMHG — SIGNIFICANT CHANGE UP (ref 83–108)
PO2 BLDV: <29 MMHG — SIGNIFICANT CHANGE UP (ref 25–45)
POIKILOCYTOSIS BLD QL AUTO: SLIGHT — SIGNIFICANT CHANGE UP
POLYCHROMASIA BLD QL SMEAR: SLIGHT — SIGNIFICANT CHANGE UP
POTASSIUM SERPL-MCNC: 3.8 MMOL/L — SIGNIFICANT CHANGE UP (ref 3.5–5.3)
POTASSIUM SERPL-MCNC: 3.8 MMOL/L — SIGNIFICANT CHANGE UP (ref 3.5–5.3)
POTASSIUM SERPL-MCNC: 3.9 MMOL/L — SIGNIFICANT CHANGE UP (ref 3.5–5.3)
POTASSIUM SERPL-MCNC: 3.9 MMOL/L — SIGNIFICANT CHANGE UP (ref 3.5–5.3)
POTASSIUM SERPL-MCNC: 4 MMOL/L — SIGNIFICANT CHANGE UP (ref 3.5–5.3)
POTASSIUM SERPL-MCNC: 4.1 MMOL/L — SIGNIFICANT CHANGE UP (ref 3.5–5.3)
POTASSIUM SERPL-MCNC: 4.2 MMOL/L — SIGNIFICANT CHANGE UP (ref 3.5–5.3)
POTASSIUM SERPL-MCNC: 4.3 MMOL/L — SIGNIFICANT CHANGE UP (ref 3.5–5.3)
POTASSIUM SERPL-MCNC: 4.6 MMOL/L — SIGNIFICANT CHANGE UP (ref 3.5–5.3)
POTASSIUM SERPL-MCNC: 4.6 MMOL/L — SIGNIFICANT CHANGE UP (ref 3.5–5.3)
POTASSIUM SERPL-MCNC: SIGNIFICANT CHANGE UP (ref 3.5–5.3)
POTASSIUM SERPL-SCNC: 3.8 MMOL/L — SIGNIFICANT CHANGE UP (ref 3.5–5.3)
POTASSIUM SERPL-SCNC: 3.8 MMOL/L — SIGNIFICANT CHANGE UP (ref 3.5–5.3)
POTASSIUM SERPL-SCNC: 3.9 MMOL/L — SIGNIFICANT CHANGE UP (ref 3.5–5.3)
POTASSIUM SERPL-SCNC: 3.9 MMOL/L — SIGNIFICANT CHANGE UP (ref 3.5–5.3)
POTASSIUM SERPL-SCNC: 4 MMOL/L — SIGNIFICANT CHANGE UP (ref 3.5–5.3)
POTASSIUM SERPL-SCNC: 4.1 MMOL/L — SIGNIFICANT CHANGE UP (ref 3.5–5.3)
POTASSIUM SERPL-SCNC: 4.2 MMOL/L — SIGNIFICANT CHANGE UP (ref 3.5–5.3)
POTASSIUM SERPL-SCNC: 4.3 MMOL/L — SIGNIFICANT CHANGE UP (ref 3.5–5.3)
POTASSIUM SERPL-SCNC: 4.5 MMOL/L
POTASSIUM SERPL-SCNC: 4.6 MMOL/L — SIGNIFICANT CHANGE UP (ref 3.5–5.3)
POTASSIUM SERPL-SCNC: 4.6 MMOL/L — SIGNIFICANT CHANGE UP (ref 3.5–5.3)
POTASSIUM SERPL-SCNC: SIGNIFICANT CHANGE UP (ref 3.5–5.3)
PROCALCITONIN SERPL-MCNC: 0.35 NG/ML — HIGH (ref 0.02–0.1)
PROT SERPL-MCNC: 6.4 G/DL — SIGNIFICANT CHANGE UP (ref 6–8.3)
PROT SERPL-MCNC: 6.5 G/DL
PROT SERPL-MCNC: 6.6 G/DL — SIGNIFICANT CHANGE UP (ref 6–8.3)
PROT SERPL-MCNC: 6.7 G/DL — SIGNIFICANT CHANGE UP (ref 6–8.3)
PROT SERPL-MCNC: 6.9 G/DL — SIGNIFICANT CHANGE UP (ref 6–8.3)
PROT SERPL-MCNC: 7.2 G/DL — SIGNIFICANT CHANGE UP (ref 6–8.3)
PROT SERPL-MCNC: 7.2 G/DL — SIGNIFICANT CHANGE UP (ref 6–8.3)
PROT UR-MCNC: NEGATIVE MG/DL — SIGNIFICANT CHANGE UP
PROT UR-MCNC: NEGATIVE MG/DL — SIGNIFICANT CHANGE UP
PROTHROM AB SERPL-ACNC: 15.7 SEC — HIGH (ref 10.5–13.4)
PROTHROM AB SERPL-ACNC: 18.8 SEC — HIGH (ref 10.5–13.4)
RBC # BLD: 2.94 M/UL — LOW (ref 4.2–5.8)
RBC # BLD: 3.06 M/UL — LOW (ref 4.2–5.8)
RBC # BLD: 3.07 M/UL — LOW (ref 4.2–5.8)
RBC # BLD: 3.12 M/UL — LOW (ref 4.2–5.8)
RBC # BLD: 3.14 M/UL — LOW (ref 4.2–5.8)
RBC # BLD: 3.16 M/UL — LOW (ref 4.2–5.8)
RBC # BLD: 3.19 M/UL — LOW (ref 4.2–5.8)
RBC # BLD: 3.2 M/UL — LOW (ref 4.2–5.8)
RBC # BLD: 3.33 M/UL — LOW (ref 4.2–5.8)
RBC # BLD: 3.36 M/UL — LOW (ref 4.2–5.8)
RBC # BLD: 3.71 M/UL
RBC # FLD: 14.2 %
RBC # FLD: 14.9 % — HIGH (ref 10.3–14.5)
RBC # FLD: 15 % — HIGH (ref 10.3–14.5)
RBC # FLD: 15 % — HIGH (ref 10.3–14.5)
RBC # FLD: 15.1 % — HIGH (ref 10.3–14.5)
RBC # FLD: 15.2 % — HIGH (ref 10.3–14.5)
RBC # FLD: 15.3 % — HIGH (ref 10.3–14.5)
RBC # FLD: 15.3 % — HIGH (ref 10.3–14.5)
RBC BLD AUTO: ABNORMAL
RBC CASTS # UR COMP ASSIST: ABNORMAL /HPF
RH IG SCN BLD-IMP: POSITIVE — SIGNIFICANT CHANGE UP
RH IG SCN BLD-IMP: POSITIVE — SIGNIFICANT CHANGE UP
S AUREUS DNA NOSE QL NAA+PROBE: POSITIVE
SAO2 % BLDA: 100 % — HIGH (ref 94–98)
SAO2 % BLDA: 100 % — HIGH (ref 94–98)
SAO2 % BLDA: 88.8 % — LOW (ref 94–98)
SAO2 % BLDA: 93.2 % — LOW (ref 94–98)
SAO2 % BLDA: 98.8 % — HIGH (ref 94–98)
SAO2 % BLDA: 99.1 % — HIGH (ref 94–98)
SAO2 % BLDA: 99.5 % — HIGH (ref 94–98)
SAO2 % BLDV: 37.7 % — LOW (ref 67–88)
SARS-COV-2 RNA SPEC QL NAA+PROBE: NEGATIVE — SIGNIFICANT CHANGE UP
SODIUM SERPL-SCNC: 135 MMOL/L — SIGNIFICANT CHANGE UP (ref 135–145)
SODIUM SERPL-SCNC: 136 MMOL/L — SIGNIFICANT CHANGE UP (ref 135–145)
SODIUM SERPL-SCNC: 136 MMOL/L — SIGNIFICANT CHANGE UP (ref 135–145)
SODIUM SERPL-SCNC: 138 MMOL/L — SIGNIFICANT CHANGE UP (ref 135–145)
SODIUM SERPL-SCNC: 139 MMOL/L
SODIUM SERPL-SCNC: 139 MMOL/L — SIGNIFICANT CHANGE UP (ref 135–145)
SODIUM SERPL-SCNC: 141 MMOL/L — SIGNIFICANT CHANGE UP (ref 135–145)
SODIUM SERPL-SCNC: 142 MMOL/L — SIGNIFICANT CHANGE UP (ref 135–145)
SODIUM SERPL-SCNC: 143 MMOL/L — SIGNIFICANT CHANGE UP (ref 135–145)
SODIUM SERPL-SCNC: 143 MMOL/L — SIGNIFICANT CHANGE UP (ref 135–145)
SP GR SPEC: 1.01 — SIGNIFICANT CHANGE UP (ref 1–1.03)
SP GR SPEC: <=1.005 — SIGNIFICANT CHANGE UP (ref 1–1.03)
SPECIMEN SOURCE: SIGNIFICANT CHANGE UP
SPECIMEN SOURCE: SIGNIFICANT CHANGE UP
STOMATOCYTES BLD QL SMEAR: SLIGHT — SIGNIFICANT CHANGE UP
TIBC SERPL-MCNC: 267 UG/DL — SIGNIFICANT CHANGE UP (ref 220–430)
TRANSFERRIN SERPL-MCNC: 229 MG/DL — SIGNIFICANT CHANGE UP (ref 200–360)
TRIGL SERPL-MCNC: 50 MG/DL — SIGNIFICANT CHANGE UP
TROPONIN T SERPL-MCNC: 0.11 NG/ML — CRITICAL HIGH (ref 0–0.01)
TROPONIN T SERPL-MCNC: 0.12 NG/ML — CRITICAL HIGH (ref 0–0.01)
TROPONIN T SERPL-MCNC: 0.14 NG/ML — CRITICAL HIGH (ref 0–0.01)
TROPONIN T SERPL-MCNC: 0.15 NG/ML — CRITICAL HIGH (ref 0–0.01)
TROPONIN T SERPL-MCNC: 0.17 NG/ML — CRITICAL HIGH (ref 0–0.01)
TROPONIN T SERPL-MCNC: 0.19 NG/ML — CRITICAL HIGH (ref 0–0.01)
UIBC SERPL-MCNC: 224 UG/DL — SIGNIFICANT CHANGE UP (ref 110–370)
UROBILINOGEN FLD QL: 2 E.U./DL
UROBILINOGEN FLD QL: 4 E.U./DL
WBC # BLD: 11.24 K/UL — HIGH (ref 3.8–10.5)
WBC # BLD: 13.04 K/UL — HIGH (ref 3.8–10.5)
WBC # BLD: 6.69 K/UL — SIGNIFICANT CHANGE UP (ref 3.8–10.5)
WBC # BLD: 7.35 K/UL — SIGNIFICANT CHANGE UP (ref 3.8–10.5)
WBC # BLD: 8.08 K/UL — SIGNIFICANT CHANGE UP (ref 3.8–10.5)
WBC # BLD: 8.1 K/UL — SIGNIFICANT CHANGE UP (ref 3.8–10.5)
WBC # BLD: 8.1 K/UL — SIGNIFICANT CHANGE UP (ref 3.8–10.5)
WBC # BLD: 8.39 K/UL — SIGNIFICANT CHANGE UP (ref 3.8–10.5)
WBC # BLD: 8.73 K/UL — SIGNIFICANT CHANGE UP (ref 3.8–10.5)
WBC # BLD: 9.37 K/UL — SIGNIFICANT CHANGE UP (ref 3.8–10.5)
WBC # FLD AUTO: 11.24 K/UL — HIGH (ref 3.8–10.5)
WBC # FLD AUTO: 11.42 K/UL
WBC # FLD AUTO: 13.04 K/UL — HIGH (ref 3.8–10.5)
WBC # FLD AUTO: 6.69 K/UL — SIGNIFICANT CHANGE UP (ref 3.8–10.5)
WBC # FLD AUTO: 7.35 K/UL — SIGNIFICANT CHANGE UP (ref 3.8–10.5)
WBC # FLD AUTO: 8.08 K/UL — SIGNIFICANT CHANGE UP (ref 3.8–10.5)
WBC # FLD AUTO: 8.1 K/UL — SIGNIFICANT CHANGE UP (ref 3.8–10.5)
WBC # FLD AUTO: 8.1 K/UL — SIGNIFICANT CHANGE UP (ref 3.8–10.5)
WBC # FLD AUTO: 8.39 K/UL — SIGNIFICANT CHANGE UP (ref 3.8–10.5)
WBC # FLD AUTO: 8.73 K/UL — SIGNIFICANT CHANGE UP (ref 3.8–10.5)
WBC # FLD AUTO: 9.37 K/UL — SIGNIFICANT CHANGE UP (ref 3.8–10.5)
WBC UR QL: < 5 /HPF — SIGNIFICANT CHANGE UP

## 2022-01-01 PROCEDURE — 74018 RADEX ABDOMEN 1 VIEW: CPT | Mod: 26

## 2022-01-01 PROCEDURE — 84466 ASSAY OF TRANSFERRIN: CPT

## 2022-01-01 PROCEDURE — 99358 PROLONG SERVICE W/O CONTACT: CPT | Mod: NC

## 2022-01-01 PROCEDURE — 71045 X-RAY EXAM CHEST 1 VIEW: CPT | Mod: 26

## 2022-01-01 PROCEDURE — 81003 URINALYSIS AUTO W/O SCOPE: CPT

## 2022-01-01 PROCEDURE — 94640 AIRWAY INHALATION TREATMENT: CPT

## 2022-01-01 PROCEDURE — 36415 COLL VENOUS BLD VENIPUNCTURE: CPT

## 2022-01-01 PROCEDURE — 96374 THER/PROPH/DIAG INJ IV PUSH: CPT

## 2022-01-01 PROCEDURE — 99291 CRITICAL CARE FIRST HOUR: CPT

## 2022-01-01 PROCEDURE — 83540 ASSAY OF IRON: CPT

## 2022-01-01 PROCEDURE — 86140 C-REACTIVE PROTEIN: CPT

## 2022-01-01 PROCEDURE — 99232 SBSQ HOSP IP/OBS MODERATE 35: CPT

## 2022-01-01 PROCEDURE — 99233 SBSQ HOSP IP/OBS HIGH 50: CPT

## 2022-01-01 PROCEDURE — 99233 SBSQ HOSP IP/OBS HIGH 50: CPT | Mod: GC

## 2022-01-01 PROCEDURE — 74018 RADEX ABDOMEN 1 VIEW: CPT

## 2022-01-01 PROCEDURE — 99285 EMERGENCY DEPT VISIT HI MDM: CPT | Mod: 25

## 2022-01-01 PROCEDURE — 86900 BLOOD TYPING SEROLOGIC ABO: CPT

## 2022-01-01 PROCEDURE — 96375 TX/PRO/DX INJ NEW DRUG ADDON: CPT

## 2022-01-01 PROCEDURE — 86850 RBC ANTIBODY SCREEN: CPT

## 2022-01-01 PROCEDURE — 83605 ASSAY OF LACTIC ACID: CPT

## 2022-01-01 PROCEDURE — 87040 BLOOD CULTURE FOR BACTERIA: CPT

## 2022-01-01 PROCEDURE — 76937 US GUIDE VASCULAR ACCESS: CPT | Mod: 26,59

## 2022-01-01 PROCEDURE — 82728 ASSAY OF FERRITIN: CPT

## 2022-01-01 PROCEDURE — 84484 ASSAY OF TROPONIN QUANT: CPT

## 2022-01-01 PROCEDURE — 80061 LIPID PANEL: CPT

## 2022-01-01 PROCEDURE — 83880 ASSAY OF NATRIURETIC PEPTIDE: CPT

## 2022-01-01 PROCEDURE — 99239 HOSP IP/OBS DSCHRG MGMT >30: CPT

## 2022-01-01 PROCEDURE — 83735 ASSAY OF MAGNESIUM: CPT

## 2022-01-01 PROCEDURE — 83036 HEMOGLOBIN GLYCOSYLATED A1C: CPT

## 2022-01-01 PROCEDURE — 93306 TTE W/DOPPLER COMPLETE: CPT | Mod: 26

## 2022-01-01 PROCEDURE — 84132 ASSAY OF SERUM POTASSIUM: CPT

## 2022-01-01 PROCEDURE — 82330 ASSAY OF CALCIUM: CPT

## 2022-01-01 PROCEDURE — 99214 OFFICE O/P EST MOD 30 MIN: CPT | Mod: 25

## 2022-01-01 PROCEDURE — 97162 PT EVAL MOD COMPLEX 30 MIN: CPT

## 2022-01-01 PROCEDURE — 82803 BLOOD GASES ANY COMBINATION: CPT

## 2022-01-01 PROCEDURE — 84295 ASSAY OF SERUM SODIUM: CPT

## 2022-01-01 PROCEDURE — 93010 ELECTROCARDIOGRAM REPORT: CPT

## 2022-01-01 PROCEDURE — 74220 X-RAY XM ESOPHAGUS 1CNTRST: CPT

## 2022-01-01 PROCEDURE — 80048 BASIC METABOLIC PNL TOTAL CA: CPT

## 2022-01-01 PROCEDURE — 81001 URINALYSIS AUTO W/SCOPE: CPT

## 2022-01-01 PROCEDURE — 85730 THROMBOPLASTIN TIME PARTIAL: CPT

## 2022-01-01 PROCEDURE — 99497 ADVNCD CARE PLAN 30 MIN: CPT | Mod: 25

## 2022-01-01 PROCEDURE — 71045 X-RAY EXAM CHEST 1 VIEW: CPT

## 2022-01-01 PROCEDURE — 85025 COMPLETE CBC W/AUTO DIFF WBC: CPT

## 2022-01-01 PROCEDURE — 71045 X-RAY EXAM CHEST 1 VIEW: CPT | Mod: 26,77,76

## 2022-01-01 PROCEDURE — 93294 REM INTERROG EVL PM/LDLS PM: CPT

## 2022-01-01 PROCEDURE — 94660 CPAP INITIATION&MGMT: CPT

## 2022-01-01 PROCEDURE — 99213 OFFICE O/P EST LOW 20 MIN: CPT | Mod: 95

## 2022-01-01 PROCEDURE — 97530 THERAPEUTIC ACTIVITIES: CPT

## 2022-01-01 PROCEDURE — 76700 US EXAM ABDOM COMPLETE: CPT

## 2022-01-01 PROCEDURE — 99223 1ST HOSP IP/OBS HIGH 75: CPT

## 2022-01-01 PROCEDURE — 87641 MR-STAPH DNA AMP PROBE: CPT

## 2022-01-01 PROCEDURE — 99497 ADVNCD CARE PLAN 30 MIN: CPT

## 2022-01-01 PROCEDURE — 82553 CREATINE MB FRACTION: CPT

## 2022-01-01 PROCEDURE — 36620 INSERTION CATHETER ARTERY: CPT

## 2022-01-01 PROCEDURE — 93296 REM INTERROG EVL PM/IDS: CPT

## 2022-01-01 PROCEDURE — 84145 PROCALCITONIN (PCT): CPT

## 2022-01-01 PROCEDURE — 84100 ASSAY OF PHOSPHORUS: CPT

## 2022-01-01 PROCEDURE — 93970 EXTREMITY STUDY: CPT | Mod: 26

## 2022-01-01 PROCEDURE — 82550 ASSAY OF CK (CPK): CPT

## 2022-01-01 PROCEDURE — 85027 COMPLETE CBC AUTOMATED: CPT

## 2022-01-01 PROCEDURE — 82962 GLUCOSE BLOOD TEST: CPT

## 2022-01-01 PROCEDURE — 85610 PROTHROMBIN TIME: CPT

## 2022-01-01 PROCEDURE — 97110 THERAPEUTIC EXERCISES: CPT

## 2022-01-01 PROCEDURE — 93970 EXTREMITY STUDY: CPT

## 2022-01-01 PROCEDURE — 99292 CRITICAL CARE ADDL 30 MIN: CPT

## 2022-01-01 PROCEDURE — 76937 US GUIDE VASCULAR ACCESS: CPT | Mod: 26

## 2022-01-01 PROCEDURE — 80053 COMPREHEN METABOLIC PANEL: CPT

## 2022-01-01 PROCEDURE — 87635 SARS-COV-2 COVID-19 AMP PRB: CPT

## 2022-01-01 PROCEDURE — 87640 STAPH A DNA AMP PROBE: CPT

## 2022-01-01 PROCEDURE — C8929: CPT

## 2022-01-01 PROCEDURE — 36000 PLACE NEEDLE IN VEIN: CPT

## 2022-01-01 PROCEDURE — 74220 X-RAY XM ESOPHAGUS 1CNTRST: CPT | Mod: 26

## 2022-01-01 PROCEDURE — 83550 IRON BINDING TEST: CPT

## 2022-01-01 PROCEDURE — 51702 INSERT TEMP BLADDER CATH: CPT

## 2022-01-01 PROCEDURE — 85652 RBC SED RATE AUTOMATED: CPT

## 2022-01-01 PROCEDURE — 86901 BLOOD TYPING SEROLOGIC RH(D): CPT

## 2022-01-01 PROCEDURE — 92610 EVALUATE SWALLOWING FUNCTION: CPT

## 2022-01-01 PROCEDURE — 93321 DOPPLER ECHO F-UP/LMTD STD: CPT

## 2022-01-01 PROCEDURE — 36000 PLACE NEEDLE IN VEIN: CPT | Mod: 59

## 2022-01-01 PROCEDURE — 76700 US EXAM ABDOM COMPLETE: CPT | Mod: 26

## 2022-01-01 RX ORDER — ACETAMINOPHEN 500 MG
650 TABLET ORAL ONCE
Refills: 0 | Status: COMPLETED | OUTPATIENT
Start: 2022-01-01 | End: 2022-01-01

## 2022-01-01 RX ORDER — FINASTERIDE 5 MG/1
1 TABLET, FILM COATED ORAL
Qty: 0 | Refills: 0 | DISCHARGE

## 2022-01-01 RX ORDER — SENNA PLUS 8.6 MG/1
2 TABLET ORAL
Qty: 0 | Refills: 0 | DISCHARGE
Start: 2022-01-01

## 2022-01-01 RX ORDER — METOPROLOL TARTRATE 50 MG
12.5 TABLET ORAL EVERY 12 HOURS
Refills: 0 | Status: DISCONTINUED | OUTPATIENT
Start: 2022-01-01 | End: 2022-01-01

## 2022-01-01 RX ORDER — MAGNESIUM SULFATE 500 MG/ML
2 VIAL (ML) INJECTION ONCE
Refills: 0 | Status: COMPLETED | OUTPATIENT
Start: 2022-01-01 | End: 2022-01-01

## 2022-01-01 RX ORDER — POTASSIUM CHLORIDE 20 MEQ
10 PACKET (EA) ORAL
Refills: 0 | Status: COMPLETED | OUTPATIENT
Start: 2022-01-01 | End: 2022-01-01

## 2022-01-01 RX ORDER — DEXTROSE 50 % IN WATER 50 %
12.5 SYRINGE (ML) INTRAVENOUS ONCE
Refills: 0 | Status: DISCONTINUED | OUTPATIENT
Start: 2022-01-01 | End: 2022-01-01

## 2022-01-01 RX ORDER — ACETAZOLAMIDE 250 MG/1
250 TABLET ORAL ONCE
Refills: 0 | Status: DISCONTINUED | OUTPATIENT
Start: 2022-01-01 | End: 2022-01-01

## 2022-01-01 RX ORDER — FUROSEMIDE 40 MG
80 TABLET ORAL ONCE
Refills: 0 | Status: COMPLETED | OUTPATIENT
Start: 2022-01-01 | End: 2022-01-01

## 2022-01-01 RX ORDER — FINASTERIDE 5 MG/1
5 TABLET, FILM COATED ORAL AT BEDTIME
Refills: 0 | Status: DISCONTINUED | OUTPATIENT
Start: 2022-01-01 | End: 2022-01-01

## 2022-01-01 RX ORDER — APIXABAN 2.5 MG/1
10 TABLET, FILM COATED ORAL EVERY 12 HOURS
Refills: 0 | Status: DISCONTINUED | OUTPATIENT
Start: 2022-01-01 | End: 2022-01-01

## 2022-01-01 RX ORDER — SPIRONOLACTONE 25 MG/1
25 TABLET, FILM COATED ORAL EVERY 24 HOURS
Refills: 0 | Status: DISCONTINUED | OUTPATIENT
Start: 2022-01-01 | End: 2022-01-01

## 2022-01-01 RX ORDER — ATORVASTATIN CALCIUM 80 MG/1
10 TABLET, FILM COATED ORAL AT BEDTIME
Refills: 0 | Status: DISCONTINUED | OUTPATIENT
Start: 2022-01-01 | End: 2022-01-01

## 2022-01-01 RX ORDER — SENNA PLUS 8.6 MG/1
2 TABLET ORAL AT BEDTIME
Refills: 0 | Status: DISCONTINUED | OUTPATIENT
Start: 2022-01-01 | End: 2022-01-01

## 2022-01-01 RX ORDER — ASPIRIN/CALCIUM CARB/MAGNESIUM 324 MG
81 TABLET ORAL EVERY 24 HOURS
Refills: 0 | Status: DISCONTINUED | OUTPATIENT
Start: 2022-01-01 | End: 2022-01-01

## 2022-01-01 RX ORDER — ACETAMINOPHEN 500 MG
1000 TABLET ORAL ONCE
Refills: 0 | Status: COMPLETED | OUTPATIENT
Start: 2022-01-01 | End: 2022-01-01

## 2022-01-01 RX ORDER — DEXTROSE 50 % IN WATER 50 %
25 SYRINGE (ML) INTRAVENOUS ONCE
Refills: 0 | Status: DISCONTINUED | OUTPATIENT
Start: 2022-01-01 | End: 2022-01-01

## 2022-01-01 RX ORDER — FUROSEMIDE 40 MG
0 TABLET ORAL
Qty: 0 | Refills: 0 | DISCHARGE

## 2022-01-01 RX ORDER — ALBUTEROL 90 UG/1
2 AEROSOL, METERED ORAL EVERY 6 HOURS
Refills: 0 | Status: DISCONTINUED | OUTPATIENT
Start: 2022-01-01 | End: 2022-01-01

## 2022-01-01 RX ORDER — POTASSIUM CHLORIDE 20 MEQ
20 PACKET (EA) ORAL ONCE
Refills: 0 | Status: COMPLETED | OUTPATIENT
Start: 2022-01-01 | End: 2022-01-01

## 2022-01-01 RX ORDER — SPIRONOLACTONE 25 MG/1
1 TABLET, FILM COATED ORAL
Qty: 0 | Refills: 0 | DISCHARGE
Start: 2022-01-01

## 2022-01-01 RX ORDER — INSULIN LISPRO 100/ML
VIAL (ML) SUBCUTANEOUS
Refills: 0 | Status: DISCONTINUED | OUTPATIENT
Start: 2022-01-01 | End: 2022-01-01

## 2022-01-01 RX ORDER — BUMETANIDE 0.25 MG/ML
1 INJECTION INTRAMUSCULAR; INTRAVENOUS EVERY 12 HOURS
Refills: 0 | Status: DISCONTINUED | OUTPATIENT
Start: 2022-01-01 | End: 2022-01-01

## 2022-01-01 RX ORDER — BUDESONIDE AND FORMOTEROL FUMARATE DIHYDRATE 160; 4.5 UG/1; UG/1
2 AEROSOL RESPIRATORY (INHALATION)
Refills: 0 | Status: DISCONTINUED | OUTPATIENT
Start: 2022-01-01 | End: 2022-01-01

## 2022-01-01 RX ORDER — ASPIRIN/CALCIUM CARB/MAGNESIUM 324 MG
81 TABLET ORAL DAILY
Refills: 0 | Status: DISCONTINUED | OUTPATIENT
Start: 2022-01-01 | End: 2022-01-01

## 2022-01-01 RX ORDER — VANCOMYCIN HCL 1 G
2000 VIAL (EA) INTRAVENOUS EVERY 24 HOURS
Refills: 0 | Status: DISCONTINUED | OUTPATIENT
Start: 2022-01-01 | End: 2022-01-01

## 2022-01-01 RX ORDER — FINASTERIDE 5 MG/1
5 TABLET, FILM COATED ORAL DAILY
Refills: 0 | Status: DISCONTINUED | OUTPATIENT
Start: 2022-01-01 | End: 2022-01-01

## 2022-01-01 RX ORDER — POTASSIUM CHLORIDE 20 MEQ
20 PACKET (EA) ORAL ONCE
Refills: 0 | Status: DISCONTINUED | OUTPATIENT
Start: 2022-01-01 | End: 2022-01-01

## 2022-01-01 RX ORDER — MONTELUKAST 4 MG/1
10 TABLET, CHEWABLE ORAL EVERY 24 HOURS
Refills: 0 | Status: DISCONTINUED | OUTPATIENT
Start: 2022-01-01 | End: 2022-01-01

## 2022-01-01 RX ORDER — NOREPINEPHRINE BITARTRATE/D5W 8 MG/250ML
0.05 PLASTIC BAG, INJECTION (ML) INTRAVENOUS
Qty: 8 | Refills: 0 | Status: DISCONTINUED | OUTPATIENT
Start: 2022-01-01 | End: 2022-01-01

## 2022-01-01 RX ORDER — PIPERACILLIN AND TAZOBACTAM 4; .5 G/20ML; G/20ML
3.38 INJECTION, POWDER, LYOPHILIZED, FOR SOLUTION INTRAVENOUS EVERY 6 HOURS
Refills: 0 | Status: DISCONTINUED | OUTPATIENT
Start: 2022-01-01 | End: 2022-01-01

## 2022-01-01 RX ORDER — BUMETANIDE 0.25 MG/ML
0.5 INJECTION INTRAMUSCULAR; INTRAVENOUS
Qty: 20 | Refills: 0 | Status: DISCONTINUED | OUTPATIENT
Start: 2022-01-01 | End: 2022-01-01

## 2022-01-01 RX ORDER — DEXTROSE 50 % IN WATER 50 %
15 SYRINGE (ML) INTRAVENOUS ONCE
Refills: 0 | Status: DISCONTINUED | OUTPATIENT
Start: 2022-01-01 | End: 2022-01-01

## 2022-01-01 RX ORDER — BUMETANIDE 0.25 MG/ML
1 INJECTION INTRAMUSCULAR; INTRAVENOUS
Qty: 20 | Refills: 0 | Status: DISCONTINUED | OUTPATIENT
Start: 2022-01-01 | End: 2022-01-01

## 2022-01-01 RX ORDER — CHLORHEXIDINE GLUCONATE 213 G/1000ML
1 SOLUTION TOPICAL
Refills: 0 | Status: DISCONTINUED | OUTPATIENT
Start: 2022-01-01 | End: 2022-01-01

## 2022-01-01 RX ORDER — BUMETANIDE 0.25 MG/ML
2 INJECTION INTRAMUSCULAR; INTRAVENOUS ONCE
Refills: 0 | Status: COMPLETED | OUTPATIENT
Start: 2022-01-01 | End: 2022-01-01

## 2022-01-01 RX ORDER — POLYETHYLENE GLYCOL 3350 17 G/17G
17 POWDER, FOR SOLUTION ORAL
Qty: 0 | Refills: 0 | DISCHARGE
Start: 2022-01-01

## 2022-01-01 RX ORDER — MONTELUKAST 4 MG/1
1 TABLET, CHEWABLE ORAL
Qty: 0 | Refills: 0 | DISCHARGE
Start: 2022-01-01

## 2022-01-01 RX ORDER — APIXABAN 2.5 MG/1
5 TABLET, FILM COATED ORAL EVERY 12 HOURS
Refills: 0 | Status: DISCONTINUED | OUTPATIENT
Start: 2022-01-01 | End: 2022-01-01

## 2022-01-01 RX ORDER — IPRATROPIUM/ALBUTEROL SULFATE 18-103MCG
3 AEROSOL WITH ADAPTER (GRAM) INHALATION ONCE
Refills: 0 | Status: COMPLETED | OUTPATIENT
Start: 2022-01-01 | End: 2022-01-01

## 2022-01-01 RX ORDER — POLYETHYLENE GLYCOL 3350 17 G/17G
17 POWDER, FOR SOLUTION ORAL EVERY 24 HOURS
Refills: 0 | Status: DISCONTINUED | OUTPATIENT
Start: 2022-01-01 | End: 2022-01-01

## 2022-01-01 RX ORDER — MONTELUKAST 4 MG/1
10 TABLET, CHEWABLE ORAL DAILY
Refills: 0 | Status: DISCONTINUED | OUTPATIENT
Start: 2022-01-01 | End: 2022-01-01

## 2022-01-01 RX ORDER — METOPROLOL TARTRATE 50 MG
12.5 TABLET ORAL ONCE
Refills: 0 | Status: COMPLETED | OUTPATIENT
Start: 2022-01-01 | End: 2022-01-01

## 2022-01-01 RX ORDER — METOPROLOL TARTRATE 50 MG
25 TABLET ORAL DAILY
Refills: 0 | Status: DISCONTINUED | OUTPATIENT
Start: 2022-01-01 | End: 2022-01-01

## 2022-01-01 RX ORDER — HEPARIN SODIUM 5000 [USP'U]/ML
7500 INJECTION INTRAVENOUS; SUBCUTANEOUS EVERY 8 HOURS
Refills: 0 | Status: DISCONTINUED | OUTPATIENT
Start: 2022-01-01 | End: 2022-01-01

## 2022-01-01 RX ORDER — ATORVASTATIN CALCIUM 80 MG/1
1 TABLET, FILM COATED ORAL
Qty: 0 | Refills: 0 | DISCHARGE
Start: 2022-01-01

## 2022-01-01 RX ORDER — APIXABAN 2.5 MG/1
10 TABLET, FILM COATED ORAL EVERY 12 HOURS
Refills: 0 | Status: COMPLETED | OUTPATIENT
Start: 2022-01-01 | End: 2022-01-01

## 2022-01-01 RX ORDER — ACETAZOLAMIDE 250 MG/1
250 TABLET ORAL ONCE
Refills: 0 | Status: COMPLETED | OUTPATIENT
Start: 2022-01-01 | End: 2022-01-01

## 2022-01-01 RX ORDER — SODIUM CHLORIDE 9 MG/ML
1000 INJECTION, SOLUTION INTRAVENOUS
Refills: 0 | Status: DISCONTINUED | OUTPATIENT
Start: 2022-01-01 | End: 2022-01-01

## 2022-01-01 RX ORDER — CEFTRIAXONE 500 MG/1
2000 INJECTION, POWDER, FOR SOLUTION INTRAMUSCULAR; INTRAVENOUS EVERY 24 HOURS
Refills: 0 | Status: COMPLETED | OUTPATIENT
Start: 2022-01-01 | End: 2022-01-01

## 2022-01-01 RX ORDER — POLYETHYLENE GLYCOL 3350 17 G/17G
17 POWDER, FOR SOLUTION ORAL
Refills: 0 | Status: DISCONTINUED | OUTPATIENT
Start: 2022-01-01 | End: 2022-01-01

## 2022-01-01 RX ORDER — GLUCAGON INJECTION, SOLUTION 0.5 MG/.1ML
1 INJECTION, SOLUTION SUBCUTANEOUS ONCE
Refills: 0 | Status: DISCONTINUED | OUTPATIENT
Start: 2022-01-01 | End: 2022-01-01

## 2022-01-01 RX ORDER — METOPROLOL TARTRATE 50 MG
25 TABLET ORAL EVERY 24 HOURS
Refills: 0 | Status: DISCONTINUED | OUTPATIENT
Start: 2022-01-01 | End: 2022-01-01

## 2022-01-01 RX ORDER — MAGNESIUM SULFATE 500 MG/ML
1 VIAL (ML) INJECTION ONCE
Refills: 0 | Status: COMPLETED | OUTPATIENT
Start: 2022-01-01 | End: 2022-01-01

## 2022-01-01 RX ORDER — APIXABAN 2.5 MG/1
1 TABLET, FILM COATED ORAL
Qty: 0 | Refills: 0 | DISCHARGE
Start: 2022-01-01

## 2022-01-01 RX ORDER — BUMETANIDE 0.25 MG/ML
1 INJECTION INTRAMUSCULAR; INTRAVENOUS ONCE
Refills: 0 | Status: COMPLETED | OUTPATIENT
Start: 2022-01-01 | End: 2022-01-01

## 2022-01-01 RX ADMIN — SPIRONOLACTONE 25 MILLIGRAM(S): 25 TABLET, FILM COATED ORAL at 11:34

## 2022-01-01 RX ADMIN — PIPERACILLIN AND TAZOBACTAM 25 GRAM(S): 4; .5 INJECTION, POWDER, LYOPHILIZED, FOR SOLUTION INTRAVENOUS at 08:54

## 2022-01-01 RX ADMIN — ATORVASTATIN CALCIUM 10 MILLIGRAM(S): 80 TABLET, FILM COATED ORAL at 22:06

## 2022-01-01 RX ADMIN — SPIRONOLACTONE 25 MILLIGRAM(S): 25 TABLET, FILM COATED ORAL at 14:42

## 2022-01-01 RX ADMIN — BUMETANIDE 5 MG/HR: 0.25 INJECTION INTRAMUSCULAR; INTRAVENOUS at 11:42

## 2022-01-01 RX ADMIN — Medication 6: at 22:32

## 2022-01-01 RX ADMIN — MONTELUKAST 10 MILLIGRAM(S): 4 TABLET, CHEWABLE ORAL at 11:55

## 2022-01-01 RX ADMIN — SENNA PLUS 2 TABLET(S): 8.6 TABLET ORAL at 22:06

## 2022-01-01 RX ADMIN — FINASTERIDE 5 MILLIGRAM(S): 5 TABLET, FILM COATED ORAL at 11:42

## 2022-01-01 RX ADMIN — APIXABAN 10 MILLIGRAM(S): 2.5 TABLET, FILM COATED ORAL at 19:05

## 2022-01-01 RX ADMIN — MONTELUKAST 10 MILLIGRAM(S): 4 TABLET, CHEWABLE ORAL at 11:16

## 2022-01-01 RX ADMIN — Medication 40 MILLIGRAM(S): at 06:12

## 2022-01-01 RX ADMIN — Medication 81 MILLIGRAM(S): at 11:27

## 2022-01-01 RX ADMIN — Medication 40 MILLIGRAM(S): at 06:24

## 2022-01-01 RX ADMIN — SPIRONOLACTONE 25 MILLIGRAM(S): 25 TABLET, FILM COATED ORAL at 12:09

## 2022-01-01 RX ADMIN — MONTELUKAST 10 MILLIGRAM(S): 4 TABLET, CHEWABLE ORAL at 11:33

## 2022-01-01 RX ADMIN — Medication 1000 MILLIGRAM(S): at 09:00

## 2022-01-01 RX ADMIN — SPIRONOLACTONE 25 MILLIGRAM(S): 25 TABLET, FILM COATED ORAL at 13:41

## 2022-01-01 RX ADMIN — CHLORHEXIDINE GLUCONATE 1 APPLICATION(S): 213 SOLUTION TOPICAL at 06:13

## 2022-01-01 RX ADMIN — Medication 2: at 17:18

## 2022-01-01 RX ADMIN — APIXABAN 5 MILLIGRAM(S): 2.5 TABLET, FILM COATED ORAL at 17:20

## 2022-01-01 RX ADMIN — Medication 2: at 22:22

## 2022-01-01 RX ADMIN — Medication 2: at 11:23

## 2022-01-01 RX ADMIN — PIPERACILLIN AND TAZOBACTAM 200 GRAM(S): 4; .5 INJECTION, POWDER, LYOPHILIZED, FOR SOLUTION INTRAVENOUS at 10:03

## 2022-01-01 RX ADMIN — BUDESONIDE AND FORMOTEROL FUMARATE DIHYDRATE 2 PUFF(S): 160; 4.5 AEROSOL RESPIRATORY (INHALATION) at 22:12

## 2022-01-01 RX ADMIN — ATORVASTATIN CALCIUM 10 MILLIGRAM(S): 80 TABLET, FILM COATED ORAL at 22:24

## 2022-01-01 RX ADMIN — SPIRONOLACTONE 25 MILLIGRAM(S): 25 TABLET, FILM COATED ORAL at 13:18

## 2022-01-01 RX ADMIN — PIPERACILLIN AND TAZOBACTAM 200 GRAM(S): 4; .5 INJECTION, POWDER, LYOPHILIZED, FOR SOLUTION INTRAVENOUS at 04:04

## 2022-01-01 RX ADMIN — Medication 2: at 06:21

## 2022-01-01 RX ADMIN — Medication 2: at 12:37

## 2022-01-01 RX ADMIN — APIXABAN 10 MILLIGRAM(S): 2.5 TABLET, FILM COATED ORAL at 18:16

## 2022-01-01 RX ADMIN — Medication 25 MILLIGRAM(S): at 06:11

## 2022-01-01 RX ADMIN — ATORVASTATIN CALCIUM 10 MILLIGRAM(S): 80 TABLET, FILM COATED ORAL at 22:29

## 2022-01-01 RX ADMIN — Medication 2: at 22:41

## 2022-01-01 RX ADMIN — BUMETANIDE 1 MILLIGRAM(S): 0.25 INJECTION INTRAMUSCULAR; INTRAVENOUS at 17:53

## 2022-01-01 RX ADMIN — Medication 1000 MILLIGRAM(S): at 16:45

## 2022-01-01 RX ADMIN — BUMETANIDE 1 MILLIGRAM(S): 0.25 INJECTION INTRAMUSCULAR; INTRAVENOUS at 06:02

## 2022-01-01 RX ADMIN — POLYETHYLENE GLYCOL 3350 17 GRAM(S): 17 POWDER, FOR SOLUTION ORAL at 18:16

## 2022-01-01 RX ADMIN — Medication 12.5 MILLIGRAM(S): at 12:29

## 2022-01-01 RX ADMIN — Medication 6: at 11:34

## 2022-01-01 RX ADMIN — Medication 4: at 12:18

## 2022-01-01 RX ADMIN — ACETAZOLAMIDE 105 MILLIGRAM(S): 250 TABLET ORAL at 10:10

## 2022-01-01 RX ADMIN — ATORVASTATIN CALCIUM 10 MILLIGRAM(S): 80 TABLET, FILM COATED ORAL at 21:53

## 2022-01-01 RX ADMIN — APIXABAN 5 MILLIGRAM(S): 2.5 TABLET, FILM COATED ORAL at 06:11

## 2022-01-01 RX ADMIN — FINASTERIDE 5 MILLIGRAM(S): 5 TABLET, FILM COATED ORAL at 22:29

## 2022-01-01 RX ADMIN — Medication 3 MILLILITER(S): at 16:30

## 2022-01-01 RX ADMIN — ACETAZOLAMIDE 105 MILLIGRAM(S): 250 TABLET ORAL at 18:18

## 2022-01-01 RX ADMIN — Medication 650 MILLIGRAM(S): at 15:56

## 2022-01-01 RX ADMIN — BUDESONIDE AND FORMOTEROL FUMARATE DIHYDRATE 2 PUFF(S): 160; 4.5 AEROSOL RESPIRATORY (INHALATION) at 08:49

## 2022-01-01 RX ADMIN — BUMETANIDE 5 MG/HR: 0.25 INJECTION INTRAMUSCULAR; INTRAVENOUS at 03:52

## 2022-01-01 RX ADMIN — Medication 4: at 21:20

## 2022-01-01 RX ADMIN — Medication 80 MILLIGRAM(S): at 16:30

## 2022-01-01 RX ADMIN — MONTELUKAST 10 MILLIGRAM(S): 4 TABLET, CHEWABLE ORAL at 11:27

## 2022-01-01 RX ADMIN — FINASTERIDE 5 MILLIGRAM(S): 5 TABLET, FILM COATED ORAL at 12:17

## 2022-01-01 RX ADMIN — Medication 2: at 11:54

## 2022-01-01 RX ADMIN — BUDESONIDE AND FORMOTEROL FUMARATE DIHYDRATE 2 PUFF(S): 160; 4.5 AEROSOL RESPIRATORY (INHALATION) at 22:29

## 2022-01-01 RX ADMIN — Medication 400 MILLIGRAM(S): at 08:15

## 2022-01-01 RX ADMIN — Medication 100 MILLIEQUIVALENT(S): at 10:10

## 2022-01-01 RX ADMIN — BUDESONIDE AND FORMOTEROL FUMARATE DIHYDRATE 2 PUFF(S): 160; 4.5 AEROSOL RESPIRATORY (INHALATION) at 14:06

## 2022-01-01 RX ADMIN — MONTELUKAST 10 MILLIGRAM(S): 4 TABLET, CHEWABLE ORAL at 12:17

## 2022-01-01 RX ADMIN — Medication 2: at 21:58

## 2022-01-01 RX ADMIN — Medication 20 MILLIEQUIVALENT(S): at 07:54

## 2022-01-01 RX ADMIN — Medication 81 MILLIGRAM(S): at 13:18

## 2022-01-01 RX ADMIN — SENNA PLUS 2 TABLET(S): 8.6 TABLET ORAL at 22:27

## 2022-01-01 RX ADMIN — APIXABAN 5 MILLIGRAM(S): 2.5 TABLET, FILM COATED ORAL at 17:49

## 2022-01-01 RX ADMIN — Medication 81 MILLIGRAM(S): at 11:42

## 2022-01-01 RX ADMIN — HEPARIN SODIUM 7500 UNIT(S): 5000 INJECTION INTRAVENOUS; SUBCUTANEOUS at 00:15

## 2022-01-01 RX ADMIN — Medication 2: at 07:51

## 2022-01-01 RX ADMIN — CHLORHEXIDINE GLUCONATE 1 APPLICATION(S): 213 SOLUTION TOPICAL at 19:45

## 2022-01-01 RX ADMIN — Medication 20 MILLIEQUIVALENT(S): at 06:56

## 2022-01-01 RX ADMIN — CHLORHEXIDINE GLUCONATE 1 APPLICATION(S): 213 SOLUTION TOPICAL at 06:08

## 2022-01-01 RX ADMIN — Medication 1000 MILLIGRAM(S): at 10:23

## 2022-01-01 RX ADMIN — CEFTRIAXONE 100 MILLIGRAM(S): 500 INJECTION, POWDER, FOR SOLUTION INTRAMUSCULAR; INTRAVENOUS at 14:02

## 2022-01-01 RX ADMIN — ATORVASTATIN CALCIUM 10 MILLIGRAM(S): 80 TABLET, FILM COATED ORAL at 22:27

## 2022-01-01 RX ADMIN — BUMETANIDE 5 MG/HR: 0.25 INJECTION INTRAMUSCULAR; INTRAVENOUS at 05:13

## 2022-01-01 RX ADMIN — SPIRONOLACTONE 25 MILLIGRAM(S): 25 TABLET, FILM COATED ORAL at 14:06

## 2022-01-01 RX ADMIN — Medication 2: at 17:08

## 2022-01-01 RX ADMIN — BUDESONIDE AND FORMOTEROL FUMARATE DIHYDRATE 2 PUFF(S): 160; 4.5 AEROSOL RESPIRATORY (INHALATION) at 01:41

## 2022-01-01 RX ADMIN — APIXABAN 10 MILLIGRAM(S): 2.5 TABLET, FILM COATED ORAL at 06:03

## 2022-01-01 RX ADMIN — Medication 13.8 MICROGRAM(S)/KG/MIN: at 18:56

## 2022-01-01 RX ADMIN — Medication 650 MILLIGRAM(S): at 21:58

## 2022-01-01 RX ADMIN — POLYETHYLENE GLYCOL 3350 17 GRAM(S): 17 POWDER, FOR SOLUTION ORAL at 10:24

## 2022-01-01 RX ADMIN — BUDESONIDE AND FORMOTEROL FUMARATE DIHYDRATE 2 PUFF(S): 160; 4.5 AEROSOL RESPIRATORY (INHALATION) at 10:06

## 2022-01-01 RX ADMIN — APIXABAN 5 MILLIGRAM(S): 2.5 TABLET, FILM COATED ORAL at 06:01

## 2022-01-01 RX ADMIN — POLYETHYLENE GLYCOL 3350 17 GRAM(S): 17 POWDER, FOR SOLUTION ORAL at 05:19

## 2022-01-01 RX ADMIN — ATORVASTATIN CALCIUM 10 MILLIGRAM(S): 80 TABLET, FILM COATED ORAL at 22:11

## 2022-01-01 RX ADMIN — APIXABAN 10 MILLIGRAM(S): 2.5 TABLET, FILM COATED ORAL at 06:09

## 2022-01-01 RX ADMIN — Medication 2: at 17:20

## 2022-01-01 RX ADMIN — BUMETANIDE 2.5 MG/HR: 0.25 INJECTION INTRAMUSCULAR; INTRAVENOUS at 11:01

## 2022-01-01 RX ADMIN — FINASTERIDE 5 MILLIGRAM(S): 5 TABLET, FILM COATED ORAL at 12:24

## 2022-01-01 RX ADMIN — APIXABAN 10 MILLIGRAM(S): 2.5 TABLET, FILM COATED ORAL at 19:37

## 2022-01-01 RX ADMIN — APIXABAN 10 MILLIGRAM(S): 2.5 TABLET, FILM COATED ORAL at 06:24

## 2022-01-01 RX ADMIN — BUDESONIDE AND FORMOTEROL FUMARATE DIHYDRATE 2 PUFF(S): 160; 4.5 AEROSOL RESPIRATORY (INHALATION) at 09:01

## 2022-01-01 RX ADMIN — Medication 650 MILLIGRAM(S): at 22:45

## 2022-01-01 RX ADMIN — BUDESONIDE AND FORMOTEROL FUMARATE DIHYDRATE 2 PUFF(S): 160; 4.5 AEROSOL RESPIRATORY (INHALATION) at 22:28

## 2022-01-01 RX ADMIN — Medication 1000 MILLIGRAM(S): at 17:00

## 2022-01-01 RX ADMIN — BUDESONIDE AND FORMOTEROL FUMARATE DIHYDRATE 2 PUFF(S): 160; 4.5 AEROSOL RESPIRATORY (INHALATION) at 22:06

## 2022-01-01 RX ADMIN — POLYETHYLENE GLYCOL 3350 17 GRAM(S): 17 POWDER, FOR SOLUTION ORAL at 19:05

## 2022-01-01 RX ADMIN — Medication 4: at 22:06

## 2022-01-01 RX ADMIN — Medication 81 MILLIGRAM(S): at 12:16

## 2022-01-01 RX ADMIN — SENNA PLUS 2 TABLET(S): 8.6 TABLET ORAL at 22:24

## 2022-01-01 RX ADMIN — FINASTERIDE 5 MILLIGRAM(S): 5 TABLET, FILM COATED ORAL at 22:24

## 2022-01-01 RX ADMIN — ATORVASTATIN CALCIUM 10 MILLIGRAM(S): 80 TABLET, FILM COATED ORAL at 22:26

## 2022-01-01 RX ADMIN — Medication 100 MILLIEQUIVALENT(S): at 07:53

## 2022-01-01 RX ADMIN — CHLORHEXIDINE GLUCONATE 1 APPLICATION(S): 213 SOLUTION TOPICAL at 06:33

## 2022-01-01 RX ADMIN — MONTELUKAST 10 MILLIGRAM(S): 4 TABLET, CHEWABLE ORAL at 11:42

## 2022-01-01 RX ADMIN — MONTELUKAST 10 MILLIGRAM(S): 4 TABLET, CHEWABLE ORAL at 11:54

## 2022-01-01 RX ADMIN — Medication 25 GRAM(S): at 01:27

## 2022-01-01 RX ADMIN — BUMETANIDE 5 MG/HR: 0.25 INJECTION INTRAMUSCULAR; INTRAVENOUS at 23:29

## 2022-01-01 RX ADMIN — BUDESONIDE AND FORMOTEROL FUMARATE DIHYDRATE 2 PUFF(S): 160; 4.5 AEROSOL RESPIRATORY (INHALATION) at 21:53

## 2022-01-01 RX ADMIN — BUDESONIDE AND FORMOTEROL FUMARATE DIHYDRATE 2 PUFF(S): 160; 4.5 AEROSOL RESPIRATORY (INHALATION) at 02:46

## 2022-01-01 RX ADMIN — CHLORHEXIDINE GLUCONATE 1 APPLICATION(S): 213 SOLUTION TOPICAL at 08:21

## 2022-01-01 RX ADMIN — BUMETANIDE 2 MILLIGRAM(S): 0.25 INJECTION INTRAMUSCULAR; INTRAVENOUS at 18:30

## 2022-01-01 RX ADMIN — HEPARIN SODIUM 7500 UNIT(S): 5000 INJECTION INTRAVENOUS; SUBCUTANEOUS at 14:05

## 2022-01-01 RX ADMIN — HEPARIN SODIUM 7500 UNIT(S): 5000 INJECTION INTRAVENOUS; SUBCUTANEOUS at 07:53

## 2022-01-01 RX ADMIN — MONTELUKAST 10 MILLIGRAM(S): 4 TABLET, CHEWABLE ORAL at 12:24

## 2022-01-01 RX ADMIN — Medication 2: at 06:52

## 2022-01-01 RX ADMIN — FINASTERIDE 5 MILLIGRAM(S): 5 TABLET, FILM COATED ORAL at 13:18

## 2022-01-01 RX ADMIN — CEFTRIAXONE 100 MILLIGRAM(S): 500 INJECTION, POWDER, FOR SOLUTION INTRAMUSCULAR; INTRAVENOUS at 16:17

## 2022-01-01 RX ADMIN — POLYETHYLENE GLYCOL 3350 17 GRAM(S): 17 POWDER, FOR SOLUTION ORAL at 11:54

## 2022-01-01 RX ADMIN — Medication 2: at 16:59

## 2022-01-01 RX ADMIN — APIXABAN 10 MILLIGRAM(S): 2.5 TABLET, FILM COATED ORAL at 18:04

## 2022-01-01 RX ADMIN — Medication 100 MILLIEQUIVALENT(S): at 07:32

## 2022-01-01 RX ADMIN — BUDESONIDE AND FORMOTEROL FUMARATE DIHYDRATE 2 PUFF(S): 160; 4.5 AEROSOL RESPIRATORY (INHALATION) at 21:57

## 2022-01-01 RX ADMIN — Medication 400 MILLIGRAM(S): at 16:30

## 2022-01-01 RX ADMIN — Medication 81 MILLIGRAM(S): at 12:29

## 2022-01-01 RX ADMIN — APIXABAN 10 MILLIGRAM(S): 2.5 TABLET, FILM COATED ORAL at 17:30

## 2022-01-01 RX ADMIN — Medication 25 MILLIGRAM(S): at 11:54

## 2022-01-01 RX ADMIN — Medication 25 MILLIGRAM(S): at 06:24

## 2022-01-01 RX ADMIN — Medication 12.5 MILLIGRAM(S): at 17:53

## 2022-01-01 RX ADMIN — BUDESONIDE AND FORMOTEROL FUMARATE DIHYDRATE 2 PUFF(S): 160; 4.5 AEROSOL RESPIRATORY (INHALATION) at 21:19

## 2022-01-01 RX ADMIN — APIXABAN 10 MILLIGRAM(S): 2.5 TABLET, FILM COATED ORAL at 16:54

## 2022-01-01 RX ADMIN — PIPERACILLIN AND TAZOBACTAM 200 GRAM(S): 4; .5 INJECTION, POWDER, LYOPHILIZED, FOR SOLUTION INTRAVENOUS at 21:54

## 2022-01-01 RX ADMIN — BUMETANIDE 5 MG/HR: 0.25 INJECTION INTRAMUSCULAR; INTRAVENOUS at 19:01

## 2022-01-01 RX ADMIN — APIXABAN 10 MILLIGRAM(S): 2.5 TABLET, FILM COATED ORAL at 06:11

## 2022-01-01 RX ADMIN — Medication 400 MILLIGRAM(S): at 10:08

## 2022-01-01 RX ADMIN — Medication 2: at 22:28

## 2022-01-01 RX ADMIN — Medication 40 MILLIGRAM(S): at 06:01

## 2022-01-01 RX ADMIN — Medication 40 MILLIGRAM(S): at 06:09

## 2022-01-01 RX ADMIN — Medication 100 MILLIEQUIVALENT(S): at 08:54

## 2022-01-01 RX ADMIN — APIXABAN 10 MILLIGRAM(S): 2.5 TABLET, FILM COATED ORAL at 06:08

## 2022-01-01 RX ADMIN — Medication 81 MILLIGRAM(S): at 11:34

## 2022-01-01 RX ADMIN — MONTELUKAST 10 MILLIGRAM(S): 4 TABLET, CHEWABLE ORAL at 12:30

## 2022-01-01 RX ADMIN — BUDESONIDE AND FORMOTEROL FUMARATE DIHYDRATE 2 PUFF(S): 160; 4.5 AEROSOL RESPIRATORY (INHALATION) at 10:14

## 2022-01-01 RX ADMIN — Medication 2: at 17:17

## 2022-01-01 RX ADMIN — POLYETHYLENE GLYCOL 3350 17 GRAM(S): 17 POWDER, FOR SOLUTION ORAL at 17:08

## 2022-01-01 RX ADMIN — POLYETHYLENE GLYCOL 3350 17 GRAM(S): 17 POWDER, FOR SOLUTION ORAL at 06:07

## 2022-01-01 RX ADMIN — SENNA PLUS 2 TABLET(S): 8.6 TABLET ORAL at 22:30

## 2022-01-01 RX ADMIN — APIXABAN 5 MILLIGRAM(S): 2.5 TABLET, FILM COATED ORAL at 05:19

## 2022-01-01 RX ADMIN — Medication 81 MILLIGRAM(S): at 11:54

## 2022-01-01 RX ADMIN — APIXABAN 10 MILLIGRAM(S): 2.5 TABLET, FILM COATED ORAL at 17:53

## 2022-01-01 RX ADMIN — HEPARIN SODIUM 7500 UNIT(S): 5000 INJECTION INTRAVENOUS; SUBCUTANEOUS at 22:11

## 2022-01-01 RX ADMIN — Medication 2: at 11:40

## 2022-01-01 RX ADMIN — MONTELUKAST 10 MILLIGRAM(S): 4 TABLET, CHEWABLE ORAL at 13:18

## 2022-01-01 RX ADMIN — BUMETANIDE 5 MG/HR: 0.25 INJECTION INTRAMUSCULAR; INTRAVENOUS at 09:38

## 2022-01-01 RX ADMIN — Medication 4: at 16:53

## 2022-01-01 RX ADMIN — SENNA PLUS 2 TABLET(S): 8.6 TABLET ORAL at 21:20

## 2022-01-01 RX ADMIN — PIPERACILLIN AND TAZOBACTAM 200 GRAM(S): 4; .5 INJECTION, POWDER, LYOPHILIZED, FOR SOLUTION INTRAVENOUS at 16:54

## 2022-01-01 RX ADMIN — POLYETHYLENE GLYCOL 3350 17 GRAM(S): 17 POWDER, FOR SOLUTION ORAL at 06:24

## 2022-01-01 RX ADMIN — POLYETHYLENE GLYCOL 3350 17 GRAM(S): 17 POWDER, FOR SOLUTION ORAL at 17:53

## 2022-01-01 RX ADMIN — ATORVASTATIN CALCIUM 10 MILLIGRAM(S): 80 TABLET, FILM COATED ORAL at 21:20

## 2022-01-01 RX ADMIN — BUDESONIDE AND FORMOTEROL FUMARATE DIHYDRATE 2 PUFF(S): 160; 4.5 AEROSOL RESPIRATORY (INHALATION) at 21:33

## 2022-01-01 RX ADMIN — Medication 2: at 22:51

## 2022-01-01 RX ADMIN — Medication 81 MILLIGRAM(S): at 12:09

## 2022-01-01 RX ADMIN — POLYETHYLENE GLYCOL 3350 17 GRAM(S): 17 POWDER, FOR SOLUTION ORAL at 06:02

## 2022-01-01 RX ADMIN — CHLORHEXIDINE GLUCONATE 1 APPLICATION(S): 213 SOLUTION TOPICAL at 06:22

## 2022-01-01 RX ADMIN — Medication 81 MILLIGRAM(S): at 11:56

## 2022-01-01 RX ADMIN — BUMETANIDE 1 MILLIGRAM(S): 0.25 INJECTION INTRAMUSCULAR; INTRAVENOUS at 10:03

## 2022-01-01 RX ADMIN — Medication 25 MILLIGRAM(S): at 05:19

## 2022-01-01 RX ADMIN — Medication 4: at 17:45

## 2022-01-01 RX ADMIN — BUDESONIDE AND FORMOTEROL FUMARATE DIHYDRATE 2 PUFF(S): 160; 4.5 AEROSOL RESPIRATORY (INHALATION) at 22:27

## 2022-01-01 RX ADMIN — ATORVASTATIN CALCIUM 10 MILLIGRAM(S): 80 TABLET, FILM COATED ORAL at 22:15

## 2022-01-01 RX ADMIN — FINASTERIDE 5 MILLIGRAM(S): 5 TABLET, FILM COATED ORAL at 22:30

## 2022-01-01 RX ADMIN — SPIRONOLACTONE 25 MILLIGRAM(S): 25 TABLET, FILM COATED ORAL at 11:57

## 2022-01-01 RX ADMIN — Medication 400 MILLIGRAM(S): at 15:54

## 2022-01-01 RX ADMIN — Medication 81 MILLIGRAM(S): at 12:23

## 2022-01-01 RX ADMIN — ATORVASTATIN CALCIUM 10 MILLIGRAM(S): 80 TABLET, FILM COATED ORAL at 21:59

## 2022-01-01 RX ADMIN — SPIRONOLACTONE 25 MILLIGRAM(S): 25 TABLET, FILM COATED ORAL at 11:54

## 2022-01-01 RX ADMIN — Medication 650 MILLIGRAM(S): at 16:15

## 2022-01-01 RX ADMIN — BUDESONIDE AND FORMOTEROL FUMARATE DIHYDRATE 2 PUFF(S): 160; 4.5 AEROSOL RESPIRATORY (INHALATION) at 09:38

## 2022-01-01 RX ADMIN — BUDESONIDE AND FORMOTEROL FUMARATE DIHYDRATE 2 PUFF(S): 160; 4.5 AEROSOL RESPIRATORY (INHALATION) at 11:55

## 2022-01-01 RX ADMIN — APIXABAN 10 MILLIGRAM(S): 2.5 TABLET, FILM COATED ORAL at 05:48

## 2022-01-01 RX ADMIN — Medication 2: at 12:32

## 2022-01-01 RX ADMIN — MONTELUKAST 10 MILLIGRAM(S): 4 TABLET, CHEWABLE ORAL at 12:09

## 2022-01-01 RX ADMIN — Medication 12.5 MILLIGRAM(S): at 06:08

## 2022-01-01 RX ADMIN — Medication 6: at 11:56

## 2022-01-01 RX ADMIN — FINASTERIDE 5 MILLIGRAM(S): 5 TABLET, FILM COATED ORAL at 22:06

## 2022-01-01 RX ADMIN — CEFTRIAXONE 100 MILLIGRAM(S): 500 INJECTION, POWDER, FOR SOLUTION INTRAMUSCULAR; INTRAVENOUS at 15:49

## 2022-01-01 RX ADMIN — CEFTRIAXONE 100 MILLIGRAM(S): 500 INJECTION, POWDER, FOR SOLUTION INTRAMUSCULAR; INTRAVENOUS at 15:56

## 2022-01-01 RX ADMIN — BUMETANIDE 1 MILLIGRAM(S): 0.25 INJECTION INTRAMUSCULAR; INTRAVENOUS at 19:25

## 2022-01-01 RX ADMIN — SENNA PLUS 2 TABLET(S): 8.6 TABLET ORAL at 22:29

## 2022-01-01 RX ADMIN — HEPARIN SODIUM 7500 UNIT(S): 5000 INJECTION INTRAVENOUS; SUBCUTANEOUS at 13:17

## 2022-01-01 RX ADMIN — Medication 2: at 11:22

## 2022-01-01 RX ADMIN — BUDESONIDE AND FORMOTEROL FUMARATE DIHYDRATE 2 PUFF(S): 160; 4.5 AEROSOL RESPIRATORY (INHALATION) at 12:01

## 2022-01-01 RX ADMIN — BUDESONIDE AND FORMOTEROL FUMARATE DIHYDRATE 2 PUFF(S): 160; 4.5 AEROSOL RESPIRATORY (INHALATION) at 10:12

## 2022-01-01 RX ADMIN — APIXABAN 5 MILLIGRAM(S): 2.5 TABLET, FILM COATED ORAL at 17:08

## 2022-01-01 RX ADMIN — Medication 40 MILLIGRAM(S): at 05:19

## 2022-01-01 RX ADMIN — Medication 100 GRAM(S): at 00:52

## 2022-01-01 RX ADMIN — MONTELUKAST 10 MILLIGRAM(S): 4 TABLET, CHEWABLE ORAL at 11:57

## 2022-01-01 RX ADMIN — Medication 81 MILLIGRAM(S): at 11:55

## 2022-01-01 RX ADMIN — HEPARIN SODIUM 7500 UNIT(S): 5000 INJECTION INTRAVENOUS; SUBCUTANEOUS at 06:11

## 2022-01-01 RX ADMIN — FINASTERIDE 5 MILLIGRAM(S): 5 TABLET, FILM COATED ORAL at 22:27

## 2022-01-01 RX ADMIN — APIXABAN 10 MILLIGRAM(S): 2.5 TABLET, FILM COATED ORAL at 05:16

## 2022-01-01 RX ADMIN — Medication 4: at 17:39

## 2022-01-01 RX ADMIN — Medication 81 MILLIGRAM(S): at 11:16

## 2022-01-01 RX ADMIN — FINASTERIDE 5 MILLIGRAM(S): 5 TABLET, FILM COATED ORAL at 21:20

## 2022-01-01 RX ADMIN — Medication 100 MILLIEQUIVALENT(S): at 06:13

## 2022-01-01 RX ADMIN — Medication 2: at 22:29

## 2022-01-25 NOTE — REVIEW OF SYSTEMS
[Joint Pain] : joint pain [Fever] : no fever [Weight Gain (___ Lbs)] : no recent weight gain [Chills] : no chills [SOB] : no shortness of breath [Dyspnea on exertion] : not dyspnea during exertion [Chest Discomfort] : no chest discomfort [Lower Ext Edema] : lower extremity edema [Palpitations] : no palpitations [Orthopnea] : no orthopnea [PND] : no PND [Syncope] : no syncope [Cough] : no cough [Wheezing] : no wheezing [Abdominal Pain] : no abdominal pain [Nausea] : no nausea [Vomiting] : no vomiting [Change in Appetite] : no change in appetite [Diarrhea] : diarrhea [Confusion] : no confusion was observed [Depression] : no depression

## 2022-01-25 NOTE — PHYSICAL EXAM
[Well Developed] : well developed [Well Nourished] : well nourished [No Acute Distress] : no acute distress [Obese] : obese [Normal Venous Pressure] : normal venous pressure [Normal S1, S2] : normal S1, S2 [Clear Lung Fields] : clear lung fields [No Respiratory Distress] : no respiratory distress  [Soft] : abdomen soft [Non Tender] : non-tender [No Masses/organomegaly] : no masses/organomegaly [Moves all extremities] : moves all extremities [Alert and Oriented] : alert and oriented [Normal memory] : normal memory [Edema ___] : edema [unfilled]

## 2022-01-25 NOTE — DISCUSSION/SUMMARY
[FreeTextEntry1] : 1.Chronic diastolic CHF - mildly overloaded on exam today, no respiratory distress\par -Increase Torsemide to 20 PO BID x 1 week\par -Check CMP and CBC today\par -c/w Toprol XL 25 daily \par -Counseled pt. on low salt diet and increasing activity as tolerated\par -Telehealth visit in 1 week - next Tuesday\par

## 2022-01-25 NOTE — HISTORY OF PRESENT ILLNESS
[FreeTextEntry1] : 89M, morbidly obese w/ h/o CHB s/p PPM, chronic dCHF presenting for follow up after recent hospitalization\par \par Mr. Samuels presents today for follow up. At his most recent visit in December he had presented from [facility] after a recent hospitalization for dCHF exacerbation at [facility]. There he was diuresed and eventually discharged on Torsemide 30 PO daily. At our visit he was found to be dry on exam w/ a new JUVE on labs. We made the decision to hold the Torsemide for 48 hrs and restart at his regular dose of Torsemide 10 mg daily.\par \par Today he feels well but him and his daughter do note increased lower extremity swelling. He denies any complaints of SOB. LE edema, orthopnea or PND. He otherwise denies any CP, palpitations, dysuria or lightheadedness.

## 2022-02-10 PROBLEM — I50.32 CHRONIC DIASTOLIC CONGESTIVE HEART FAILURE: Status: ACTIVE | Noted: 2020-12-08

## 2022-02-10 NOTE — DISCUSSION/SUMMARY
[FreeTextEntry1] : 1.Chronic diastolic CHF \par -Reduce Torsemide back to 20 PO daily\par -f/u CMP from last week showed improved renal function since December\par -c/w Toprol XL 25 daily \par -Counseled pt. on low salt diet and increasing activity as tolerated\par -f/u in 6-8weeks\par

## 2022-02-10 NOTE — HISTORY OF PRESENT ILLNESS
[FreeTextEntry1] : 89M, morbidly obese w/ h/o CHB s/p PPM, chronic dCHF presenting for telehealth visit after recent titration of diuretics\par \par At his last visit on 1/25/22, Mr. Samuels was noted to be mildly overloaded on exam, w/ 1+ b/l lower extremity edema. He otherwise denied any SOB, orthopnea, PND or wt. gain. We decided to increase his Torsemide to 20mg twice daily for one week and reassess. \par \par Today he feels well. He denies any new complaints and the LE edema has significantly improved. He otherwise denies any CP/SOB, palpitations, orthopnea, PND, lightheadedness or syncope.

## 2022-02-10 NOTE — REASON FOR VISIT
[Home] : at home, [unfilled] , at the time of the visit. [Medical Office: ([facility])___] : at the medical office located in  [Family Member] : family member [Verbal consent obtained from patient] : the patient, [unfilled] [Cardiac Failure] : cardiac failure

## 2022-06-03 NOTE — ED PROVIDER NOTE - WR INTERPRETATION 1
Imaging reviewed.  Left pathologic femur fracture 2/2 multiple myeloma.  On hospice for 8 years, now wishes for full treatment as of ~1 week ago.    Plan:  - Recommend L femur/hip nail for L subtroch femur fracture  - Keep NPO  - Ortho consult to follow   cardiomegaly, effusion, chf

## 2022-06-03 NOTE — H&P ADULT - NSHPPHYSICALEXAM_GEN_ALL_CORE
PHYSICAL EXAM:    Constitutional: Elderly male NAD  HEENT: PERRL, EOMI, sclera non-icteric, MMM  Neck: No JVD  Respiratory: CTA b/l. Breathing comfortable on BiPap 10/5  Cardiovascular: Distant heart sounds. RRR, +S1S2, no M/R/G  Gastrointestinal: soft, obese no masses palpable, BS normal. No suprapubic tenderness  Extremities: 2+ edema feet to knees. Warm feet, Edematous cold hands.  Vascular: 2+ DP, radial.   : Scrotal swelling and erythema. Stoll in place draining clear-yellow urine  Neurological: AAOx3, Sensation equal and intact to light touch  Skin: Normal temperature, warm, dry

## 2022-06-03 NOTE — PATIENT PROFILE ADULT - FUNCTIONAL ASSESSMENT - DAILY ACTIVITY 5.
3 = A little assistance

## 2022-06-03 NOTE — ED ADULT NURSE NOTE - OBJECTIVE STATEMENT
pt transferred from [facility] with SOB on minimal exertion , low SaO2 , pt alert and oriented x 3 , denies any CP

## 2022-06-03 NOTE — ED PROVIDER NOTE - PHYSICAL EXAMINATION
VITAL SIGNS: I have reviewed nursing notes and confirm.  CONSTITUTIONAL: Well-developed; obese, slightly somnolent, + resp distress w tachypnea  SKIN:  warm and dry, no acute rash.   HEAD:  normocephalic, atraumatic.  EYES: PERRL, EOM intact; conjunctiva and sclera clear.  ENT: No nasal discharge; airway clear.   NECK: Supple; non tender.  CARD: S1, S2 normal; no murmurs, gallops, or rubs. Regular rate and rhythm.   RESP:  decreased air movement, crackles 2/3 up bilat  ABD: Normal bowel sounds; soft; non-distended; non-tender; no guarding/ rebound. no sig suprapubic distension  MSK: Normal ROM. No clubbing, cyanosis, 4+ rle, 3+ lle edema. no ttp bilat le  NEURO:  oriented, grossly unremarkable  PSYCH: Cooperative, mood and affect appropriate.

## 2022-06-03 NOTE — ED ADULT NURSE NOTE - NSIMPLEMENTINTERV_GEN_ALL_ED
Implemented All Fall with Harm Risk Interventions:  [place] to call system. Call bell, personal items and telephone within reach. Instruct patient to call for assistance. Room bathroom lighting operational. Non-slip footwear when patient is off stretcher. Physically safe environment: no spills, clutter or unnecessary equipment. Stretcher in lowest position, wheels locked, appropriate side rails in place. Provide visual cue, wrist band, yellow gown, etc. Monitor gait and stability. Monitor for mental status changes and reorient to person, place, and time. Review medications for side effects contributing to fall risk. Reinforce activity limits and safety measures with patient and family. Provide visual clues: red socks.

## 2022-06-03 NOTE — ED ADULT TRIAGE NOTE - HISTORY OF COVID-19 VACCINATION
Problem: Adult Inpatient Plan of Care  Goal: Plan of Care Review  Recent Flowsheet Documentation  Taken 10/1/2020 2330 by Wicho Johnson, PT  Plan of Care Reviewed With: patient  Outcome Summary: PT IE complete.  Min/mod A x2 bed mobility.  Cg A sit<>stand.  After a few steps forward, pt became faint and dizzy.  Returned to bed safely.  R knee AAROM 12-62 degrees.  PT to provide gait, exercise, ROM.  Recommend home health at [place].  Thank you for referral.      Yes

## 2022-06-03 NOTE — ED ADULT TRIAGE NOTE - CHIEF COMPLAINT QUOTE
Pt BIBEMS co abdominal pain and SOB worsening over the past few days. Pt arrives on 4L NC applied via EMS and reports improvement. HX of T2DM not requiring insulin, CHF. Denies fevers, chills, CP, pain with urination. unsure if he has been having fevers or chills.

## 2022-06-03 NOTE — ED PROVIDER NOTE - NSICDXPASTMEDICALHX_GEN_ALL_CORE_FT
Problem: Patient Care Overview  Goal: Plan of Care/Patient Progress Review  Speech Language Therapy Discharge Summary    Reason for therapy discharge:    Patient/family request discontinuation of services.    Progress towards therapy goal(s). See goals on Care Plan in [facility] electronic health record for goal details.  Goals partially met.  Barriers to achieving goals:   lethargy, confusion.    Therapy recommendation(s):    No further therapy is recommended. Consulted with MD who reports patient's family would like patient to continue regular diet/thin liquids despite aspiration risks. No further treatment warranted at this time per family request. Will sign off at this time.         PAST MEDICAL HISTORY:  Acute myocardial infarction     Acute on chronic systolic congestive heart failure     JUVE (acute kidney injury)     Asthma     Diabetes     Dysphagia     H/O pulmonary edema     HLD (hyperlipidemia)     HTN (hypertension)     TRACIE (obstructive sleep apnea)     Prostate disorder     Trifascicular block

## 2022-06-03 NOTE — PATIENT PROFILE ADULT - DO YOU NEED ADDITIONAL SERVICES TO MANAGE ANY OF THESE MEDICAL CONDITIONS AT HOME?
Date of Service: 05/09/2019    ORTHOPEDIC CLINIC NOTE    HISTORY OF PRESENT ILLNESS:  Chriss presents to my clinic for evaluation of his left knee.  He is a pleasant 70-year-old gentleman who has been having pain along the lateral aspect of his knee.  His pain has been worse with activity and is relieved with rest.  I was concerned about a meniscus tear and had subsequently ordered an MRI.  He comes in today to discuss the results.    PHYSICAL EXAMINATION:  He is noted to be in no apparent distress.  His respirations are unlabored.  His skin is warm and dry.  His thought content, mood and affect all appear to be normal.    On examination of his left knee, he demonstrates full knee range of motion.  He is able to walk with a normal gait.    IMAGING:  MRI findings were reviewed.  His MRI demonstrates a radial tear involving his lateral meniscus.  He otherwise demonstrates no tears of his medial meniscus.  He does have evidence of an old enchondroma that was likely biopsied.  Chriss demonstrates no other abnormalities.    ASSESSMENT AND PLAN:  At this point, I discussed conservative and surgical measures with Chriss.  He is interested in getting his knee scoped.  We discussed the nature of the procedure as well as the postoperative rehabilitation.  He was thus signed up for surgery.  He had no further questions.      Dictated By: Christine Barnhart MD  Signing Provider: MD GENEVIEVE Duong/thuy ([id number])  DD: 05/09/2019 10:42:23 TD: 05/11/2019 07:17:47    Copy Sent To:     cc: Janay VAUGHN   yes

## 2022-06-03 NOTE — ED PROVIDER NOTE - OBJECTIVE STATEMENT
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 Brown), chronic CHF (EF 60% echo 2020), TRACIE c/o increased sob, also noted chronic cough but w new hemoptysis.  + le edema R > L at baseline, less severe than prior admit.  Pt was admitted at [facility] 5/1-19 and then dc'd to [facility].  [facility] notes indicate pt poorly compliant w cpap, 1-2 d blood tinged sputum, sat 88-91% w o2 (dtr reports pt on 2-4 L usually at night), + langston.  No cp, palpitations, uri sx, sick contacts.  Pt also c/o lower abd burning and urinary discomfort. + cpr, no comment on intubation on pt's MOLST from NH.

## 2022-06-03 NOTE — H&P ADULT - NSICDXPASTMEDICALHX_GEN_ALL_CORE_FT
PAST MEDICAL HISTORY:  Acute myocardial infarction     Acute on chronic systolic congestive heart failure     Asthma     Diabetes     Dysphagia     HLD (hyperlipidemia)     HTN (hypertension)     TRACIE (obstructive sleep apnea)     Prostate disorder     Trifascicular block

## 2022-06-03 NOTE — ED PROVIDER NOTE - CARE PLAN
Principal Discharge DX:	Acute respiratory failure with hypercapnia  Secondary Diagnosis:	Acute CHF   1

## 2022-06-03 NOTE — H&P ADULT - ASSESSMENT
89 yo male, patient of Dr. Tuttle's, h/o HTN, HLD, NIDDM, asthma, (denies intubations/hospitalizations), BPH, hx of [place] s/p Medtronic PPM 10/30/2018 (followed by Dr. Brown), chronic CHF (EF 60% echo 2020), TRACIE who presented from rehab with c/o SOB and reported hypoxia found to have hypercapnic respiratory failure 2/2 CHF exacerbation.    NEURO  No issues    CARDIOVASCULAR  #CHF exacerbation: Patient with HFpEF (EF 60% in 2020). Was being followed by Dr. Tuttle, more recently followed by visiting doctor from [facility] for diuretic management. Was on Toprol 25mg and torsemide. Patient with increasing SOB, crackles on exam, vascular congestion on CXR. BNP 5655. On BiPAP for WOB.  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 Brown)    #HTN:   -Metoprolol as above    #HLD:  - c/w home lipitor  - f/u lipid profile    PULMONARY  #Hypercapnic respiratory failure: ABG with pCO2 79. pH wnl but bicarb 43 (likely some chronic retention). Placed on BiPAP in ED.  -management of CHF exacerbation as above  -management of TRACIE as below    #Hemoptysis: Also with new anemia  -maintain active T&S    #TRACIE: Patient uses 2-4L O2 qhs, non-compliant with CPAP  -c/w BiPAP for now    #Asthma: On Advair 250-50, albuterol prn, and montelukast 10mg qd  -c/w home meds (Symbicort in place of Advair inpatient)    RENAL/  #BPH:  -c/w home finasteride    #Increased BUN  -Cr at baseline, continue to trend    ENDO  #DM: Patient on metformin at home  -mISS inpatient  -f/u a1c    MISC  F: None  E: replete prn  N: DASH/CC  GI: None  A/c: Hep SQ  Code: FC - Confirmed by rehab documents  Dispo: CCU 89 yo male, patient of Dr. 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 Brown), chronic CHF (EF 60% echo 2020), TRACIE who presented from rehab with c/o SOB and reported hypoxia found to have hypercapnic respiratory failure 2/2 CHF exacerbation.    NEURO  No issues    CARDIOVASCULAR  #CHF exacerbation: Patient with HFpEF (EF 60% in 2020). Was being followed by Dr. Tuttle, more recently followed by visiting doctor from [facility] for diuretic management. Was on Toprol 25mg and torsemide which was weaned to once daily in feb. Patient with increasing SOB, crackles on exam, vascular congestion on CXR. BNP 5655. On BiPAP for WOB.  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 Brown)    #HTN:   -Metoprolol as above    #HLD:  - c/w home lipitor  - f/u lipid profile    PULMONARY  #Hypercapnic respiratory failure: ABG with pCO2 79. pH wnl bicarb 43, appropriate compensation of respiratory acidosis Placed on BiPAP in ED.   -management of CHF exacerbation as above  -management of TRACIE as below    #Hemoptysis: Also with new anemia  -maintain active T&S    #TRACIE: Patient uses 2-4L O2 qhs, non-adherent with CPAP  -c/w BiPAP for now    #Asthma: On Advair 250-50, albuterol prn, and montelukast 10mg qd  -c/w home meds (Symbicort in place of Advair inpatient)    HEME  #Anemia  Likely multifactorial AOCD, ANTONIO, dilutional 2/2 volume overload, less likely hemoptysis. Has had remote melena and hematochezia. No prior iron studies. Hb 12.6 Nov 2020 now 9.6.  -F/u iron studies, Given HF, may be candidate for iron supplementation    RENAL/  #BPH:  -c/w home finasteride    #Increased BUN  -Cr at baseline, continue to trend    ENDO  #DM: Patient on metformin at home  - mISS inpatient  - f/u a1c    MISC  F: None  E: replete prn  N: DASH/CC  GI: None  A/c: Hep SQ  Code: FC - Confirmed by rehab documents  Dispo: CCU 89 yo male, patient of Dr. 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 Brown), chronic CHF (EF 60% echo 2020), TRACIE who presented from rehab with c/o SOB and reported hypoxia found to have hypercapnic respiratory failure 2/2 CHF exacerbation.    NEURO  No issues    CARDIOVASCULAR  #CHF exacerbation: Patient with HFpEF (EF 60% in 2020). Was being followed by Dr. Tuttle, more recently followed by visiting doctor from [facility] for diuretic management. Was on Toprol 25mg and torsemide which was weaned to once daily in feb. Patient with increasing SOB, crackles on exam, vascular congestion on CXR. BNP 5655. On BiPAP for WOB.  - Bumex 2mg IVP STAT followed by Bumex gtt  - f/u BMP, replete as needed  -c/w home Toprol  -c/w BiPAP, wean as tolerated  -c/w home aspirin    #Demand ischemia:  Trop 0.11, other cardiac enzymes negative. Cr wnl. 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 Brown)    #HTN:   -Metoprolol as above    #HLD:  - c/w home lipitor  - f/u lipid profile    PULMONARY  #Hypercapnic respiratory failure: ABG with pCO2 79. pH wnl bicarb 43, appropriate compensation of respiratory acidosis Placed on BiPAP in ED.   - Repeat ABG  - management of CHF exacerbation as above  - management of TRACIE as below    #Hemoptysis: Also with new anemia  -maintain active T&S    #TRACIE: Patient uses 2-4L O2 qhs, non-adherent with CPAP  -c/w BiPAP for now    #Asthma: On Advair 250-50, albuterol prn, and montelukast 10mg qd  -c/w home meds (Symbicort in place of Advair inpatient)    HEME  #Anemia  Likely multifactorial AOCD, ANTONIO, dilutional 2/2 volume overload, less likely hemoptysis. Has had remote melena and hematochezia. No prior iron studies. Hb 12.6 Nov 2020 now 9.6.  - F/u iron studies, Given HF, may be candidate for iron supplementation    RENAL/  #BPH:  - c/w home finasteride    #Increased BUN  - Cr at baseline, continue to trend    ENDO  #DM: Patient on metformin at home  - mISS inpatient  - f/u a1c    MISC  F: None  E: replete prn  N: DASH/CC  GI: None  A/c: Hep SQ  Code: FC - Confirmed by rehab documents  Dispo: CCU

## 2022-06-03 NOTE — ED PROVIDER NOTE - NSICDXPASTSURGICALHX_GEN_ALL_CORE_FT
PAST SURGICAL HISTORY:  Cardiac pacemaker     History of left cataract surgery     History of right cataract surgery     S/P TURP

## 2022-06-03 NOTE — ED PROVIDER NOTE - PROGRESS NOTE DETAILS
Pt w cxr c/w chf.  Trop/bnp elevated c/w chf.  Pt ordered for lasix.  Pt started on bipap w some improvement in ms and + improved resp status however no sig change in blood gas/hypercarbia pre to post bipap.  Pt w neg ua, no retention on unofficial bedside u/s.  RN unable to place castro (castro requested by card) - gu consulted and will place.  Per papers and daughters, pt + cpr; they are discussing and considering dni status but seem to be leaning towards + intubation.  Pt discussed w card fellow - now in ed evaluating pt; do not have concerns for pe, think pt's sx and presentation are all chf - will cancel cta.  Pt accepted to ccu, Dr Tuttle.

## 2022-06-03 NOTE — H&P ADULT - NSHPLABSRESULTS_GEN_ALL_CORE
Labs:                        9.6    8.08  )-----------( 153      ( 03 Jun 2022 16:18 )             31.8     06-03    141  |  91<L>  |  40<H>  ----------------------------<  140<H>  4.6   |  43<H>  |  1.01    Ca    9.2      03 Jun 2022 16:18    TPro  7.2  /  Alb  3.5  /  TBili  0.8  /  DBili  x   /  AST  25  /  ALT  17  /  AlkPhos  64  06-03    PT/INR - ( 03 Jun 2022 16:18 )   PT: 15.7 sec;   INR: 1.32          PTT - ( 03 Jun 2022 16:18 )  PTT:29.6 sec  ABG - ( 03 Jun 2022 17:10 )  pH, Arterial: 7.37  pH, Blood: x     /  pCO2: 79    /  pO2: 148   / HCO3: 46    / Base Excess: 16.3  /  SaO2: 99.5              Urinalysis Basic - ( 03 Jun 2022 16:19 )    Color: Yellow / Appearance: Clear / SG: <=1.005 / pH: x  Gluc: x / Ketone: NEGATIVE  / Bili: Negative / Urobili: 2.0 E.U./dL   Blood: x / Protein: NEGATIVE mg/dL / Nitrite: NEGATIVE   Leuk Esterase: NEGATIVE / RBC: x / WBC x   Sq Epi: x / Non Sq Epi: x / Bacteria: x      COVID-19 PCR: Negative (03 Jun 2022 16:20)      Radiology: Reviewed

## 2022-06-03 NOTE — PATIENT PROFILE ADULT - FALL HARM RISK - HARM RISK INTERVENTIONS
Assistance with ambulation/Assistance OOB with selected safe patient handling equipment/Communicate Risk of Fall with Harm to all staff/Discuss with provider need for PT consult/Monitor gait and stability/Provide patient with walking aids - walker, cane, crutches/Reinforce activity limits and safety measures with patient and family/Review medications for side effects contributing to fall risk/Sit up slowly, dangle for a short time, stand at bedside before walking/Tailored Fall Risk Interventions/Toileting schedule using arm’s reach rule for commode and bathroom/Visual Cue: Yellow wristband and red socks/Bed in lowest position, wheels locked, appropriate side rails in place/Call bell, personal items and telephone in reach/Instruct patient to call for assistance before getting out of bed or chair/Non-slip footwear when patient is out of bed/[place] to call system/Physically safe environment - no spills, clutter or unnecessary equipment/Purposeful Proactive Rounding/Room/bathroom lighting operational, light cord in reach

## 2022-06-03 NOTE — H&P ADULT - NSHPSOCIALHISTORY_GEN_ALL_CORE
Quit cigars 20 years ago, ~50 years lifetime use. Social etoh. Denied recreational drugs. Uses a walker at baseline. Currently lives in Rehab. Has family support with his daughters.

## 2022-06-03 NOTE — PATIENT PROFILE ADULT - SURGICAL SITE INCISION
Patient:   RAYO JIMENEZ            MRN: [id number]            FIN: [id number]               Age:   60 years     Sex:  MALE     :  57   Associated Diagnoses:   None   Author:   AFSANEH GARCIA      PREOPERATIVE DIAGNOSIS:  Anal Pain    POSTOPERATIVE DIAGNOSIS:  Anal pain    INTERVENTION:  ENDOANAL ULTRASOUND.    INDICATION:  This is a 60-year-old patient presenting with severe anal pain.  The patientwas brought to GI lab for endoanal ultrasound, to exclude deep post anal space abscess    DESCRIPTION OF PROCEDURE:  A BK 13 megahertz endoanal ultrasound probe is well lubricated and inserted into the anal canal.  The prostate is identified anteriorly and is normal .We thereafter analyzed  the anal canal.  The high anal canal shows a   preserved puborectalis muscle.  The mid anal canal reveals complete both internal and external anal muscle sphincters.  The lower anal canal is normal as well.  Overall, this is a normal endoanal ultrasound.             Electronically Signed On 10/13/2017 14:41  __________________________________________________   AFSANEH GARCIA     no

## 2022-06-03 NOTE — ED PROVIDER NOTE - CLINICAL SUMMARY MEDICAL DECISION MAKING FREE TEXT BOX
Pt c/o sob, + le edema (RLE usually > LLE) on exam w decreased air movement and crackles on exam, also c/o hemoptysis - on lovenox.  Suspect chf, no fever or uri sx to suggest pna, less concern for pe given lovenox but ? pe, less concern for asthma despite h/o this based on lung exam.  Plan labs, o2 support/poss bipap, neb since decreased air movement and h/o asthma, no wheezing noted, cta for pe (low suspicion overall), diuresis.  Pt also c/o lower abd burning w urinary sx ? uti - will check ua; no bladder distension.   Pt will need admit.

## 2022-06-03 NOTE — H&P ADULT - HISTORY OF PRESENT ILLNESS
89 yo male, patient of Dr. 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 Brown), chronic CHF (EF 60% echo 2020), TRACIE who presented from rehab with c/o SOB and reported hypoxia.  Also c/o hemoptysis.    Of note, patient has been unable to come to Dr. Tuttle 2/2 poor functionality, was being followed by a [facility] visiting doctor who was managing his diuretics. He was admitted to [facility] last month for CHF exacerbation and was discharged to rehab 2 weeks ago where he was getting PO lasix. He was becoming more SOB for the past 2 days and daughter wanted him to come to [facility] for eval.    In the ED:  VS: Afebrile. HR 59-68, -150/63-81, RR 20, 96% RA --> 97% on BiPAP  Labs: Hgb 9.6 (baseline 12-13), bicarb 43, BUN 40, Cr 1.01, PT 15.7, INR 1.32, Trop 0.11, Cpk/CKMB wnl, BNP 5655, pCO2 on VBG 84 and on ABG 79. UA negative. COVID negative.  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 Brown), chronic CHF (EF 60% echo 2020), TRACIE (nonadherent to CPAP) who presented from rehab with c/o SOB, hypoxia, swelling of arms legs and scrotum, and 1 week of intermittent hemoptysis. Admitted to [facility] Nov 2020 for CHF exacerbation (, EF 60% borderline reduced RV systolic function) with contraction alkalosis, improved on lasix and diamox, dc'd on torsemide 20 po BID. Torsemide was decreased to 20 QD in Feb with encouragement of low-salt diet.   Patient has been unable to come to Dr. Tuttle 2/2 poor functionality, was being followed by a [facility] visiting doctor who was managing his diuretics. At best this year able to walk 12 total steps with walker before becoming SOB. He was admitted to [facility] last month for CHF exacerbation and was discharged to rehab 2 weeks ago where he was getting PO lasix. He was becoming more SOB for the past 2 days requiring supplemental O2 (5L per daughter). Daughter brought to [facility] for eval. On arrival to CCU endorsed improvement in breathing, feeling cold, hungry and thirsty. Denied fever, cough, chest pain, abdominal pain, recent melena or hematochezia    In the ED:  VS: Afebrile. HR 59-68, -150/63-81, RR 20, 96% RA --> 97% on BiPAP  Labs: Hgb 9.6 (baseline 12-13), bicarb 43, BUN 40, Cr 1.01, PT 15.7, INR 1.32, Trop 0.11, Cpk/CKMB wnl, BNP 5655, pCO2 on VBG 84 and on ABG 79. UA negative. COVID negative.  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 Brown), chronic CHF (EF 60% echo 2020), TRACIE (nonadherent to CPAP) who presented from rehab with c/o SOB, hypoxia, swelling of arms legs and scrotum, and 1 week of intermittent hemoptysis. Admitted to [facility] Nov 2020 for CHF exacerbation (, EF 60% borderline reduced RV systolic function) with contraction alkalosis, improved on lasix and diamox, dc'd on torsemide 20 po BID. Torsemide was decreased to 20 QD in Feb with encouragement of low-salt diet.   Patient has been unable to come to Dr. Tuttle 2/2 poor functionality, was being followed by a [facility] visiting doctor who was managing his diuretics. At best this year able to walk 12 total steps with walker before becoming SOB. He was admitted to [facility] last month for CHF exacerbation and was discharged to rehab 2 weeks ago where he was getting PO lasix. He was becoming more SOB for the past 2 days requiring supplemental O2 (5L per daughter). Daughter brought to [facility] for eval. On arrival to CCU endorsed improvement in breathing, feeling cold, hungry and thirsty. Denied fever, cough, chest pain, abdominal pain, recent melena or hematochezia    In the ED:  VS: Afebrile. HR 59-68, -150/63-81, RR 20, 96% RA --> 97% on BiPAP  Labs: Hgb 9.6 (baseline 12-13), bicarb 43, BUN 40, Cr 1.01, PT 15.7, INR 1.32, Trop 0.11, Cpk/CKMB wnl, BNP 5655, pCO2 on VBG 84 and on ABG 79. UA negative. COVID negative.  ECG: Paced  Imaging: CXR with b/l haziness and increased vacular congestion  Interventions: Placed on NC --> BiPAP at 10/5

## 2022-06-03 NOTE — ED PROVIDER NOTE - TOBACCO USE
[Diabetes Foot Care] : diabetes foot care [Long Term Vascular Complications] : long term vascular complications of diabetes [Carbohydrate Consistent Diet] : carbohydrate consistent diet [Importance of Diet and Exercise] : importance of diet and exercise to improve glycemic control, achieve weight loss and improve cardiovascular health [Exercise/Effect on Glucose] : exercise/effect on glucose [Hypoglycemia Management] : hypoglycemia management [Glucagon Use] : glucagon use [Ketone Testing] : ketone testing [Action and use of Insulin] : action and use of short and long-acting insulin [Self Monitoring of Blood Glucose] : self monitoring of blood glucose [Insulin Self-Administration] : insulin self-administration [Injection Technique, Storage, Sharps Disposal] : injection technique, storage, and sharps disposal [Sick-Day Management] : sick-day management [Retinopathy Screening] : Patient was referred to ophthalmology for retinopathy screening [Diabetic Medications] : Risks and benefits of diabetic medications were discussed [FreeTextEntry1] : 1. Severe uncontrolled T2DM\par - compliance is reiterated\par - Nolan PRO\par - increase Tresiba 26 un HS\par - metformin 500 mg TID, monitor EF\par - will avoid DPP4 inhibitors since they can potentiate a bradykinin induced  angioedema, and I'd also avoid GLP1 agonists for now, given some increased risks in angioedema on some of them (incl Soliqua, adlyxin, Trulicity)\par - Steglatro 5 mg qd. monitor weight\par - Admelog ac B(7-2-0-10-11-12-14-15-16), ac L+D (7-9-10-50-57-68-14-15-16) \par - monitor lipids, blood pressure, urine microalbumin\par - Prev with myalgia on statins. cont zetia and trial of Crestor 5 mg HS\par \par 2. MNG\par - refer to [facility] for FNA\par RTC for pump teaching\par  Unknown if ever smoked

## 2022-06-04 NOTE — DIETITIAN INITIAL EVALUATION ADULT - PERTINENT MEDS FT
MEDICATIONS  (STANDING):  aspirin enteric coated 81 milliGRAM(s) Oral daily  atorvastatin 10 milliGRAM(s) Oral at bedtime  budesonide 160 MICROgram(s)/formoterol 4.5 MICROgram(s) Inhaler 2 Puff(s) Inhalation two times a day  buMETAnide Infusion 1 mG/Hr (5 mL/Hr) IV Continuous <Continuous>  chlorhexidine 4% Liquid 1 Application(s) Topical <User Schedule>  dextrose 5%. 1000 milliLiter(s) (50 mL/Hr) IV Continuous <Continuous>  dextrose 5%. 1000 milliLiter(s) (100 mL/Hr) IV Continuous <Continuous>  dextrose 50% Injectable 25 Gram(s) IV Push once  dextrose 50% Injectable 12.5 Gram(s) IV Push once  dextrose 50% Injectable 25 Gram(s) IV Push once  finasteride 5 milliGRAM(s) Oral daily  glucagon  Injectable 1 milliGRAM(s) IntraMuscular once  heparin   Injectable 7500 Unit(s) SubCutaneous every 8 hours  insulin lispro (ADMELOG) corrective regimen sliding scale   SubCutaneous Before meals and at bedtime  montelukast 10 milliGRAM(s) Oral daily    MEDICATIONS  (PRN):  ALBUTerol    90 MICROgram(s) HFA Inhaler 2 Puff(s) Inhalation every 6 hours PRN Shortness of Breath and/or Wheezing  dextrose Oral Gel 15 Gram(s) Oral once PRN Blood Glucose LESS THAN 70 milliGRAM(s)/deciliter

## 2022-06-04 NOTE — DIETITIAN INITIAL EVALUATION ADULT - ENERGY INTAKE
patient does not have his dentures with him so unable to chew solid foods.Diet changed to pureed./Adequate (%) suspected adequate based on BMI and patient complaining of being hungry.

## 2022-06-04 NOTE — PROGRESS NOTE ADULT - SUBJECTIVE AND OBJECTIVE BOX
**Incomplete Note**  OVERNIGHT EVENTS:  1. Started on bumex drip (5 cc/h) ---> UOP 4.2L oe  2. ABG with worsening respiratory acidosis with subsequent adjustments in BiPAP settings 10/5 --> 12/5 --> 12/7   3. Trops trending up 0.11 --> 0.12 ---> 0.14, ECG w/o new onset ischemic changes, pt denies CP    SUBJECTIVE / INTERVAL HPI: Patient seen and examined at bedside (7h40 AM). Limited conversation since pt dyspneic and on BiPAP. However, on ROS, pt denies any F/C, CP or palpitations. Pt does report continued dyspnea (not worsening), Pt reports pain in his right leg, reports its chronic. No other health concerns. Will re-assess once off BiPAP for additional informations.     MEDICATIONS  (STANDING):  aspirin enteric coated 81 milliGRAM(s) Oral daily  atorvastatin 10 milliGRAM(s) Oral at bedtime  budesonide 160 MICROgram(s)/formoterol 4.5 MICROgram(s) Inhaler 2 Puff(s) Inhalation two times a day  buMETAnide Infusion 1 mG/Hr (5 mL/Hr) IV Continuous <Continuous>  chlorhexidine 4% Liquid 1 Application(s) Topical <User Schedule>  dextrose 5%. 1000 milliLiter(s) (50 mL/Hr) IV Continuous <Continuous>  dextrose 5%. 1000 milliLiter(s) (100 mL/Hr) IV Continuous <Continuous>  dextrose 50% Injectable 25 Gram(s) IV Push once  dextrose 50% Injectable 12.5 Gram(s) IV Push once  dextrose 50% Injectable 25 Gram(s) IV Push once  finasteride 5 milliGRAM(s) Oral daily  glucagon  Injectable 1 milliGRAM(s) IntraMuscular once  heparin   Injectable 7500 Unit(s) SubCutaneous every 8 hours  insulin lispro (ADMELOG) corrective regimen sliding scale   SubCutaneous Before meals and at bedtime  metoprolol succinate ER 25 milliGRAM(s) Oral daily  montelukast 10 milliGRAM(s) Oral daily    MEDICATIONS  (PRN):  ALBUTerol    90 MICROgram(s) HFA Inhaler 2 Puff(s) Inhalation every 6 hours PRN Shortness of Breath and/or Wheezing  dextrose Oral Gel 15 Gram(s) Oral once PRN Blood Glucose LESS THAN 70 milliGRAM(s)/deciliter    Allergies    lisinopril (Angioedema)    Intolerances        VITAL SIGNS:  Vital Signs Last 24 Hrs  T(C): 36.7 (04 Jun 2022 05:51), Max: 36.9 (03 Jun 2022 14:38)  T(F): 98 (04 Jun 2022 05:51), Max: 98.4 (03 Jun 2022 14:38)  HR: 62 (04 Jun 2022 07:00) (58 - 99)  BP: 94/44 (04 Jun 2022 07:00) (93/50 - 150/83)  BP(mean): 64 (04 Jun 2022 07:00) (64 - 89)  RR: 13 (04 Jun 2022 07:00) (13 - 28)  SpO2: 93% (04 Jun 2022 07:00) (93% - 100%)      06-03-22 @ 07:01  -  06-04-22 @ 07:00  --------------------------------------------------------  IN: 160 mL / OUT: 4285 mL / NET: -4125 mL    06-04-22 @ 07:01  -  06-04-22 @ 08:56  --------------------------------------------------------  IN: 0 mL / OUT: 300 mL / NET: -300 mL        PHYSICAL EXAM: 7h45 AM  Constitutional: NAD  HEENT: PERRL, EOMI, sclera non-icteric, MMM  Neck: No JVD  Respiratory: CTA b/l, on BiPAP 12/7  Cardiovascular: RRR, +S1S2, no M/R/G  Gastrointestinal: soft, obese, no masses palpable, BS normal. No suprapubic tenderness  Extremities: 1+ edema feet to knees. WWP throughout  Vascular: 2+ DP, radial.   : Scrotal swelling and erythema. Stoll in place draining clear-yellow urine  Neurological: AAOx3, Sensation equal and intact to light touch  Skin: Normal temperature, warm, dry      LABS:                        9.4    6.69  )-----------( 130      ( 04 Jun 2022 06:35 )             30.9     06-04    143  |  92<L>  |  33<H>  ----------------------------<  133<H>  4.2   |  44<H>  |  0.91    Ca    9.3      04 Jun 2022 06:35  Phos  3.4     06-04  Mg     2.0     06-04    TPro  6.7  /  Alb  3.1<L>  /  TBili  0.9  /  DBili  x   /  AST  24  /  ALT  16  /  AlkPhos  60  06-04    PT/INR - ( 03 Jun 2022 16:18 )   PT: 15.7 sec;   INR: 1.32          PTT - ( 03 Jun 2022 16:18 )  PTT:29.6 sec  Urinalysis Basic - ( 03 Jun 2022 16:19 )    Color: Yellow / Appearance: Clear / SG: <=1.005 / pH: x  Gluc: x / Ketone: NEGATIVE  / Bili: Negative / Urobili: 2.0 E.U./dL   Blood: x / Protein: NEGATIVE mg/dL / Nitrite: NEGATIVE   Leuk Esterase: NEGATIVE / RBC: x / WBC x   Sq Epi: x / Non Sq Epi: x / Bacteria: x      CAPILLARY BLOOD GLUCOSE      POCT Blood Glucose.: 129 mg/dL (04 Jun 2022 06:23)  POCT Blood Glucose.: 148 mg/dL (04 Jun 2022 00:00)        Urinalysis with Rflx Culture (collected 06-03-22 @ 16:19)        RADIOLOGY & ADDITIONAL TESTS: Reviewed. OVERNIGHT EVENTS:  1. Started on bumex drip (5 cc/h) ---> UOP 4.2L oe  2. ABG with worsening respiratory acidosis with subsequent adjustments in BiPAP settings 10/5 --> 12/5 --> 12/7   3. Trops trending up 0.11 --> 0.12 ---> 0.14, ECG w/o new onset ischemic changes, pt denies CP    SUBJECTIVE / INTERVAL HPI: Patient seen and examined at bedside (7h40 AM). Limited conversation since pt dyspneic and on BiPAP. However, on ROS, pt denies any F/C, CP or palpitations. Pt does report continued dyspnea (not worsening), Pt reports pain in his right leg, reports its chronic. No other health concerns. Will re-assess once off BiPAP for additional informations.     MEDICATIONS  (STANDING):  aspirin enteric coated 81 milliGRAM(s) Oral daily  atorvastatin 10 milliGRAM(s) Oral at bedtime  budesonide 160 MICROgram(s)/formoterol 4.5 MICROgram(s) Inhaler 2 Puff(s) Inhalation two times a day  buMETAnide Infusion 1 mG/Hr (5 mL/Hr) IV Continuous <Continuous>  chlorhexidine 4% Liquid 1 Application(s) Topical <User Schedule>  dextrose 5%. 1000 milliLiter(s) (50 mL/Hr) IV Continuous <Continuous>  dextrose 5%. 1000 milliLiter(s) (100 mL/Hr) IV Continuous <Continuous>  dextrose 50% Injectable 25 Gram(s) IV Push once  dextrose 50% Injectable 12.5 Gram(s) IV Push once  dextrose 50% Injectable 25 Gram(s) IV Push once  finasteride 5 milliGRAM(s) Oral daily  glucagon  Injectable 1 milliGRAM(s) IntraMuscular once  heparin   Injectable 7500 Unit(s) SubCutaneous every 8 hours  insulin lispro (ADMELOG) corrective regimen sliding scale   SubCutaneous Before meals and at bedtime  metoprolol succinate ER 25 milliGRAM(s) Oral daily  montelukast 10 milliGRAM(s) Oral daily    MEDICATIONS  (PRN):  ALBUTerol    90 MICROgram(s) HFA Inhaler 2 Puff(s) Inhalation every 6 hours PRN Shortness of Breath and/or Wheezing  dextrose Oral Gel 15 Gram(s) Oral once PRN Blood Glucose LESS THAN 70 milliGRAM(s)/deciliter    Allergies    lisinopril (Angioedema)    Intolerances        VITAL SIGNS:  Vital Signs Last 24 Hrs  T(C): 36.7 (04 Jun 2022 05:51), Max: 36.9 (03 Jun 2022 14:38)  T(F): 98 (04 Jun 2022 05:51), Max: 98.4 (03 Jun 2022 14:38)  HR: 62 (04 Jun 2022 07:00) (58 - 99)  BP: 94/44 (04 Jun 2022 07:00) (93/50 - 150/83)  BP(mean): 64 (04 Jun 2022 07:00) (64 - 89)  RR: 13 (04 Jun 2022 07:00) (13 - 28)  SpO2: 93% (04 Jun 2022 07:00) (93% - 100%)      06-03-22 @ 07:01  -  06-04-22 @ 07:00  --------------------------------------------------------  IN: 160 mL / OUT: 4285 mL / NET: -4125 mL    06-04-22 @ 07:01  -  06-04-22 @ 08:56  --------------------------------------------------------  IN: 0 mL / OUT: 300 mL / NET: -300 mL        PHYSICAL EXAM: 7h45 AM  Constitutional: NAD  HEENT: PERRL, EOMI, sclera non-icteric, MMM  Neck: No JVD  Respiratory: CTA b/l, on BiPAP 12/7  Cardiovascular: RRR, +S1S2, no M/R/G  Gastrointestinal: soft, obese, no masses palpable, BS normal. No suprapubic tenderness  Extremities: 1+ edema feet to knees. WWP throughout  Vascular: 2+ DP, radial.   : Scrotal swelling and erythema. Stoll in place draining clear-yellow urine  Neurological: AAOx3, Sensation equal and intact to light touch  Skin: Normal temperature, warm, dry      LABS:                        9.4    6.69  )-----------( 130      ( 04 Jun 2022 06:35 )             30.9     06-04    143  |  92<L>  |  33<H>  ----------------------------<  133<H>  4.2   |  44<H>  |  0.91    Ca    9.3      04 Jun 2022 06:35  Phos  3.4     06-04  Mg     2.0     06-04    TPro  6.7  /  Alb  3.1<L>  /  TBili  0.9  /  DBili  x   /  AST  24  /  ALT  16  /  AlkPhos  60  06-04    PT/INR - ( 03 Jun 2022 16:18 )   PT: 15.7 sec;   INR: 1.32          PTT - ( 03 Jun 2022 16:18 )  PTT:29.6 sec  Urinalysis Basic - ( 03 Jun 2022 16:19 )    Color: Yellow / Appearance: Clear / SG: <=1.005 / pH: x  Gluc: x / Ketone: NEGATIVE  / Bili: Negative / Urobili: 2.0 E.U./dL   Blood: x / Protein: NEGATIVE mg/dL / Nitrite: NEGATIVE   Leuk Esterase: NEGATIVE / RBC: x / WBC x   Sq Epi: x / Non Sq Epi: x / Bacteria: x      CAPILLARY BLOOD GLUCOSE      POCT Blood Glucose.: 129 mg/dL (04 Jun 2022 06:23)  POCT Blood Glucose.: 148 mg/dL (04 Jun 2022 00:00)        Urinalysis with Rflx Culture (collected 06-03-22 @ 16:19)        RADIOLOGY & ADDITIONAL TESTS: Reviewed.

## 2022-06-04 NOTE — DIETITIAN INITIAL EVALUATION ADULT - PERTINENT LABORATORY DATA
06-04    143  |  92<L>  |  33<H>  ----------------------------<  133<H>  4.2   |  44<H>  |  0.91    Ca    9.3      04 Jun 2022 06:35  Phos  3.4     06-04  Mg     2.0     06-04    TPro  6.7  /  Alb  3.1<L>  /  TBili  0.9  /  DBili  x   /  AST  24  /  ALT  16  /  AlkPhos  60  06-04  POCT Blood Glucose.: 143 mg/dL (06-04-22 @ 11:44)  A1C with Estimated Average Glucose Result: 6.9 % (06-04-22 @ 06:35)

## 2022-06-04 NOTE — PROGRESS NOTE ADULT - ASSESSMENT
89 yo male, patient of Dr. 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 Brown), chronic CHF (EF 60% echo 2020), TRACIE who presented from rehab with c/o SOB and reported hypoxia found to have hypercapnic respiratory failure 2/2 CHF exacerbation.    NEURO  No issues    CARDIOVASCULAR  #CHF exacerbation: Patient with HFpEF (EF 60% in 2020). Was being followed by Dr. Tuttle, more recently followed by visiting doctor from [facility] for diuretic management. Was on Toprol 25mg and torsemide which was weaned to once daily in feb. Patient with increasing SOB, crackles on exam, vascular congestion on CXR. BNP 5655. On BiPAP for WOB.  - Bumex 2mg IVP STAT followed by Bumex gtt  - f/u BMP, replete as needed  -c/w home Toprol 25 mg   -c/w BiPAP, wean as tolerated  -c/w home aspirin    #Demand ischemia:  Trop 0.11, other cardiac enzymes negative. Cr wnl. 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 Brown)    #HTN:   -Metoprolol as above    #HLD:  - c/w home lipitor  - f/u lipid profile    PULMONARY  #Hypercapnic respiratory failure: ABG with pCO2 79. pH wnl bicarb 43, appropriate compensation of respiratory acidosis Placed on BiPAP in ED.   - Repeat ABG  - management of CHF exacerbation as above  - management of TRACIE as below    #Hemoptysis: Also with new anemia  -maintain active T&S    #TRACIE: Patient uses 2-4L O2 qhs, non-adherent with CPAP  -c/w BiPAP for now    #Asthma: On Advair 250-50, albuterol prn, and montelukast 10mg qd  -c/w home meds (Symbicort in place of Advair inpatient)    HEME  #Anemia  Likely multifactorial AOCD, ANTONIO, dilutional 2/2 volume overload, less likely hemoptysis. Has had remote melena and hematochezia. No prior iron studies. Hb 12.6 Nov 2020 now 9.6.  - F/u iron studies, Given HF, may be candidate for iron supplementation    RENAL/  #BPH:  - c/w home finasteride    #Increased BUN  - Cr at baseline, continue to trend    ENDO  #DM: Patient on metformin at home  - mISS inpatient  - f/u a1c    MISC  F: None  E: replete prn  N: DASH/CC  GI: None  A/c: Hep SQ  Code: FC - Confirmed by rehab documents  Dispo: CCU 89 yo male, patient of Dr. Tuttle's, h/o HTN, HLD, NIDDM, asthma, (denies intubations/hospitalizations), BPH, hx of [place] s/p Medtronic PPM 10/30/2018 (followed by Dr. Brown), chronic CHF (EF 60% echo 2020), TRACIE who presented from rehab with c/o SOB and reported hypoxia found to have hypercapnic respiratory failure 2/2 CHF exacerbation.    NEURO  No issues    CARDIOVASCULAR  #CHF exacerbation: Patient with HFpEF (EF 60% in 2020). Was being followed by Dr. Tuttle, more recently followed by visiting doctor from [facility] for diuretic management. Was on Toprol 25mg and torsemide which was weaned to once daily in feb. Patient with increasing SOB, crackles on exam, vascular congestion on CXR. BNP 5655. On BiPAP for WOB.  - Bumex 2mg IVP STAT followed by Bumex gtt 1 mg/h ---> monitor Is/Os and decrease rate of bumex drip accordingly if UOP> 4L on 6/4 day shift  - f/u BMP 4 PM on 6/4, replete as needed  -dc toprol on 6/4, monitor for tachycardia  -c/w BiPAP, wean as tolerated ---> HFNC on 6/4  -c/w home aspirin  -discharge pt on spironolactone    #Demand ischemia:  Trop 0.11, other cardiac enzymes negative. Cr wnl. 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 Brown)    #HTN:   - Monitor  - dc toprol on 6/4/22    #HLD:  - c/w home lipitor  - f/u lipid profile    PULMONARY  #Hypercapnic respiratory failure: ABG with pCO2 79. pH wnl bicarb 43, appropriate compensation of respiratory acidosis Placed on BiPAP in ED.   - Repeat ABG if worsening mental status on HFNC  - management of CHF exacerbation as above  - management of TRACIE as below    #TRACIE: Patient uses 2-4L O2 qhs, non-adherent with CPAP  -c/w BiPAP for now    #Asthma: On Advair 250-50, albuterol prn, and montelukast 10mg qd  -c/w home meds (Symbicort in place of Advair inpatient)    HEME  #Anemia  Likely multifactorial AOCD, ANTONIO, dilutional 2/2 volume overload, less likely hemoptysis. Has had remote melena and hematochezia. No prior iron studies. Hb 12.6 Nov 2020 now 9.6.  - Monitor    RENAL/  #BPH:  - c/w home finasteride    #Increased BUN  - Cr at baseline, continue to trend    ENDO  #DM: Patient on metformin at home  - mISS inpatient  - A1c 6.9    MISC  F: None  E: replete prn  N: DASH/CC  GI: None  A/c: Hep SQ  Code: FC - Confirmed by rehab documents  Dispo: CCU

## 2022-06-04 NOTE — DIETITIAN INITIAL EVALUATION ADULT - [id number]_GEN_A_CORE
due to left dentures at home will needs chopped to pureed foods.Family might bring in dentures so diet can be advanced.

## 2022-06-04 NOTE — DIETITIAN NUTRITION RISK NOTIFICATION - TREATMENT: THE FOLLOWING DIET HAS BEEN RECOMMENDED
Diet, DASH/TLC:   Sodium & Cholesterol Restricted  Pureed (PUREED)  1000mL Fluid Restriction ([id number]) (06-04-22 @ 12:39) [Active]

## 2022-06-04 NOTE — DIETITIAN INITIAL EVALUATION ADULT - OTHER INFO
89 morbidly obese M h/o HTN, HLD, NIDDM, asthma, (denies intubations/hospitalizations), BPH, 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 Brown), chronic CHF (EF 60% echo 2020), TRACIE (nonadherent to CPAP) who presented from rehab with c/o SOB, hypoxia, swelling of arms legs and scrotum, and 1 week of intermittent hemoptysis.  This afternoon, patient in bed and complaining of being hungry.Per RN, needed to be fed earlier and due to no dentures needs chopped food.Patient annoyed with RD questions so deferred further interview at this time.No N/V/D/No BM noted.Skin with noted Stage 2Sacrum and left leg.1+Edema of feet to knees.Patient is presently 188% of IBW.IBW:172lbs.Unable to quantify weights PTA.RD requested diet change to pureed with DASH and fluids restriction.May change to soft bite size if patient not excepting of pureed foods.Covering RD to follow up with po tolerance and diet education needs.

## 2022-06-04 NOTE — DIETITIAN INITIAL EVALUATION ADULT - NSFNSPHYEXAMSKINFT_GEN_A_CORE
Pressure Injury 1: sacrum, Stage II  Pressure Injury 2: Left:,leg, Stage II  Pressure Injury 3: none, Stage II  Pressure Injury 4: none, none  Pressure Injury 5: none, none  Pressure Injury 6: none, none  Pressure Injury 7: none, none  Pressure Injury 8: none, none  Pressure Injury 9: none, none  Pressure Injury 10: none, none  Pressure Injury 11: none, none, Pressure Injury 1: sacrum, Stage II  Pressure Injury 2: Left:,leg, Stage II  Pressure Injury 3: Left:,leg,inner area, Stage II  Pressure Injury 4: none, none  Pressure Injury 5: none, none  Pressure Injury 6: none, none  Pressure Injury 7: none, none  Pressure Injury 8: none, none  Pressure Injury 9: none, none  Pressure Injury 10: none, none  Pressure Injury 11: none, none, Pressure Injury 1: sacrum, Stage II  Pressure Injury 2: Left:,leg,inner left upper leg, Stage II  Pressure Injury 3: Left:,leg,inner area, Stage II  Pressure Injury 4: none, none  Pressure Injury 5: none, none  Pressure Injury 6: none, none  Pressure Injury 7: none, none  Pressure Injury 8: none, none  Pressure Injury 9: none, none  Pressure Injury 10: none, none  Pressure Injury 11: none, none

## 2022-06-04 NOTE — DIETITIAN INITIAL EVALUATION ADULT - OTHER CALCULATIONS
fluids per MD discretion due to CHF.Adjusted for age.IBW used due to above 120% of IBW.INcreased protein due to PU

## 2022-06-04 NOTE — DIETITIAN INITIAL EVALUATION ADULT - NS FNS DIET ORDER
Diet, DASH/TLC:   Sodium & Cholesterol Restricted  Pureed (PUREED)  1000mL Fluid Restriction ([id number]) (06-04-22 @ 12:39)

## 2022-06-05 NOTE — PROCEDURE NOTE - NSPROCDETAILS_GEN_ALL_CORE
ultrasound-guidance/location identified, draped/prepped, sterile technique used/blood seen on insertion/dressing applied/flushes easily/secured in place/sterile technique, catheter placed
location identified, draped/prepped, sterile technique used/blood seen on insertion/dressing applied/flushes easily/secured in place/sterile technique, catheter placed
location identified, draped/prepped, sterile technique used, needle inserted/introduced/positive blood return obtained via catheter/connected to a pressurized flush line/sutured in place/hemostasis with direct pressure, dressing applied/Seldinger technique/all materials/supplies accounted for at end of procedure
sterile technique, indwelling urinary device inserted

## 2022-06-05 NOTE — SWALLOW BEDSIDE ASSESSMENT ADULT - COMMENTS
Per pt's 2 daughters at bedside, pt was tolerating a soft/reg solid diet prior to admission due to pt is edentulous, does not wear his dentures. No hx of dysphagia prior to admission.    Per medical team, pt's respiratory status is tenuous, on BiPAP in evening, 50L/50% HFNC during the day. Pt spiked fever this AM, MD concern for potential aspiration PNA.

## 2022-06-05 NOTE — PROCEDURE NOTE - NSICDXPROCEDURE_GEN_ALL_CORE_FT
PROCEDURES:  Insertion, needle, vein 05-Jun-2022 01:07:12  Claudia Castellanos  US guided vascular access 05-Jun-2022 01:07:23  Claudia Castellanos  
PROCEDURES:  Insertion of intravenous catheter with ultrasound guidance 03-Jun-2022 22:30:58  Georgia Moreland  
PROCEDURES:  Insertion of arterial line with imaging guidance 04-Jun-2022 18:08:38  Jeanne Hameed

## 2022-06-05 NOTE — PROGRESS NOTE ADULT - ASSESSMENT
89 yo male, patient of Dr. 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 Brown), chronic CHF (EF 60% echo 2020), TRACIE who presented from rehab with c/o SOB and reported hypoxia found to have hypercapnic respiratory failure 2/2 CHF exacerbation admitted on Bumex gtt initially on BiPAP now on HFNC with new onset sepsis, bumex gtt on pause.    NEURO  No issues    CARDIOVASCULAR  #CHF exacerbation: Patient with HFpEF (EF 60% in 2020). Was being followed by Dr. Tuttle, more recently followed by visiting doctor from [facility] for diuretic management. Was on Toprol 25mg and torsemide which was weaned to once daily in feb. Patient with increasing SOB, crackles on exam, vascular congestion on CXR. BNP 5655.  - holding Bumex gtt i/s/o soft BPs, will attempt to restart tomorrow or give IVPs  - dc toprol on 6/4 2/2 unable to take oral meds, continue to hold i/s/o hypotension, monitor for tachycardia  - weaned from BiPAP to HFNC today (6/5)  -spironolactone 25mg qd  -c/w home aspirin  -discharge pt on spironolactone    #Demand ischemia:  -Trop 0.11 on admission, other cardiac enzymes negative.   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 Brown)    #HTN:   - dc toprol on 6/4/22, hold as currently hypotensive    #HLD:  - c/w home lipitor  - f/u lipid profile    PULMONARY  #Hypercapnic/hypoxic respiratory failure: Admission ABG with pCO2 79. pH wnl bicarb 43, appropriate compensation of respiratory acidosis.   - Placed on BiPAP in ED now on HFNC as of 6/4  - management of CHF exacerbation as above  - management of TRACIE as below  -tx of sepsis as below    #TRACIE: Patient uses 2-4L O2 qhs, non-adherent with CPAP  -BiPAP qhs    #Asthma: On Advair 250-50, albuterol prn, and montelukast 10mg qd  -c/w home meds (Symbicort in place of Advair inpatient)    GI  No active issues    RENAL/  #BPH:  - c/w home finasteride    #Increased BUN  - Cr at baseline, continue to trend    HEME  #Anemia  Likely multifactorial AOCD, ANTONIO, dilutional 2/2 volume overload, less likely hemoptysis. Has had remote melena and hematochezia. No prior iron studies. Hb 12.6 Nov 2020, now 9-10.  - active t&s - most recent 6/4    #DVTs: New b/l LE DVTs found 6/5  -Eliquis load 6/5-6/11 with 10mg BID, then 5mg BID after    ID  #Febrile to 101.5 with WBC 13.04 6/5. MAPs 50s-60s. UA negative, possible RLL consolidation on CXR. MRSA negative. ESR, CRP, and procal elevated.  -possible sources: PNA vs less likely  -f/u BCx 6/5  -c/w Zosyn  -wound care for sacral wounds  -S&S given possible RLL consolidation, age, and edentulous state    ENDO  #DM: Patient on metformin at home  - mISS inpatient  - A1c 6.9    MISC  F: None  E: replete prn  N: DASH, pureed pending S&S  GI: None  A/c: Hep SQ  Code: FC - Confirmed by rehab documents  Dispo: CCU 89 yo male, patient of Dr. 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 Brown), chronic CHF (EF 60% echo 2020), TRACIE who presented from rehab with c/o SOB and reported hypoxia found to have hypercapnic respiratory failure 2/2 CHF exacerbation admitted on Bumex gtt initially on BiPAP now on HFNC with new onset sepsis, bumex gtt on pause.    NEURO  No issues    CARDIOVASCULAR  #CHF exacerbation: Patient with HFpEF (EF 60% in 2020). Was being followed by Dr. Tuttle, more recently followed by visiting doctor from [facility] for diuretic management. Was on Toprol 25mg and torsemide which was weaned to once daily in feb. Patient with increasing SOB, crackles on exam, vascular congestion on CXR. BNP 5655.  - holding Bumex gtt i/s/o soft BPs, will attempt to restart tomorrow or give IVPs  - dc toprol on 6/4 2/2 unable to take oral meds, continue to hold i/s/o hypotension, monitor for tachycardia  - weaned from BiPAP to HFNC today (6/5)  -spironolactone 25mg qd  -c/w home aspirin  -discharge pt on spironolactone    #Demand ischemia:  -Trop 0.11 on admission, other cardiac enzymes negative.   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 Brown)    #HTN:   - dc toprol on 6/4/22, hold as currently hypotensive    #HLD:  - c/w home lipitor  - f/u lipid profile    PULMONARY  #Hypercapnic/hypoxic respiratory failure: Admission ABG with pCO2 79. pH wnl bicarb 43, appropriate compensation of respiratory acidosis.   - Placed on BiPAP in ED now on HFNC as of 6/4  - management of CHF exacerbation as above  - management of TRACIE as below  -tx of sepsis as below    #TRACIE: Patient uses 2-4L O2 qhs, non-adherent with CPAP  -BiPAP qhs    #Asthma: On Advair 250-50, albuterol prn, and montelukast 10mg qd  -c/w home meds (Symbicort in place of Advair inpatient)    GI  #Abd pain: Possible RUQ pain  -f/u RUQ u/s    RENAL/  #BPH:  - c/w home finasteride    #Increased BUN  - Cr at baseline, continue to trend    HEME  #Anemia  Likely multifactorial AOCD, ANTONIO, dilutional 2/2 volume overload, less likely hemoptysis. Has had remote melena and hematochezia. No prior iron studies. Hb 12.6 Nov 2020, now 9-10.  - active t&s - most recent 6/4    #DVTs: New b/l LE DVTs found 6/5  -Eliquis load 6/5-6/11 with 10mg BID, then 5mg BID after    ID  #Febrile to 101.5 with WBC 13.04 6/5. MAPs 50s-60s. UA negative, possible RLL consolidation on CXR. MRSA negative. ESR, CRP, and procal elevated. Also with RUQ pain?  -possible sources: PNA vs GB, less likely sacral ulcer  -f/u BCx 6/5  -f/u RUQ u/s  -c/w Zosyn  -wound care for sacral wounds  -S&S given possible RLL consolidation, age, and edentulous state    ENDO  #DM: Patient on metformin at home  - mISS inpatient  - A1c 6.9    MISC  F: None  E: replete prn  N: DASH, pureed pending S&S  GI: None  A/c: Hep SQ  Code: FC - Confirmed by rehab documents  Dispo: CCU

## 2022-06-05 NOTE — SWALLOW BEDSIDE ASSESSMENT ADULT - SWALLOW EVAL: DIAGNOSIS
Given tenuous respiratory status, deconditioning, lethargy and waning arousal/alertness, and concern for potential aspiration PNA with new fever this AM per MD, consider NPO w/ short-term alternative means of nutrition/hydration at this time until pt becomes more medically stable, if consistent with pt's GOC. Consider further instrumental assessment as pt is appropriate, awake/alert, medically stable.

## 2022-06-05 NOTE — SWALLOW BEDSIDE ASSESSMENT ADULT - SLP PERTINENT HISTORY OF CURRENT PROBLEM
Spoke to patient regarding need to attend appointment with Dr. Bailey in order to receive refills for Lexapro.  Patient verbalized understanding and verified next appointment on 3/4/20.   89 yo male, patient of Dr. 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 Brown), chronic CHF (EF 60% echo 2020), TRACIE who presented from rehab with c/o SOB and reported hypoxia found to have hypercapnic respiratory failure 2/2 CHF exacerbation admitted on Bumex gtt initially on BiPAP now on HFNC with new onset sepsis, bumex gtt on pause.

## 2022-06-05 NOTE — SWALLOW BEDSIDE ASSESSMENT ADULT - ORAL PREPARATORY PHASE
Very prolonged yet functional mastication of solid/Reduced oral grading/Decreased mastication ability

## 2022-06-05 NOTE — SWALLOW BEDSIDE ASSESSMENT ADULT - PHARYNGEAL PHASE
Cough x2 noted only when SLP held cup/fed pt, suggestive of aspiration, likely due to reduced bolus control. When pt held cup, no overt signs of aspiration were observed.

## 2022-06-05 NOTE — PROGRESS NOTE ADULT - SUBJECTIVE AND OBJECTIVE BOX
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 Brown), chronic CHF (EF 60% echo ), TRACIE (nonadherent to CPAP) who presented from rehab with c/o SOB, hypoxia, swelling of arms legs and scrotum, and 1 week of intermittent hemoptysis found to have acute CHF exacerbation. Admitted on BiPAP. Bumex gtt started. Diamox being given prn. Patient became febrile  with transient desat, then MAPs decreased to 50s, Bumex gtt on hold. Zosyn started (MRSA negative) and full w/u sent. Weaned to HFNC . Noted also to have unequal LE swelling, found to have b/l DVTs on , started on Eliquis.    OVERNIGHT EVENTS/INTERVAL HPI: Febrile to 101.5F at 7am with transient desat to 83% resolved with increased FiO2 to 70%, now back on 40%. Given Tylenol, w/u sent, started on Zosyn. MRSA negative so vanc was not given. Given Diamox 250mg this AM. Weaned to HFNC and tolerating diet. Patient without acute complaints. Not c/o dizziness, CP, SOB.     OBJECTIVE:  Vital Signs Last 24 Hrs  T(C): 37.8 (2022 12:00), Max: 38.6 (2022 07:50)  T(F): 100.1 (2022 12:00), Max: 101.5 (2022 07:50)  HR: 60 (2022 13:15) (58 - 79)  BP: 115/61 (2022 13:00) (102/51 - 164/60)  BP(mean): 79 (2022 13:00) (72 - 98)  RR: 23 (2022 13:15) (15 - 41)  SpO2: 98% (2022 13:15) (86% - 100%)  I&O's Detail    2022 07:01  -  2022 07:00  --------------------------------------------------------  IN:    Bumetanide: 115 mL    IV PiggyBack: 100 mL    Oral Fluid: 868 mL  Total IN: 1083 mL    OUT:    Indwelling Catheter - Urethral (mL): 6655 mL  Total OUT: 6655 mL    Total NET: -5572 mL      2022 07:01  -  2022 13:53  --------------------------------------------------------  IN:    Bumetanide: 2.5 mL    IV PiggyBack: 500 mL  Total IN: 502.5 mL    OUT:    Indwelling Catheter - Urethral (mL): 600 mL  Total OUT: 600 mL    Total NET: -97.5 mL        Physical Exam:  GENERAL: Awake, alert and interactive, no acute distress, appears comfortable  NEURO: A&Ox2 on assessment this AM, no focal deficits  HEENT: Normocephalic, atraumatic, no conjunctivitis or scleral icterus, MMM, edentulous state  NECK: Supple  CARDIAC: Regular rate and rhythm, +S1/S2, no murmurs/rubs/gallops  PULM: Breathing comfortably on BiPAP and then HFNC, +rhonchi and crackles  ABDOMEN: Soft, nontender, nondistended  : Stoll with yellow urine  MSK: Range of motion grossly intact  SKIN: Warm and dry  VASC: 3+ RLE edema, 2+ LLE edema, WWP, 1+ peripheral pulses, no LE tenderness    Medications:  MEDICATIONS  (STANDING):  aspirin enteric coated 81 milliGRAM(s) Oral daily  atorvastatin 10 milliGRAM(s) Oral at bedtime  budesonide 160 MICROgram(s)/formoterol 4.5 MICROgram(s) Inhaler 2 Puff(s) Inhalation two times a day  chlorhexidine 4% Liquid 1 Application(s) Topical <User Schedule>  dextrose 5%. 1000 milliLiter(s) (50 mL/Hr) IV Continuous <Continuous>  dextrose 5%. 1000 milliLiter(s) (100 mL/Hr) IV Continuous <Continuous>  dextrose 50% Injectable 25 Gram(s) IV Push once  dextrose 50% Injectable 12.5 Gram(s) IV Push once  dextrose 50% Injectable 25 Gram(s) IV Push once  finasteride 5 milliGRAM(s) Oral daily  glucagon  Injectable 1 milliGRAM(s) IntraMuscular once  heparin   Injectable 7500 Unit(s) SubCutaneous every 8 hours  insulin lispro (ADMELOG) corrective regimen sliding scale   SubCutaneous Before meals and at bedtime  montelukast 10 milliGRAM(s) Oral daily  piperacillin/tazobactam IVPB.. 3.375 Gram(s) IV Intermittent every 6 hours  spironolactone 25 milliGRAM(s) Oral every 24 hours    MEDICATIONS  (PRN):  ALBUTerol    90 MICROgram(s) HFA Inhaler 2 Puff(s) Inhalation every 6 hours PRN Shortness of Breath and/or Wheezing  dextrose Oral Gel 15 Gram(s) Oral once PRN Blood Glucose LESS THAN 70 milliGRAM(s)/deciliter      Labs:                        10.0   13.04 )-----------( 148      ( 2022 05:52 )             31.6     06-05    143  |  90<L>  |  33<H>  ----------------------------<  185<H>  3.8   |  44<H>  |  1.00    Ca    9.6      2022 05:52  Phos  3.2     06-05  Mg     2.1     06-05    TPro  7.2  /  Alb  3.5  /  TBili  1.2  /  DBili  x   /  AST  31  /  ALT  22  /  AlkPhos  68  06-05    PT/INR - ( 2022 16:18 )   PT: 15.7 sec;   INR: 1.32          PTT - ( 2022 16:18 )  PTT:29.6 sec  ABG - ( 2022 11:47 )  pH, Arterial: 7.45  pH, Blood: x     /  pCO2: 68    /  pO2: 123   / HCO3: 47    / Base Excess: 19.3  /  SaO2: 100.0             Urinalysis Basic - ( 2022 08:47 )    Color: Yellow / Appearance: Clear / S.015 / pH: x  Gluc: x / Ketone: NEGATIVE  / Bili: Negative / Urobili: 4.0 E.U./dL   Blood: x / Protein: NEGATIVE mg/dL / Nitrite: NEGATIVE   Leuk Esterase: Trace / RBC: 5-10 /HPF / WBC < 5 /HPF   Sq Epi: x / Non Sq Epi: 0-5 /HPF / Bacteria: Present /HPF      COVID-19 PCR: Negative (2022 16:20)      ECG:  CXR: CCU Progress Note  Liliane Magdalena, PGY-1  Pager: [phone]    Hospital Course:  89M h/o HTN, HLD, NIDDM, asthma, (denies intubations/hospitalizations), BPH, 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 Brown), chronic CHF (EF 60% echo ), TRACIE (nonadherent to CPAP) who presented from rehab with c/o SOB, hypoxia, swelling of arms legs and scrotum, and 1 week of intermittent hemoptysis found to have acute CHF exacerbation. Admitted on BiPAP. Bumex gtt started. Diamox being given prn. Patient became febrile  with transient desat, then MAPs decreased to 50s, Bumex gtt on hold. Zosyn started (MRSA negative) and full w/u sent. Weaned to HFNC . Noted also to have unequal LE swelling, found to have b/l DVTs on , started on Eliquis.    OVERNIGHT EVENTS/INTERVAL HPI: Febrile to 101.5F at 7am with transient desat to 83% resolved with increased FiO2 to 70%, now back on 40%. Given Tylenol, w/u sent, started on Zosyn. MRSA negative so vanc was not given. Given Diamox 250mg this AM. Weaned to HFNC and tolerating diet. Patient without acute complaints. Not c/o dizziness, CP, SOB.     OBJECTIVE:  Vital Signs Last 24 Hrs  T(C): 37.8 (2022 12:00), Max: 38.6 (2022 07:50)  T(F): 100.1 (2022 12:00), Max: 101.5 (2022 07:50)  HR: 60 (2022 13:15) (58 - 79)  BP: 115/61 (2022 13:00) (102/51 - 164/60)  BP(mean): 79 (2022 13:00) (72 - 98)  RR: 23 (2022 13:15) (15 - 41)  SpO2: 98% (2022 13:15) (86% - 100%)  I&O's Detail    2022 07:01  -  2022 07:00  --------------------------------------------------------  IN:    Bumetanide: 115 mL    IV PiggyBack: 100 mL    Oral Fluid: 868 mL  Total IN: 1083 mL    OUT:    Indwelling Catheter - Urethral (mL): 6655 mL  Total OUT: 6655 mL    Total NET: -5572 mL      2022 07:01  -  2022 13:53  --------------------------------------------------------  IN:    Bumetanide: 2.5 mL    IV PiggyBack: 500 mL  Total IN: 502.5 mL    OUT:    Indwelling Catheter - Urethral (mL): 600 mL  Total OUT: 600 mL    Total NET: -97.5 mL        Physical Exam:  GENERAL: Awake, alert and interactive, no acute distress, appears comfortable  NEURO: A&Ox2 on assessment this AM, no focal deficits  HEENT: Normocephalic, atraumatic, no conjunctivitis or scleral icterus, MMM, edentulous state  NECK: Supple  CARDIAC: Regular rate and rhythm, +S1/S2, no murmurs/rubs/gallops  PULM: Breathing comfortably on BiPAP and then HFNC, +rhonchi and crackles  ABDOMEN: Soft, nontender, nondistended  : Stoll with yellow urine  MSK: Range of motion grossly intact  SKIN: Warm and dry  VASC: 3+ RLE edema, 2+ LLE edema, WWP, 1+ peripheral pulses, no LE tenderness    Medications:  MEDICATIONS  (STANDING):  aspirin enteric coated 81 milliGRAM(s) Oral daily  atorvastatin 10 milliGRAM(s) Oral at bedtime  budesonide 160 MICROgram(s)/formoterol 4.5 MICROgram(s) Inhaler 2 Puff(s) Inhalation two times a day  chlorhexidine 4% Liquid 1 Application(s) Topical <User Schedule>  dextrose 5%. 1000 milliLiter(s) (50 mL/Hr) IV Continuous <Continuous>  dextrose 5%. 1000 milliLiter(s) (100 mL/Hr) IV Continuous <Continuous>  dextrose 50% Injectable 25 Gram(s) IV Push once  dextrose 50% Injectable 12.5 Gram(s) IV Push once  dextrose 50% Injectable 25 Gram(s) IV Push once  finasteride 5 milliGRAM(s) Oral daily  glucagon  Injectable 1 milliGRAM(s) IntraMuscular once  heparin   Injectable 7500 Unit(s) SubCutaneous every 8 hours  insulin lispro (ADMELOG) corrective regimen sliding scale   SubCutaneous Before meals and at bedtime  montelukast 10 milliGRAM(s) Oral daily  piperacillin/tazobactam IVPB.. 3.375 Gram(s) IV Intermittent every 6 hours  spironolactone 25 milliGRAM(s) Oral every 24 hours    MEDICATIONS  (PRN):  ALBUTerol    90 MICROgram(s) HFA Inhaler 2 Puff(s) Inhalation every 6 hours PRN Shortness of Breath and/or Wheezing  dextrose Oral Gel 15 Gram(s) Oral once PRN Blood Glucose LESS THAN 70 milliGRAM(s)/deciliter      Labs:                        10.0   13.04 )-----------( 148      ( 2022 05:52 )             31.6     06-05    143  |  90<L>  |  33<H>  ----------------------------<  185<H>  3.8   |  44<H>  |  1.00    Ca    9.6      2022 05:52  Phos  3.2     06-05  Mg     2.1     06-05    TPro  7.2  /  Alb  3.5  /  TBili  1.2  /  DBili  x   /  AST  31  /  ALT  22  /  AlkPhos  68  06-05    PT/INR - ( 2022 16:18 )   PT: 15.7 sec;   INR: 1.32          PTT - ( 2022 16:18 )  PTT:29.6 sec  ABG - ( 2022 11:47 )  pH, Arterial: 7.45  pH, Blood: x     /  pCO2: 68    /  pO2: 123   / HCO3: 47    / Base Excess: 19.3  /  SaO2: 100.0             Urinalysis Basic - ( 2022 08:47 )    Color: Yellow / Appearance: Clear / S.015 / pH: x  Gluc: x / Ketone: NEGATIVE  / Bili: Negative / Urobili: 4.0 E.U./dL   Blood: x / Protein: NEGATIVE mg/dL / Nitrite: NEGATIVE   Leuk Esterase: Trace / RBC: 5-10 /HPF / WBC < 5 /HPF   Sq Epi: x / Non Sq Epi: 0-5 /HPF / Bacteria: Present /HPF      COVID-19 PCR: Negative (2022 16:20)      ECG: Paced though slightly irregular    CXR: B/l opacities, improved. No pleural effusions. Possible RLL consolidation. CCU Progress Note  Liliane Magdalena, PGY-1  Pager: [phone]    Hospital Course:  89M h/o HTN, HLD, NIDDM, asthma, (denies intubations/hospitalizations), BPH, 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 Brown), chronic CHF (EF 60% echo ), TRACIE (nonadherent to CPAP) who presented from rehab with c/o SOB, hypoxia, swelling of arms legs and scrotum, and 1 week of intermittent hemoptysis found to have acute CHF exacerbation. Admitted on BiPAP. Bumex gtt started. Diamox being given prn. Patient became febrile  with transient desat, then MAPs decreased to 50s, Bumex gtt on hold. Zosyn started (MRSA negative) and full w/u sent. Weaned to HFNC . Noted also to have unequal LE swelling, found to have b/l DVTs on , started on Eliquis.    OVERNIGHT EVENTS/INTERVAL HPI: Febrile to 101.5F at 7am with transient desat to 83% resolved with increased FiO2 to 70%, now back on 40%. Given Tylenol, w/u sent, started on Zosyn. MRSA negative so vanc was not given. Given Diamox 250mg this AM. Weaned to HFNC and tolerating diet. Patient c/o abd pain (RUQ?). Not c/o dizziness, CP, SOB.     OBJECTIVE:  Vital Signs Last 24 Hrs  T(C): 37.8 (2022 12:00), Max: 38.6 (2022 07:50)  T(F): 100.1 (2022 12:00), Max: 101.5 (2022 07:50)  HR: 60 (2022 13:15) (58 - 79)  BP: 115/61 (2022 13:00) (102/51 - 164/60)  BP(mean): 79 (2022 13:00) (72 - 98)  RR: 23 (2022 13:15) (15 - 41)  SpO2: 98% (2022 13:15) (86% - 100%)  I&O's Detail    2022 07:01  -  2022 07:00  --------------------------------------------------------  IN:    Bumetanide: 115 mL    IV PiggyBack: 100 mL    Oral Fluid: 868 mL  Total IN: 1083 mL    OUT:    Indwelling Catheter - Urethral (mL): 6655 mL  Total OUT: 6655 mL    Total NET: -5572 mL      2022 07:01  -  2022 13:53  --------------------------------------------------------  IN:    Bumetanide: 2.5 mL    IV PiggyBack: 500 mL  Total IN: 502.5 mL    OUT:    Indwelling Catheter - Urethral (mL): 600 mL  Total OUT: 600 mL    Total NET: -97.5 mL        Physical Exam:  GENERAL: Awake, alert and interactive, no acute distress, appears comfortable  NEURO: A&Ox2 on assessment this AM, no focal deficits  HEENT: Normocephalic, atraumatic, no conjunctivitis or scleral icterus, MMM, edentulous state  NECK: Supple  CARDIAC: Regular rate and rhythm, +S1/S2, no murmurs/rubs/gallops  PULM: Breathing comfortably on BiPAP and then HFNC, +rhonchi and crackles  ABDOMEN: Soft, nontender, nondistended  : Stoll with yellow urine  MSK: Range of motion grossly intact  SKIN: Warm and dry  VASC: 3+ RLE edema, 2+ LLE edema, WWP, 1+ peripheral pulses, no LE tenderness    Medications:  MEDICATIONS  (STANDING):  aspirin enteric coated 81 milliGRAM(s) Oral daily  atorvastatin 10 milliGRAM(s) Oral at bedtime  budesonide 160 MICROgram(s)/formoterol 4.5 MICROgram(s) Inhaler 2 Puff(s) Inhalation two times a day  chlorhexidine 4% Liquid 1 Application(s) Topical <User Schedule>  dextrose 5%. 1000 milliLiter(s) (50 mL/Hr) IV Continuous <Continuous>  dextrose 5%. 1000 milliLiter(s) (100 mL/Hr) IV Continuous <Continuous>  dextrose 50% Injectable 25 Gram(s) IV Push once  dextrose 50% Injectable 12.5 Gram(s) IV Push once  dextrose 50% Injectable 25 Gram(s) IV Push once  finasteride 5 milliGRAM(s) Oral daily  glucagon  Injectable 1 milliGRAM(s) IntraMuscular once  heparin   Injectable 7500 Unit(s) SubCutaneous every 8 hours  insulin lispro (ADMELOG) corrective regimen sliding scale   SubCutaneous Before meals and at bedtime  montelukast 10 milliGRAM(s) Oral daily  piperacillin/tazobactam IVPB.. 3.375 Gram(s) IV Intermittent every 6 hours  spironolactone 25 milliGRAM(s) Oral every 24 hours    MEDICATIONS  (PRN):  ALBUTerol    90 MICROgram(s) HFA Inhaler 2 Puff(s) Inhalation every 6 hours PRN Shortness of Breath and/or Wheezing  dextrose Oral Gel 15 Gram(s) Oral once PRN Blood Glucose LESS THAN 70 milliGRAM(s)/deciliter      Labs:                        10.0   13.04 )-----------( 148      ( 2022 05:52 )             31.6     06-05    143  |  90<L>  |  33<H>  ----------------------------<  185<H>  3.8   |  44<H>  |  1.00    Ca    9.6      2022 05:52  Phos  3.2     06-05  Mg     2.1     06-05    TPro  7.2  /  Alb  3.5  /  TBili  1.2  /  DBili  x   /  AST  31  /  ALT  22  /  AlkPhos  68  06-05    PT/INR - ( 2022 16:18 )   PT: 15.7 sec;   INR: 1.32          PTT - ( 2022 16:18 )  PTT:29.6 sec  ABG - ( 2022 11:47 )  pH, Arterial: 7.45  pH, Blood: x     /  pCO2: 68    /  pO2: 123   / HCO3: 47    / Base Excess: 19.3  /  SaO2: 100.0             Urinalysis Basic - ( 2022 08:47 )    Color: Yellow / Appearance: Clear / S.015 / pH: x  Gluc: x / Ketone: NEGATIVE  / Bili: Negative / Urobili: 4.0 E.U./dL   Blood: x / Protein: NEGATIVE mg/dL / Nitrite: NEGATIVE   Leuk Esterase: Trace / RBC: 5-10 /HPF / WBC < 5 /HPF   Sq Epi: x / Non Sq Epi: 0-5 /HPF / Bacteria: Present /HPF      COVID-19 PCR: Negative (2022 16:20)      ECG: Paced though slightly irregular    CXR: B/l opacities, improved. No pleural effusions. Possible RLL consolidation.

## 2022-06-06 NOTE — CONSULT NOTE ADULT - CONVERSATION DETAILS
In addition to the EM visit, an advance care planning meeting was performed  Start time: 150pm  End time: 206pm  Total time: 16 minutes   A face to face meeting to discuss advance care planning was held today regarding: CORY SAMUELS  Primary decision maker: Brianna Samuels  Alternate/surrogate: Other sister   Discussed advance directives including, but not limited to, healthcare proxy and code status.  Decision regarding code status: Non   Documentation completed today: Non    Discussion had with patients daughter about overall goals of care and she stated that her father never discussed this with her.  A MOLST was reviewed with her and she stated that she needs to talk to her otherwise and they need to make this decision together.  She stated she will call and inform the writer of the decision and inform the medical team as well. Emotional support was provided.

## 2022-06-06 NOTE — SWALLOW FEES ASSESSMENT ADULT - DEMONSTRATES NEED FOR REFERRAL TO ANOTHER SERVICE
Consider modified barium swallow study or esophagram and consult to GI for further work-up of esophageal dysmotility if c/w GOC

## 2022-06-06 NOTE — SWALLOW FEES ASSESSMENT ADULT - PHARYNGEAL PHASE COMMENTS
BOT retraction is WNL. Timeliness of swallow trigger is variable for thin liquids, at times triggering with the bolus head in the pyriform sinuses and at times before the bolus reaches the valleculae. Hyolaryngeal excursion was intact and laryngeal vestibule closure was complete. There was a complete white-out during the swallow and there was no penetration or aspiration observed on this exam. There was mild amount of puree residue (but not chewable solid) that was cleared with a liquid wash. Of note, Pt did cough on the initial two sips of thin liquid, but no blue coloring was observed in the laryngeal vestibule or trachea.

## 2022-06-06 NOTE — CONSULT NOTE ADULT - ASSESSMENT
89 year old man with Respiratory failure, debility, Dysphagia, CHF and encounter for palliative care.  89 year old man with Respiratory failure, debility, Dysphagia, CHF, advance care planning and encounter for palliative care.

## 2022-06-06 NOTE — SWALLOW FEES ASSESSMENT ADULT - RECOMMENDED CONSISTENCY
Easy to chew and thin liquids. Please provide smaller, more frequent meals and snack instead of 3 large meals, and maintain an upright position for at least 30 minutes after eating.

## 2022-06-06 NOTE — SWALLOW FEES ASSESSMENT ADULT - COMMENTS
Dtr at bedside and provided encouragement for patient to participate. Risks/benefits/alternatives of FEES were explained to the patient and his daughter who provided verbal consent to participate. Leslie Harrison, SLP, was present for feeding assistance. The Pt tolerated the passing and presence of the scope.

## 2022-06-06 NOTE — SWALLOW FEES ASSESSMENT ADULT - DIAGNOSTIC IMPRESSIONS
Pt p/w an essentially functional oropharyngeal swallow with no penetration or aspiration on this exam. It is suspected that Pt has esophageal dysmotility as evidenced by return of swallowed liquids into the hypopharynx after the swallow, which may contribute to intermittent coughing during meals an current suspicion for aspiration.

## 2022-06-06 NOTE — PROGRESS NOTE ADULT - ASSESSMENT
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 Brown), chronic CHF (EF 60% echo 2020), TRACIE (nonadherent to CPAP, uses 2-4L O2 qhs) who presented from rehab 6/3 with c/o SOB and reported hypoxia found to have hypercapnic respiratory failure 2/2 CHF exacerbation admitted on Bumex gtt initially on BiPAP now on HFNC with new onset sepsis, bumex gtt on pause since 6/5, also with b/l DVTs.    NEURO  No issues    CARDIOVASCULAR  #CHF exacerbation: Patient with HFpEF (EF 60% in 2020). Was being followed by Dr. Tuttle, more recently followed by visiting doctor from [facility] for diuretic management. Was on Toprol 25mg and torsemide which was weaned to once daily in feb. Patient with increasing SOB, crackles on exam, vascular congestion on CXR. BNP 5655.  - holding Bumex gtt i/s/o soft BPs, will attempt to restart tomorrow or give IVPs  - dc toprol on 6/4 2/2 unable to take oral meds, continue to hold i/s/o hypotension, monitor for tachycardia  - weaned from BiPAP to HFNC today (6/5)  -spironolactone 25mg qd  -c/w home aspirin  -discharge pt on spironolactone    #Demand ischemia:  -Trop 0.11 on admission, other cardiac enzymes negative.   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 Brown) set at 65bpm, DDD  - continues to have innate atrial rate >65    #HTN:   - dc toprol on 6/4/22, hold as currently hypotensive    #HLD:  - c/w home Lipitor  - f/u lipid profile    PULMONARY  #Hypercapnic/hypoxic respiratory failure: Admission ABG with pCO2 79. pH wnl bicarb 43, appropriate compensation of respiratory acidosis.   - Placed on BiPAP in ED now on HFNC as of 6/4  - management of CHF exacerbation as above  - management of TRACIE as below  -tx of sepsis as below    #TRACIE: Patient uses 2-4L O2 qhs, non-adherent with CPAP  -BiPAP qhs    #Asthma: On Advair 250-50, albuterol prn, and montelukast 10mg qd  -c/w home meds (Symbicort in place of Advair inpatient)    GI  #Abd pain: Possible RUQ pain  -f/u RUQ u/s    RENAL/  #BPH:  - c/w home finasteride    #Increased BUN  - Cr at baseline, continue to trend    HEME  #Anemia  Likely multifactorial AOCD, ANTONIO, dilutional 2/2 volume overload, less likely hemoptysis. Has had remote melena and hematochezia. No prior iron studies. Hb 12.6 Nov 2020, now 9-10.  - active t&s - most recent 6/4    #DVTs: New b/l LE DVTs found 6/5  -Eliquis load 6/5-6/11 with 10mg BID, then 5mg BID after    ID  #Febrile to 101.5 with WBC 13.04 6/5. MAPs 50s-60s. UA negative, possible RLL consolidation on CXR. MRSA negative. ESR, CRP, and procal elevated. Also with RUQ pain?  -possible sources: PNA vs GB, less likely sacral ulcer  -f/u BCx 6/5  -f/u RUQ u/s  -c/w Zosyn  -wound care for sacral wounds  -S&S given possible RLL consolidation, age, and edentulous state    ENDO  #DM: Patient on metformin at home  - mISS inpatient  - A1c 6.9    MISC  F: None  E: replete prn  N: DASH, fluid restricted 1L, pureed pending FEES  GI: None  A/c: Hep SQ  Code: FC - Confirmed by rehab documents  Dispo: CCU 89M h/o HTN, HLD, NIDDM, asthma, (denies intubations/hospitalizations), BPH, 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 Brown), chronic CHF (EF 60% echo 2020), TRACIE (nonadherent to CPAP, uses 2-4L O2 qhs) who presented from rehab 6/3 with c/o SOB and reported hypoxia found to have hypercapnic respiratory failure 2/2 CHF exacerbation admitted on Bumex gtt initially on BiPAP now on HFNC with new onset sepsis, bumex gtt on pause since 6/5, also with b/l DVTs.    NEURO  No issues    CARDIOVASCULAR  #CHF exacerbation: Patient with HFpEF (EF 60% in 2020). Was being followed by Dr. Tuttle, more recently followed by visiting doctor from [facility] for diuretic management. Was on Toprol 25mg and torsemide which was weaned to once daily in feb. Patient with increasing SOB, crackles on exam, vascular congestion on CXR. BNP 5655.  - held Bumex gtt i/s/o soft BPs, will restart today at 0.5  - dc'd toprol on 6/4 2/2 unable to take oral meds, continue to hold i/s/o hypotension, monitor for tachycardia  - weaned from BiPAP to HFNC 6/5  -spironolactone 25mg qd  -c/w home aspirin 81mg qd    #Demand ischemia:  -Trop 0.11 on admission, other cardiac enzymes negative.   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 Brown) set at 65bpm, DDD  - continues to have innate atrial rate >65    #HTN:   - dc toprol on 6/4/22, hold i/s/o hypotension    #HLD: Lipid profile wnl  - c/w home Lipitor    PULMONARY  #Hypercapnic/hypoxic respiratory failure: Admission ABG with pCO2 79. pH wnl bicarb 43, appropriate compensation of respiratory acidosis. ABGs improving.  - Placed on BiPAP in ED now on HFNC as of 6/4 with BiPAP at night  - management of CHF exacerbation as above  - management of TRACIE as below  - tx of sepsis as below    #TRACIE: Patient uses 2-4L O2 qhs, non-adherent with CPAP  -BiPAP qhs for now, CPAP qhs to be encouraged on discharge    #Asthma: On Advair 250-50, albuterol prn, and montelukast 10mg qd  -c/w home meds (Symbicort in place of Advair inpatient)    GI  No active issues    RENAL/  #BPH:  - c/w home finasteride    #Rising Cr: Likely i/s/o aggressive diuresis and sepsis  -continue to trend    HEME  #Anemia: Likely multifactorial dilutional 2/2 volume overload, less likely hemoptysis (resolved). Has had remote melena and hematochezia. Hb 12.6 Nov 2020, now 9-10. Iron studies with iron 43, rest wnl.  - active t&s - most recent 6/4, ordered for 6/7    #DVTs: New b/l LE DVTs found 6/5  -Eliquis load 6/5-6/11 with 10mg BID, then 5mg BID after    ID  #Febrile to 101.5 with WBC 13.04 6/5. MAPs 50s-60s. UA negative, possible RLL consolidation on CXR. MRSA negative. ESR, CRP, and procal elevated. Also with RUQ pain?  -possible sources: PNA vs GB, less likely sacral ulcer  -f/u BCx 6/5  -f/u RUQ u/s  -c/w Zosyn  -wound care for sacral wounds  -S&S given possible RLL consolidation, age, and edentulous state    ENDO  #DM: Patient on metformin at home  - mISS inpatient  - A1c 6.9    MISC  F: None  E: replete prn  N: DASH, fluid restricted 1L, pureed pending FEES  GI: None  A/c: Hep SQ  Code: FC - Confirmed by rehab documents  Dispo: CCU 89M h/o HTN, HLD, NIDDM, asthma, (denies intubations/hospitalizations), BPH, 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 Brown), chronic CHF (EF 60% echo 2020), TRACIE (nonadherent to CPAP, uses 2-4L O2 qhs) who presented from rehab 6/3 with c/o SOB and reported hypoxia found to have hypercapnic respiratory failure 2/2 CHF exacerbation admitted on Bumex gtt initially on BiPAP now on HFNC with new onset sepsis, bumex gtt on pause since 6/5, also with b/l DVTs.    NEURO  No issues    CARDIOVASCULAR  #CHF exacerbation: Patient with HFpEF (EF 60% in 2020). Was being followed by Dr. Tuttle, more recently followed by visiting doctor from [facility] for diuretic management. Was on Toprol 25mg and torsemide which was weaned to once daily in feb. Patient with increasing SOB, crackles on exam, vascular congestion on CXR. BNP 5655.  - held Bumex gtt i/s/o soft BPs, will restart today at 0.5  - dc'd toprol on 6/4 2/2 unable to take oral meds, continue to hold i/s/o hypotension, monitor for tachycardia  - weaned from BiPAP to HFNC 6/5  -spironolactone 25mg qd  -c/w home aspirin 81mg qd    #Demand ischemia:  -Trop 0.11 on admission, other cardiac enzymes negative.   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 rBown) set at 65bpm, DDD  - continues to have innate atrial rate >65    #HTN:   - dc toprol on 6/4/22, hold i/s/o hypotension    #HLD: Lipid profile wnl  - c/w home Lipitor    PULMONARY  #Hypercapnic/hypoxic respiratory failure: Admission ABG with pCO2 79. pH wnl bicarb 43, appropriate compensation of respiratory acidosis. ABGs improving.  - Placed on BiPAP in ED now on HFNC as of 6/4 with BiPAP at night  - management of CHF exacerbation as above  - management of TRACIE as below  - tx of sepsis as below    #TRACIE: Patient uses 2-4L O2 qhs, non-adherent with CPAP  -BiPAP qhs for now, CPAP qhs to be encouraged on discharge    #Asthma: On Advair 250-50, albuterol prn, and montelukast 10mg qd  -c/w home meds (Symbicort in place of Advair inpatient)    GI  No active issues    RENAL/  #BPH:  - c/w home finasteride    #Rising Cr: Likely i/s/o aggressive diuresis and sepsis  -continue to trend    HEME  #Anemia: Likely multifactorial dilutional 2/2 volume overload, less likely hemoptysis (resolved). Has had remote melena and hematochezia. Hb 12.6 Nov 2020, now 9-10. Iron studies with iron 43, rest wnl.  - active t&s - most recent 6/4, ordered for 6/7    #DVTs: New b/l LE DVTs found 6/5  -Eliquis load 6/5-6/11 with 10mg BID, then 5mg BID after    ID  #Febrile to 101.5 with WBC 13.04 6/5. MAPs 50s-60s. UA negative, possible RLL consolidation on CXR. MRSA negative. ESR, CRP, and procal elevated. Also with RUQ pain, now resolved.  -possible sources: aspiration PNA vs less likely sacral ulcer  -Zosyn started 6/5, switch to CTX 2g q24hr for total of 5d (through 6/9)  -S&S for aspiration given possible RLL consolidation, age, and edentulous state - pending FEES today  -RUQ u/s negative 6/5  -f/u BCx 6/5 - NGTD  -wound care for sacral wounds  -pall care consult for aspiration/GOC    ENDO  #DM: Patient on metformin at home  - mISS inpatient  - A1c 6.9    MISC  F: None  E: replete prn  N: NPO again pending FEES  GI: None  A/c: Hep SQ  Code: FC - Confirmed by rehab documents  Dispo: CCU 89M h/o HTN, HLD, NIDDM, asthma, (denies intubations/hospitalizations), BPH, 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 Brown), chronic CHF (EF 60% echo 2020), TRACIE (nonadherent to CPAP, uses 2-4L O2 qhs) who presented from rehab 6/3 with c/o SOB and reported hypoxia found to have hypercapnic respiratory failure 2/2 CHF exacerbation admitted on Bumex gtt initially on BiPAP now on HFNC with new onset sepsis, bumex gtt on pause since 6/5, also with b/l DVTs.    NEURO  No issues    CARDIOVASCULAR  #CHF exacerbation: Patient with HFpEF (EF 60% in 2020). Was being followed by Dr. Tuttle, more recently followed by visiting doctor from [facility] for diuretic management. Was on Toprol 25mg and torsemide which was weaned to once daily in feb. Patient with increasing SOB, crackles on exam, vascular congestion on CXR. BNP 5655.  - held Bumex gtt i/s/o soft BPs, will restart today at 0.5  - dc'd toprol on 6/4 2/2 unable to take oral meds, continue to hold i/s/o hypotension, monitor for tachycardia  - weaned from BiPAP to HFNC 6/5  -spironolactone 25mg qd  -c/w home aspirin 81mg qd  -TTE from 6/3 read with LV function grossly nl, dilated RV with reduced function, mild-to-moderate tricuspid regurgitation, PASP 57mmHg, trivial pericardial effusion.  -f/u complete TTE    #Demand ischemia:  -Trop 0.11 on admission, other cardiac enzymes negative.   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 Brown) set at 65bpm, DDD  - continues to have innate atrial rate >65    #HTN:   - dc toprol on 6/4/22, hold i/s/o hypotension    #HLD: Lipid profile wnl  - c/w home Lipitor    PULMONARY  #Hypercapnic/hypoxic respiratory failure: Admission ABG with pCO2 79. pH wnl bicarb 43, appropriate compensation of respiratory acidosis. ABGs improving.  - Placed on BiPAP in ED now on HFNC as of 6/4 with BiPAP at night  - management of CHF exacerbation as above  - management of TRACIE as below  - tx of sepsis as below    #TRACIE: Patient uses 2-4L O2 qhs, non-adherent with CPAP  -BiPAP qhs for now, CPAP qhs to be encouraged on discharge    #Asthma: On Advair 250-50, albuterol prn, and montelukast 10mg qd  -c/w home meds (Symbicort in place of Advair inpatient)    GI  No active issues    RENAL/  #BPH:  - c/w home finasteride    #Rising Cr: Likely i/s/o aggressive diuresis and sepsis  -continue to trend    HEME  #Anemia: Likely multifactorial dilutional 2/2 volume overload, less likely hemoptysis (resolved). Has had remote melena and hematochezia. Hb 12.6 Nov 2020, now 9-10. Iron studies with iron 43, rest wnl.  - active t&s - most recent 6/4, ordered for 6/7    #DVTs: New b/l LE DVTs found 6/5  -Eliquis load 6/5-6/11 with 10mg BID, then 5mg BID after    ID  #Febrile to 101.5 with WBC 13.04 6/5. MAPs 50s-60s. UA negative, possible RLL consolidation on CXR. MRSA negative. ESR, CRP, and procal elevated. Also with RUQ pain, now resolved.  -possible sources: aspiration PNA vs less likely sacral ulcer  -Zosyn started 6/5, switch to CTX 2g q24hr for total of 5d (through 6/9)  -S&S for aspiration given possible RLL consolidation, age, and edentulous state - pending FEES today  -RUQ u/s negative 6/5  -f/u BCx 6/5 - NGTD  -wound care for sacral wounds  -pall care consult for aspiration/GOC    ENDO  #DM: Patient on metformin at home  - mISS inpatient  - A1c 6.9    MISC  F: None  E: replete prn  N: NPO again pending FEES  GI: None  A/c: Hep SQ  Code: FC - Confirmed by rehab documents  Dispo: CCU

## 2022-06-06 NOTE — CONSULT NOTE ADULT - PROBLEM SELECTOR RECOMMENDATION 4
Patient with a history of CHF, and has EF was 60% in 2020. Was a patient of Dr. Tuttle, but couldn't make it to the office and was seen by [facility] visiting doctors. Currently patient on Bumex drip for Diuresis. Continue care as per CCU team.

## 2022-06-06 NOTE — SWALLOW FEES ASSESSMENT ADULT - ESOPHAGEAL PHASE
After the swallow, there was a mild amount of return of blue-tinged liquid from the esophagus back into the hypopharynx. This increased as bolus trials increased, suggestive of delayed emptying from the esophagus.

## 2022-06-06 NOTE — CONSULT NOTE ADULT - PROBLEM SELECTOR RECOMMENDATION 3
Per Swallow bedside assessment done by speech pathologist. Patient had cough when SLP held cup and fed patient, which was suggestive of aspiration, no signs of aspiration when  patient held the cup him self. Pending possible FEES. Appreciate Speech and swallow involvement

## 2022-06-06 NOTE — PROGRESS NOTE ADULT - SUBJECTIVE AND OBJECTIVE BOX
CCU Progress Note  Liliane Magdalena, PGY-1  Pager: [phone]    Hospital Course:  89M h/o HTN, HLD, NIDDM, asthma, (denies intubations/hospitalizations), BPH, 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 Brown), chronic CHF (EF 60% echo ), TRACIE (nonadherent to CPAP, uses 2-4L O2 qhs) who presented from rehab 6/3 with c/o SOB, hypoxia, swelling of arms legs and scrotum, and 1 week of intermittent hemoptysis found to have acute CHF exacerbation. Admitted on BiPAP. Bumex gtt started. Diamox being given prn. Patient became febrile  with transient desat, then MAPs decreased to 50s, Bumex gtt on hold. Zosyn started (MRSA negative) and full w/u sent. Weaned to HFNC , continuing with BiPAP at night. Noted also to have unequal LE swelling, found to have b/l DVTs on , started on Eliquis.    OVERNIGHT EVENTS/INTERVAL HPI:     OBJECTIVE:  Vital Signs Last 24 Hrs  T(C): 37.4 (2022 09:00), Max: 38.6 (2022 09:43)  T(F): 99.3 (2022 09:00), Max: 101.5 (2022 09:43)  HR: 70 (2022 08:00) (59 - 70)  BP: 127/80 (2022 08:00) (102/51 - 128/57)  BP(mean): 89 (2022 08:00) (72 - 89)  RR: 21 (2022 08:00) (15 - 25)  SpO2: 100% (2022 08:00) (90% - 100%)  I&O's Detail    2022 07:01  -  2022 07:00  --------------------------------------------------------  IN:    Bumetanide: 2.5 mL    IV PiggyBack: 900 mL    Norepinephrine: 39.4 mL  Total IN: 941.9 mL    OUT:    Indwelling Catheter - Urethral (mL): 2625 mL  Total OUT: 2625 mL    Total NET: -1683.1 mL      2022 07:01  -  2022 09:17  --------------------------------------------------------  IN:  Total IN: 0 mL    OUT:    Indwelling Catheter - Urethral (mL): 100 mL    Norepinephrine: 0 mL  Total OUT: 100 mL    Total NET: -100 mL        Physical Exam:  GENERAL: Awake, alert and interactive, no acute distress, appears comfortable  NEURO: A&Ox4, no focal deficits, 5/5 strength in all ext, reflexes 2+ throughout, CN 2-12 intact  HEENT: Normocephalic, atraumatic, no conjunctivitis or scleral icterus, oral mucosa moist, no oral lesions noted  NECK: Supple, no LAD, no JVD, thyroid not palpable  CARDIAC: Regular rate and rhythm, +S1/S2, no murmurs/rubs/gallops  PULM: Breathing comfortably on RA, clear to auscultation bilaterally, no wheezes/rales/rhonchi  ABDOMEN: Soft, nontender, nondistended, +bs, no hepatosplenomegaly, no rebound tenderness or fluid wave, no CVA tenderness  : Deferred  MSK: Range of motion grossly intact, no back tenderness  SKIN: Warm and dry, no rashes, lesions  VASC: Cap refil < 2 sec, 2+ peripheral pulses, no edema, no LE tenderness  Psych: Appropriate affect    Medications:  MEDICATIONS  (STANDING):  apixaban 10 milliGRAM(s) Oral every 12 hours  aspirin enteric coated 81 milliGRAM(s) Oral daily  atorvastatin 10 milliGRAM(s) Oral at bedtime  budesonide 160 MICROgram(s)/formoterol 4.5 MICROgram(s) Inhaler 2 Puff(s) Inhalation two times a day  chlorhexidine 4% Liquid 1 Application(s) Topical <User Schedule>  dextrose 5%. 1000 milliLiter(s) (50 mL/Hr) IV Continuous <Continuous>  dextrose 5%. 1000 milliLiter(s) (100 mL/Hr) IV Continuous <Continuous>  dextrose 50% Injectable 25 Gram(s) IV Push once  dextrose 50% Injectable 12.5 Gram(s) IV Push once  dextrose 50% Injectable 25 Gram(s) IV Push once  finasteride 5 milliGRAM(s) Oral daily  glucagon  Injectable 1 milliGRAM(s) IntraMuscular once  insulin lispro (ADMELOG) corrective regimen sliding scale   SubCutaneous Before meals and at bedtime  montelukast 10 milliGRAM(s) Oral daily  norepinephrine Infusion 0.05 MICROgram(s)/kG/Min (13.8 mL/Hr) IV Continuous <Continuous>  piperacillin/tazobactam IVPB.. 3.375 Gram(s) IV Intermittent every 6 hours  spironolactone 25 milliGRAM(s) Oral every 24 hours    MEDICATIONS  (PRN):  ALBUTerol    90 MICROgram(s) HFA Inhaler 2 Puff(s) Inhalation every 6 hours PRN Shortness of Breath and/or Wheezing  dextrose Oral Gel 15 Gram(s) Oral once PRN Blood Glucose LESS THAN 70 milliGRAM(s)/deciliter      Labs:                        9.7    11.24 )-----------( 129      ( 2022 06:01 )             30.2     06-06    139  |  92<L>  |  33<H>  ----------------------------<  165<H>  3.8   |  38<H>  |  1.16    Ca    9.3      2022 06:01  Phos  4.2     06-06  Mg     2.2     06-06    TPro  6.9  /  Alb  2.8<L>  /  TBili  1.1  /  DBili  x   /  AST  27  /  ALT  18  /  AlkPhos  63  06-06    PT/INR - ( 2022 13:41 )   PT: 18.8 sec;   INR: 1.57          PTT - ( 2022 13:41 )  PTT:47.2 sec  ABG - ( 2022 06:01 )  pH, Arterial: 7.45  pH, Blood: x     /  pCO2: 61    /  pO2: 109   / HCO3: 31    / Base Excess: 16.0  /  SaO2: 100.0             Urinalysis Basic - ( 2022 08:47 )    Color: Yellow / Appearance: Clear / S.015 / pH: x  Gluc: x / Ketone: NEGATIVE  / Bili: Negative / Urobili: 4.0 E.U./dL   Blood: x / Protein: NEGATIVE mg/dL / Nitrite: NEGATIVE   Leuk Esterase: Trace / RBC: 5-10 /HPF / WBC < 5 /HPF   Sq Epi: x / Non Sq Epi: 0-5 /HPF / Bacteria: Present /HPF      COVID-19 PCR: Negative (2022 16:20)      ECG:  CXR: CCU Progress Note  Liliane Magdalena, PGY-1  Pager: [phone]    Hospital Course:  89M h/o HTN, HLD, NIDDM, asthma, (denies intubations/hospitalizations), BPH, 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 Brown), chronic CHF (EF 60% echo ), TRACIE (nonadherent to CPAP, uses 2-4L O2 qhs) who presented from rehab 6/3 with c/o SOB, hypoxia, swelling of arms legs and scrotum, and 1 week of intermittent hemoptysis found to have acute CHF exacerbation. Admitted on BiPAP. Bumex gtt started. Diamox being given prn. Patient became febrile  with transient desat, then MAPs decreased to 50s, Bumex gtt on hold. Zosyn started (MRSA negative) and full w/u sent. Weaned to HFNC , continuing with BiPAP at night. Noted also to have unequal LE swelling, found to have b/l DVTs on , started on Eliquis.    OVERNIGHT EVENTS/INTERVAL HPI: Patient was intermittently on levophed 0.01-0.02 o/n, has been off since 7:30am this morning. Only complaint today is that he wanted to eat (was NPO for BiPAP). Not c/o SOB, CP, abd pain. No amenable to further ROS.    OBJECTIVE:  Vital Signs Last 24 Hrs  T(C): 37.4 (2022 09:00), Max: 38.6 (2022 09:43)  T(F): 99.3 (2022 09:00), Max: 101.5 (2022 09:43)  HR: 70 (2022 08:00) (59 - 70)  BP: 127/80 (2022 08:00) (102/51 - 128/57)  BP(mean): 89 (2022 08:00) (72 - 89)  RR: 21 (2022 08:00) (15 - 25)  SpO2: 100% (2022 08:00) (90% - 100%)  I&O's Detail    2022 07:01  -  2022 07:00  --------------------------------------------------------  IN:    Bumetanide: 2.5 mL    IV PiggyBack: 900 mL    Norepinephrine: 39.4 mL  Total IN: 941.9 mL    OUT:    Indwelling Catheter - Urethral (mL): 2625 mL  Total OUT: 2625 mL    Total NET: -1683.1 mL      2022 07:01  -  2022 09:17  --------------------------------------------------------  IN:  Total IN: 0 mL    OUT:    Indwelling Catheter - Urethral (mL): 100 mL    Norepinephrine: 0 mL  Total OUT: 100 mL    Total NET: -100 mL        Physical Exam:  GENERAL: Awake, alert and interactive, no acute distress  NEURO: Non-participatory with A&O questioning  HEENT: Normocephalic, atraumatic, no conjunctivitis or scleral icterus, MMM, edentulous state  NECK: Supple  CARDIAC: Regular rate and rhythm, +S1/S2, no murmurs/rubs/gallops  PULM: Breathing comfortably on HFNC, crackles throughout  ABDOMEN: Soft, nontender, nondistended  : Stoll with yellow urine  MSK: Range of motion grossly intact  SKIN: Warm and dry  VASC: 2+ RLE edema to 2/3 calf, 1+ LLE edema to 2/3 calf, WWP, 1+ peripheral pulses, no LE tenderness  PSYCH: agitated, frustrated    Medications:  MEDICATIONS  (STANDING):  apixaban 10 milliGRAM(s) Oral every 12 hours  aspirin enteric coated 81 milliGRAM(s) Oral daily  atorvastatin 10 milliGRAM(s) Oral at bedtime  budesonide 160 MICROgram(s)/formoterol 4.5 MICROgram(s) Inhaler 2 Puff(s) Inhalation two times a day  chlorhexidine 4% Liquid 1 Application(s) Topical <User Schedule>  dextrose 5%. 1000 milliLiter(s) (50 mL/Hr) IV Continuous <Continuous>  dextrose 5%. 1000 milliLiter(s) (100 mL/Hr) IV Continuous <Continuous>  dextrose 50% Injectable 25 Gram(s) IV Push once  dextrose 50% Injectable 12.5 Gram(s) IV Push once  dextrose 50% Injectable 25 Gram(s) IV Push once  finasteride 5 milliGRAM(s) Oral daily  glucagon  Injectable 1 milliGRAM(s) IntraMuscular once  insulin lispro (ADMELOG) corrective regimen sliding scale   SubCutaneous Before meals and at bedtime  montelukast 10 milliGRAM(s) Oral daily  norepinephrine Infusion 0.05 MICROgram(s)/kG/Min (13.8 mL/Hr) IV Continuous <Continuous>  piperacillin/tazobactam IVPB.. 3.375 Gram(s) IV Intermittent every 6 hours  spironolactone 25 milliGRAM(s) Oral every 24 hours    MEDICATIONS  (PRN):  ALBUTerol    90 MICROgram(s) HFA Inhaler 2 Puff(s) Inhalation every 6 hours PRN Shortness of Breath and/or Wheezing  dextrose Oral Gel 15 Gram(s) Oral once PRN Blood Glucose LESS THAN 70 milliGRAM(s)/deciliter      Labs:                        9.7    11.24 )-----------( 129      ( 2022 06:01 )             30.2     06-06    139  |  92<L>  |  33<H>  ----------------------------<  165<H>  3.8   |  38<H>  |  1.16    Ca    9.3      2022 06:01  Phos  4.2     06-06  Mg     2.2     06-06    TPro  6.9  /  Alb  2.8<L>  /  TBili  1.1  /  DBili  x   /  AST  27  /  ALT  18  /  AlkPhos  63  06-06    PT/INR - ( 2022 13:41 )   PT: 18.8 sec;   INR: 1.57          PTT - ( 2022 13:41 )  PTT:47.2 sec  ABG - ( 2022 06:01 )  pH, Arterial: 7.45  pH, Blood: x     /  pCO2: 61    /  pO2: 109   / HCO3: 31    / Base Excess: 16.0  /  SaO2: 100.0             Urinalysis Basic - ( 2022 08:47 )    Color: Yellow / Appearance: Clear / S.015 / pH: x  Gluc: x / Ketone: NEGATIVE  / Bili: Negative / Urobili: 4.0 E.U./dL   Blood: x / Protein: NEGATIVE mg/dL / Nitrite: NEGATIVE   Leuk Esterase: Trace / RBC: 5-10 /HPF / WBC < 5 /HPF   Sq Epi: x / Non Sq Epi: 0-5 /HPF / Bacteria: Present /HPF      COVID-19 PCR: Negative (2022 16:20)      ECG: Paced with underlying innate atrial beats    CXR: b/l vascular congestion and pulmonary edema, 2 focal consolidations in the R lung

## 2022-06-06 NOTE — SWALLOW FEES ASSESSMENT ADULT - SLP PERTINENT HISTORY OF CURRENT PROBLEM
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 Brown), chronic CHF (EF 60% echo 2020), TRACIE (nonadherent to CPAP) who presented from rehab with c/o SOB, hypoxia, swelling of arms legs and scrotum, and 1 week of intermittent hemoptysis. Admitted to [facility] Nov 2020 for CHF exacerbation. Seen for clinical exam & f/u and noted to have intermittent coughing during and after PO intake

## 2022-06-06 NOTE — CONSULT NOTE ADULT - PROBLEM SELECTOR RECOMMENDATION 6
Patient remains full code. Will speak to patient and daughters together when they are at bedside. Please see [facility] note above. Geriatrics and Palliative medicine was explained to the daughter.   As discussed during the palliative IDT meeting, the patients PSSA screening did not identify any current psychosocial need or spiritual support deficits.  - Restorationist/Spiritual practice: unknown   - Coping: [ x] well [ ] with difficulty [ ] poor coping   - Support system: [x ] strong [ ] adequate [ ] inadequate  - All questions answered, emotional support provided  -  primary team   - Please contact Palliative Medicine 24/7 at [phone] for any acute symptoms or further questions  - Will continue to follow with you

## 2022-06-06 NOTE — CONSULT NOTE ADULT - PROBLEM SELECTOR RECOMMENDATION 9
Patient is currently on High Flow NC 65% O2 and 65L/min, most likely secondary to his CHF. Continue care as per CCU.

## 2022-06-06 NOTE — SWALLOW FEES ASSESSMENT ADULT - MODE OF PRESENTATION
ice-chip x1; tsp thin x2; self admin cup sips x3oz; tsp applesauce x2; 1 bite dry cracker/cup/spoon/self fed/fed by clinician

## 2022-06-06 NOTE — CONSULT NOTE ADULT - PROBLEM SELECTOR RECOMMENDATION 5
Patient remains full code. Will speak to patient and daughters together when they are at bedside.  As discussed during the palliative IDT meeting, the patients PSSA screening did not identify any current psychosocial need or spiritual support deficits.  - Evangelical/Spiritual practice: unknown   - Coping: [ x] well [ ] with difficulty [ ] poor coping   - Support system: [x ] strong [ ] adequate [ ] inadequate  - All questions answered, emotional support provided  -  primary team   - Please contact Palliative Medicine 24/7 at [phone] for any acute symptoms or further questions  - Will continue to follow with you 16 minutes was spent discussing advance care planning face to face with the patients daughter.

## 2022-06-07 NOTE — PROGRESS NOTE ADULT - PROBLEM SELECTOR PLAN 3
Per Swallow bedside assessment done by speech pathologist. Patient had cough when SLP held cup and fed patient, which was suggestive of aspiration, no signs of aspiration when  patient held the cup him. Patient to go for barium swallow today for further evaluation of swallowing.

## 2022-06-07 NOTE — PROCEDURE NOTE - NSINFORMCONSENT_GEN_A_CORE
Benefits, risks, and possible complications of procedure explained to patient/caregiver who verbalized understanding and gave verbal consent.
Benefits, risks, and possible complications of procedure explained to patient/caregiver who verbalized understanding and gave written consent.
This was an emergent procedure.

## 2022-06-07 NOTE — PROGRESS NOTE ADULT - PROBLEM SELECTOR PLAN 1
Patient is currently on High Flow NC 50% O2 [id number]/min, most likely secondary to his CHF. Continue care as per CCU.

## 2022-06-07 NOTE — PROGRESS NOTE ADULT - ASSESSMENT
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 Brown), chronic CHF (EF 60% echo 2020), TRACIE (nonadherent to CPAP, uses 2-4L O2 qhs) who presented from rehab 6/3 with c/o SOB and reported hypoxia found to have hypercapnic respiratory failure 2/2 CHF exacerbation admitted on Bumex gtt initially on BiPAP now on HFNC with new onset sepsis, bumex gtt on pause since 6/5, also with b/l DVTs.    NEURO  No issues    CARDIOVASCULAR  #CHF exacerbation: Patient with HFpEF (EF 60% in 2020). Was being followed by Dr. Tuttle, more recently followed by visiting doctor from [facility] for diuretic management. Was on Toprol 25mg and torsemide which was weaned to once daily in feb. Patient with increasing SOB, crackles on exam, vascular congestion on CXR. BNP 5655.  - held Bumex gtt i/s/o soft BPs, will restart today at 0.5  - dc'd toprol on 6/4 2/2 unable to take oral meds, continue to hold i/s/o hypotension, monitor for tachycardia  - weaned from BiPAP to HFNC 6/5  -spironolactone 25mg qd  -c/w home aspirin 81mg qd  -TTE from 6/3 read with LV function grossly nl, dilated RV with reduced function, mild-to-moderate tricuspid regurgitation, PASP 57mmHg, trivial pericardial effusion.  -f/u complete TTE    #Demand ischemia:  -Trop 0.11 on admission, other cardiac enzymes negative.   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 Brown) set at 65bpm, DDD  - continues to have innate atrial rate >65    #HTN:   - dc toprol on 6/4/22, hold i/s/o hypotension    #HLD: Lipid profile wnl  - c/w home Lipitor    PULMONARY  #Hypercapnic/hypoxic respiratory failure: Admission ABG with pCO2 79. pH wnl bicarb 43, appropriate compensation of respiratory acidosis. ABGs improving.  - Placed on BiPAP in ED now on HFNC as of 6/4 with BiPAP at night  - management of CHF exacerbation as above  - management of TRACIE as below  - tx of sepsis as below    #TRACIE: Patient uses 2-4L O2 qhs, non-adherent with CPAP  -BiPAP qhs for now, CPAP qhs to be encouraged on discharge    #Asthma: On Advair 250-50, albuterol prn, and montelukast 10mg qd  -c/w home meds (Symbicort in place of Advair inpatient)    GI  No active issues    RENAL/  #BPH:  - c/w home finasteride    #Rising Cr: Likely i/s/o aggressive diuresis and sepsis  -continue to trend    HEME  #Anemia: Likely multifactorial dilutional 2/2 volume overload, less likely hemoptysis (resolved). Has had remote melena and hematochezia. Hb 12.6 Nov 2020, now 9-10. Iron studies with iron 43, rest wnl.  - active t&s - most recent 6/4, ordered for 6/7    #DVTs: New b/l LE DVTs found 6/5  -Eliquis load 6/5-6/11 with 10mg BID, then 5mg BID after    ID  #Febrile to 101.5 with WBC 13.04 6/5. MAPs 50s-60s. UA negative, possible RLL consolidation on CXR. MRSA negative. ESR, CRP, and procal elevated. Also with RUQ pain, now resolved.  -possible sources: aspiration PNA vs less likely sacral ulcer  -Zosyn started 6/5, switch to CTX 2g q24hr for total of 5d (through 6/9)  -S&S for aspiration given possible RLL consolidation, age, and edentulous state - pending FEES today  -RUQ u/s negative 6/5  -f/u BCx 6/5 - NGTD  -wound care for sacral wounds  -pall care consult for aspiration/GOC    ENDO  #DM: Patient on metformin at home  - mISS inpatient  - A1c 6.9    MISC  F: None  E: replete prn  N: NPO again pending FEES  GI: None  A/c: Hep SQ  Code: FC - Confirmed by rehab documents  Dispo: CCU 89M h/o HTN, HLD, NIDDM, asthma, (denies intubations/hospitalizations), BPH, 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 Brown), chronic CHF (EF 60% echo 2020), TRACIE (nonadherent to CPAP, uses 2-4L O2 qhs) who presented from rehab 6/3 with c/o SOB and reported hypoxia found to have hypercapnic respiratory failure 2/2 CHF exacerbation admitted on Bumex gtt initially on BiPAP now on HFNC with new onset sepsis, bumex gtt on pause since 6/5, also with b/l DVTs.    NEURO  No issues    CARDIOVASCULAR  #CHF exacerbation: Patient with HFpEF (EF 60% in 2020). Was being followed by Dr. Tuttle, more recently followed by visiting doctor from [facility] for diuretic management. Was on Toprol 25mg and torsemide which was weaned to once daily in feb. Patient with increasing SOB, crackles on exam, vascular congestion on CXR. BNP 5655.  - held Bumex gtt 6/5 i/s/o soft BPs, restarted 6/6 at 0.5, will increase to 1 today  - dc'd toprol on 6/4 2/2 unable to take oral meds, continue to hold i/s/o hypotension, monitor for tachycardia  - weaned from BiPAP to HFNC 6/5  -spironolactone 25mg qd  -c/w home aspirin 81mg qd  - d/c'c a-line 6/7  -limited TTE from 6/3: LV function grossly nl, dilated RV with reduced function, mild-to-moderate tricuspid regurgitation, PASP 57mmHg, trivial pericardial effusion.  -complete TTE from 6/6: Nl LV size/systolic function, mod LVH, dilated RV with reduced function, slightly dilated RA, aortic sclerosis without stenosis, mild AR, mild-to-mod TR, severe pulm HTN (PASP 76mmHg), trivial pericardial effusion.    #Severe pulmonary HTN: PASP 76mmHg on TTE 6/6  -likely 2/2 CHF exacerbation, continue to tx as above    #Demand ischemia:  -Trop 0.11 on admission, other cardiac enzymes negative.   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 Brown) set at 65bpm, DDD  - continues to have innate atrial rate >65    #HTN:   - dc toprol on 6/4/22, hold i/s/o hypotension    #HLD: Lipid profile wnl  - c/w home Lipitor    PULMONARY  #Hypercapnic/hypoxic respiratory failure  - Placed on BiPAP in ED now on HFNC as of 6/4 with BiPAP at night  - management of CHF exacerbation as above  - management of TRACIE as below  - tx of sepsis as below    #TRACIE: Patient uses 2-4L O2 qhs, non-adherent with CPAP  -BiPAP qhs for now, CPAP qhs to be encouraged on discharge    #Asthma: On Advair 250-50, albuterol prn, and montelukast 10mg qd  -c/w home meds (Symbicort in place of Advair inpatient)    GI  #R/o dysphagia:  -FEES 6/6 without dysphagia but concern for regurg  -f/u barium swallow today    RENAL/  #BPH:  - c/w home finasteride    #JUVE: Likely i/s/o aggressive diuresis/sepsis/fluid overload  -continue to trend    HEME  #DVTs: New b/l LE DVTs found 6/5  -Eliquis load 6/5-6/11 with 10mg BID, then 5mg BID after    #Anemia: Likely multifactorial dilutional 2/2 volume overload, less likely hemoptysis (resolved). Has had remote melena and hematochezia. Hb 12.6 Nov 2020, now 9-10. Iron studies with iron 43, rest wnl.  - active t&s - most recent 6/7    ID  #Febrile to 101.5 with WBC 13.04 6/5. MAPs 50s-60s. UA negative, possible RLL consolidation on CXR. MRSA negative. ESR, CRP, and procal elevated. Also with RUQ pain, now resolved.  -most likely source aspiration PNA  -Zosyn started 6/5, switch to CTX 2g q24hr for total of 5d (through 6/9)  -S&S for aspiration given possible RLL consolidation, age, and edentulous state - FEES with c/f regurgitation though no dysphagia, will get barium study today  -RUQ u/s negative 6/5  -f/u BCx 6/5 - NGTD  -wound care for sacral wounds  -pall care consult for aspiration/GOC    ENDO  #DM: Patient on metformin at home  - mISS inpatient  - A1c 6.9    MISC  F: None  E: replete prn  N: NPO again pending barium study  GI: None  A/c: Hep SQ  Code: FC - Confirmed by rehab documents  Dispo: CCU

## 2022-06-07 NOTE — PROGRESS NOTE ADULT - PROBLEM SELECTOR PLAN 6
Patient last assessed: 6/6/2022  to manage: GOC/AD, symptoms, and support.  30 Minutes; Start: 0800am End: 0830am  , of non-face-to-face prolonged service provided that relates to (face-to-face) care that has or will occur and ongoing patient management, including one or more of the following:   - Reviewed records from other physicians or other health care professional services, including one or more of the following: other medical records and diagnostic / radiology study results

## 2022-06-07 NOTE — PROGRESS NOTE ADULT - SUBJECTIVE AND OBJECTIVE BOX
CCU Progress Note  Liliane Magdalena, PGY-1  Pager: [phone]    Hospital Course:  89M h/o HTN, HLD, NIDDM, asthma, (denies intubations/hospitalizations), BPH, 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 Brown), chronic CHF (EF 60% echo 2020), TRACIE (nonadherent to CPAP, uses 2-4L O2 qhs) who presented from rehab 6/3 with c/o SOB, hypoxia, swelling of arms legs and scrotum, and 1 week of intermittent hemoptysis found to have acute CHF exacerbation. Admitted on BiPAP. Bumex gtt started. Diamox being given prn. Patient became febrile 6/5 with transient desat, then MAPs decreased to 50s, briefly on levophed now off, Bumex gtt on hold 6/5-6/6, now restarted. Zosyn started (MRSA negative) and full w/u sent. Weaned to HFNC 6/5, continuing with BiPAP at night. Noted also to have unequal LE swelling, found to have b/l DVTs on 6/5, started on Eliquis.     OVERNIGHT EVENTS/INTERVAL HPI: Patient was febrile to 100.6F rectal, given Tylenol. No new cultures as last cultures were within 48hr. A-line pulled this AM. This AM, patient reports no dizziness, SOB, CP, abd pain, n/v. Still has not had a BM.    OBJECTIVE:  Vital Signs Last 24 Hrs  T(C): 37.6 (07 Jun 2022 06:48), Max: 38.1 (06 Jun 2022 21:00)  T(F): 99.7 (07 Jun 2022 06:48), Max: 100.6 (06 Jun 2022 21:00)  HR: 65 (07 Jun 2022 13:00) (62 - 69)  BP: 118/57 (07 Jun 2022 13:00) (100/54 - 127/60)  BP(mean): 81 (07 Jun 2022 13:00) (74 - 87)  RR: 20 (07 Jun 2022 13:00) (17 - 33)  SpO2: 96% (07 Jun 2022 13:00) (85% - 100%)  I&O's Detail    06 Jun 2022 07:01  -  07 Jun 2022 07:00  --------------------------------------------------------  IN:    Bumetanide: 50 mL    IV PiggyBack: 150 mL    Oral Fluid: 1270 mL  Total IN: 1470 mL    OUT:    Indwelling Catheter - Urethral (mL): 2715 mL    Norepinephrine: 0 mL  Total OUT: 2715 mL    Total NET: -1245 mL      07 Jun 2022 07:01  -  07 Jun 2022 14:08  --------------------------------------------------------  IN:    Bumetanide: 5 mL    Bumetanide: 25 mL    IV PiggyBack: 50 mL    Oral Fluid: 480 mL  Total IN: 560 mL    OUT:    Indwelling Catheter - Urethral (mL): 1225 mL  Total OUT: 1225 mL    Total NET: -665 mL      Physical Exam:  GENERAL: Awake, alert and interactive, no acute distress  NEURO: No focal deficits  HEENT: Normocephalic, atraumatic, no conjunctivitis or scleral icterus, MMM, edentulous state  NECK: Supple  CARDIAC: Regular rate and rhythm, +S1/S2, no murmurs/rubs/gallops  PULM: Breathing comfortably on HFNC, crackles throughout  ABDOMEN: Soft, nontender, nondistended  : Stoll with yellow urine  MSK: Range of motion grossly intact  SKIN: Warm and dry  VASC: 1+ RLE edema to 2/3 calf, trace LLE edema to 2/3 calf, WWP, 1+ peripheral pulses, no LE tenderness  PSYCH: calmer    Medications:  MEDICATIONS  (STANDING):  apixaban 10 milliGRAM(s) Oral every 12 hours  aspirin enteric coated 81 milliGRAM(s) Oral daily  atorvastatin 10 milliGRAM(s) Oral at bedtime  budesonide 160 MICROgram(s)/formoterol 4.5 MICROgram(s) Inhaler 2 Puff(s) Inhalation two times a day  buMETAnide Infusion 1 mG/Hr (5 mL/Hr) IV Continuous <Continuous>  cefTRIAXone   IVPB 2000 milliGRAM(s) IV Intermittent every 24 hours  chlorhexidine 4% Liquid 1 Application(s) Topical <User Schedule>  dextrose 5%. 1000 milliLiter(s) (50 mL/Hr) IV Continuous <Continuous>  dextrose 5%. 1000 milliLiter(s) (100 mL/Hr) IV Continuous <Continuous>  dextrose 50% Injectable 25 Gram(s) IV Push once  dextrose 50% Injectable 12.5 Gram(s) IV Push once  dextrose 50% Injectable 25 Gram(s) IV Push once  finasteride 5 milliGRAM(s) Oral daily  glucagon  Injectable 1 milliGRAM(s) IntraMuscular once  insulin lispro (ADMELOG) corrective regimen sliding scale   SubCutaneous Before meals and at bedtime  montelukast 10 milliGRAM(s) Oral daily  polyethylene glycol 3350 17 Gram(s) Oral every 24 hours  senna 2 Tablet(s) Oral at bedtime  spironolactone 25 milliGRAM(s) Oral every 24 hours    MEDICATIONS  (PRN):  ALBUTerol    90 MICROgram(s) HFA Inhaler 2 Puff(s) Inhalation every 6 hours PRN Shortness of Breath and/or Wheezing  dextrose Oral Gel 15 Gram(s) Oral once PRN Blood Glucose LESS THAN 70 milliGRAM(s)/deciliter      Labs:                        8.9    9.37  )-----------( 122      ( 07 Jun 2022 05:00 )             27.9     06-07    138  |  91<L>  |  33<H>  ----------------------------<  121<H>  3.9   |  37<H>  |  1.28    Ca    8.8      07 Jun 2022 05:00  Phos  5.0     06-07  Mg     2.3     06-07    TPro  6.4  /  Alb  2.7<L>  /  TBili  0.6  /  DBili  x   /  AST  23  /  ALT  15  /  AlkPhos  55  06-07      ABG - ( 06 Jun 2022 06:01 )  pH, Arterial: 7.45  pH, Blood: x     /  pCO2: 61    /  pO2: 109   / HCO3: 31    / Base Excess: 16.0  /  SaO2: 100.0               COVID-19 PCR: Negative (03 Jun 2022 16:20)      ECG:  CXR: CCU Progress Note  Liliane Magdalena, PGY-1  Pager: [phone]    Hospital Course:  89M h/o HTN, HLD, NIDDM, asthma, (denies intubations/hospitalizations), BPH, 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 Brown), chronic CHF (EF 60% echo 2020), TRACIE (nonadherent to CPAP, uses 2-4L O2 qhs) who presented from rehab 6/3 with c/o SOB, hypoxia, swelling of arms legs and scrotum, and 1 week of intermittent hemoptysis found to have acute CHF exacerbation. Admitted on BiPAP. Bumex gtt started. Diamox being given prn. Patient became febrile 6/5 with transient desat, then MAPs decreased to 50s, briefly on levophed now off, Bumex gtt on hold 6/5-6/6, now restarted. Zosyn started (MRSA negative) and full w/u sent. Weaned to HFNC 6/5, continuing with BiPAP at night. Noted also to have unequal LE swelling, found to have b/l DVTs on 6/5, started on Eliquis.     OVERNIGHT EVENTS/INTERVAL HPI: Patient was febrile to 100.6F rectal, given Tylenol. No new cultures as last cultures were within 48hr. A-line pulled this AM. This AM, patient reports no dizziness, SOB, CP, abd pain, n/v. Still has not had a BM.    OBJECTIVE:  Vital Signs Last 24 Hrs  T(C): 37.6 (07 Jun 2022 06:48), Max: 38.1 (06 Jun 2022 21:00)  T(F): 99.7 (07 Jun 2022 06:48), Max: 100.6 (06 Jun 2022 21:00)  HR: 65 (07 Jun 2022 13:00) (62 - 69)  BP: 118/57 (07 Jun 2022 13:00) (100/54 - 127/60)  BP(mean): 81 (07 Jun 2022 13:00) (74 - 87)  RR: 20 (07 Jun 2022 13:00) (17 - 33)  SpO2: 96% (07 Jun 2022 13:00) (85% - 100%)  I&O's Detail    06 Jun 2022 07:01  -  07 Jun 2022 07:00  --------------------------------------------------------  IN:    Bumetanide: 50 mL    IV PiggyBack: 150 mL    Oral Fluid: 1270 mL  Total IN: 1470 mL    OUT:    Indwelling Catheter - Urethral (mL): 2715 mL    Norepinephrine: 0 mL  Total OUT: 2715 mL    Total NET: -1245 mL      07 Jun 2022 07:01  -  07 Jun 2022 14:08  --------------------------------------------------------  IN:    Bumetanide: 5 mL    Bumetanide: 25 mL    IV PiggyBack: 50 mL    Oral Fluid: 480 mL  Total IN: 560 mL    OUT:    Indwelling Catheter - Urethral (mL): 1225 mL  Total OUT: 1225 mL    Total NET: -665 mL      Physical Exam:  GENERAL: Awake, alert and interactive, no acute distress  NEURO: No focal deficits  HEENT: Normocephalic, atraumatic, no conjunctivitis or scleral icterus, MMM, edentulous state  NECK: Supple  CARDIAC: Regular rate and rhythm, +S1/S2, no murmurs/rubs/gallops  PULM: Breathing comfortably on HFNC, crackles throughout  ABDOMEN: Soft, nontender, nondistended  : Stoll with yellow urine  MSK: Range of motion grossly intact  SKIN: Warm and dry; 2 stage 2 sacral/gluteal ulcers, clean and intact  VASC: 1+ RLE edema to 2/3 calf, trace LLE edema to 2/3 calf, WWP, 1+ peripheral pulses, no LE tenderness  PSYCH: calmer    Medications:  MEDICATIONS  (STANDING):  apixaban 10 milliGRAM(s) Oral every 12 hours  aspirin enteric coated 81 milliGRAM(s) Oral daily  atorvastatin 10 milliGRAM(s) Oral at bedtime  budesonide 160 MICROgram(s)/formoterol 4.5 MICROgram(s) Inhaler 2 Puff(s) Inhalation two times a day  buMETAnide Infusion 1 mG/Hr (5 mL/Hr) IV Continuous <Continuous>  cefTRIAXone   IVPB 2000 milliGRAM(s) IV Intermittent every 24 hours  chlorhexidine 4% Liquid 1 Application(s) Topical <User Schedule>  dextrose 5%. 1000 milliLiter(s) (50 mL/Hr) IV Continuous <Continuous>  dextrose 5%. 1000 milliLiter(s) (100 mL/Hr) IV Continuous <Continuous>  dextrose 50% Injectable 25 Gram(s) IV Push once  dextrose 50% Injectable 12.5 Gram(s) IV Push once  dextrose 50% Injectable 25 Gram(s) IV Push once  finasteride 5 milliGRAM(s) Oral daily  glucagon  Injectable 1 milliGRAM(s) IntraMuscular once  insulin lispro (ADMELOG) corrective regimen sliding scale   SubCutaneous Before meals and at bedtime  montelukast 10 milliGRAM(s) Oral daily  polyethylene glycol 3350 17 Gram(s) Oral every 24 hours  senna 2 Tablet(s) Oral at bedtime  spironolactone 25 milliGRAM(s) Oral every 24 hours    MEDICATIONS  (PRN):  ALBUTerol    90 MICROgram(s) HFA Inhaler 2 Puff(s) Inhalation every 6 hours PRN Shortness of Breath and/or Wheezing  dextrose Oral Gel 15 Gram(s) Oral once PRN Blood Glucose LESS THAN 70 milliGRAM(s)/deciliter      Labs:                        8.9    9.37  )-----------( 122      ( 07 Jun 2022 05:00 )             27.9     06-07    138  |  91<L>  |  33<H>  ----------------------------<  121<H>  3.9   |  37<H>  |  1.28    Ca    8.8      07 Jun 2022 05:00  Phos  5.0     06-07  Mg     2.3     06-07    TPro  6.4  /  Alb  2.7<L>  /  TBili  0.6  /  DBili  x   /  AST  23  /  ALT  15  /  AlkPhos  55  06-07      ABG - ( 06 Jun 2022 06:01 )  pH, Arterial: 7.45  pH, Blood: x     /  pCO2: 61    /  pO2: 109   / HCO3: 31    / Base Excess: 16.0  /  SaO2: 100.0               COVID-19 PCR: Negative (03 Jun 2022 16:20)      ECG:  CXR:

## 2022-06-07 NOTE — PROCEDURE NOTE - NSPROCNAME_GEN_A_CORE
Peripheral Line Insertion
Feeding Tube Placement
Arterial Puncture/Cannulation
Peripheral Line Insertion
Urinary Device Placement

## 2022-06-07 NOTE — PROGRESS NOTE ADULT - SUBJECTIVE AND OBJECTIVE BOX
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 Brown), chronic CHF (EF 60% echo 2020), TRACIE (nonadherent to CPAP) who presented from rehab with c/o SOB, hypoxia, swelling of arms legs and scrotum, and 1 week of intermittent hemoptysis. Admitted to [facility] Nov 2020 for CHF exacerbation (, EF 60% borderline reduced RV systolic function) with contraction alkalosis, improved on lasix and diamox, dc'd on torsemide 20 po BID. Torsemide was decreased to 20 QD in Feb with encouragement of low-salt diet.   Patient has been unable to come to Dr. Tuttle 2/2 poor functionality, was being followed by a [facility] visiting doctor who was managing his diuretics. At best this year able to walk 12 total steps with walker before becoming SOB. He was admitted to [facility] last month for CHF exacerbation and was discharged to rehab 2 weeks ago where he was getting PO lasix. He was becoming more SOB for the past 2 days requiring supplemental O2 (5L per daughter). Daughter brought to [facility] for eval. On arrival to CCU endorsed improvement in breathing, feeling cold, hungry and thirsty. Denied fever, cough, chest pain, abdominal pain, recent melena or hematochezia    In the ED:  VS: Afebrile. HR 59-68, -150/63-81, RR 20, 96% RA --> 97% on BiPAP  Labs: Hgb 9.6 (baseline 12-13), bicarb 43, BUN 40, Cr 1.01, PT 15.7, INR 1.32, Trop 0.11, Cpk/CKMB wnl, BNP 5655, pCO2 on VBG 84 and on ABG 79. UA negative. COVID negative.  ECG: Paced  Imaging: CXR with b/l haziness and increased vacular congestion  Interventions: Placed on NC --> BiPAP at 10/5 (03 Jun 2022 18:05)    SUBJECTIVE: Patient resting in bed in no distress. Daughter to be at bedside later today.     ROS:  DYSPNEA (Marilu): 0  NAUS/VOM: N	  SECRETIONS: N	  AGITATION: N  Pain (Y/N):    N   -Provocation/Palliation: n/a   -Quality/Quantity: n/a   -Radiating: n/a  -Severity:   n/a   -Timing/Frequency:   n/a   -Impact on ADLs:    OTHER REVIEW OF SYSTEMS: + weakness   UNABLE TO OBTAIN  due to: n/a     PEx:  T(C): 37.6 (06-07-22 @ 06:48), Max: 38.1 (06-06-22 @ 21:00)  HR: 63 (06-07-22 @ 11:00) (62 - 69)  BP: 117/55 (06-07-22 @ 11:00) (100/54 - 127/60)  RR: 20 (06-07-22 @ 11:00) (16 - 33)  SpO2: 85% (06-07-22 @ 11:00) (85% - 100%)  Wt(kg):  147.1kG      GENERAL:  [x ]Alert  [ x]Oriented x 2    [ ]Lethargic  [ ]Cachexia  [ ]Unarousable  [x ]Verbal  [ ]Non-Verbal  Behavioral:   [ ] Anxiety  [ ] Delirium [ ] Agitation [x ] Other - calm   HEENT:  [x ]Normal   [ ]Dry mouth   [ ]ET Tube/Trach  [ ]Oral lesions  PULMONARY:   [ ]Clear  [ ]Tachypnea  [ ]Audible excessive secretions   [x ]Rhonchi        [ ]Right [ ]Left [ ]Bilateral  [ ]Crackles        [ ]Right [ ]Left [ ]Bilateral  [ ]Wheezing     [ ]Right [ ]Left [ ]Bilateral  CARDIOVASCULAR:    [x ]Regular [ ]Irregular [ ]Tachy  [ ]Kolby [ ]Murmur [ ]Other  GASTROINTESTINAL:  [x ]Soft  [ ]Distended   [ ]+BS  [x ]Non tender [ ]Tender  [ ]PEG [ ]OGT/ NGT  Last BM:  6/5/2022  GENITOURINARY:  [ ]Normal [ ] Incontinent   [ ]Oliguria/Anuria   [x ]Stoll  MUSCULOSKELETAL:   [ ]Normal   [ ]Weakness  [x ]Bed/Wheelchair bound [x]Edema  - +1 b/l LE extremities   NEUROLOGIC:   [ ]No focal deficits  [x ] mild Cognitive impairment  [ ] Dysphagia [ ]Dysarthria [ ] Paresis [ ]Other   SKIN:   [ ]Normal   [x ]Pressure ulcer(s) - stage 2 sacrum  [ ]Rash      ALLERGIES: lisinopril (Angioedema)      OPIATE NAÏVE (Y/N): Y    MEDICATIONS: REVIEWED  MEDICATIONS  (STANDING):  apixaban 10 milliGRAM(s) Oral every 12 hours  aspirin enteric coated 81 milliGRAM(s) Oral daily  atorvastatin 10 milliGRAM(s) Oral at bedtime  budesonide 160 MICROgram(s)/formoterol 4.5 MICROgram(s) Inhaler 2 Puff(s) Inhalation two times a day  buMETAnide Infusion 1 mG/Hr (5 mL/Hr) IV Continuous <Continuous>  cefTRIAXone   IVPB 2000 milliGRAM(s) IV Intermittent every 24 hours  chlorhexidine 4% Liquid 1 Application(s) Topical <User Schedule>  dextrose 5%. 1000 milliLiter(s) (50 mL/Hr) IV Continuous <Continuous>  dextrose 5%. 1000 milliLiter(s) (100 mL/Hr) IV Continuous <Continuous>  dextrose 50% Injectable 25 Gram(s) IV Push once  dextrose 50% Injectable 12.5 Gram(s) IV Push once  dextrose 50% Injectable 25 Gram(s) IV Push once  finasteride 5 milliGRAM(s) Oral daily  glucagon  Injectable 1 milliGRAM(s) IntraMuscular once  insulin lispro (ADMELOG) corrective regimen sliding scale   SubCutaneous Before meals and at bedtime  montelukast 10 milliGRAM(s) Oral daily  polyethylene glycol 3350 17 Gram(s) Oral every 24 hours  senna 2 Tablet(s) Oral at bedtime  spironolactone 25 milliGRAM(s) Oral every 24 hours    MEDICATIONS  (PRN):  ALBUTerol    90 MICROgram(s) HFA Inhaler 2 Puff(s) Inhalation every 6 hours PRN Shortness of Breath and/or Wheezing  dextrose Oral Gel 15 Gram(s) Oral once PRN Blood Glucose LESS THAN 70 milliGRAM(s)/deciliter    LABS: REVIEWED  CBC:                        8.9    9.37  )-----------( 122      ( 07 Jun 2022 05:00 )             27.9     CMP:    06-07    138  |  91<L>  |  33<H>  ----------------------------<  121<H>  3.9   |  37<H>  |  1.28    Ca    8.8      07 Jun 2022 05:00  Phos  5.0     06-07  Mg     2.3     06-07    TPro  6.4  /  Alb  2.7<L>  /  TBili  0.6  /  DBili  x   /  AST  23  /  ALT  15  /  AlkPhos  55  06-07    IMAGING: REVIEWED    ADVANCED DIRECTIVES:            FULL CODE           DECISION MAKER: Patient defers decision making to daughters  LEGAL SURROGATE: Brianna Samuels [phone]    PSYCHOSOCIAL-SPIRITUAL ASSESSMENT:       Reviewed       Care plan unchanged     GOALS OF CARE DISCUSSION       Palliative care info/counseling provided	           Family meeting       Advanced Directives addressed please see Advance Care Planning Note	           See previous Palliative Medicine Note       Documentation of GOC: Full code   	      AGENCY CHOICE DISCUSSED:           Homecare    REFERRALS	        Unit SW/Case Mgmt       PT/OT

## 2022-06-07 NOTE — PROCEDURE NOTE - NSAPPROACH_GEN_A_CORE
peripheral
via natural/artificial opening endoscopic
percutaneous
peripheral
via natural/artificial opening

## 2022-06-07 NOTE — PROGRESS NOTE ADULT - ASSESSMENT
89 year old man with Respiratory failure, debility, Dysphagia, CHF, advance care planning and encounter for palliative care.

## 2022-06-07 NOTE — PROGRESS NOTE ADULT - PROBLEM SELECTOR PLAN 5
Patient remain full code. Daughter to be at bedside later today to further discuss GOC.  - Restoration/Spiritual practice: unknown   - Coping: [ x] well [ ] with difficulty [ ] poor coping   - Support system: [ x] strong [ ] adequate [ ] inadequate  - All questions answered, emotional support provided  -  primary team   - Please contact Palliative Medicine 24/7 at [phone] for any acute symptoms or further questions  - Will continue to follow with you Patient remain full code. Daughter to be at bedside later today to further discuss GOC.  Called patient daughtervic she is currently in transit and cannot talk and asked that we speak tomorrow.   - Muslim/Spiritual practice: unknown   - Coping: [ x] well [ ] with difficulty [ ] poor coping   - Support system: [ x] strong [ ] adequate [ ] inadequate  - All questions answered, emotional support provided  -  primary team   - Please contact Palliative Medicine 24/7 at [phone] for any acute symptoms or further questions  - Will continue to follow with you

## 2022-06-07 NOTE — PROGRESS NOTE ADULT - PROBLEM SELECTOR PLAN 4
Patient with a history of CHF, and has EF was 60% in 2020. Was a patient of Dr. Tuttle, but couldn't make it to the office and was seen by [facility] visiting doctors. Currently patient on Bumex drip for Diuresis. Continue care as per CCU team.

## 2022-06-07 NOTE — PROCEDURE NOTE - NSINDICATIONS_GEN_A_CORE
esophageal dysmotility/other
venous access
arterial puncture to obtain ABG's/blood sampling/cannulation purposes/monitoring purposes
fluid administration
strict intake/output during critical illness

## 2022-06-07 NOTE — ADVANCED PRACTICE NURSE CONSULT - REASON FOR CONSULT
WO nurse consult to assess stage 2 pressure injuries that were present on admission, which does not meet the WOCN criteria. WOCN spoke with RN, Vale who reported she had applied foam dressings.

## 2022-06-08 NOTE — PROGRESS NOTE ADULT - ASSESSMENT
89 year old man with debility, Dysphagia, CHF, advance care planning and encounter for palliative care.

## 2022-06-08 NOTE — PROGRESS NOTE ADULT - ASSESSMENT
89M h/o chronic CHF (EF 60% echo 2020), 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 Brown), HTN, HLD, NIDDM, asthma, (denies intubations/hospitalizations), BPH, TRACIE (nonadherent to CPAP, uses 2-4L O2 qhs) who presented from rehab 6/3 with c/o SOB and reported hypoxia found to have hypercapnic/hypoxic respiratory failure 2/2 CHF exacerbation admitted on Bumex gtt initially on BiPAP now on NC with course complicated by sepsis 2/2 aspiration PNA, b/l DVTs, and esophageal dysmotility now with NGT pending further GOC discussions.    NEURO  No issues    CARDIOVASCULAR  #CHF exacerbation: Patient with HFpEF (EF 60% in 2020). Was being followed by Dr. Tuttle, more recently followed by visiting doctor from [facility] for diuretic management. Was on Toprol 25mg and torsemide which was weaned to once daily in feb. Patient with increasing SOB, crackles on exam, vascular congestion on CXR with minimal improvement. BNP 5655.  - held Bumex gtt 6/5 i/s/o soft BPs, restarted 6/6 at 0.5, will increased to 1 6/7. Tolerating well and making good UOP.  -strict Is&Os  -d/c'd a-line 6/7  - dc'd toprol on 6/4 2/2 unable to take oral meds, continued to hold i/s/o hypotension, restarted 6/8  - weaned from BiPAP to HFNC 6/5, on NC as of 6/8 (still with BiPAP at night)  -spironolactone 25mg qd  -c/w home aspirin 81mg qd  -limited TTE from 6/3: LV function grossly nl, dilated RV with reduced function, mild-to-moderate tricuspid regurgitation, PASP 57mmHg, trivial pericardial effusion.  -complete TTE from 6/6: Nl LV size/systolic function, mod LVH, dilated RV with reduced function, slightly dilated RA, aortic sclerosis without stenosis, mild AR, mild-to-mod TR, severe pulm HTN (PASP 76mmHg), trivial pericardial effusion.    #Severe pulmonary HTN: PASP 76mmHg on TTE 6/6  -likely 2/2 CHF exacerbation, continue to tx as above    #Demand ischemia:  -Trop 0.11 on admission, other cardiac enzymes negative.   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 Brown) set at 65bpm, DDD  - continues to have innate atrial rate >65    #HTN:   - dc toprol on 6/4/22, restarted as above    #HLD: Lipid profile wnl  - c/w home Lipitor    PULMONARY  #Hypercapnic/hypoxic respiratory failure  - Placed on BiPAP in ED now on NC as of 6/4 with BiPAP at night  - management of CHF exacerbation as above  - management of TRACIE as below  - tx of sepsis as below    #TRACIE: Patient uses 2-4L O2 qhs, non-adherent with CPAP  -BiPAP qhs for now, CPAP qhs to be encouraged on discharge    #Asthma: On Advair 250-50, albuterol prn, and montelukast 10mg qd  -c/w home meds (Symbicort in place of Advair inpatient)    GI  #Esophageal dysmotility  -FEES 6/6 without dysphagia but concern for regurgitation  -barium swallow 6/7 with achalasia vs pseudoachalasia and esophageal dysmotility  -NGT placed 6/7, feeds with Jevity 1.2 started 6/8  -GOC discussion with family and pall care, pall care already on board    RENAL/  #BPH:  - c/w home finasteride    #JUVE: Likely i/s/o aggressive diuresis/sepsis/fluid overload  -continue to trend, improving    HEME  #DVTs: New b/l LE DVTs found 6/5  -Eliquis load 6/5-6/11 with 10mg BID, then 5mg BID after    #Anemia: Likely multifactorial dilutional 2/2 volume overload, less likely hemoptysis (resolved). Has had remote melena and hematochezia. Hb 12.6 Nov 2020, now 9-10. Iron studies with iron 43, rest wnl.  - active t&s - most recent 6/7    ID  #Sepsis 2/2 aspiration PNA. Febrile to 101.5 with WBC 13.04 6/5. MAPs 50s-60s. UA negative, RLL consolidation on CXR. MRSA negative. ESR, CRP, and procal elevated. Also with RUQ pain, now resolved.  -most likely source aspiration PNA given evolving RLL consolidation and esophageal dysmotility  -Zosyn started 6/5, switch to CTX 2g q24hr for total of 5d (through 6/9)  -RUQ u/s negative 6/5  -f/u BCx 6/5 - NGTD    ENDO  #DM: Patient on metformin at home  - mISS inpatient  - A1c 6.9    MISC  F: None  E: replete prn  N: NGT with Jevity 1.2 from 8am-9pm (cannot be on feeds while on BiPAP overnight), increase to goal of 110cc/hr  GI: None  A/c: Eliquis  Code: FC - Confirmed by rehab documents  Dispo: CCU

## 2022-06-08 NOTE — PROGRESS NOTE ADULT - PROBLEM SELECTOR PLAN 3
Patient with a history of CHF, and has EF was 60% in 2020. Was a patient of Dr. Tuttle, but couldn't make it to the office and was seen by [facility] visiting doctors. Currently patient on Bumex drip for Diuresis. Continue care as per CCU team.

## 2022-06-08 NOTE — PHYSICAL THERAPY INITIAL EVALUATION ADULT - GENERAL OBSERVATIONS, REHAB EVAL
PT received consent from BOGDAN Boss to see pt. Pt was received semi-supine in bed +tele +IV lines +castro +5 LNC w/ humidifier +NGT. Pt was left seated in bed, VSS, NAD, call bell in reach, and RN aware.

## 2022-06-08 NOTE — PHYSICAL THERAPY INITIAL EVALUATION ADULT - ADDITIONAL COMMENTS
Pt was admitted from rehab. Pt used a walker and needed assistance with ambulation and ADLs prior to this admission.

## 2022-06-08 NOTE — PROGRESS NOTE ADULT - PROBLEM SELECTOR PLAN 5
Patient last assessed: 6/7/2022  to manage: GOC/AD, symptoms, and support.  30 Minutes; Start: 0900am End: 0930am  , of non-face-to-face prolonged service provided that relates to (face-to-face) care that has or will occur and ongoing patient management, including one or more of the following:   - Reviewed records from other physicians or other health care professional services, including one or more of the following: other medical records and diagnostic / radiology study results 20 minutes were spent discussing advance care planning with the patient and family and team face to face.     Patient last assessed: 6/7/2022  to manage: GOC/AD, symptoms, and support.  30 Minutes; Start: 0900am End: 0930am  , of non-face-to-face prolonged service provided that relates to (face-to-face) care that has or will occur and ongoing patient management, including one or more of the following:   - Reviewed records from other physicians or other health care professional services, including one or more of the following: other medical records and diagnostic / radiology study results

## 2022-06-08 NOTE — PROGRESS NOTE ADULT - SUBJECTIVE AND OBJECTIVE BOX
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 Brown), chronic CHF (EF 60% echo 2020), TRACIE (nonadherent to CPAP) who presented from rehab with c/o SOB, hypoxia, swelling of arms legs and scrotum, and 1 week of intermittent hemoptysis. Admitted to [facility] Nov 2020 for CHF exacerbation (, EF 60% borderline reduced RV systolic function) with contraction alkalosis, improved on lasix and diamox, dc'd on torsemide 20 po BID. Torsemide was decreased to 20 QD in Feb with encouragement of low-salt diet.   Patient has been unable to come to Dr. Tuttle 2/2 poor functionality, was being followed by a [facility] visiting doctor who was managing his diuretics. At best this year able to walk 12 total steps with walker before becoming SOB. He was admitted to [facility] last month for CHF exacerbation and was discharged to rehab 2 weeks ago where he was getting PO lasix. He was becoming more SOB for the past 2 days requiring supplemental O2 (5L per daughter). Daughter brought to [facility] for eval. On arrival to CCU endorsed improvement in breathing, feeling cold, hungry and thirsty. Denied fever, cough, chest pain, abdominal pain, recent melena or hematochezia    In the ED:  VS: Afebrile. HR 59-68, -150/63-81, RR 20, 96% RA --> 97% on BiPAP  Labs: Hgb 9.6 (baseline 12-13), bicarb 43, BUN 40, Cr 1.01, PT 15.7, INR 1.32, Trop 0.11, Cpk/CKMB wnl, BNP 5655, pCO2 on VBG 84 and on ABG 79. UA negative. COVID negative.  ECG: Paced  Imaging: CXR with b/l haziness and increased vacular congestion  Interventions: Placed on NC --> BiPAP at 10/5 (03 Jun 2022 18:05)    SUBJECTIVE: Patient resting in bed in no distress. No longer requiring High flow Nasal canula.     ROS:  DYSPNEA (Marilu): 0  NAUS/VOM: N	  SECRETIONS: N	  AGITATION: N  Pain (Y/N):    N   -Provocation/Palliation: n/a   -Quality/Quantity: n/a   -Radiating: n/a   -Severity: n/a   -Timing/Frequency: n/a   -Impact on ADLs: n/a     OTHER REVIEW OF SYSTEMS: + weakness   UNABLE TO OBTAIN  due to: n/a     PEx:  T(C): 37.7 (06-08-22 @ 09:00), Max: 37.9 (06-07-22 @ 21:55)  HR: 59 (06-08-22 @ 11:00) (59 - 71)  BP: 104/51 (06-08-22 @ 11:00) (102/57 - 144/65)  RR: 20 (06-08-22 @ 11:00) (17 - 31)  SpO2: 95% (06-08-22 @ 11:00) (89% - 99%)  Wt(kg): 147.1kG      GENERAL:  [x ]Alert  [ x]Oriented x 2    [ ]Lethargic  [ ]Cachexia  [ ]Unarousable  [x ]Verbal  [ ]Non-Verbal  Behavioral:   [ ] Anxiety  [ ] Delirium [ ] Agitation [x ] Other - calm   HEENT:  [x ]Normal   [ ]Dry mouth   [ ]ET Tube/Trach  [ ]Oral lesions  PULMONARY:   [ ]Clear  [ ]Tachypnea  [ ]Audible excessive secretions   [x ]Rhonchi        [ ]Right [ ]Left [ ]Bilateral  [ ]Crackles        [ ]Right [ ]Left [ ]Bilateral  [ ]Wheezing     [ ]Right [ ]Left [ ]Bilateral  CARDIOVASCULAR:    [x ]Regular [ ]Irregular [ ]Tachy  [ ]Kolby [ ]Murmur [ ]Other  GASTROINTESTINAL:  [x ]Soft  [ ]Distended   [ ]+BS  [x ]Non tender [ ]Tender  [ ]PEG [ ]OGT/ NGT  Last BM:  6/5/2022  GENITOURINARY:  [ ]Normal [ ] Incontinent   [ ]Oliguria/Anuria   [x ]Stoll  MUSCULOSKELETAL:   [ ]Normal   [ ]Weakness  [x ]Bed/Wheelchair bound [x]Edema  - +1 b/l LE extremities   NEUROLOGIC:   [ ]No focal deficits  [x ] mild Cognitive impairment  [ ] Dysphagia [ ]Dysarthria [ ] Paresis [ ]Other   SKIN:   [ ]Normal   [x ]Pressure ulcer(s) - stage 2 sacrum  [ ]Rash    ALLERGIES: lisinopril (Angioedema)      OPIATE NAÏVE (Y/N): Y    MEDICATIONS: REVIEWED  MEDICATIONS  (STANDING):  apixaban 10 milliGRAM(s) Enteral Tube every 12 hours  aspirin  chewable 81 milliGRAM(s) Oral every 24 hours  atorvastatin 10 milliGRAM(s) Oral at bedtime  budesonide 160 MICROgram(s)/formoterol 4.5 MICROgram(s) Inhaler 2 Puff(s) Inhalation two times a day  buMETAnide Infusion 1 mG/Hr (5 mL/Hr) IV Continuous <Continuous>  cefTRIAXone   IVPB 2000 milliGRAM(s) IV Intermittent every 24 hours  chlorhexidine 4% Liquid 1 Application(s) Topical <User Schedule>  dextrose 5%. 1000 milliLiter(s) (50 mL/Hr) IV Continuous <Continuous>  dextrose 5%. 1000 milliLiter(s) (100 mL/Hr) IV Continuous <Continuous>  dextrose 50% Injectable 25 Gram(s) IV Push once  dextrose 50% Injectable 12.5 Gram(s) IV Push once  dextrose 50% Injectable 25 Gram(s) IV Push once  finasteride 5 milliGRAM(s) Oral at bedtime  glucagon  Injectable 1 milliGRAM(s) IntraMuscular once  insulin lispro (ADMELOG) corrective regimen sliding scale   SubCutaneous Before meals and at bedtime  metoprolol tartrate 12.5 milliGRAM(s) Oral every 12 hours  montelukast 10 milliGRAM(s) Oral every 24 hours  spironolactone 25 milliGRAM(s) Oral every 24 hours    MEDICATIONS  (PRN):  ALBUTerol    90 MICROgram(s) HFA Inhaler 2 Puff(s) Inhalation every 6 hours PRN Shortness of Breath and/or Wheezing  dextrose Oral Gel 15 Gram(s) Oral once PRN Blood Glucose LESS THAN 70 milliGRAM(s)/deciliter      LABS: REVIEWED  CBC:                        9.1    8.10  )-----------( 119      ( 08 Jun 2022 05:13 )             28.6     CMP:    06-08    135  |  90<L>  |  34<H>  ----------------------------<  120<H>  3.9   |  34<H>  |  1.22    Ca    9.0      08 Jun 2022 05:13  Phos  4.3     06-08  Mg     2.2     06-08    TPro  6.6  /  Alb  2.7<L>  /  TBili  0.6  /  DBili  x   /  AST  25  /  ALT  17  /  AlkPhos  56  06-08      IMAGING:   < from: Xray Esophagram Single Contrast (06.07.22 @ 16:00) >  IMPRESSION:  1.  Persistent narrowed distal esophagus 1.5 cm with stasis of barium   column to level of thoracic inlet during prolonged observation,   differential includes achalasia or pseudoachalasia. Consider endoscopic   evaluation.  2.  Esophageal dysmotility.    < end of copied text >      ADVANCED DIRECTIVES:            FULL CODE           DECISION MAKER: Patient defers decision making to daughters  LEGAL SURROGATE: Brianna Samuels [phone]    PSYCHOSOCIAL-SPIRITUAL ASSESSMENT:       Reviewed       Care plan unchanged     GOALS OF CARE DISCUSSION       Palliative care info/counseling provided	           Family meeting       Advanced Directives addressed please see Advance Care Planning Note	           See previous Palliative Medicine Note       Documentation of GOC: Full code   	      AGENCY CHOICE DISCUSSED:           Homecare    REFERRALS	        Unit SW/Case Mgmt       PT/OT   CORY SAMUELS             MRN-[id number]    CC: SOB    HPI:  89M h/o HTN, HLD, NIDDM, asthma, (denies intubations/hospitalizations), BPH, 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 Brown), chronic CHF (EF 60% echo 2020), TRACIE (nonadherent to CPAP) who presented from rehab with c/o SOB, hypoxia, swelling of arms legs and scrotum, and 1 week of intermittent hemoptysis. Admitted to [facility] Nov 2020 for CHF exacerbation (, EF 60% borderline reduced RV systolic function) with contraction alkalosis, improved on lasix and diamox, dc'd on torsemide 20 po BID. Torsemide was decreased to 20 QD in Feb with encouragement of low-salt diet.   Patient has been unable to come to Dr. Tuttle 2/2 poor functionality, was being followed by a [facility] visiting doctor who was managing his diuretics. At best this year able to walk 12 total steps with walker before becoming SOB. He was admitted to [facility] last month for CHF exacerbation and was discharged to rehab 2 weeks ago where he was getting PO lasix. He was becoming more SOB for the past 2 days requiring supplemental O2 (5L per daughter). Daughter brought to [facility] for eval. On arrival to CCU endorsed improvement in breathing, feeling cold, hungry and thirsty. Denied fever, cough, chest pain, abdominal pain, recent melena or hematochezia    In the ED:  VS: Afebrile. HR 59-68, -150/63-81, RR 20, 96% RA --> 97% on BiPAP  Labs: Hgb 9.6 (baseline 12-13), bicarb 43, BUN 40, Cr 1.01, PT 15.7, INR 1.32, Trop 0.11, Cpk/CKMB wnl, BNP 5655, pCO2 on VBG 84 and on ABG 79. UA negative. COVID negative.  ECG: Paced  Imaging: CXR with b/l haziness and increased vacular congestion  Interventions: Placed on NC --> BiPAP at 10/5 (03 Jun 2022 18:05)    SUBJECTIVE: Patient resting in bed in no distress. No longer requiring High flow Nasal canula.     ROS:  DYSPNEA (Marilu): 0  NAUS/VOM: N	  SECRETIONS: N	  AGITATION: N  Pain (Y/N):    N   -Provocation/Palliation: n/a   -Quality/Quantity: n/a   -Radiating: n/a   -Severity: n/a   -Timing/Frequency: n/a   -Impact on ADLs: n/a     OTHER REVIEW OF SYSTEMS: + weakness   UNABLE TO OBTAIN  due to: n/a     PEx:  T(C): 37.7 (06-08-22 @ 09:00), Max: 37.9 (06-07-22 @ 21:55)  HR: 59 (06-08-22 @ 11:00) (59 - 71)  BP: 104/51 (06-08-22 @ 11:00) (102/57 - 144/65)  RR: 20 (06-08-22 @ 11:00) (17 - 31)  SpO2: 95% (06-08-22 @ 11:00) (89% - 99%)  Wt(kg): 147.1kG      GENERAL:  [x ]Alert  [ x]Oriented x 2    [ ]Lethargic  [ ]Cachexia  [ ]Unarousable  [x ]Verbal  [ ]Non-Verbal  Behavioral:   [ ] Anxiety  [ ] Delirium [ ] Agitation [x ] Other - calm   HEENT:  [x ]Normal   [ ]Dry mouth   [ ]ET Tube/Trach  [ ]Oral lesions  PULMONARY:   [ ]Clear  [ ]Tachypnea  [ ]Audible excessive secretions   [x ]Rhonchi        [ ]Right [ ]Left [ ]Bilateral  [ ]Crackles        [ ]Right [ ]Left [ ]Bilateral  [ ]Wheezing     [ ]Right [ ]Left [ ]Bilateral  CARDIOVASCULAR:    [x ]Regular [ ]Irregular [ ]Tachy  [ ]Kolby [ ]Murmur [ ]Other  GASTROINTESTINAL:  [x ]Soft  [ ]Distended   [ ]+BS  [x ]Non tender [ ]Tender  [ ]PEG [X ]NGT  Last BM:  6/5/2022  GENITOURINARY:  [ ]Normal [ ] Incontinent   [ ]Oliguria/Anuria   [x ]Stoll  MUSCULOSKELETAL:   [ ]Normal   [ ]Weakness  [x ]Bed/Wheelchair bound [x]Edema  - +1 b/l LE extremities   NEUROLOGIC:   [ ]No focal deficits  [x ] mild Cognitive impairment  [ ] Dysphagia [ ]Dysarthria [ ] Paresis [ ]Other   SKIN:   [ ]Normal   [x ]Pressure ulcer(s) - stage 2 sacrum  [ ]Rash    ALLERGIES: lisinopril (Angioedema)      OPIATE NAÏVE (Y/N): Y    MEDICATIONS: REVIEWED  MEDICATIONS  (STANDING):  apixaban 10 milliGRAM(s) Enteral Tube every 12 hours  aspirin  chewable 81 milliGRAM(s) Oral every 24 hours  atorvastatin 10 milliGRAM(s) Oral at bedtime  budesonide 160 MICROgram(s)/formoterol 4.5 MICROgram(s) Inhaler 2 Puff(s) Inhalation two times a day  buMETAnide Infusion 1 mG/Hr (5 mL/Hr) IV Continuous <Continuous>  cefTRIAXone   IVPB 2000 milliGRAM(s) IV Intermittent every 24 hours  chlorhexidine 4% Liquid 1 Application(s) Topical <User Schedule>  dextrose 5%. 1000 milliLiter(s) (50 mL/Hr) IV Continuous <Continuous>  dextrose 5%. 1000 milliLiter(s) (100 mL/Hr) IV Continuous <Continuous>  dextrose 50% Injectable 25 Gram(s) IV Push once  dextrose 50% Injectable 12.5 Gram(s) IV Push once  dextrose 50% Injectable 25 Gram(s) IV Push once  finasteride 5 milliGRAM(s) Oral at bedtime  glucagon  Injectable 1 milliGRAM(s) IntraMuscular once  insulin lispro (ADMELOG) corrective regimen sliding scale   SubCutaneous Before meals and at bedtime  metoprolol tartrate 12.5 milliGRAM(s) Oral every 12 hours  montelukast 10 milliGRAM(s) Oral every 24 hours  spironolactone 25 milliGRAM(s) Oral every 24 hours    MEDICATIONS  (PRN):  ALBUTerol    90 MICROgram(s) HFA Inhaler 2 Puff(s) Inhalation every 6 hours PRN Shortness of Breath and/or Wheezing  dextrose Oral Gel 15 Gram(s) Oral once PRN Blood Glucose LESS THAN 70 milliGRAM(s)/deciliter      LABS: REVIEWED  CBC:                        9.1    8.10  )-----------( 119      ( 08 Jun 2022 05:13 )             28.6     CMP:    06-08    135  |  90<L>  |  34<H>  ----------------------------<  120<H>  3.9   |  34<H>  |  1.22    Ca    9.0      08 Jun 2022 05:13  Phos  4.3     06-08  Mg     2.2     06-08    TPro  6.6  /  Alb  2.7<L>  /  TBili  0.6  /  DBili  x   /  AST  25  /  ALT  17  /  AlkPhos  56  06-08      IMAGING:   < from: Xray Esophagram Single Contrast (06.07.22 @ 16:00) >  IMPRESSION:  1.  Persistent narrowed distal esophagus 1.5 cm with stasis of barium   column to level of thoracic inlet during prolonged observation,   differential includes achalasia or pseudoachalasia. Consider endoscopic   evaluation.  2.  Esophageal dysmotility.    < end of copied text >      ADVANCED DIRECTIVES:            FULL CODE           DECISION MAKER: Patient defers decision making to daughters  LEGAL SURROGATE: Brianna Samuels [phone]    PSYCHOSOCIAL-SPIRITUAL ASSESSMENT:       Reviewed       Care plan unchanged     GOALS OF CARE DISCUSSION       Palliative care info/counseling provided	           Family meeting       Advanced Directives addressed please see Advance Care Planning Note	           See previous Palliative Medicine Note       Documentation of GOC: Full code   	      AGENCY CHOICE DISCUSSED:           Homecare    REFERRALS	        Unit SW/Case Mgmt       PT/OT

## 2022-06-08 NOTE — PHYSICAL THERAPY INITIAL EVALUATION ADULT - PERTINENT HX OF CURRENT PROBLEM, REHAB EVAL
89 yo male, patient of Dr. Tuttle's, h/o HTN, HLD, NIDDM, asthma, (denies intubations/hospitalizations), BPH, hx of [facility] s/p [employer] PPM 10/30/2018 (followed by Dr. Brown), chronic CHF (EF 60% echo 2020), TRACIE who presented from rehab with c/o SOB and reported hypoxia found to have hypercapnic respiratory failure 2/2 CHF exacerbation.

## 2022-06-08 NOTE — PROGRESS NOTE ADULT - SUBJECTIVE AND OBJECTIVE BOX
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 Brown), chronic CHF (EF 60% echo 2020), TRACIE (nonadherent to CPAP, uses 2-4L O2 qhs) who presented from rehab 6/3 with c/o SOB, hypoxia, swelling of arms legs and scrotum, and 1 week of intermittent hemoptysis found to have acute CHF exacerbation. Admitted on BiPAP. Bumex gtt started. Diamox being given prn. Patient became febrile 6/5 with transient desat, then MAPs decreased to 50s, briefly on levophed now off, Bumex gtt on hold 6/5-6/6, now restarted. Zosyn started (MRSA negative) and full w/u sent, switched to CTX as likely source aspiration PNA. Weaned to HFNC 6/5 and then NC 6/8, continuing with BiPAP at night. Noted also to have unequal LE swelling, found to have b/l DVTs on 6/5, started on Eliquis. Barium swallow 6/7 with esophageal dysmotility. NGT placed 6/7, tube feeds started 6/8, pending further GOC discussions.    OVERNIGHT EVENTS/INTERVAL HPI: Weaned to NC this AM. Tube feeds started this AM. Patient reports generalized body pains and cough. No SOB, no n/v/d.    OBJECTIVE:  Vital Signs Last 24 Hrs  T(C): 37.7 (08 Jun 2022 09:00), Max: 37.9 (07 Jun 2022 21:55)  T(F): 99.9 (08 Jun 2022 09:00), Max: 100.2 (07 Jun 2022 21:55)  HR: 59 (08 Jun 2022 09:00) (59 - 71)  BP: 120/58 (08 Jun 2022 09:00) (102/57 - 144/65)  BP(mean): 84 (08 Jun 2022 09:00) (75 - 93)  RR: 23 (08 Jun 2022 09:00) (17 - 31)  SpO2: 95% (08 Jun 2022 09:00) (85% - 99%)  I&O's Detail    07 Jun 2022 07:01  -  08 Jun 2022 07:00  --------------------------------------------------------  IN:    Bumetanide: 5 mL    Bumetanide: 110 mL    Enteral Tube Flush: 200 mL    IV PiggyBack: 150 mL    Oral Fluid: 480 mL  Total IN: 945 mL    OUT:    Indwelling Catheter - Urethral (mL): 3925 mL  Total OUT: 3925 mL    Total NET: -2980 mL      08 Jun 2022 07:01  -  08 Jun 2022 10:26  --------------------------------------------------------  IN:    Bumetanide: 5 mL    IV PiggyBack: 100 mL    Jevity 1.2: 20 mL  Total IN: 125 mL    OUT:    Indwelling Catheter - Urethral (mL): 230 mL  Total OUT: 230 mL    Total NET: -105 mL      Physical Exam:  GENERAL: Awake, alert and interactive, no acute distress  NEURO: No focal deficits  HEENT: Normocephalic, atraumatic, no conjunctivitis or scleral icterus, MMM, edentulous state, NGT in place  NECK: Supple  CARDIAC: Regular rate and rhythm, +S1/S2, no murmurs/rubs/gallops  PULM: Breathing comfortably on 5L via NC, crackles throughout, decreased breath sounds in the R base  ABDOMEN: Soft, nontender, nondistended  : Stoll with yellow urine  MSK: Range of motion grossly intact  SKIN: Warm and dry  VASC: 2+ RLE edema to 1/2 calf, 1+ LLE edema to 1/2 calf, WWP, 1+ peripheral pulses, no LE tenderness  PSYCH: calm    Medications:  MEDICATIONS  (STANDING):  apixaban 10 milliGRAM(s) Enteral Tube every 12 hours  aspirin  chewable 81 milliGRAM(s) Oral every 24 hours  atorvastatin 10 milliGRAM(s) Oral at bedtime  budesonide 160 MICROgram(s)/formoterol 4.5 MICROgram(s) Inhaler 2 Puff(s) Inhalation two times a day  buMETAnide Infusion 1 mG/Hr (5 mL/Hr) IV Continuous <Continuous>  cefTRIAXone   IVPB 2000 milliGRAM(s) IV Intermittent every 24 hours  chlorhexidine 4% Liquid 1 Application(s) Topical <User Schedule>  dextrose 5%. 1000 milliLiter(s) (100 mL/Hr) IV Continuous <Continuous>  dextrose 5%. 1000 milliLiter(s) (50 mL/Hr) IV Continuous <Continuous>  dextrose 50% Injectable 25 Gram(s) IV Push once  dextrose 50% Injectable 12.5 Gram(s) IV Push once  dextrose 50% Injectable 25 Gram(s) IV Push once  finasteride 5 milliGRAM(s) Oral at bedtime  glucagon  Injectable 1 milliGRAM(s) IntraMuscular once  insulin lispro (ADMELOG) corrective regimen sliding scale   SubCutaneous Before meals and at bedtime  metoprolol tartrate 12.5 milliGRAM(s) Oral every 12 hours  montelukast 10 milliGRAM(s) Oral every 24 hours  spironolactone 25 milliGRAM(s) Oral every 24 hours    MEDICATIONS  (PRN):  ALBUTerol    90 MICROgram(s) HFA Inhaler 2 Puff(s) Inhalation every 6 hours PRN Shortness of Breath and/or Wheezing  dextrose Oral Gel 15 Gram(s) Oral once PRN Blood Glucose LESS THAN 70 milliGRAM(s)/deciliter      Labs:                        9.1    8.10  )-----------( 119      ( 08 Jun 2022 05:13 )             28.6     06-08    135  |  90<L>  |  34<H>  ----------------------------<  120<H>  3.9   |  34<H>  |  1.22    Ca    9.0      08 Jun 2022 05:13  Phos  4.3     06-08  Mg     2.2     06-08    TPro  6.6  /  Alb  2.7<L>  /  TBili  0.6  /  DBili  x   /  AST  25  /  ALT  17  /  AlkPhos  56  06-08          COVID-19 PCR: Negative (03 Jun 2022 16:20)      ECG: Paced with underlying SR    CXR: worsening vascular edema and RLL consolidation

## 2022-06-08 NOTE — PROGRESS NOTE ADULT - PROBLEM SELECTOR PLAN 2
Per Swallow bedside assessment done by speech pathologist. Patient had cough when SLP held cup and fed patient, which was suggestive of aspiration, no signs of aspiration when  patient held the cup him. barium swallow showed Esophageal dysmotility.

## 2022-06-08 NOTE — PROGRESS NOTE ADULT - PROBLEM SELECTOR PLAN 4
Patient remain full code. Plan for family meeting with both sister and primary team today at 4pm  - Zoroastrian/Spiritual practice: unknown   - Coping: [ x] well [ ] with difficulty [ ] poor coping   - Support system: [ x] strong [ ] adequate [ ] inadequate  - All questions answered, emotional support provided  - dw primary team   - Please contact Palliative Medicine 24/7 at [phone] for any acute symptoms or further questions  - Will continue to follow with you

## 2022-06-09 NOTE — PROGRESS NOTE ADULT - PROBLEM SELECTOR PLAN 4
Patient made himself a DNR/DNI with no feeding tube. Please refer to [facility] note above for details.   - Mandaen/Spiritual practice:  Taoism    - Coping: [ x] well [ ] with difficulty [ ] poor coping   - Support system: [ x] strong [ ] adequate [ ] inadequate  - All questions answered, emotional support provided  -  primary team   - Please contact Palliative Medicine 24/7 at [phone] for any acute symptoms or further questions  - Will continue to follow with you

## 2022-06-09 NOTE — PROGRESS NOTE ADULT - PROBLEM SELECTOR PLAN 2
Per Swallow bedside assessment done by speech pathologist. Patient had cough when SLP held cup and fed patient, which was suggestive of aspiration, no signs of aspiration when  patient held the cup him. barium swallow showed Esophageal dysmotility. Plan for no feeding tube and pleasure feeds.

## 2022-06-09 NOTE — PROGRESS NOTE ADULT - SUBJECTIVE AND OBJECTIVE BOX
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 Brown), chronic CHF (EF 60% echo 2020), TRACIE (nonadherent to CPAP) who presented from rehab with c/o SOB, hypoxia, swelling of arms legs and scrotum, and 1 week of intermittent hemoptysis. Admitted to [facility] Nov 2020 for CHF exacerbation (, EF 60% borderline reduced RV systolic function) with contraction alkalosis, improved on lasix and diamox, dc'd on torsemide 20 po BID. Torsemide was decreased to 20 QD in Feb with encouragement of low-salt diet.   Patient has been unable to come to Dr. Tuttle 2/2 poor functionality, was being followed by a [facility] visiting doctor who was managing his diuretics. At best this year able to walk 12 total steps with walker before becoming SOB. He was admitted to [facility] last month for CHF exacerbation and was discharged to rehab 2 weeks ago where he was getting PO lasix. He was becoming more SOB for the past 2 days requiring supplemental O2 (5L per daughter). Daughter brought to [facility] for eval. On arrival to CCU endorsed improvement in breathing, feeling cold, hungry and thirsty. Denied fever, cough, chest pain, abdominal pain, recent melena or hematochezia    In the ED:  VS: Afebrile. HR 59-68, -150/63-81, RR 20, 96% RA --> 97% on BiPAP  Labs: Hgb 9.6 (baseline 12-13), bicarb 43, BUN 40, Cr 1.01, PT 15.7, INR 1.32, Trop 0.11, Cpk/CKMB wnl, BNP 5655, pCO2 on VBG 84 and on ABG 79. UA negative. COVID negative.  ECG: Paced  Imaging: CXR with b/l haziness and increased vacular congestion  Interventions: Placed on NC --> BiPAP at 10/5 (03 Jun 2022 18:05)    SUBJECTIVE: Patient resting in bed in no distress. Madhuri, Daughter is at bedside. Patient stating "I want to eat".    ROS:  DYSPNEA (Marilu): 0  NAUS/VOM: N	  SECRETIONS: N  AGITATION: N  Pain (Y/N):     N  -Provocation/Palliation: n/a   -Quality/Quantity:  n/a   -Radiating: n/a   -Severity:  n/a   -Timing/Frequency:  n/a   -Impact on ADLs:  n/a     OTHER REVIEW OF SYSTEMS: + weakness   UNABLE TO OBTAIN  due to: n/a     PEx:  T(C): 37.7 (06-09-22 @ 09:00), Max: 37.7 (06-08-22 @ 22:05)  HR: 59 (06-09-22 @ 12:12) (59 - 74)  BP: 107/68 (06-09-22 @ 12:00) (101/52 - 144/66)  RR: 24 (06-09-22 @ 12:00) (17 - 34)  SpO2: 93% (06-09-22 @ 12:12) (88% - 100%)  Wt(kg): 147.1kG    GENERAL:  [x ]Alert  [ x]Oriented x 2    [ ]Lethargic  [ ]Cachexia  [ ]Unarousable  [x ]Verbal  [ ]Non-Verbal  Behavioral:   [ ] Anxiety  [ ] Delirium [ ] Agitation [x ] Other - calm   HEENT:  [x ]Normal   [ ]Dry mouth   [ ]ET Tube/Trach  [ ]Oral lesions  PULMONARY:   [ ]Clear  [ ]Tachypnea  [ ]Audible excessive secretions   [x ]Rhonchi anteriorly        [ ]Right [ ]Left [ ]Bilateral  [ ]Crackles        [ ]Right [ ]Left [ ]Bilateral  [ ]Wheezing     [ ]Right [ ]Left [ ]Bilateral  CARDIOVASCULAR:    [x ]Regular [ ]Irregular [ ]Tachy  [ ]Kolby [ ]Murmur [ ]Other  GASTROINTESTINAL:  [x ]Soft  [ ]Distended   [ ]+BS  [x ]Non tender [ ]Tender  [ ]PEG [X ]NGT  Last BM:  6/5/2022  GENITOURINARY:  [ ]Normal [ ] Incontinent   [ ]Oliguria/Anuria   [x ]Stoll  MUSCULOSKELETAL:   [ ]Normal   [ ]Weakness  [x ]Bed/Wheelchair bound [x]Edema  - +1 b/l LE extremities   NEUROLOGIC:   [ ]No focal deficits  [x ] mild Cognitive impairment  [ ] Dysphagia [ ]Dysarthria [ ] Paresis [ ]Other   SKIN:   [ ]Normal   [x ]Pressure ulcer(s) - stage 2 sacrum  [ ]Rash    ALLERGIES: lisinopril (Angioedema)      OPIATE NAÏVE (Y/N): Y    MEDICATIONS: REVIEWED  MEDICATIONS  (STANDING):  apixaban 10 milliGRAM(s) Oral every 12 hours  aspirin  chewable 81 milliGRAM(s) Oral every 24 hours  atorvastatin 10 milliGRAM(s) Oral at bedtime  budesonide 160 MICROgram(s)/formoterol 4.5 MICROgram(s) Inhaler 2 Puff(s) Inhalation two times a day  buMETAnide Injectable 1 milliGRAM(s) IV Push every 12 hours  cefTRIAXone   IVPB 2000 milliGRAM(s) IV Intermittent every 24 hours  chlorhexidine 4% Liquid 1 Application(s) Topical <User Schedule>  dextrose 5%. 1000 milliLiter(s) (50 mL/Hr) IV Continuous <Continuous>  dextrose 5%. 1000 milliLiter(s) (100 mL/Hr) IV Continuous <Continuous>  dextrose 50% Injectable 25 Gram(s) IV Push once  dextrose 50% Injectable 12.5 Gram(s) IV Push once  dextrose 50% Injectable 25 Gram(s) IV Push once  finasteride 5 milliGRAM(s) Oral at bedtime  glucagon  Injectable 1 milliGRAM(s) IntraMuscular once  insulin lispro (ADMELOG) corrective regimen sliding scale   SubCutaneous Before meals and at bedtime  metoprolol tartrate 12.5 milliGRAM(s) Oral once  montelukast 10 milliGRAM(s) Oral every 24 hours  polyethylene glycol 3350 17 Gram(s) Oral two times a day  senna 2 Tablet(s) Oral at bedtime  spironolactone 25 milliGRAM(s) Oral every 24 hours    MEDICATIONS  (PRN):  ALBUTerol    90 MICROgram(s) HFA Inhaler 2 Puff(s) Inhalation every 6 hours PRN Shortness of Breath and/or Wheezing  dextrose Oral Gel 15 Gram(s) Oral once PRN Blood Glucose LESS THAN 70 milliGRAM(s)/deciliter      LABS: REVIEWED  CBC:                        9.8    8.10  )-----------( 155      ( 09 Jun 2022 05:30 )             31.3     CMP:    06-09    141  |  92<L>  |  38<H>  ----------------------------<  181<H>  4.0   |  40<H>  |  1.17    Ca    9.5      09 Jun 2022 05:30  Phos  4.2     06-09  Mg     2.5     06-09    TPro  6.6  /  Alb  2.7<L>  /  TBili  0.6  /  DBili  x   /  AST  25  /  ALT  17  /  AlkPhos  56  06-08    IMAGING: REVIEWED    ADVANCED DIRECTIVES:            DNR DNI            MOLST      DECISION MAKER: Patient defers decision making to daughters  LEGAL SURROGATE: Brianna Samuels [phone] and Madhuri Lopez [phone]    PSYCHOSOCIAL-SPIRITUAL ASSESSMENT:       Reviewed       Care plan unchanged     GOALS OF CARE DISCUSSION       Palliative care info/counseling provided	           Family meeting       Advanced Directives addressed please see Advance Care Planning Note	           See previous Palliative Medicine Note       Documentation of GOC: DNR/DNI  	      AGENCY CHOICE DISCUSSED:           [facility]           REFERRALS	        Unit SW/Case Mgmt       PT/OT

## 2022-06-09 NOTE — PROGRESS NOTE ADULT - SUBJECTIVE AND OBJECTIVE BOX
CCU Progress Note  Liliane Magdalena, PGY-1  Pager: [phone]    Hospital Course:  89M h/o HTN, HLD, NIDDM, asthma, (denies intubations/hospitalizations), BPH, 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 Brown), chronic CHF (EF 60% echo 2020), TRACIE (nonadherent to CPAP, uses 2-4L O2 qhs) who presented from rehab 6/3 with c/o SOB, hypoxia, swelling of arms legs and scrotum, and 1 week of intermittent hemoptysis found to have acute CHF exacerbation. Admitted on BiPAP. Bumex gtt started. Diamox being given prn. Patient became febrile 6/5 with transient desat, then MAPs decreased to 50s, briefly on levophed now off, Bumex gtt on hold 6/5-6/6, now restarted. Zosyn started (MRSA negative) and full w/u sent, switched to CTX as likely source aspiration PNA. Weaned to HFNC 6/5 and then NC 6/8, continuing with BiPAP at night. Noted also to have unequal LE swelling, found to have b/l DVTs on 6/5, started on Eliquis. Barium swallow 6/7 with esophageal dysmotility. NGT placed 6/7, tube feeds started 6/8 then d/c'd 6/9 as patient/family opted for comfort feeds, made DNR/DNI, NGT removed.    OVERNIGHT EVENTS/INTERVAL HPI: Patient/family opted for comfort feeds, made DNR/DNI, NGT removed. Only complaint is thirst/hunger. Denies CP, SOB, abd pain. Small BM smear yesterday.    OBJECTIVE:  Vital Signs Last 24 Hrs  T(C): 37.7 (09 Jun 2022 09:00), Max: 37.7 (08 Jun 2022 22:05)  T(F): 99.9 (09 Jun 2022 09:00), Max: 99.9 (08 Jun 2022 22:05)  HR: 64 (09 Jun 2022 09:00) (59 - 74)  BP: 128/62 (09 Jun 2022 09:00) (101/52 - 144/66)  BP(mean): 87 (09 Jun 2022 09:00) (73 - 95)  RR: 24 (09 Jun 2022 09:00) (17 - 34)  SpO2: 93% (09 Jun 2022 09:00) (88% - 100%)  I&O's Detail    08 Jun 2022 07:01  -  09 Jun 2022 07:00  --------------------------------------------------------  IN:    Bumetanide: 120 mL    Enteral Tube Flush: 300 mL    IV PiggyBack: 150 mL    Jevity 1.2: 470 mL  Total IN: 1040 mL    OUT:    Indwelling Catheter - Urethral (mL): 3125 mL    Nasogastric/Oral tube (mL): 0 mL  Total OUT: 3125 mL    Total NET: -2085 mL      09 Jun 2022 07:01  -  09 Jun 2022 10:14  --------------------------------------------------------  IN:    Bumetanide: 10 mL  Total IN: 10 mL    OUT:    Indwelling Catheter - Urethral (mL): 355 mL  Total OUT: 355 mL    Total NET: -345 mL      Physical Exam:  GENERAL: Awake, alert and interactive, no acute distress  NEURO: No focal deficits  HEENT: Normocephalic, atraumatic, no conjunctivitis or scleral icterus, MM dry, edentulous state  NECK: Supple  CARDIAC: Regular rate and rhythm, +S1/S2, no murmurs/rubs/gallops  PULM: Breathing comfortably on 3L via NC, crackles throughout but improved mildly, decreased breath sounds in the R base  ABDOMEN: Soft, nontender, nondistended  : Stoll with yellow urine  MSK: Range of motion grossly intact  SKIN: Warm and dry  VASC: 2+ RLE edema to 1/3 calf, trace+ LLE edema to ankle, WWP, 1+ peripheral pulses, no LE tenderness  PSYCH: withdrawn    Medications:  MEDICATIONS  (STANDING):  apixaban 10 milliGRAM(s) Enteral Tube every 12 hours  aspirin  chewable 81 milliGRAM(s) Oral every 24 hours  atorvastatin 10 milliGRAM(s) Oral at bedtime  budesonide 160 MICROgram(s)/formoterol 4.5 MICROgram(s) Inhaler 2 Puff(s) Inhalation two times a day  buMETAnide Injectable 1 milliGRAM(s) IV Push every 12 hours  cefTRIAXone   IVPB 2000 milliGRAM(s) IV Intermittent every 24 hours  chlorhexidine 4% Liquid 1 Application(s) Topical <User Schedule>  dextrose 5%. 1000 milliLiter(s) (50 mL/Hr) IV Continuous <Continuous>  dextrose 5%. 1000 milliLiter(s) (100 mL/Hr) IV Continuous <Continuous>  dextrose 50% Injectable 25 Gram(s) IV Push once  dextrose 50% Injectable 12.5 Gram(s) IV Push once  dextrose 50% Injectable 25 Gram(s) IV Push once  finasteride 5 milliGRAM(s) Oral at bedtime  glucagon  Injectable 1 milliGRAM(s) IntraMuscular once  insulin lispro (ADMELOG) corrective regimen sliding scale   SubCutaneous Before meals and at bedtime  metoprolol tartrate 12.5 milliGRAM(s) Enteral Tube every 12 hours  montelukast 10 milliGRAM(s) Oral every 24 hours  spironolactone 25 milliGRAM(s) Oral every 24 hours    MEDICATIONS  (PRN):  ALBUTerol    90 MICROgram(s) HFA Inhaler 2 Puff(s) Inhalation every 6 hours PRN Shortness of Breath and/or Wheezing  dextrose Oral Gel 15 Gram(s) Oral once PRN Blood Glucose LESS THAN 70 milliGRAM(s)/deciliter      Labs:                        9.8    8.10  )-----------( 155      ( 09 Jun 2022 05:30 )             31.3     06-09    141  |  92<L>  |  38<H>  ----------------------------<  181<H>  4.0   |  40<H>  |  1.17    Ca    9.5      09 Jun 2022 05:30  Phos  4.2     06-09  Mg     2.5     06-09    TPro  6.6  /  Alb  2.7<L>  /  TBili  0.6  /  DBili  x   /  AST  25  /  ALT  17  /  AlkPhos  56  06-08          COVID-19 PCR: Negative (03 Jun 2022 16:20)      ECG: Paced with underlying SR    CXR: improved vascular congestion in RUL CCU Progress Note  Liliane Nichols, PGY-1  Pager: [phone]    ***********************************************STEPDOWN CCU TO CARDIAC TELE************************************    Hospital Course:  89M h/o HTN, HLD, NIDDM, asthma, (denies intubations/hospitalizations), BPH, 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 Brown), chronic CHF (EF 60% echo 2020), TRACIE (nonadherent to CPAP, uses 2-4L O2 qhs) who presented from rehab 6/3 with c/o SOB, hypoxia, swelling of arms legs and scrotum, and 1 week of intermittent hemoptysis found to have acute CHF exacerbation. Admitted on BiPAP. Bumex gtt started. Diamox being given prn. Patient became febrile 6/5 with transient desat, then MAPs decreased to 50s, briefly on levophed now off, Bumex gtt on hold 6/5-6/6, now restarted. Zosyn started (MRSA negative) and full w/u sent, switched to CTX as likely source aspiration PNA. Weaned to HFNC 6/5 and then NC 6/8, continuing with BiPAP at night. Noted also to have unequal LE swelling, found to have b/l DVTs on 6/5, started on Eliquis. Barium swallow 6/7 with esophageal dysmotility. NGT placed 6/7, tube feeds started 6/8 then d/c'd 6/9 as patient/family opted for comfort feeds, made DNR/DNI, NGT removed.    OVERNIGHT EVENTS/INTERVAL HPI: Patient/family opted for comfort feeds, made DNR/DNI, NGT removed. Only complaint is thirst/hunger. Denies CP, SOB, abd pain. Small BM smear yesterday.    OBJECTIVE:  Vital Signs Last 24 Hrs  T(C): 37.7 (09 Jun 2022 09:00), Max: 37.7 (08 Jun 2022 22:05)  T(F): 99.9 (09 Jun 2022 09:00), Max: 99.9 (08 Jun 2022 22:05)  HR: 64 (09 Jun 2022 09:00) (59 - 74)  BP: 128/62 (09 Jun 2022 09:00) (101/52 - 144/66)  BP(mean): 87 (09 Jun 2022 09:00) (73 - 95)  RR: 24 (09 Jun 2022 09:00) (17 - 34)  SpO2: 93% (09 Jun 2022 09:00) (88% - 100%)  I&O's Detail    08 Jun 2022 07:01  -  09 Jun 2022 07:00  --------------------------------------------------------  IN:    Bumetanide: 120 mL    Enteral Tube Flush: 300 mL    IV PiggyBack: 150 mL    Jevity 1.2: 470 mL  Total IN: 1040 mL    OUT:    Indwelling Catheter - Urethral (mL): 3125 mL    Nasogastric/Oral tube (mL): 0 mL  Total OUT: 3125 mL    Total NET: -2085 mL      09 Jun 2022 07:01  -  09 Jun 2022 10:14  --------------------------------------------------------  IN:    Bumetanide: 10 mL  Total IN: 10 mL    OUT:    Indwelling Catheter - Urethral (mL): 355 mL  Total OUT: 355 mL    Total NET: -345 mL      Physical Exam:  GENERAL: Awake, alert and interactive, no acute distress  NEURO: No focal deficits  HEENT: Normocephalic, atraumatic, no conjunctivitis or scleral icterus, MM dry, edentulous state  NECK: Supple  CARDIAC: Regular rate and rhythm, +S1/S2, no murmurs/rubs/gallops  PULM: Breathing comfortably on 3L via NC, crackles throughout but improved mildly, decreased breath sounds in the R base  ABDOMEN: Soft, nontender, nondistended  : Stoll with yellow urine  MSK: Range of motion grossly intact  SKIN: Warm and dry  VASC: 2+ RLE edema to 1/3 calf, trace+ LLE edema to ankle, WWP, 1+ peripheral pulses, no LE tenderness  PSYCH: withdrawn    Medications:  MEDICATIONS  (STANDING):  apixaban 10 milliGRAM(s) Enteral Tube every 12 hours  aspirin  chewable 81 milliGRAM(s) Oral every 24 hours  atorvastatin 10 milliGRAM(s) Oral at bedtime  budesonide 160 MICROgram(s)/formoterol 4.5 MICROgram(s) Inhaler 2 Puff(s) Inhalation two times a day  buMETAnide Injectable 1 milliGRAM(s) IV Push every 12 hours  cefTRIAXone   IVPB 2000 milliGRAM(s) IV Intermittent every 24 hours  chlorhexidine 4% Liquid 1 Application(s) Topical <User Schedule>  dextrose 5%. 1000 milliLiter(s) (50 mL/Hr) IV Continuous <Continuous>  dextrose 5%. 1000 milliLiter(s) (100 mL/Hr) IV Continuous <Continuous>  dextrose 50% Injectable 25 Gram(s) IV Push once  dextrose 50% Injectable 12.5 Gram(s) IV Push once  dextrose 50% Injectable 25 Gram(s) IV Push once  finasteride 5 milliGRAM(s) Oral at bedtime  glucagon  Injectable 1 milliGRAM(s) IntraMuscular once  insulin lispro (ADMELOG) corrective regimen sliding scale   SubCutaneous Before meals and at bedtime  metoprolol tartrate 12.5 milliGRAM(s) Enteral Tube every 12 hours  montelukast 10 milliGRAM(s) Oral every 24 hours  spironolactone 25 milliGRAM(s) Oral every 24 hours    MEDICATIONS  (PRN):  ALBUTerol    90 MICROgram(s) HFA Inhaler 2 Puff(s) Inhalation every 6 hours PRN Shortness of Breath and/or Wheezing  dextrose Oral Gel 15 Gram(s) Oral once PRN Blood Glucose LESS THAN 70 milliGRAM(s)/deciliter      Labs:                        9.8    8.10  )-----------( 155      ( 09 Jun 2022 05:30 )             31.3     06-09    141  |  92<L>  |  38<H>  ----------------------------<  181<H>  4.0   |  40<H>  |  1.17    Ca    9.5      09 Jun 2022 05:30  Phos  4.2     06-09  Mg     2.5     06-09    TPro  6.6  /  Alb  2.7<L>  /  TBili  0.6  /  DBili  x   /  AST  25  /  ALT  17  /  AlkPhos  56  06-08          COVID-19 PCR: Negative (03 Jun 2022 16:20)      ECG: Paced with underlying SR    CXR: improved vascular congestion in RUL

## 2022-06-09 NOTE — PROGRESS NOTE ADULT - PROBLEM SELECTOR PLAN 3
Sepsis 2/2 aspiration PNA and esophageal dysmotility. T max 101.5 with WBC 13.04 6/5. MAPs 50s-60s.   -UA negative, RLL consolidation on CXR. MRSA negative. ESR, CRP, and procal elevated. Also with RUQ pain, now resolved.  -s/p Zosyn and de-escalated to CTX 2g q24hr x 5days (last dose 6/9 PM)  -RUQ u/s negative 6/5  - BCx 6/5: NGTD

## 2022-06-09 NOTE — PROGRESS NOTE ADULT - CONVERSATION DETAILS
In addition to the EM visit, an advance care planning meeting was performed  Start time: 0840am  End time: 0900am  Total time: 20 minutes   A face to face meeting to discuss advance care planning was held today regarding: ANTONIETTA SAMUELS  Primary decision maker:  Antonietta Samuels   Alternate/surrogate: Madhuri Lopez and Ladonna Samuels  Discussed advance directives including, but not limited to, healthcare proxy and code status.  Decision regarding code status: DNR/DNI  Documentation completed today: MOLST    Discussion had with patient and his daughter at bedside. Patient thought about the feeding tube and does not want it, but does want to eat real food. With that being said Code status was discussed and patient mentioned that he wants no tubes and want to have a natural death when the time come. DOLORES filled out  A MOLST was reviewed with her and she stated that her mother should be a DNR/DNI/ Limited medical interventions / Send to hospital as needed / No feeding tube / Trial of IV fluids and a use antibiotics if an infection occurs. Daughter emphasized that she wants full medical management and appreciated the time spent with her.
In addition to the EM visit, an advance care planning meeting was performed  Start time: 350pm  End time: 310pm  Total time: 20 minutes   A face to face meeting to discuss advance care planning was held today regarding: CORY SAMUELS  Primary decision maker:  Cory Samuels  Alternate/surrogate: Ladonna Mane and Madhuri Lopez  Discussed advance directives including, but not limited to, healthcare proxy and code status.  Decision regarding code status: Full code  Documentation completed today: non    Discussion had with Multidisciplinary team and family. Nutritional GOC were discussed, and risks and benefits were mentioned. Daughters mentioned quantity of life versus quality of life, patient needs time to think about if he wants a feeding tube or not and asked to come back tomorrow. Emotional support was provided. Both daughters and patient overwhelmed at this time. code status not discussed

## 2022-06-09 NOTE — CONSULT NOTE ADULT - ATTENDING COMMENTS
continue GOC discussions by primary team  last day of antibx for aspiration PNA today  continue pleasure feeds

## 2022-06-09 NOTE — PROGRESS NOTE ADULT - PROBLEM SELECTOR PLAN 5
Palliative team following   -DNR/DNI, MOLST in chart.   - no tubes, Limited medical interventions,  Send to hospital as needed, trial of IV fluids and a use antibiotics if an infection occurs  -no tele, Qshift vitals     F: comfort  E: Replete electrolytes as needed for K<4 and Mg<2  N: DASH Diet    DVT PPX: Eliquis  10 mg BID (initial tx dose for DVTs) Palliative team following   -DNR/DNI, MOLST in chart.   - no tubes, Limited medical interventions,  Send to hospital as needed, trial of IV fluids and a use antibiotics if an infection occurs  -pt and daughter want to continue tele, Qshift vitals and every other day labs     F: comfort  E: Replete electrolytes as needed for K<4 and Mg<2  N: DASH Diet    DVT PPX: Eliquis  10 mg BID (initial tx dose for DVTs)

## 2022-06-09 NOTE — PROGRESS NOTE ADULT - PROBLEM SELECTOR PLAN 3
Patient with a history of CHF, and has EF was 60% in 2020. Was a patient of Dr. Tuttle, but couldn't make it to the office and was seen by [facility] visiting doctors. Bumex drip discontinued and patient on PO Bumex. Continue care as per CCU team.

## 2022-06-09 NOTE — PROGRESS NOTE ADULT - PROBLEM SELECTOR PLAN 2
Hypercapnic/hypoxic respiratory failure in setting of acute on chronic HFpEF exacerbation and aspiration PNA  - satting 93% on 3L NC, BiPAP qhs, encourage CPAP qhs on dc  -c/w symbicort, duonebs PRN, montelukast 10 mg QD  -FEES 6/6 w/o dysphagia but concern for regurgitation  -barium swallow 6/7 w/  achalasia vs pseudoachalasia and esophageal dysmotility  -NGT placed 6/7, s/p feeds with Jevity 1.2 and d/c 6/9 as family/patient opted for comfort feeds

## 2022-06-09 NOTE — PROGRESS NOTE ADULT - SUBJECTIVE AND OBJECTIVE BOX
Interventional Cardiology PA Adult Progress Note    CCU stepdown to Tele Acceptance Note    Hospital Course:     89M h/o HTN, HLD, NIDDM, asthma, (denies intubations/hospitalizations), BPH, 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 Brown), HFpEF (EF 60% echo 2020), TRACIE (nonadherent to CPAP, 2-4L O2 qhs) who presented from rehab 6/3 c/o SOB, hypoxia, swelling of arms legs and scrotum, and 1 week of intermittent hemoptysis. Admitted to CCU for acute on chronic HFpEF exacerbation, on BiPAP. Bumex gtt started. Diamox PRN. Course c/b fever 6/5 with transient desat, MAPs in 50s, s/p levophed, s/p Bumex gtt. s/p Zosyn (MRSA negative) and de-escalated to CTX (last day 6/9 PM). Likely aspiration PNA. Weaned to HFNC 6/5 and then NC 6/8, continuing with BiPAP at night. Further c/b b/l DVTs on 6/5, started on Eliquis (initial treatment dose 10 mg BID until 6/12 am). Barium swallow 6/7 with esophageal dysmotility. NGT placed 6/7, tube feeds started 6/8 now d/c'd as patient/family opted for comfort feeds, made DNR/DNI (MOLST in chart), NGT removed. Medicine transfer rejected and stepped down to cardiac tele 6/9/22 for disposition planning.     Subjective Assessment: pt seen and examined at bedside and reports......    ROS negative except for subjective and HPI.   	  MEDICATIONS:  buMETAnide Injectable 1 milliGRAM(s) IV Push every 12 hours  metoprolol tartrate 12.5 milliGRAM(s) Oral once  spironolactone 25 milliGRAM(s) Oral every 24 hours    cefTRIAXone   IVPB 2000 milliGRAM(s) IV Intermittent every 24 hours    ALBUTerol    90 MICROgram(s) HFA Inhaler 2 Puff(s) Inhalation every 6 hours PRN  budesonide 160 MICROgram(s)/formoterol 4.5 MICROgram(s) Inhaler 2 Puff(s) Inhalation two times a day  montelukast 10 milliGRAM(s) Oral every 24 hours      polyethylene glycol 3350 17 Gram(s) Oral two times a day  senna 2 Tablet(s) Oral at bedtime    atorvastatin 10 milliGRAM(s) Oral at bedtime  dextrose 50% Injectable 25 Gram(s) IV Push once  dextrose 50% Injectable 12.5 Gram(s) IV Push once  dextrose 50% Injectable 25 Gram(s) IV Push once  dextrose Oral Gel 15 Gram(s) Oral once PRN  finasteride 5 milliGRAM(s) Oral at bedtime  glucagon  Injectable 1 milliGRAM(s) IntraMuscular once  insulin lispro (ADMELOG) corrective regimen sliding scale   SubCutaneous Before meals and at bedtime    apixaban 10 milliGRAM(s) Oral every 12 hours  aspirin  chewable 81 milliGRAM(s) Oral every 24 hours  chlorhexidine 4% Liquid 1 Application(s) Topical <User Schedule>  dextrose 5%. 1000 milliLiter(s) IV Continuous <Continuous>  dextrose 5%. 1000 milliLiter(s) IV Continuous <Continuous>      	    [PHYSICAL EXAM:  TELEMETRY:  T(C): 37.7 (06-09-22 @ 09:00), Max: 37.7 (06-08-22 @ 22:05)  HR: 59 (06-09-22 @ 12:12) (59 - 74)  BP: 107/68 (06-09-22 @ 12:00) (101/52 - 144/66)  RR: 24 (06-09-22 @ 12:00) (17 - 33)  SpO2: 93% (06-09-22 @ 12:12) (88% - 100%)  Wt(kg): --  I&O's Summary    08 Jun 2022 07:01  -  09 Jun 2022 07:00  --------------------------------------------------------  IN: 1040 mL / OUT: 3125 mL / NET: -2085 mL    09 Jun 2022 07:01  -  09 Jun 2022 13:14  --------------------------------------------------------  IN: 10 mL / OUT: 585 mL / NET: -575 mL        Stoll:  Central/PICC/Mid Line:                                         Appearance: Normal	  HEENT:   Normal oral mucosa, PERRL, EOMI	  Neck: Supple, + JVD/ - JVD; Carotid Bruit   Cardiovascular: Normal S1 S2, No JVD, No murmurs,   Respiratory: Lungs clear to auscultation/Decreased Breath Sounds/No Rales, Rhonchi, Wheezing	  Gastrointestinal:  Soft, Non-tender, + BS	  Skin: No rashes, No ecchymoses, No cyanosis  Extremities: Normal range of motion, No clubbing, cyanosis or edema  Vascular: Peripheral pulses palpable 2+ bilaterally  Neurologic: Non-focal  Psychiatry: A & O x 3, Mood & affect appropriate      	    ECG:  	  RADIOLOGY:   DIAGNOSTIC TESTING:  [ ] Echocardiogram:  [ ]  Catheterization:  [ ] Stress Test:    [ ] KOREY  OTHER: 	    LABS:	 	  CARDIAC MARKERS:                                  9.8    8.10  )-----------( 155      ( 09 Jun 2022 05:30 )             31.3     06-09    141  |  92<L>  |  38<H>  ----------------------------<  181<H>  4.0   |  40<H>  |  1.17    Ca    9.5      09 Jun 2022 05:30  Phos  4.2     06-09  Mg     2.5     06-09    TPro  6.6  /  Alb  2.7<L>  /  TBili  0.6  /  DBili  x   /  AST  25  /  ALT  17  /  AlkPhos  56  06-08    proBNP:   Lipid Profile:   HgA1c:   TSH:       ASSESSMENT/PLAN: 	        DVT ppx:  Dispo:     Interventional Cardiology PA Adult Progress Note    CCU stepdown to Tele Acceptance Note    Hospital Course:     89M w/ PMH TRACIE (noncompliant w/CPAP, 2-4L O2 qhs), asthma, (no intubations/hospitalizations), HTN, HLD, DM II, 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 Brown), pHTN (sees Dr Tuttle), HFpEF (EF 60% echo 2020), who presented from rehab 6/3 c/o SOB, hypoxia, swelling of arms legs and scrotum, and 1 week of intermittent hemoptysis. Admitted to CCU for acute on chronic HFpEF exacerbation, on BiPAP and Bumex gtt. Diamox PRN. Course c/b fever 6/5, MAPs in 50s, s/p levophed, s/p Bumex gtt. s/p Zosyn (MRSA negative) and de-escalated to CTX (last day 6/9 PM). Likely aspiration PNA. Weaned to HFNC 6/5 and currently on 3L NC 6/8 with BiPAP at night. Further c/b b/l DVTs on 6/5, started on Eliquis (initial treatment dose 10 mg BID until 6/12 am). Barium swallow 6/7 with esophageal dysmotility. NGT placed 6/7, tube feeds started 6/8 now d/c'd as patient/family opted for comfort feeds, made DNR/DNI (MOLST in chart), NGT removed. Medicine transfer rejected and stepped down to cardiac tele 6/9/22 for disposition planning for home hospice.     Subjective Assessment: pt seen and examined at bedside and reports......    ROS negative except for subjective and HPI.   	  MEDICATIONS:  buMETAnide Injectable 1 milliGRAM(s) IV Push every 12 hours  metoprolol tartrate 12.5 milliGRAM(s) Oral once  spironolactone 25 milliGRAM(s) Oral every 24 hours    cefTRIAXone   IVPB 2000 milliGRAM(s) IV Intermittent every 24 hours    ALBUTerol    90 MICROgram(s) HFA Inhaler 2 Puff(s) Inhalation every 6 hours PRN  budesonide 160 MICROgram(s)/formoterol 4.5 MICROgram(s) Inhaler 2 Puff(s) Inhalation two times a day  montelukast 10 milliGRAM(s) Oral every 24 hours      polyethylene glycol 3350 17 Gram(s) Oral two times a day  senna 2 Tablet(s) Oral at bedtime    atorvastatin 10 milliGRAM(s) Oral at bedtime  dextrose 50% Injectable 25 Gram(s) IV Push once  dextrose 50% Injectable 12.5 Gram(s) IV Push once  dextrose 50% Injectable 25 Gram(s) IV Push once  dextrose Oral Gel 15 Gram(s) Oral once PRN  finasteride 5 milliGRAM(s) Oral at bedtime  glucagon  Injectable 1 milliGRAM(s) IntraMuscular once  insulin lispro (ADMELOG) corrective regimen sliding scale   SubCutaneous Before meals and at bedtime    apixaban 10 milliGRAM(s) Oral every 12 hours  aspirin  chewable 81 milliGRAM(s) Oral every 24 hours  chlorhexidine 4% Liquid 1 Application(s) Topical <User Schedule>  dextrose 5%. 1000 milliLiter(s) IV Continuous <Continuous>  dextrose 5%. 1000 milliLiter(s) IV Continuous <Continuous>      	    [PHYSICAL EXAM:  TELEMETRY:  T(C): 37.7 (06-09-22 @ 09:00), Max: 37.7 (06-08-22 @ 22:05)  HR: 59 (06-09-22 @ 12:12) (59 - 74)  BP: 107/68 (06-09-22 @ 12:00) (101/52 - 144/66)  RR: 24 (06-09-22 @ 12:00) (17 - 33)  SpO2: 93% (06-09-22 @ 12:12) (88% - 100%)  Wt(kg): --  I&O's Summary    08 Jun 2022 07:01  -  09 Jun 2022 07:00  --------------------------------------------------------  IN: 1040 mL / OUT: 3125 mL / NET: -2085 mL    09 Jun 2022 07:01  -  09 Jun 2022 13:14  --------------------------------------------------------  IN: 10 mL / OUT: 585 mL / NET: -575 mL        Stoll:  Central/PICC/Mid Line:                                         Appearance: Normal	  HEENT:   Normal oral mucosa, PERRL, EOMI	  Neck: Supple, + JVD/ - JVD; Carotid Bruit   Cardiovascular: Normal S1 S2, No JVD, No murmurs,   Respiratory: Lungs clear to auscultation/Decreased Breath Sounds/No Rales, Rhonchi, Wheezing	  Gastrointestinal:  Soft, Non-tender, + BS	  Skin: No rashes, No ecchymoses, No cyanosis  Extremities: Normal range of motion, No clubbing, cyanosis or edema  Vascular: Peripheral pulses palpable 2+ bilaterally  Neurologic: Non-focal  Psychiatry: A & O x 3, Mood & affect appropriate      	    ECG:  	  RADIOLOGY:   DIAGNOSTIC TESTING:  [ ] Echocardiogram:  [ ]  Catheterization:  [ ] Stress Test:    [ ] KOREY  OTHER: 	    LABS:	 	  CARDIAC MARKERS:                                  9.8    8.10  )-----------( 155      ( 09 Jun 2022 05:30 )             31.3     06-09    141  |  92<L>  |  38<H>  ----------------------------<  181<H>  4.0   |  40<H>  |  1.17    Ca    9.5      09 Jun 2022 05:30  Phos  4.2     06-09  Mg     2.5     06-09    TPro  6.6  /  Alb  2.7<L>  /  TBili  0.6  /  DBili  x   /  AST  25  /  ALT  17  /  AlkPhos  56  06-08    proBNP:   Lipid Profile:   HgA1c:   TSH:       ASSESSMENT/PLAN: 	        DVT ppx:  Dispo:     Interventional Cardiology PA Adult Progress Note    CCU stepdown to Tele Acceptance Note    Hospital Course:     89M w/ PMH TRACIE (noncompliant w/CPAP, 2-4L O2 qhs), asthma, (no intubations/hospitalizations), HTN, HLD, DM II, 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 Brown), pHTN (sees Dr Tuttle), HFpEF (EF 60% echo 2020), who presented from rehab 6/3 c/o SOB, hypoxia, swelling of arms legs and scrotum, and 1 week of intermittent hemoptysis. Admitted to CCU for acute on chronic HFpEF exacerbation, on BiPAP and Bumex gtt. Diamox PRN. Course c/b fever 6/5, MAPs in 50s, s/p levophed, s/p Bumex gtt. s/p Zosyn (MRSA negative) and de-escalated to CTX (last day 6/9 PM). Likely aspiration PNA. Weaned to HFNC 6/5 and currently on 3L NC 6/8 with BiPAP at night. Further c/b b/l DVTs on 6/5, started on Eliquis (initial treatment dose 10 mg BID until 6/12 am). Barium swallow 6/7 with esophageal dysmotility. NGT placed 6/7, tube feeds started 6/8 now d/c'd as patient/family opted for comfort feeds, made DNR/DNI (MOLST in chart), NGT removed. Medicine transfer rejected and stepped down to cardiac tele 6/9/22 for disposition planning for [facility].     Subjective Assessment: pt seen and examined at bedside. Pt reports headache. Denies cp, sob, n/v/d, fever, chills.     ROS negative except for subjective and HPI.   	  MEDICATIONS:  buMETAnide Injectable 1 milliGRAM(s) IV Push every 12 hours  metoprolol tartrate 12.5 milliGRAM(s) Oral once  spironolactone 25 milliGRAM(s) Oral every 24 hours    cefTRIAXone   IVPB 2000 milliGRAM(s) IV Intermittent every 24 hours    ALBUTerol    90 MICROgram(s) HFA Inhaler 2 Puff(s) Inhalation every 6 hours PRN  budesonide 160 MICROgram(s)/formoterol 4.5 MICROgram(s) Inhaler 2 Puff(s) Inhalation two times a day  montelukast 10 milliGRAM(s) Oral every 24 hours      polyethylene glycol 3350 17 Gram(s) Oral two times a day  senna 2 Tablet(s) Oral at bedtime    atorvastatin 10 milliGRAM(s) Oral at bedtime  dextrose 50% Injectable 25 Gram(s) IV Push once  dextrose 50% Injectable 12.5 Gram(s) IV Push once  dextrose 50% Injectable 25 Gram(s) IV Push once  dextrose Oral Gel 15 Gram(s) Oral once PRN  finasteride 5 milliGRAM(s) Oral at bedtime  glucagon  Injectable 1 milliGRAM(s) IntraMuscular once  insulin lispro (ADMELOG) corrective regimen sliding scale   SubCutaneous Before meals and at bedtime    apixaban 10 milliGRAM(s) Oral every 12 hours  aspirin  chewable 81 milliGRAM(s) Oral every 24 hours  chlorhexidine 4% Liquid 1 Application(s) Topical <User Schedule>  dextrose 5%. 1000 milliLiter(s) IV Continuous <Continuous>  dextrose 5%. 1000 milliLiter(s) IV Continuous <Continuous>      	    [PHYSICAL EXAM:  TELEMETRY:  T(C): 37.7 (06-09-22 @ 09:00), Max: 37.7 (06-08-22 @ 22:05)  HR: 59 (06-09-22 @ 12:12) (59 - 74)  BP: 107/68 (06-09-22 @ 12:00) (101/52 - 144/66)  RR: 24 (06-09-22 @ 12:00) (17 - 33)  SpO2: 93% (06-09-22 @ 12:12) (88% - 100%)  Wt(kg): --  I&O's Summary    08 Jun 2022 07:01  -  09 Jun 2022 07:00  --------------------------------------------------------  IN: 1040 mL / OUT: 3125 mL / NET: -2085 mL    09 Jun 2022 07:01  -  09 Jun 2022 13:14  --------------------------------------------------------  IN: 10 mL / OUT: 585 mL / NET: -575 mL        Stoll:  Central/PICC/Mid Line:                                         Appearance: obese	  HEENT:   Normal oral mucosa, PERRL, EOMI	  Neck: Supple, + JVD  Cardiovascular: Normal S1 S2, No JVD, No murmurs,   Respiratory: Decreased Breath Sounds/poor inspiratory effort, +b/l crackles   Gastrointestinal:  Soft, Non-tender, + BS	  Skin: No rashes, No ecchymoses, No cyanosis  Extremities: Normal range of motion, No clubbing, cyanosis, +2 edema B/L   Vascular: Peripheral pulses palpable 2+ bilaterally  Neurologic: Non-focal  Psychiatry: A & O x 3, Mood & affect appropriate      	    ECG:  	  RADIOLOGY:   DIAGNOSTIC TESTING:  [ ] Echocardiogram:  [ ]  Catheterization:  [ ] Stress Test:    [ ] KOREY  OTHER: 	    LABS:	 	  CARDIAC MARKERS:                                  9.8    8.10  )-----------( 155      ( 09 Jun 2022 05:30 )             31.3     06-09    141  |  92<L>  |  38<H>  ----------------------------<  181<H>  4.0   |  40<H>  |  1.17    Ca    9.5      09 Jun 2022 05:30  Phos  4.2     06-09  Mg     2.5     06-09    TPro  6.6  /  Alb  2.7<L>  /  TBili  0.6  /  DBili  x   /  AST  25  /  ALT  17  /  AlkPhos  56  06-08    proBNP:   Lipid Profile:   HgA1c:   TSH:       ASSESSMENT/PLAN: 	        DVT ppx:  Dispo:     Interventional Cardiology PA Adult Progress Note    CCU stepdown to Tele Acceptance Note    Hospital Course:     89M w/ PMH TRACIE (noncompliant w/CPAP, 2-4L O2 qhs), asthma, (no intubations/hospitalizations), HTN, HLD, DM II, 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 Brown), pHTN (sees Dr Tuttle), HFpEF (EF 60% echo 2020), who presented from rehab 6/3 c/o SOB, hypoxia, swelling of arms legs and scrotum, and 1 week of intermittent hemoptysis. Admitted to CCU for acute on chronic HFpEF exacerbation, on BiPAP and Bumex gtt. Diamox PRN. Course c/b fever 6/5, MAPs in 50s, s/p levophed, s/p Bumex gtt. s/p Zosyn (MRSA negative) and de-escalated to CTX (last day 6/9 PM). Likely aspiration PNA. Weaned to HFNC 6/5 and currently on 3L NC 6/8 with BiPAP at night. Further c/b b/l DVTs on 6/5, started on Eliquis (initial treatment dose 10 mg BID until 6/12 am). Barium swallow 6/7 with esophageal dysmotility. NGT placed 6/7, tube feeds started 6/8 now d/c'd as patient/family opted for comfort feeds, made DNR/DNI (MOLST in chart), NGT removed. Medicine transfer rejected and stepped down to cardiac tele 6/9/22 for disposition planning for [facility].     Subjective Assessment: pt seen and examined at bedside. Pt reports headache. Denies cp, sob, n/v/d, fever, chills.     ROS negative except for subjective and HPI.   	  MEDICATIONS:  buMETAnide Injectable 1 milliGRAM(s) IV Push every 12 hours  metoprolol tartrate 12.5 milliGRAM(s) Oral once  spironolactone 25 milliGRAM(s) Oral every 24 hours    cefTRIAXone   IVPB 2000 milliGRAM(s) IV Intermittent every 24 hours    ALBUTerol    90 MICROgram(s) HFA Inhaler 2 Puff(s) Inhalation every 6 hours PRN  budesonide 160 MICROgram(s)/formoterol 4.5 MICROgram(s) Inhaler 2 Puff(s) Inhalation two times a day  montelukast 10 milliGRAM(s) Oral every 24 hours      polyethylene glycol 3350 17 Gram(s) Oral two times a day  senna 2 Tablet(s) Oral at bedtime    atorvastatin 10 milliGRAM(s) Oral at bedtime  dextrose 50% Injectable 25 Gram(s) IV Push once  dextrose 50% Injectable 12.5 Gram(s) IV Push once  dextrose 50% Injectable 25 Gram(s) IV Push once  dextrose Oral Gel 15 Gram(s) Oral once PRN  finasteride 5 milliGRAM(s) Oral at bedtime  glucagon  Injectable 1 milliGRAM(s) IntraMuscular once  insulin lispro (ADMELOG) corrective regimen sliding scale   SubCutaneous Before meals and at bedtime    apixaban 10 milliGRAM(s) Oral every 12 hours  aspirin  chewable 81 milliGRAM(s) Oral every 24 hours  chlorhexidine 4% Liquid 1 Application(s) Topical <User Schedule>  dextrose 5%. 1000 milliLiter(s) IV Continuous <Continuous>  dextrose 5%. 1000 milliLiter(s) IV Continuous <Continuous>      	    [PHYSICAL EXAM:  TELEMETRY:  T(C): 37.7 (06-09-22 @ 09:00), Max: 37.7 (06-08-22 @ 22:05)  HR: 59 (06-09-22 @ 12:12) (59 - 74)  BP: 107/68 (06-09-22 @ 12:00) (101/52 - 144/66)  RR: 24 (06-09-22 @ 12:00) (17 - 33)  SpO2: 93% (06-09-22 @ 12:12) (88% - 100%)  Wt(kg): --  I&O's Summary    08 Jun 2022 07:01  -  09 Jun 2022 07:00  --------------------------------------------------------  IN: 1040 mL / OUT: 3125 mL / NET: -2085 mL    09 Jun 2022 07:01  -  09 Jun 2022 13:14  --------------------------------------------------------  IN: 10 mL / OUT: 585 mL / NET: -575 mL        Stoll:  Central/PICC/Mid Line:                                         Appearance: obese	  HEENT:   Normal oral mucosa, PERRL, EOMI	  Neck: Supple, + JVD  Cardiovascular: Normal S1 S2, No JVD, No murmurs,   Respiratory: Decreased Breath Sounds/poor inspiratory effort, +b/l crackles   Gastrointestinal:  Soft, Non-tender, + BS	  Skin: No rashes, No ecchymoses, No cyanosis  Extremities: Normal range of motion, No clubbing, cyanosis, +1 edema R, +2 edema L  Vascular: Peripheral pulses palpable 2+ bilaterally  Neurologic: Non-focal  Psychiatry: A & O x 3, Mood & affect appropriate      	    ECG:  	  RADIOLOGY:   DIAGNOSTIC TESTING:  [ ] Echocardiogram:  [ ]  Catheterization:  [ ] Stress Test:    [ ] KOREY  OTHER: 	    LABS:	 	  CARDIAC MARKERS:                                  9.8    8.10  )-----------( 155      ( 09 Jun 2022 05:30 )             31.3     06-09    141  |  92<L>  |  38<H>  ----------------------------<  181<H>  4.0   |  40<H>  |  1.17    Ca    9.5      09 Jun 2022 05:30  Phos  4.2     06-09  Mg     2.5     06-09    TPro  6.6  /  Alb  2.7<L>  /  TBili  0.6  /  DBili  x   /  AST  25  /  ALT  17  /  AlkPhos  56  06-08    proBNP:   Lipid Profile:   HgA1c:   TSH:       ASSESSMENT/PLAN: 	        DVT ppx:  Dispo:

## 2022-06-09 NOTE — CONSULT NOTE ADULT - ASSESSMENT
89M h/o chronic CHF (EF 60% echo 2020), hx of [facility] s/p Medtronic PPM 10/30/2018 (followed by Dr. Brown), HTN, HLD, NIDDM, asthma, (denies intubations/hospitalizations), BPH, TRACIE (nonadherent to CPAP, uses 2-4L O2 qhs) who presented from rehab 6/3 with c/o SOB and reported hypoxia found to have hypercapnic/hypoxic respiratory failure 2/2 CHF exacerbation admitted on Bumex gtt (now on Bumex pushes) initially on BiPAP now on NC with course complicated by sepsis 2/2 aspiration PNA, b/l DVTs now on Eliquis, and esophageal dysmotility now DNR/DNI with comfort feeds.      #CHF exacerbation. Net negative 18.2 L on this admission   - s/p bumex gtt and now on bumex 1 mg IV q 12 hrs   - strict intake outake and daily weights   - c/w lopressor 12.5 mg bid with hold parameters   - c/w spironolactone 25mg qd  - c/w home aspirin 81mg qd  - discuss with palliative care which medications to continue/discontinue if goes to hospice    #Severe pulmonary HTN: PASP 76mmHg on TTE 6/6  -likely 2/2 CHF exacerbation  -treatment as per cardiology       #hx of [facility] s/p Medtronic PPM 10/30/2018 (followed by Dr. Brown) set at 60bpm, DDD  - continues to have innate atrial rate      #HLD  - c/w home Lipitor      #TRACIE: Patient uses 2-4L O2 qhs  -BiPAP at night  -discuss CPAP on discharge pending discussion of hospice     #Asthma  -c/w home advair and montelukast     #Esophageal dysmotility.   -patient at risk for aspiration but family opted for comfort feeds  -palliative following with discussion for hospice       #constipation  -miralax BID, senna qhs        #BPH:  - c/w finasteride    #JUVE. Now resolved.          #DVTs: New b/l LE DVTs found 6/5  -Eliquis load 6/5-6/12 in AM with 10mg BID, then 5mg BID after  -pending dispo, may consider stopping eliquis       #Sepsis 2/2 aspiration PNA.  -Finish 5-day course of abx (will finish ceftriaxone 2 g IV daily through 6/9)        #DM: Patient on metformin at home  - mISS inpatient  - A1c 6.9  - GOC and consider stopping FS and sliding scale     Dispo: cardiology tele/regional floors

## 2022-06-09 NOTE — PROGRESS NOTE ADULT - ASSESSMENT
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 Brown), pHTN (sees Dr Tuttle), HFpEF (EF 60% echo 2020), who presented from rehab 6/3 c/o SOB, hypoxia, swelling of arms legs and scrotum, and 1 week of intermittent hemoptysis. Admitted to CCU for acute on chronic HFpEF exacerbation, on BiPAP and Bumex gtt. Diamox PRN. Course c/b fever 6/5, MAPs in 50s, s/p levophed, s/p Bumex gtt. s/p Zosyn (MRSA negative) and de-escalated to CTX (last day 6/9 PM). Likely aspiration PNA. Weaned to HFNC 6/5 and currently on 3L NC 6/8 with BiPAP at night. Further c/b b/l DVTs on 6/5, started on Eliquis (initial treatment dose 10 mg BID until 6/12 am). Barium swallow 6/7 with esophageal dysmotility. NGT placed 6/7, tube feeds started 6/8 now d/c'd as patient/family opted for comfort feeds, made DNR/DNI (MOLST in chart), NGT removed. Medicine transfer rejected and stepped down to cardiac tele 6/9/22 for disposition planning for [facility].

## 2022-06-09 NOTE — CONSULT NOTE ADULT - SUBJECTIVE AND OBJECTIVE BOX
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 Brown), chronic CHF (EF 60% echo 2020), TRACIE (nonadherent to CPAP, uses 2-4L O2 qhs) who presented from rehab 6/3 with c/o SOB, hypoxia, swelling of arms legs and scrotum, and 1 week of intermittent hemoptysis found to have acute CHF exacerbation. Admitted on BiPAP. Bumex gtt started. Diamox being given prn. Patient became febrile 6/5 with transient desat, then MAPs decreased to 50s, briefly on levophed now off, Bumex gtt on hold 6/5-6/6, now restarted. Zosyn started (MRSA negative) and full w/u sent, switched to CTX as likely source aspiration PNA. Weaned to HFNC 6/5 and then NC 6/8, continuing with BiPAP at night. Noted also to have unequal LE swelling, found to have b/l DVTs on 6/5, started on Eliquis. Barium swallow 6/7 with esophageal dysmotility. NGT placed 6/7, tube feeds started 6/8 then d/c'd 6/9 as patient/family opted for comfort feeds, made DNR/DNI, NGT removed.    Interval/overnight events: Not in acute distress this AM. Started on comfort feeds. GOC with possibility of inpatient hospice. On ROS, patient without headaches, vision changes, SOB, CP, palpitations, n/v/d/c, fever/chills.         PAST MEDICAL/SURGICAL HISTORY  PAST MEDICAL & SURGICAL HISTORY:  Diabetes      Asthma      Prostate disorder      Acute on chronic systolic congestive heart failure      Acute myocardial infarction      HLD (hyperlipidemia)      TRACIE (obstructive sleep apnea)      HTN (hypertension)      Trifascicular block      Dysphagia      S/P TURP      History of left cataract surgery      History of right cataract surgery      Cardiac pacemaker          T(C): 37.7 (06-09-22 @ 09:00), Max: 37.7 (06-08-22 @ 22:05)  HR: 59 (06-09-22 @ 12:12) (59 - 74)  BP: 107/68 (06-09-22 @ 12:00) (101/52 - 144/66)  RR: 24 (06-09-22 @ 12:00) (17 - 33)  SpO2: 93% (06-09-22 @ 12:12) (88% - 100%)  Wt(kg): --Vital Signs Last 24 Hrs  T(C): 37.7 (09 Jun 2022 09:00), Max: 37.7 (08 Jun 2022 22:05)  T(F): 99.9 (09 Jun 2022 09:00), Max: 99.9 (08 Jun 2022 22:05)  HR: 59 (09 Jun 2022 12:12) (59 - 74)  BP: 107/68 (09 Jun 2022 12:00) (101/52 - 144/66)  BP(mean): 76 (09 Jun 2022 12:00) (73 - 95)  RR: 24 (09 Jun 2022 12:00) (17 - 33)  SpO2: 93% (09 Jun 2022 12:12) (88% - 100%)    PHYSICAL EXAM:  GENERAL: NAD, well-groomed, well-developed  HEAD:  Atraumatic, Normocephalic  EYES: EOMI, PERRLA, conjunctiva and sclera clear  NECK: Supple, No JVD   NERVOUS SYSTEM:  Alert & Oriented X1, moves limbs spontaneously  CHEST/LUNG: Clear to auscultation bilaterally; No rales, rhonchi, wheezing, or rubs  HEART: Regular rate and rhythm; No murmurs, rubs, or gallops  ABDOMEN: Soft, Nontender, Nondistended; Bowel sounds present  EXTREMITIES:  2+ Peripheral Pulses, No clubbing, cyanosis. Pitting edema on right 2+ with trace edema on left    LABS:  CBC   06-09-22 @ 05:30  Hematcorit 31.3  Hemoglobin 9.8  Mean Cell Hemoglobin 29.4  Platelet Count-Automated 155  RBC Count 3.33  Red Cell Distrib Width 15.0  Wbc Count 8.10      BMP  06-09-22 @ 05:30  Anion Gap. Serum 9  Blood Urea Nitrogen,Serm 38  Calcium, Total Serum 9.5  Carbon Dioxide, Serum 40  Chloride, Serum 92  Creatinine, Serum 1.17  eGFR in  --  eGFR in Non Afican American --  Gloucose, serum 181  Potassium, Serum 4.0  Sodium, Serum 141              06-08-22 @ 05:13  Anion Gap. Serum 11  Blood Urea Nitrogen,Serm 34  Calcium, Total Serum 9.0  Carbon Dioxide, Serum 34  Chloride, Serum 90  Creatinine, Serum 1.22  eGFR in  --  eGFR in Non Afican American --  Gloucose, serum 120  Potassium, Serum 3.9  Sodium, Serum 135              06-07-22 @ 05:00  Anion Gap. Serum 10  Blood Urea Nitrogen,Serm 33  Calcium, Total Serum 8.8  Carbon Dioxide, Serum 37  Chloride, Serum 91  Creatinine, Serum 1.28  eGFR in  --  eGFR in Non Afican American --  Gloucose, serum 121  Potassium, Serum 3.9  Sodium, Serum 138              06-06-22 @ 20:25  Anion Gap. Serum 8  Blood Urea Nitrogen,Serm 38  Calcium, Total Serum 8.9  Carbon Dioxide, Serum 39  Chloride, Serum 89  Creatinine, Serum 1.30  eGFR in  --  eGFR in Non Afican American --  Gloucose, serum 164  Potassium, Serum 4.2  Sodium, Serum 136              06-06-22 @ 19:45  Anion Gap. Serum See Note  Blood Urea Nitrogen,Serm See Note  Calcium, Total Serum See Note  Carbon Dioxide, Serum See Note  Chloride, Serum See Note  Creatinine, Serum See Note  eGFR in  --  eGFR in Non Afican American --  Gloucose, serum See Note  Potassium, Serum See Note  Sodium, Serum See Note                  CMP  06-09-22 @ 05:30  Raquel Aminotransferase(ALT/SGPT)--  Albumin, Serum --  Alkaline Phosphatase, Serum --  Anion Gap, Serum 9  Aspartate Aminotransferase (AST/SGOT)--  Bilirubin Total, Serum --  Blood Urea Nitrogen, Serum 38  Calcium,Total Serum 9.5  Carbon Dioxide, Serum 40  Chloride, Serum 92  Creatinine, Serum 1.17  eGFR if  --  eGFR if Non African American --  Glucose, Serum 181  Potassium, Serum 4.0  Protein Total, Serum --  Sodium, Serum 141                      06-08-22 @ 05:13  Raquel Aminotransferase(ALT/SGPT)17  Albumin, Serum 2.7  Alkaline Phosphatase, Serum 56  Anion Gap, Serum 11  Aspartate Aminotransferase (AST/SGOT)25  Bilirubin Total, Serum 0.6  Blood Urea Nitrogen, Serum 34  Calcium,Total Serum 9.0  Carbon Dioxide, Serum 34  Chloride, Serum 90  Creatinine, Serum 1.22  eGFR if  --  eGFR if Non African American --  Glucose, Serum 120  Potassium, Serum 3.9  Protein Total, Serum 6.6  Sodium, Serum 135                      06-07-22 @ 05:00  Raquel Aminotransferase(ALT/SGPT)15  Albumin, Serum 2.7  Alkaline Phosphatase, Serum 55  Anion Gap, Serum 10  Aspartate Aminotransferase (AST/SGOT)23  Bilirubin Total, Serum 0.6  Blood Urea Nitrogen, Serum 33  Calcium,Total Serum 8.8  Carbon Dioxide, Serum 37  Chloride, Serum 91  Creatinine, Serum 1.28  eGFR if  --  eGFR if Non African American --  Glucose, Serum 121  Potassium, Serum 3.9  Protein Total, Serum 6.4  Sodium, Serum 138                      06-06-22 @ 20:25  Raquel Aminotransferase(ALT/SGPT)--  Albumin, Serum --  Alkaline Phosphatase, Serum --  Anion Gap, Serum 8  Aspartate Aminotransferase (AST/SGOT)--  Bilirubin Total, Serum --  Blood Urea Nitrogen, Serum 38  Calcium,Total Serum 8.9  Carbon Dioxide, Serum 39  Chloride, Serum 89  Creatinine, Serum 1.30  eGFR if  --  eGFR if Non African American --  Glucose, Serum 164  Potassium, Serum 4.2  Protein Total, Serum --  Sodium, Serum 136                      06-06-22 @ 19:45  Raquel Aminotransferase(ALT/SGPT)--  Albumin, Serum --  Alkaline Phosphatase, Serum --  Anion Gap, Serum See Note  Aspartate Aminotransferase (AST/SGOT)--  Bilirubin Total, Serum --  Blood Urea Nitrogen, Serum See Note  Calcium,Total Serum See Note  Carbon Dioxide, Serum See Note  Chloride, Serum See Note  Creatinine, Serum See Note  eGFR if  --  eGFR if Non African American --  Glucose, Serum See Note  Potassium, Serum See Note  Protein Total, Serum --  Sodium, Serum See Note                          PT/INR      Amylase/Lipase            RADIOLOGY & ADDITIONAL TESTS: Reviewed
CORY SAMUELS          MRN-[id number]            ([id number])    HPI:  89M h/o HTN, HLD, NIDDM, asthma, (denies intubations/hospitalizations), BPH, 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 Brown), chronic CHF (EF 60% echo ), TRACIE (nonadherent to CPAP) who presented from rehab with c/o SOB, hypoxia, swelling of arms legs and scrotum, and 1 week of intermittent hemoptysis. Admitted to [facility] 2020 for CHF exacerbation (, EF 60% borderline reduced RV systolic function) with contraction alkalosis, improved on lasix and diamox, dc'd on torsemide 20 po BID. Torsemide was decreased to 20 QD in Feb with encouragement of low-salt diet.   Patient has been unable to come to Dr. Tuttle 2/ poor functionality, was being followed by a [facility] visiting doctor who was managing his diuretics. At best this year able to walk 12 total steps with walker before becoming SOB. He was admitted to [facility] last month for CHF exacerbation and was discharged to rehab 2 weeks ago where he was getting PO lasix. He was becoming more SOB for the past 2 days requiring supplemental O2 (5L per daughter). Daughter brought to [facility] for eval. On arrival to CCU endorsed improvement in breathing, feeling cold, hungry and thirsty. Denied fever, cough, chest pain, abdominal pain, recent melena or hematochezia    In the ED:  VS: Afebrile. HR 59-68, -150/63-81, RR 20, 96% RA --> 97% on BiPAP  Labs: Hgb 9.6 (baseline 12-13), bicarb 43, BUN 40, Cr 1.01, PT 15.7, INR 1.32, Trop 0.11, Cpk/CKMB wnl, BNP 5655, pCO2 on VBG 84 and on ABG 79. UA negative. COVID negative.  ECG: Paced  Imaging: CXR with b/l haziness and increased vacular congestion  Interventions: Placed on NC --> BiPAP at 10/5 (2022 18:05)      PAST MEDICAL & SURGICAL HISTORY:  Diabetes      Asthma      Prostate disorder      Acute on chronic systolic congestive heart failure      Acute myocardial infarction      HLD (hyperlipidemia)      TRACIE (obstructive sleep apnea)      HTN (hypertension)      Trifascicular block      Dysphagia      S/P TURP    History of left cataract surgery    History of right cataract surgery    Cardiac pacemaker    FAMILY HISTORY:  Family history of diabetes mellitus (Sibling)     Reviewed and found non contributory in mother or father    SOCIAL HISTORY: Quit cigars 20 years ago, ~50 years lifetime use. Social etoh. Denied recreational drugs. Uses a walker at baseline. Currently lives in Rehab. Has family support with his daughters.    PSYCHOSOCIAL ASSESSMENT:  Episcopalian/Spiritual practice: unknown   Role of organized Temple [ ] important [ ] some [ ] unable to assess dt pt mentation   Coping: [x ] well [ ] with difficulty [ ] poor coping [ ] unable to assess dt pt mentation  Support system: [x ] strong [ ] adequate [ ] inadequate    ROS:    Unable to attain due to:  n/a     Dyspnea (Marilu 0-10):     0                   N/V (Y/N):      N                        Secretions (Y/N) :        N        Agitation(Y/N): N  Pain (Y/N):     N  -Provocation/Palliation: n/a   -Quality/Quantity:  n/a   -Radiating:  n/a   -Severity:  n/a   -Timing/Frequency:  n/a     General:  + weakness   HEENT:    Denied  Neck:  Denied  CVS:  Denied  Resp:  Denied  GI:  Denied  :  Denied  Musc:  Denied  Neuro:  Denied  Psych:  Denied  Skin:  Denied  Lymph:  Denied    Allergies    lisinopril (Angioedema)    Intolerances      Opiate Naive (Y/N):  Y  -iStop reviewed (Y/N):Y  (Ref#:      [id number]     )    Medications:      MEDICATIONS  (STANDING):  apixaban 10 milliGRAM(s) Oral every 12 hours  aspirin enteric coated 81 milliGRAM(s) Oral daily  atorvastatin 10 milliGRAM(s) Oral at bedtime  budesonide 160 MICROgram(s)/formoterol 4.5 MICROgram(s) Inhaler 2 Puff(s) Inhalation two times a day  buMETAnide Infusion 0.5 mG/Hr (2.5 mL/Hr) IV Continuous <Continuous>  cefTRIAXone   IVPB 2000 milliGRAM(s) IV Intermittent every 24 hours  chlorhexidine 4% Liquid 1 Application(s) Topical <User Schedule>  dextrose 5%. 1000 milliLiter(s) (50 mL/Hr) IV Continuous <Continuous>  dextrose 5%. 1000 milliLiter(s) (100 mL/Hr) IV Continuous <Continuous>  dextrose 50% Injectable 25 Gram(s) IV Push once  dextrose 50% Injectable 12.5 Gram(s) IV Push once  dextrose 50% Injectable 25 Gram(s) IV Push once  finasteride 5 milliGRAM(s) Oral daily  glucagon  Injectable 1 milliGRAM(s) IntraMuscular once  insulin lispro (ADMELOG) corrective regimen sliding scale   SubCutaneous Before meals and at bedtime  montelukast 10 milliGRAM(s) Oral daily  norepinephrine Infusion 0.05 MICROgram(s)/kG/Min (13.8 mL/Hr) IV Continuous <Continuous>  spironolactone 25 milliGRAM(s) Oral every 24 hours    MEDICATIONS  (PRN):  ALBUTerol    90 MICROgram(s) HFA Inhaler 2 Puff(s) Inhalation every 6 hours PRN Shortness of Breath and/or Wheezing  dextrose Oral Gel 15 Gram(s) Oral once PRN Blood Glucose LESS THAN 70 milliGRAM(s)/deciliter    Labs:    CBC:                        9.7    11.24 )-----------( 129      ( 2022 06:01 )             30.2     CMP:    06-    139  |  92<L>  |  33<H>  ----------------------------<  165<H>  3.8   |  38<H>  |  1.16    Ca    9.3      2022 06:01  Phos  4.2     06-06  Mg     2.2     06-06    TPro  6.9  /  Alb  2.8<L>  /  TBili  1.1  /  DBili  x   /  AST  27  /  ALT  18  /  AlkPhos  63  06-06    PT/INR - ( 2022 13:41 )   PT: 18.8 sec;   INR: 1.57          PTT - ( 2022 13:41 )  PTT:47.2 sec  Urinalysis Basic - ( 2022 08:47 )    Color: Yellow / Appearance: Clear / S.015 / pH: x  Gluc: x / Ketone: NEGATIVE  / Bili: Negative / Urobili: 4.0 E.U./dL   Blood: x / Protein: NEGATIVE mg/dL / Nitrite: NEGATIVE   Leuk Esterase: Trace / RBC: 5-10 /HPF / WBC < 5 /HPF   Sq Epi: x / Non Sq Epi: 0-5 /HPF / Bacteria: Present /HPF    Imaging:    < from: TTE Limited Echo w/o Cont (22 @ 21:41) >  ACC: [id number] EXAM:  [facility]                          PROCEDURE DATE:  2022  CONCLUSIONS:     1. Limited study obtained for evaluation of left ventricular function   performed by cardiology fellow on call.   2. Left ventricular endocardium is not well visualized. Grossly, left   ventricular function appears normal.   3. Dilated right ventricular size. Reduced right ventricular systolic   function.   4. At least mild-to-moderate tricuspid regurgitation.   5. Pulmonary hypertension present, pulmonary artery systolic pressure is   57 mmHg.   6. Trivial pericardial effusion.    < end of copied text >    < from: Xray Chest 1 View- PORTABLE-Urgent (Xray Chest 1 View- PORTABLE-Urgent .) (22 @ 17:13) >  ACC: [id number] EXAM:  XR CHEST PORTABLE URGENT 1V                          PROCEDURE DATE:  2022    IMPRESSION: Mild interval increase of pulmonary vascular congestion.    < end of copied text >    < from: US Duplex Venous Lower Ext Complete, Bilateral (22 @ 13:12) >    ACC: [id number] EXAM:  US DPLX LWR EXT VEINS COMPL BI                          PROCEDURE DATE:  2022  IMPRESSION:  Deep venous thromboses involving the bilateral intramuscular calf veins.    < end of copied text >    < from: US Abdomen Complete (US Abdomen Complete .) (22 @ 13:12) >    ACC: [id number] EXAM:  US ABDOMEN COMPLETE                          PROCEDURE DATE:  2022      IMPRESSION:  Limited exam.  1.  No biliary or gallbladder dilatation. Gallbladder sludge versus   artifact.  2.  Trace perihepatic free fluid.  3.  No right hydronephrosis. Increased right renal parenchymal   echogenicity suggesting medical renal disease. Left kidney not well seen   due to artifact from bowel gas.    < end of copied text >    PEx:  T(C): 37.4 (22 @ 09:00), Max: 38.1 (22 @ 16:00)  HR: 65 (22 @ 10:00) (60 - 70)  BP: 114/58 (22 @ 10:00) (106/57 - 128/57)  RR: 24 (22 @ 10:00) (15 - 25)  SpO2: 100% (22 @ 10:00) (90% - 100%)  Wt(kg): 147.1kG  Daily     Daily Weight in k (2022 06:00)  CAPILLARY BLOOD GLUCOSE      POCT Blood Glucose.: 166 mg/dL (2022 05:54)    I&O's Summary    2022 07:01  -  2022 07:00  --------------------------------------------------------  IN: 941.9 mL / OUT: 2625 mL / NET: -1683.1 mL    2022 07:01  -  2022 12:03  --------------------------------------------------------  IN: 0 mL / OUT: 240 mL / NET: -240 mL    GENERAL:  [x ]Alert  [ x]Oriented x 2    [ ]Lethargic  [ ]Cachexia  [ ]Unarousable  [x ]Verbal  [ ]Non-Verbal  Behavioral:   [ ] Anxiety  [ ] Delirium [ ] Agitation [x ] Other - calm   HEENT:  [x ]Normal   [ ]Dry mouth   [ ]ET Tube/Trach  [ ]Oral lesions  PULMONARY:   [ ]Clear  [ ]Tachypnea  [ ]Audible excessive secretions   [x ]Rhonchi        [ ]Right [ ]Left [ ]Bilateral  [ ]Crackles        [ ]Right [ ]Left [ ]Bilateral  [ ]Wheezing     [ ]Right [ ]Left [ ]Bilateral  CARDIOVASCULAR:    [x ]Regular [ ]Irregular [ ]Tachy  [ ]Kolby [ ]Murmur [ ]Other  GASTROINTESTINAL:  [x ]Soft  [ ]Distended   [ ]+BS  [x ]Non tender [ ]Tender  [ ]PEG [ ]OGT/ NGT  Last BM:  2022  GENITOURINARY:  [ ]Normal [ ] Incontinent   [ ]Oliguria/Anuria   [x ]Stoll  MUSCULOSKELETAL:   [ ]Normal   [ ]Weakness  [x ]Bed/Wheelchair bound [ ]Edema  NEUROLOGIC:   [ ]No focal deficits  [x ] mild Cognitive impairment  [ ] Dysphagia [ ]Dysarthria [ ] Paresis [ ]Other   SKIN:   [ ]Normal   [x ]Pressure ulcer(s) - stage 2 sacrum  [ ]Rash      Preadmit Karnofsky: 60 %           Current Karnofsky:   50  %  Cachexia (Y/N): N  BMI:  45.3kg/m2     Advanced Directives:     Full Code     DNR/DNI     MOLST     HCP     DPOA     Living Will     DECISION MAKER: Patient defers decision making to daughters  LEGAL SURROGATE: Brianna Samuels [phone]    GOALS OF CARE DISCUSSION       Palliative care info/counseling provided	           Family meeting       Advanced Directives addressed please see Advance Care Planning Note	           See previous Palliative Medicine Note       Documentation of GOC: 	          REFERRALS	        Palliative Med        Unit SW/Case Mgmt              Speech/Swallow       Patient/Family Support       Hospice       Nutrition       Dietician       PT/OT

## 2022-06-09 NOTE — PROGRESS NOTE ADULT - PROBLEM SELECTOR PLAN 4
-US 6/5/22: New b/l LE DVTs   -Eliquis 10 mg BID (initial tx dose 6/5-6/12 in AM), Eliquis 5mg BID (starting 6/12 PM, ordered)

## 2022-06-09 NOTE — PROGRESS NOTE ADULT - ASSESSMENT
89M h/o chronic CHF (EF 60% echo 2020), 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 Brown), HTN, HLD, NIDDM, asthma, (denies intubations/hospitalizations), BPH, TRACIE (nonadherent to CPAP, uses 2-4L O2 qhs) who presented from rehab 6/3 with c/o SOB and reported hypoxia found to have hypercapnic/hypoxic respiratory failure 2/2 CHF exacerbation admitted on Bumex gtt (now on Bumex pushes) initially on BiPAP now on NC with course complicated by sepsis 2/2 aspiration PNA, b/l DVTs now on Eliquis, and esophageal dysmotility now DNR/DNI with comfort feeds.    NEURO  No issues    CARDIOVASCULAR  #CHF exacerbation: Patient with HFpEF (EF 60% in 2020). Was being followed by Dr. Tuttle, more recently followed by visiting doctor from [facility] for diuretic management. Was on Toprol 25mg and torsemide which was weaned to once daily in feb. Patient with increasing SOB, crackles on exam, vascular congestion on CXR with minimal improvement. BNP 5655.  - held Bumex gtt 6/5 i/s/o soft BPs, restarted 6/6 at 0.5, increased to 1 6/7, switched to 1mg IVP today (6/9). Tolerating well and making good UOP.  -strict Is&Os  -d/c'd a-line 6/7  - dc'd toprol on 6/4 2/2 unable to take oral meds, continued to hold i/s/o hypotension, then restarted as lopressor 6/8, will transition back to Toprol in AM as no longer without PO access  - weaned from BiPAP to HFNC 6/5, on NC as of 6/8 (still with BiPAP at night)  -spironolactone 25mg qd  -c/w home aspirin 81mg qd  -limited TTE from 6/3: LV function grossly nl, dilated RV with reduced function, mild-to-moderate tricuspid regurgitation, PASP 57mmHg, trivial pericardial effusion.  -complete TTE from 6/6: Nl LV size/systolic function, mod LVH, dilated RV with reduced function, slightly dilated RA, aortic sclerosis without stenosis, mild AR, mild-to-mod TR, severe pulm HTN (PASP 76mmHg), trivial pericardial effusion.    #Severe pulmonary HTN: PASP 76mmHg on TTE 6/6  -likely 2/2 CHF exacerbation, continue to tx as above    #Demand ischemia:  -Trop 0.11 on admission, other cardiac enzymes negative thus tropenemia likely 2/2 demand  -No need to trend    #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 Brown) set at 60bpm, DDD  - continues to have innate atrial rate    #HTN:   - dc toprol on 6/4/22, restarted as above    #HLD: Lipid profile wnl  - c/w home Lipitor    PULMONARY  #Hypercapnic/hypoxic respiratory failure  - Placed on BiPAP in ED now on NC as of 6/8 with BiPAP at night  - management of CHF exacerbation as above  - management of TRACIE as below  - tx of sepsis as below    #TRACIE: Patient uses 2-4L O2 qhs, non-adherent with CPAP  -BiPAP qhs for now, CPAP qhs to be encouraged on discharge    #Asthma: On Advair 250-50, albuterol prn, and montelukast 10mg qd  -c/w home meds (Symbicort in place of Advair inpatient)    GI  #Esophageal dysmotility  -FEES 6/6 without dysphagia but concern for regurgitation  -barium swallow 6/7 with achalasia vs pseudoachalasia and esophageal dysmotility  -NGT placed 6/7, feeds with Jevity 1.2 started 6/8  -GOC discussion with family and pall care, pall care already on board    RENAL/  #BPH:  - c/w home finasteride    #JUVE: Likely i/s/o aggressive diuresis/sepsis/fluid overload  -continue to trend, improving    HEME  #DVTs: New b/l LE DVTs found 6/5  -Eliquis load 6/5-6/11 with 10mg BID, then 5mg BID after    #Anemia: Likely multifactorial dilutional 2/2 volume overload, less likely hemoptysis (resolved). Has had remote melena and hematochezia. Hb 12.6 Nov 2020, now 9-10. Iron studies with iron 43, rest wnl.  - active t&s - most recent 6/7    ID  #Sepsis 2/2 aspiration PNA. Febrile to 101.5 with WBC 13.04 6/5. MAPs 50s-60s. UA negative, RLL consolidation on CXR. MRSA negative. ESR, CRP, and procal elevated. Also with RUQ pain, now resolved.  -most likely source aspiration PNA given evolving RLL consolidation and esophageal dysmotility  -Zosyn started 6/5, switch to CTX 2g q24hr for total of 5d (through 6/9)  -RUQ u/s negative 6/5  -f/u BCx 6/5 - NGTD    ENDO  #DM: Patient on metformin at home  - mISS inpatient  - A1c 6.9    MISC  F: None  E: replete prn  N: NGT with Jevity 1.2 from 8am-9pm (cannot be on feeds while on BiPAP overnight), increase to goal of 110cc/hr  GI: None  A/c: Eliquis  Code: FC - Confirmed by rehab documents  Dispo: CCU 89M h/o chronic CHF (EF 60% echo 2020), 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 Brown), HTN, HLD, NIDDM, asthma, (denies intubations/hospitalizations), BPH, TRACIE (nonadherent to CPAP, uses 2-4L O2 qhs) who presented from rehab 6/3 with c/o SOB and reported hypoxia found to have hypercapnic/hypoxic respiratory failure 2/2 CHF exacerbation admitted on Bumex gtt (now on Bumex pushes) initially on BiPAP now on NC with course complicated by sepsis 2/2 aspiration PNA, b/l DVTs now on Eliquis, and esophageal dysmotility now DNR/DNI with comfort feeds.    NEURO  No issues    CARDIOVASCULAR  #CHF exacerbation: Patient with HFpEF (EF 60% in 2020). Was being followed by Dr. Tuttle, more recently followed by visiting doctor from [facility] for diuretic management. Was on Toprol 25mg and torsemide which was weaned to once daily in feb. Patient with increasing SOB, crackles on exam, vascular congestion on CXR with minimal improvement. BNP 5655.  -net negative to date: 17L  - held Bumex gtt 6/5 i/s/o soft BPs, restarted 6/6 at 0.5, increased to 1 6/7, switched to 1mg IVP BID today (6/9).  -strict Is&Os, daily weights  -d/c'd a-line 6/7  - dc'd toprol on 6/4 2/2 unable to take oral meds, continued to hold i/s/o hypotension, then restarted as lopressor 6/8, will transition back to Toprol in AM as no longer without PO access  - weaned from BiPAP to HFNC 6/5, on NC as of 6/8 (still with BiPAP at night)  -spironolactone 25mg qd  -c/w home aspirin 81mg qd  -limited TTE from 6/3: LV function grossly nl, dilated RV with reduced function, mild-to-moderate tricuspid regurgitation, PASP 57mmHg, trivial pericardial effusion.  -complete TTE from 6/6: Nl LV size/systolic function, mod LVH, dilated RV with reduced function, slightly dilated RA, aortic sclerosis without stenosis, mild AR, mild-to-mod TR, severe pulm HTN (PASP 76mmHg), trivial pericardial effusion.    #Severe pulmonary HTN: PASP 76mmHg on TTE 6/6  -likely 2/2 CHF exacerbation, continue to tx as above    #Demand ischemia:  -Trop 0.11 on admission, other cardiac enzymes negative thus tropenemia likely 2/2 demand  -No need to trend    #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 Brown) set at 60bpm, DDD  - continues to have innate atrial rate    #HTN:   - dc toprol on 6/4/22, restarted as above    #HLD: Lipid profile wnl  - c/w home Lipitor    PULMONARY  #Hypercapnic/hypoxic respiratory failure  - Placed on BiPAP in ED now on NC as of 6/8 with BiPAP at night  - management of CHF exacerbation as above  - management of TRACIE as below  - tx of sepsis as below    #TRACIE: Patient uses 2-4L O2 qhs, non-adherent with CPAP  -BiPAP qhs for now, CPAP qhs to be encouraged on discharge    #Asthma: On Advair 250-50, albuterol prn, and montelukast 10mg qd  -c/w home meds (Symbicort in place of Advair inpatient)    GI  #Esophageal dysmotility  -FEES 6/6 without dysphagia but concern for regurgitation  -barium swallow 6/7 with achalasia vs pseudoachalasia and esophageal dysmotility  -NGT placed 6/7, feeds with Jevity 1.2 started 6/8, d/c'd 6/9 as family/patient opted for comfort feeds    RENAL/  #Contraction alkalosis: bicarb 40 this AM (6/9). Likely 2/2 diuresis  -continue to trend    #BPH:  - c/w home finasteride    #JUVE: Likely i/s/o aggressive diuresis/sepsis/fluid overload  -continue to trend, improving    HEME  #DVTs: New b/l LE DVTs found 6/5  -Eliquis load 6/5-6/12 in AM with 10mg BID, then 5mg BID after (ordered)    #Anemia: Likely multifactorial dilutional 2/2 volume overload, less likely hemoptysis (resolved). Has had remote melena and hematochezia. Hb 12.6 Nov 2020, now 9-10. Iron studies with iron 43, rest wnl.  - active t&s - most recent 6/9    ID  #Sepsis 2/2 aspiration PNA. Febrile to 101.5 with WBC 13.04 6/5. MAPs 50s-60s. UA negative, RLL consolidation on CXR. MRSA negative. ESR, CRP, and procal elevated. Also with RUQ pain, now resolved.  -most likely source aspiration PNA given evolving RLL consolidation and esophageal dysmotility  -Zosyn started 6/5, switch to CTX 2g q24hr for total of 5d (through 6/9, will fall off)  -RUQ u/s negative 6/5  -f/u BCx 6/5 - NGTD    ENDO  #DM: Patient on metformin at home  - mISS inpatient  - A1c 6.9    MISC  F: None  E: replete prn  N: Comfort feeds  GI: None  A/c: Eliquis  Code: DNR/DNI  Dispo: Med consult 89M h/o chronic CHF (EF 60% echo 2020), 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 Brown), HTN, HLD, NIDDM, asthma, (denies intubations/hospitalizations), BPH, TRACIE (nonadherent to CPAP, uses 2-4L O2 qhs) who presented from rehab 6/3 with c/o SOB and reported hypoxia found to have hypercapnic/hypoxic respiratory failure 2/2 CHF exacerbation admitted on Bumex gtt (now on Bumex pushes) initially on BiPAP now on NC with course complicated by sepsis 2/2 aspiration PNA, b/l DVTs now on Eliquis, and esophageal dysmotility now DNR/DNI with comfort feeds.    NEURO  No issues    CARDIOVASCULAR  #CHF exacerbation: Patient with HFpEF (EF 60% in 2020). Was being followed by Dr. Tuttle, more recently followed by visiting doctor from [facility] for diuretic management. Was on Toprol 25mg and torsemide which was weaned to once daily in feb. Patient with increasing SOB, crackles on exam, vascular congestion on CXR with minimal improvement. BNP 5655.  -net negative to date: 17L  - held Bumex gtt 6/5 i/s/o soft BPs, restarted 6/6 at 0.5, increased to 1 6/7, switched to 1mg IVP BID today (6/9).  -strict Is&Os, daily weights  -d/c'd a-line 6/7  - dc'd toprol on 6/4 2/2 unable to take oral meds, continued to hold i/s/o hypotension, then restarted as lopressor 6/8, will transition back to Toprol in AM as no longer without PO access  - weaned from BiPAP to HFNC 6/5, on NC as of 6/8 (still with BiPAP at night)  -spironolactone 25mg qd  -c/w home aspirin 81mg qd  -limited TTE from 6/3: LV function grossly nl, dilated RV with reduced function, mild-to-moderate tricuspid regurgitation, PASP 57mmHg, trivial pericardial effusion.  -complete TTE from 6/6: Nl LV size/systolic function, mod LVH, dilated RV with reduced function, slightly dilated RA, aortic sclerosis without stenosis, mild AR, mild-to-mod TR, severe pulm HTN (PASP 76mmHg), trivial pericardial effusion.    #Severe pulmonary HTN: PASP 76mmHg on TTE 6/6  -likely 2/2 CHF exacerbation, continue to tx as above    #Demand ischemia:  -Trop 0.11 on admission, other cardiac enzymes negative thus tropenemia likely 2/2 demand  -No need to trend    #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 Brown) set at 60bpm, DDD  - continues to have innate atrial rate    #HTN:   - dc toprol on 6/4/22, restarted as above    #HLD: Lipid profile wnl  - c/w home Lipitor    PULMONARY  #Hypercapnic/hypoxic respiratory failure  - Placed on BiPAP in ED now on NC as of 6/8 with BiPAP at night  - management of CHF exacerbation as above  - management of TRACIE as below  - tx of sepsis as below    #TRACIE: Patient uses 2-4L O2 qhs, non-adherent with CPAP  -BiPAP qhs for now, CPAP qhs to be encouraged on discharge    #Asthma: On Advair 250-50, albuterol prn, and montelukast 10mg qd  -c/w home meds (Symbicort in place of Advair inpatient)    GI  #Esophageal dysmotility  -FEES 6/6 without dysphagia but concern for regurgitation  -barium swallow 6/7 with achalasia vs pseudoachalasia and esophageal dysmotility  -NGT placed 6/7, feeds with Jevity 1.2 started 6/8, d/c'd 6/9 as family/patient opted for comfort feeds    #constipation  -miralax BID, senna qhs    RENAL/  #Contraction alkalosis: bicarb 40 this AM (6/9). Likely 2/2 diuresis  -continue to trend    #BPH:  - c/w home finasteride    #JUVE: Likely i/s/o aggressive diuresis/sepsis/fluid overload  -continue to trend, improving    HEME  #DVTs: New b/l LE DVTs found 6/5  -Eliquis load 6/5-6/12 in AM with 10mg BID, then 5mg BID after (ordered)    #Anemia: Likely multifactorial dilutional 2/2 volume overload, less likely hemoptysis (resolved). Has had remote melena and hematochezia. Hb 12.6 Nov 2020, now 9-10. Iron studies with iron 43, rest wnl.  - active t&s - most recent 6/9    ID  #Sepsis 2/2 aspiration PNA. Febrile to 101.5 with WBC 13.04 6/5. MAPs 50s-60s. UA negative, RLL consolidation on CXR. MRSA negative. ESR, CRP, and procal elevated. Also with RUQ pain, now resolved.  -most likely source aspiration PNA given evolving RLL consolidation and esophageal dysmotility  -Zosyn started 6/5, switch to CTX 2g q24hr for total of 5d (through 6/9, will fall off)  -RUQ u/s negative 6/5  -f/u BCx 6/5 - NGTD    ENDO  #DM: Patient on metformin at home  - mISS inpatient  - A1c 6.9    MISC  F: None  E: replete prn  N: Comfort feeds  GI: None  A/c: Eliquis  Code: DNR/DNI  Dispo: Med consult 89M h/o chronic CHF (EF 60% echo 2020), 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 Brown), HTN, HLD, NIDDM, asthma, (denies intubations/hospitalizations), BPH, TRACIE (nonadherent to CPAP, uses 2-4L O2 qhs) who presented from rehab 6/3 with c/o SOB and reported hypoxia found to have hypercapnic/hypoxic respiratory failure 2/2 CHF exacerbation admitted on Bumex gtt (now on Bumex pushes) initially on BiPAP now on NC with course complicated by sepsis 2/2 aspiration PNA, b/l DVTs now on Eliquis, and esophageal dysmotility now DNR/DNI with comfort feeds.    NEURO  No issues    CARDIOVASCULAR  #CHF exacerbation: Patient with HFpEF (EF 60% in 2020). Was being followed by Dr. Tuttle, more recently followed by visiting doctor from [facility] for diuretic management. Was on Toprol 25mg and torsemide which was weaned to once daily in feb. Patient with increasing SOB, crackles on exam, vascular congestion on CXR with minimal improvement. BNP 5655.  -net negative to date: 17L  - held Bumex gtt 6/5 i/s/o soft BPs, restarted 6/6 at 0.5, increased to 1 6/7, switched to 1mg IVP BID today (6/9).  -strict Is&Os, daily weights  -d/c'd a-line 6/7  - dc'd toprol on 6/4 2/2 unable to take oral meds, continued to hold i/s/o hypotension, then restarted as lopressor 6/8, will transition back to Toprol in AM as no longer without PO access  - weaned from BiPAP to HFNC 6/5, on NC as of 6/8 (still with BiPAP at night)  -spironolactone 25mg qd  -c/w home aspirin 81mg qd  -limited TTE from 6/3: LV function grossly nl, dilated RV with reduced function, mild-to-moderate tricuspid regurgitation, PASP 57mmHg, trivial pericardial effusion.  -complete TTE from 6/6: Nl LV size/systolic function, mod LVH, dilated RV with reduced function, slightly dilated RA, aortic sclerosis without stenosis, mild AR, mild-to-mod TR, severe pulm HTN (PASP 76mmHg), trivial pericardial effusion.    #Severe pulmonary HTN: PASP 76mmHg on TTE 6/6  -likely 2/2 CHF exacerbation, continue to tx as above    #Demand ischemia:  -Trop 0.11 on admission, other cardiac enzymes negative thus tropenemia likely 2/2 demand  -No need to trend    #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 Brown) set at 60bpm, DDD  - continues to have innate atrial rate    #HTN:   - dc toprol on 6/4/22, restarted as above    #HLD: Lipid profile wnl  - c/w home Lipitor    PULMONARY  #Hypercapnic/hypoxic respiratory failure  - Placed on BiPAP in ED now on NC as of 6/8 with BiPAP at night  - management of CHF exacerbation as above  - management of TRACIE as below  - tx of sepsis as below    #TRACIE: Patient uses 2-4L O2 qhs, non-adherent with CPAP  -BiPAP qhs for now, CPAP qhs to be encouraged on discharge    #Asthma: On Advair 250-50, albuterol prn, and montelukast 10mg qd  -c/w home meds (Symbicort in place of Advair inpatient)    GI  #Esophageal dysmotility  -FEES 6/6 without dysphagia but concern for regurgitation  -barium swallow 6/7 with achalasia vs pseudoachalasia and esophageal dysmotility  -NGT placed 6/7, feeds with Jevity 1.2 started 6/8, d/c'd 6/9 as family/patient opted for comfort feeds    #constipation  -miralax BID, senna qhs    RENAL/  #Contraction alkalosis: bicarb 40 this AM (6/9). Likely 2/2 diuresis  -continue to trend    #BPH:  - c/w home finasteride    #JUVE: Likely i/s/o aggressive diuresis/sepsis/fluid overload  -continue to trend, improving    HEME  #DVTs: New b/l LE DVTs found 6/5  -Eliquis load 6/5-6/12 in AM with 10mg BID, then 5mg BID after (ordered)    #Anemia: Likely multifactorial dilutional 2/2 volume overload, less likely hemoptysis (resolved). Has had remote melena and hematochezia. Hb 12.6 Nov 2020, now 9-10. Iron studies with iron 43, rest wnl.  - active t&s - most recent 6/9    ID  #Sepsis 2/2 aspiration PNA. Febrile to 101.5 with WBC 13.04 6/5. MAPs 50s-60s. UA negative, RLL consolidation on CXR. MRSA negative. ESR, CRP, and procal elevated. Also with RUQ pain, now resolved.  -most likely source aspiration PNA given evolving RLL consolidation and esophageal dysmotility  -Zosyn started 6/5, switch to CTX 2g q24hr for total of 5d (through 6/9, will fall off)  -RUQ u/s negative 6/5  -f/u BCx 6/5 - NGTD    ENDO  #DM: Patient on metformin at home  - mISS inpatient  - A1c 6.9    MISC  F: None  E: replete prn  N: Comfort feeds  GI: None  A/c: Eliquis  Code: DNR/DNI  Dispo: Cardiac tele

## 2022-06-09 NOTE — PROGRESS NOTE ADULT - PROBLEM SELECTOR PLAN 5
20 minutes were spent discussing advance care planning with the patient and family and team face to face.     Patient last assessed: 6/8/2022  to manage: GOC/AD, symptoms, and support.  30 Minutes; Start: 0700am End: 0730am  , of non-face-to-face prolonged service provided that relates to (face-to-face) care that has or will occur and ongoing patient management, including one or more of the following:   - Reviewed records from other physicians or other health care professional services, including one or more of the following: other medical records and diagnostic / radiology study results

## 2022-06-09 NOTE — PROGRESS NOTE ADULT - PROBLEM SELECTOR PLAN 1
HFpEF (60% by ECHO 2020), on admission BNP 5655, crackles, CXR with congestion, s/p Bipap and HFNC  -Currently satting 93 % on 3L NC, bipap at night   -Home meds: toprol 25 mg QD, torsemide 20 mg QD  -s/p bumex gtt and transitioned to Bumex 1 mg IV BID 6/9/22  -home toprol  held 2/2 dysphagia and hypotension, plan to restart home toprol 25mg QD 6/10/22  -limited TTE 6/3: LV function grossly nl, dilated RV with reduced function, mild-mod TR, PASP 57mmHg, trivial pericardial effusion.  -TTE 6/6: EF 55-60%, mod LVH, dilated RV with reduced function, slightly dilated RA, mild AR, mild-mod TR, severe  pHTN (PASP 76mmHg), trivial pericardial effusion.

## 2022-06-09 NOTE — PROGRESS NOTE ADULT - ATTENDING COMMENTS
I have personally seen and examined the patient.  I fully participated in the care of this patient.  I have made amendments to the documentation where necessary, and agree with the history, physical exam, and plan as documented by the Resident and Fellow.     89M, obese, pmh of chronic dCHF, CHB s/p PPM, TRACIE p/w acute on chronic dCHF exacerbation w/ acute hypoxemic respiratory failure, admitted to CCU.       HR 50-60s  -140/50-60s    NAD   RRR S1 S2  rales at the bases bilaterally  NT soft + BS  1+ leg edema    K 3.9   Cr 1.22  plts 119   EKG CXR reviewed    -dCHF - Significantly volume overloaded on exam, +Anasarca, CXR Pulm edema   - increase Bumex to 1mg/hr    - continue aldactone  - Continue AC for DVT  - Palliative Care f/u with family meeting
I have personally seen and examined the patient.  I fully participated in the care of this patient.  I have made amendments to the documentation where necessary, and agree with the history, physical exam, and plan as documented by the Resident and Fellow.     89M, obese, pmh of chronic dCHF, CHB s/p PPM, TRACIE p/w acute on chronic dCHF exacerbation w/ acute hypoxemic respiratory failure, admitted to CCU      Patient diuresed well.   BP /40-60s  on BiPAP Tm 100.6   HR 60s  NAD   RRR S1 S2  rales at the bases bilaterally  NT soft + BS  1+ leg edema    K 3.9   Cr  1.28   Cr  9.0   plts 122   EKG CXR reviewed    -dCHF - Significantly volume overloaded on exam, +Anasarca, CXR Pulm edema   - increase Bumex to 1mg/hr    - continue aldactone  - Continue AC for DVT  - For Barium swallow today
89M, obese, pmh of chronic dCHF, CHB s/p PPM, TRACIE p/w acute on chronic dCHF exacerbation w/ acute hypoxemic respiratory failure, admitted to CCU.       HR 50-60s  -140/50-60s    NAD   RRR S1 S2  rales at the bases bilaterally  NT soft + BS  1+ leg edema    K 3.9   Cr 1.22  plts 119   EKG CXR reviewed    -dCHF - Significantly volume overloaded on exam, +Anasarca, CXR Pulm edema   - decrease bumex to 1mg BID  - transfer to medicine floors  DNR DNI  comfort care
89M, obese, pmh of chronic dCHF, CHB s/p PPM, TRACIE p/w acute on chronic dCHF exacerbation w/ acute hypoxemic respiratory failure, admitted to CCU    -dCHF - Significantly volume overloaded on exam, +Anasarca, CXR Pulm edema - Started Bumex gtt 1mg/hr, responded well w/ 4L UOP overnight, need significant volume removal - c/w Bumex gtt, will likely add Spironolactone 25 mg daily, f/u 4pm BMP; castro placed, monitor strict Is&Os  -Pulm - acute hypoxemic resp failure 2/2 CHF, also w/ chronic hypercapnia 2/2 OHS/TRACIE; Currently on BIPAP, will attempt to wean to HFNC during day; BIPAP overnight; ABG chronic resp acidosis, compensated; c/w IV diuresis; OOB to chair when possible  -DASH diet  -DVT PPx  -DIspo: CCU    Bull Tuttle MD  CCU Attending
I have personally seen and examined the patient.  I fully participated in the care of this patient.  I have made amendments to the documentation where necessary, and agree with the history, physical exam, and plan as documented by the Resident and Fellow.     89M, obese, pmh of chronic dCHF, CHB s/p PPM, TRACIE p/w acute on chronic dCHF exacerbation w/ acute hypoxemic respiratory failure, admitted to CCU      Patient diuresed well. Tm 100.6  HR 60-80s  110-120/50-60s    NAD   RRR S1 S2  rales at the bases bilaterally  NT soft + BS  1+ leg edema    K 3.8   Cr 1.16   EKG CXR reviewed    -dCHF - Significantly volume overloaded on exam, +Anasarca, CXR Pulm edema   - restart bumex 0.5 mg /hr  - continue aldactone    Continue AC for DVT  - continue abx
89M, obese, pmh of chronic dCHF, CHB s/p PPM, TRACIE p/w acute on chronic dCHF exacerbation w/ acute hypoxemic respiratory failure, admitted to CCU    -dCHF - Significantly volume overloaded on exam, +Anasarca, CXR Pulm edema - on Bumex gtt 1mg/hr, responded very well w/ 10L UOP over 48hrs - However given new fevers overnight, softer BPs and early sepsis picture- Bumex gtt held today; Pt. remains volume overloaded and will need further IV diuresis once sepsis resolves. c/w Spironolactone 25 mg daily, monitor strict Is&Os  -ID - new fevers overnight; s/p Blood Cx's UA, CXR w/ pulm edema patter, cannot exclude underlying opacity; Started vanc/zosyn, Lactate WNL, though w/ softer BPs - may require low dose Levophed for 24-48hrs while sepsis resolves, diuresis held during this time  -Pulm - acute hypoxemic resp failure 2/2 CHF, also w/ chronic hypercapnia 2/2 OHS/TRACIE; Improved oxygenation on ABG today, better mental status and more comfortable from respiratory standpoint; remains volume overloaded, diuresis held today d/t early sepsis; Will continue to alternate HFNC during day and BIPAP overnight; Will benefit from OOB to chair and Incentive spirometer when possible  -DASH diet  -DVT PPx  -Dispo: CCU  -Case d/w Patient and Daughters at bedside    Bull Tuttle MD  CCU Attending

## 2022-06-10 NOTE — PROGRESS NOTE ADULT - PROBLEM SELECTOR PLAN 5
Palliative team following   -DNR/DNI, MOLST in chart.   - no tubes, Limited medical interventions, Send to hospital as needed, trial of IV fluids and a use antibiotics if an infection occurs  -pt and daughter want to continue tele, Qshift vitals and every other day labs     F: comfort  E: Replete electrolytes as needed for K<4 and Mg<2  N: DASH Diet    DVT PPX: Eliquis  10 mg BID (initial tx dose for DVTs) Palliative team following   -DNR/DNI, MOLST in chart.   - no tubes, Limited medical interventions, Send to hospital as needed, trial of IV fluids and a use antibiotics if an infection occurs  -pt and daughter want to continue tele, Qshift vitals and every other day labs     F: comfort  E: Replete electrolytes as needed for K<4 and Mg<2  N: DASH Diet    DVT PPX: Eliquis 10 mg BID (initial tx dose for DVTs)  Dispo: pending discussion w/ family re: [facility]

## 2022-06-10 NOTE — PROGRESS NOTE ADULT - PROBLEM SELECTOR PLAN 5
Patient last assessed: 6/9/2022  to manage: GOC/AD, symptoms, and support.  30 Minutes; Start: 0700am End: 0730am  , of non-face-to-face prolonged service provided that relates to (face-to-face) care that has or will occur and ongoing patient management, including one or more of the following:   - Reviewed records from other physicians or other health care professional services, including one or more of the following: other medical records and diagnostic / radiology study results

## 2022-06-10 NOTE — PROGRESS NOTE ADULT - PROBLEM SELECTOR PLAN 1
HFpEF (60% by ECHO 2020), on admission BNP 5655, crackles, CXR with congestion, s/p Bipap and HFNC  -Currently satting 93 % on 3L NC, bipap at night   -Home meds: toprol 25 mg QD, torsemide 20 mg QD  -s/p bumex gtt and transitioned to Bumex 1 mg IV BID 6/9/22  -home toprol  held 2/2 dysphagia and hypotension, plan to restart home toprol 25mg QD 6/10/22  -limited TTE 6/3: LV function grossly nl, dilated RV with reduced function, mild-mod TR, PASP 57mmHg, trivial pericardial effusion.  -TTE 6/6: EF 55-60%, mod LVH, dilated RV with reduced function, slightly dilated RA, mild AR, mild-mod TR, severe  pHTN (PASP 76mmHg), trivial pericardial effusion. HFpEF (60% by ECHO 2020), on admission BNP 5655, crackles, CXR with congestion, s/p Bipap and HFNC  -Currently satting 93 % on 3L NC, bipap at night   -Home meds: toprol 25 mg QD, torsemide 20 mg QD  -s/p bumex gtt and transitioned to Bumex 1 mg IV BID 6/9/22 (last dose 6/10 PM). Will be transitioned to Torsemide 40mg qD starting 6/11/22.  -home Toprol held 2/2 dysphagia and hypotension, restarted home Toprol 25mg QD 6/10/22  -limited TTE 6/3: LV function grossly nl, dilated RV with reduced function, mild-mod TR, PASP 57mmHg, trivial pericardial effusion.  -TTE 6/6: EF 55-60%, mod LVH, dilated RV with reduced function, slightly dilated RA, mild AR, mild-mod TR, severe  pHTN (PASP 76mmHg), trivial pericardial effusion.

## 2022-06-10 NOTE — PROGRESS NOTE ADULT - PROBLEM SELECTOR PLAN 3
Sepsis 2/2 aspiration PNA and esophageal dysmotility. T max 101.5 with WBC 13.04 6/5. MAPs 50s-60s.   -UA negative, RLL consolidation on CXR. MRSA negative. ESR, CRP, and procal elevated. Also with RUQ pain, now resolved.  -s/p Zosyn and de-escalated to CTX 2g q24hr x 5days (last dose 6/9)  -RUQ u/s negative 6/5  - BCx 6/5: NGTD Sepsis 2/2 aspiration PNA and esophageal dysmotility. T max 101.5 with WBC 13.04 6/5. MAPs 50s-60s.   -UA negative, RLL consolidation on CXR. MRSA negative. ESR, CRP, and procal elevated. Also with RUQ pain, now resolved.  -s/p Zosyn and de-escalated to CTX 2g q24hr x 5days (last dose 6/9)  -RUQ US negative 6/5  -BCx 6/5: NGTD

## 2022-06-10 NOTE — PROGRESS NOTE ADULT - PROBLEM SELECTOR PLAN 3
Patient with a history of CHF, and has EF was 60% in 2020. Was a patient of Dr. Tuttle, but couldn't make it to the office and was seen by [facility] visiting doctors. Bumex drip discontinued and patient on PO Bumex. Continue care as per cardiology team.

## 2022-06-10 NOTE — PROGRESS NOTE ADULT - ASSESSMENT
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 Brown), pHTN (sees Dr Tuttle), HFpEF (EF 60% echo 2020), who presented from rehab 6/3 c/o SOB, hypoxia, swelling of arms legs and scrotum, and 1 week of intermittent hemoptysis. Admitted to CCU for acute on chronic HFpEF exacerbation, on BiPAP and Bumex gtt. Diamox PRN. Course c/b fever 6/5, MAPs in 50s, s/p levophed, s/p Bumex gtt. s/p Zosyn (MRSA negative) and de-escalated to CTX (last day 6/9 PM). Likely aspiration PNA. Weaned to HFNC 6/5 and currently on 3L NC 6/8 with BiPAP at night. Further c/b b/l DVTs on 6/5, started on Eliquis (initial treatment dose 10 mg BID until 6/12 am). Barium swallow 6/7 with esophageal dysmotility. NGT placed 6/7, tube feeds started 6/8 now d/c'd as patient/family opted for comfort feeds, made DNR/DNI (MOLST in chart), NGT removed. Medicine transfer rejected and stepped down to cardiac tele 6/9/22 for disposition planning for [facility].

## 2022-06-10 NOTE — PROGRESS NOTE ADULT - SUBJECTIVE AND OBJECTIVE BOX
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 Brown), chronic CHF (EF 60% echo 2020), TRACIE (nonadherent to CPAP) who presented from rehab with c/o SOB, hypoxia, swelling of arms legs and scrotum, and 1 week of intermittent hemoptysis. Admitted to [facility] Nov 2020 for CHF exacerbation (, EF 60% borderline reduced RV systolic function) with contraction alkalosis, improved on lasix and diamox, dc'd on torsemide 20 po BID. Torsemide was decreased to 20 QD in Feb with encouragement of low-salt diet.   Patient has been unable to come to Dr. Tuttle 2/2 poor functionality, was being followed by a [facility] visiting doctor who was managing his diuretics. At best this year able to walk 12 total steps with walker before becoming SOB. He was admitted to [facility] last month for CHF exacerbation and was discharged to rehab 2 weeks ago where he was getting PO lasix. He was becoming more SOB for the past 2 days requiring supplemental O2 (5L per daughter). Daughter brought to [facility] for eval. On arrival to CCU endorsed improvement in breathing, feeling cold, hungry and thirsty. Denied fever, cough, chest pain, abdominal pain, recent melena or hematochezia    In the ED:  VS: Afebrile. HR 59-68, -150/63-81, RR 20, 96% RA --> 97% on BiPAP  Labs: Hgb 9.6 (baseline 12-13), bicarb 43, BUN 40, Cr 1.01, PT 15.7, INR 1.32, Trop 0.11, Cpk/CKMB wnl, BNP 5655, pCO2 on VBG 84 and on ABG 79. UA negative. COVID negative.  ECG: Paced  Imaging: CXR with b/l haziness and increased vacular congestion  Interventions: Placed on NC --> BiPAP at 10/5 (03 Jun 2022 18:05)    SUBJECTIVE: Patient resting in bed in no distress. Appears comfortable, no family at bedside at this time.     ROS:  DYSPNEA (Marilu): 0	  NAUS/VOM: N	  SECRETIONS: N	  AGITATION: N  Pain (Y/N):     N  -Provocation/Palliation: n/a   -Quality/Quantity: n/a   -Radiating: n/a   -Severity:  n/a   -Timing/Frequency: n/a   -Impact on ADLs: n/a     OTHER REVIEW OF SYSTEMS: + weakness  UNABLE TO OBTAIN  due to: n/a     PEx:  T(C): 36.1 (06-10-22 @ 09:56), Max: 36.6 (06-09-22 @ 18:00)  HR: 60 (06-10-22 @ 10:05) (59 - 62)  BP: 118/59 (06-10-22 @ 09:00) (104/51 - 128/60)  RR: 18 (06-10-22 @ 10:05) (15 - 27)  SpO2: 99% (06-10-22 @ 10:05) (92% - 99%)  Wt(kg): 147.1kG    GENERAL:  [x ]Alert  [ x]Oriented x 2    [ ]Lethargic  [ ]Cachexia  [ ]Unarousable  [x ]Verbal  [ ]Non-Verbal  Behavioral:   [ ] Anxiety  [ ] Delirium [ ] Agitation [x ] Other - calm   HEENT:  [x ]Normal   [ ]Dry mouth   [ ]ET Tube/Trach  [ ]Oral lesions  PULMONARY:   [ ]Clear  [ ]Tachypnea  [ ]Audible excessive secretions   [x ]Rhonchi anteriorly        [ ]Right [ ]Left [ ]Bilateral  [ ]Crackles        [ ]Right [ ]Left [ ]Bilateral  [ ]Wheezing     [ ]Right [ ]Left [ ]Bilateral  CARDIOVASCULAR:    [x ]Regular [ ]Irregular [ ]Tachy  [ ]Kolby [ ]Murmur [ ]Other  GASTROINTESTINAL:  [x ]Soft  [ ]Distended   [ ]+BS  [x ]Non tender [ ]Tender  [ ]PEG [X ]NGT  Last BM:  6/6/2022  GENITOURINARY:  [ ]Normal [ ] Incontinent   [ ]Oliguria/Anuria   [x ]Stoll  MUSCULOSKELETAL:   [ ]Normal   [ ]Weakness  [x ]Bed/Wheelchair bound [x]Edema  - +1 b/l LE extremities   NEUROLOGIC:   [ ]No focal deficits  [x ] mild Cognitive impairment  [ ] Dysphagia [ ]Dysarthria [ ] Paresis [ ]Other   SKIN:   [ ]Normal   [x ]Pressure ulcer(s) - stage 2 sacrum  [ ]Rash      ALLERGIES: lisinopril (Angioedema)      OPIATE NAÏVE (Y/N): Y    MEDICATIONS: REVIEWED  MEDICATIONS  (STANDING):  apixaban 10 milliGRAM(s) Oral every 12 hours  aspirin  chewable 81 milliGRAM(s) Oral every 24 hours  atorvastatin 10 milliGRAM(s) Oral at bedtime  budesonide 160 MICROgram(s)/formoterol 4.5 MICROgram(s) Inhaler 2 Puff(s) Inhalation two times a day  buMETAnide Injectable 1 milliGRAM(s) IV Push every 12 hours  chlorhexidine 4% Liquid 1 Application(s) Topical <User Schedule>  dextrose 5%. 1000 milliLiter(s) (50 mL/Hr) IV Continuous <Continuous>  dextrose 5%. 1000 milliLiter(s) (100 mL/Hr) IV Continuous <Continuous>  dextrose 50% Injectable 25 Gram(s) IV Push once  dextrose 50% Injectable 12.5 Gram(s) IV Push once  dextrose 50% Injectable 25 Gram(s) IV Push once  finasteride 5 milliGRAM(s) Oral at bedtime  glucagon  Injectable 1 milliGRAM(s) IntraMuscular once  insulin lispro (ADMELOG) corrective regimen sliding scale   SubCutaneous Before meals and at bedtime  metoprolol succinate ER 25 milliGRAM(s) Oral every 24 hours  montelukast 10 milliGRAM(s) Oral every 24 hours  polyethylene glycol 3350 17 Gram(s) Oral two times a day  senna 2 Tablet(s) Oral at bedtime  spironolactone 25 milliGRAM(s) Oral every 24 hours    MEDICATIONS  (PRN):  ALBUTerol    90 MICROgram(s) HFA Inhaler 2 Puff(s) Inhalation every 6 hours PRN Shortness of Breath and/or Wheezing  dextrose Oral Gel 15 Gram(s) Oral once PRN Blood Glucose LESS THAN 70 milliGRAM(s)/deciliter      LABS: REVIEWED  CBC:                        9.8    8.10  )-----------( 155      ( 09 Jun 2022 05:30 )             31.3     CMP:    06-09    141  |  92<L>  |  38<H>  ----------------------------<  181<H>  4.0   |  40<H>  |  1.17    Ca    9.5      09 Jun 2022 05:30  Phos  4.2     06-09  Mg     2.5     06-09        IMAGING: REVIEWED    ADVANCED DIRECTIVES:            FULL CODE            DNR DNI            LIVING WILL            DPOA       HCP       MOLST    DECISION MAKER:   LEGAL SURROGATE:    PSYCHOSOCIAL-SPIRITUAL ASSESSMENT:       Reviewed       Care plan unchanged       Care plan adjusted as above	    GOALS OF CARE DISCUSSION       Palliative care info/counseling provided	           Family meeting       Advanced Directives addressed please see Advance Care Planning Note	           See previous Palliative Medicine Note       Documentation of GOC:   	      AGENCY CHOICE DISCUSSED:           Homecare        Hospice        [facility]        Other:    REFERRALS	        Palliative Med        Patient and family support       Unit SW/Case Mgmt              Speech/Swallow       Hospice       Nutrition       Dietician       PT/OT CORY SAMUELS             MRN-[id number]    CC: SOB    HPI:  89M h/o HTN, HLD, NIDDM, asthma, (denies intubations/hospitalizations), BPH, 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 Brown), chronic CHF (EF 60% echo 2020), TRACIE (nonadherent to CPAP) who presented from rehab with c/o SOB, hypoxia, swelling of arms legs and scrotum, and 1 week of intermittent hemoptysis. Admitted to [facility] Nov 2020 for CHF exacerbation (, EF 60% borderline reduced RV systolic function) with contraction alkalosis, improved on lasix and diamox, dc'd on torsemide 20 po BID. Torsemide was decreased to 20 QD in Feb with encouragement of low-salt diet.   Patient has been unable to come to Dr. Tuttle 2/2 poor functionality, was being followed by a [facility] visiting doctor who was managing his diuretics. At best this year able to walk 12 total steps with walker before becoming SOB. He was admitted to [facility] last month for CHF exacerbation and was discharged to rehab 2 weeks ago where he was getting PO lasix. He was becoming more SOB for the past 2 days requiring supplemental O2 (5L per daughter). Daughter brought to [facility] for eval. On arrival to CCU endorsed improvement in breathing, feeling cold, hungry and thirsty. Denied fever, cough, chest pain, abdominal pain, recent melena or hematochezia    In the ED:  VS: Afebrile. HR 59-68, -150/63-81, RR 20, 96% RA --> 97% on BiPAP  Labs: Hgb 9.6 (baseline 12-13), bicarb 43, BUN 40, Cr 1.01, PT 15.7, INR 1.32, Trop 0.11, Cpk/CKMB wnl, BNP 5655, pCO2 on VBG 84 and on ABG 79. UA negative. COVID negative.  ECG: Paced  Imaging: CXR with b/l haziness and increased vacular congestion  Interventions: Placed on NC --> BiPAP at 10/5 (03 Jun 2022 18:05)    SUBJECTIVE: Patient resting in bed in no distress. Appears comfortable, no family at bedside at this time.     ROS:  DYSPNEA (Marilu): 0	  NAUS/VOM: N	  SECRETIONS: N	  AGITATION: N  Pain (Y/N):     N  -Provocation/Palliation: n/a   -Quality/Quantity: n/a   -Radiating: n/a   -Severity:  n/a   -Timing/Frequency: n/a   -Impact on ADLs: n/a     OTHER REVIEW OF SYSTEMS: + weakness  UNABLE TO OBTAIN  due to: n/a     PEx:  T(C): 36.1 (06-10-22 @ 09:56), Max: 36.6 (06-09-22 @ 18:00)  HR: 60 (06-10-22 @ 10:05) (59 - 62)  BP: 118/59 (06-10-22 @ 09:00) (104/51 - 128/60)  RR: 18 (06-10-22 @ 10:05) (15 - 27)  SpO2: 99% (06-10-22 @ 10:05) (92% - 99%)  Wt(kg): 147.1kG    GENERAL:  [x ]Alert  [ x]Oriented x 2    [ ]Lethargic  [ ]Cachexia  [ ]Unarousable  [x ]Verbal  [ ]Non-Verbal  Behavioral:   [ ] Anxiety  [ ] Delirium [ ] Agitation [x ] Other - calm   HEENT:  [x ]Normal   [ ]Dry mouth   [ ]ET Tube/Trach  [ ]Oral lesions  PULMONARY:   [ ]Clear  [ ]Tachypnea  [ ]Audible excessive secretions   [x ]Rhonchi anteriorly        [ ]Right [ ]Left [ ]Bilateral  [ ]Crackles        [ ]Right [ ]Left [ ]Bilateral  [ ]Wheezing     [ ]Right [ ]Left [ ]Bilateral  CARDIOVASCULAR:    [x ]Regular [ ]Irregular [ ]Tachy  [ ]Kolby [ ]Murmur [ ]Other  GASTROINTESTINAL:  [x ]Soft  [ ]Distended   [ ]+BS  [x ]Non tender [ ]Tender  [ ]PEG [X ]NGT  Last BM:  6/6/2022  GENITOURINARY:  [ ]Normal [ ] Incontinent   [ ]Oliguria/Anuria   [x ]Stoll  MUSCULOSKELETAL:   [ ]Normal   [ ]Weakness  [x ]Bed/Wheelchair bound [x]Edema  - +1 b/l LE extremities   NEUROLOGIC:   [ ]No focal deficits  [x ] mild Cognitive impairment  [ ] Dysphagia [ ]Dysarthria [ ] Paresis [ ]Other   SKIN:   [ ]Normal   [x ]Pressure ulcer(s) - stage 2 sacrum  [ ]Rash      ALLERGIES: lisinopril (Angioedema)      OPIATE NAÏVE (Y/N): Y    MEDICATIONS: REVIEWED  MEDICATIONS  (STANDING):  apixaban 10 milliGRAM(s) Oral every 12 hours  aspirin  chewable 81 milliGRAM(s) Oral every 24 hours  atorvastatin 10 milliGRAM(s) Oral at bedtime  budesonide 160 MICROgram(s)/formoterol 4.5 MICROgram(s) Inhaler 2 Puff(s) Inhalation two times a day  buMETAnide Injectable 1 milliGRAM(s) IV Push every 12 hours  chlorhexidine 4% Liquid 1 Application(s) Topical <User Schedule>  dextrose 5%. 1000 milliLiter(s) (50 mL/Hr) IV Continuous <Continuous>  dextrose 5%. 1000 milliLiter(s) (100 mL/Hr) IV Continuous <Continuous>  dextrose 50% Injectable 25 Gram(s) IV Push once  dextrose 50% Injectable 12.5 Gram(s) IV Push once  dextrose 50% Injectable 25 Gram(s) IV Push once  finasteride 5 milliGRAM(s) Oral at bedtime  glucagon  Injectable 1 milliGRAM(s) IntraMuscular once  insulin lispro (ADMELOG) corrective regimen sliding scale   SubCutaneous Before meals and at bedtime  metoprolol succinate ER 25 milliGRAM(s) Oral every 24 hours  montelukast 10 milliGRAM(s) Oral every 24 hours  polyethylene glycol 3350 17 Gram(s) Oral two times a day  senna 2 Tablet(s) Oral at bedtime  spironolactone 25 milliGRAM(s) Oral every 24 hours    MEDICATIONS  (PRN):  ALBUTerol    90 MICROgram(s) HFA Inhaler 2 Puff(s) Inhalation every 6 hours PRN Shortness of Breath and/or Wheezing  dextrose Oral Gel 15 Gram(s) Oral once PRN Blood Glucose LESS THAN 70 milliGRAM(s)/deciliter      LABS: REVIEWED  CBC:                        9.8    8.10  )-----------( 155      ( 09 Jun 2022 05:30 )             31.3     CMP:    06-09    141  |  92<L>  |  38<H>  ----------------------------<  181<H>  4.0   |  40<H>  |  1.17    Ca    9.5      09 Jun 2022 05:30  Phos  4.2     06-09  Mg     2.5     06-09    IMAGING: REVIEWED      ADVANCED DIRECTIVES:            DNR DNI            MOLST      DECISION MAKER: Patient defers decision making to daughters  LEGAL SURROGATE: Brianna Samuels [phone] and Madhuri Lopez [phone]    PSYCHOSOCIAL-SPIRITUAL ASSESSMENT:       Reviewed       Care plan unchanged     GOALS OF CARE DISCUSSION       Palliative care info/counseling provided	           Family meeting       Advanced Directives addressed please see Advance Care Planning Note	           See previous Palliative Medicine Note       Documentation of GOC: DNR/DNI  	      AGENCY CHOICE DISCUSSED:           [facility]           REFERRALS	        Unit SW/Case Mgmt       PT/OT

## 2022-06-10 NOTE — PROGRESS NOTE ADULT - SUBJECTIVE AND OBJECTIVE BOX
Interventional Cardiology PA Adult Progress Note    Subjective Assessment: Patient seen and assessed at bedside. Denies chest pain, palpitations, shortness of breath, PND, orthopnea or any other complaints.     ROS Negative except as per Subjective and HPI  	  MEDICATIONS:  buMETAnide Injectable 1 milliGRAM(s) IV Push every 12 hours  metoprolol succinate ER 25 milliGRAM(s) Oral every 24 hours  spironolactone 25 milliGRAM(s) Oral every 24 hours    ALBUTerol    90 MICROgram(s) HFA Inhaler 2 Puff(s) Inhalation every 6 hours PRN  budesonide 160 MICROgram(s)/formoterol 4.5 MICROgram(s) Inhaler 2 Puff(s) Inhalation two times a day  montelukast 10 milliGRAM(s) Oral every 24 hours    polyethylene glycol 3350 17 Gram(s) Oral two times a day  senna 2 Tablet(s) Oral at bedtime    atorvastatin 10 milliGRAM(s) Oral at bedtime  dextrose 50% Injectable 25 Gram(s) IV Push once  dextrose 50% Injectable 12.5 Gram(s) IV Push once  dextrose 50% Injectable 25 Gram(s) IV Push once  dextrose Oral Gel 15 Gram(s) Oral once PRN  finasteride 5 milliGRAM(s) Oral at bedtime  glucagon  Injectable 1 milliGRAM(s) IntraMuscular once  insulin lispro (ADMELOG) corrective regimen sliding scale   SubCutaneous Before meals and at bedtime    apixaban 10 milliGRAM(s) Oral every 12 hours  aspirin  chewable 81 milliGRAM(s) Oral every 24 hours  chlorhexidine 4% Liquid 1 Application(s) Topical <User Schedule>  dextrose 5%. 1000 milliLiter(s) IV Continuous <Continuous>  dextrose 5%. 1000 milliLiter(s) IV Continuous <Continuous>      PHYSICAL EXAM:  TELEMETRY:  T(C): 36.5 (06-10-22 @ 06:13), Max: 37.7 (06-09-22 @ 09:00)  HR: 62 (06-10-22 @ 06:15) (59 - 70)  BP: 128/60 (06-09-22 @ 14:28) (107/68 - 128/62)  RR: 21 (06-10-22 @ 06:15) (20 - 27)  SpO2: 98% (06-10-22 @ 06:15) (93% - 99%)  Wt(kg): --  I&O's Summary    09 Jun 2022 07:01  -  10 Silvestre 2022 07:00  --------------------------------------------------------  IN: 10 mL / OUT: 1635 mL / NET: -1625 mL    Stoll:  Central/PICC/Mid Line:                                         Appearance: Normal	  HEENT:   Normal oral mucosa, PERRL, EOMI	  Neck: Supple, + JVD  Cardiovascular: Normal S1 S2, No JVD, No murmurs,   Respiratory: Poor inspiratory effort. Decreased breath sounds. Bilateral crackles.   Gastrointestinal:  Soft, Non-tender, + BS	  Skin: No rashes, No ecchymoses, No cyanosis  Extremities: Normal range of motion, No clubbing, cyanosis. 2+ pitting b/l edema.  Vascular: Peripheral pulses palpable 2+ bilaterally  Neurologic: Non-focal  Psychiatry: A & O x 3, Mood & affect appropriate    	    ECG:  	  RADIOLOGY:   DIAGNOSTIC TESTING:  [ ] Echocardiogram:  [ ]  Catheterization:  [ ] Stress Test:    [ ] KOREY  OTHER: 	    LABS:	 	  CARDIAC MARKERS:  Troponin T 0.11 --> 0.12 --> 0.14 --> 0.15 --> 0.17 --> 0.19                          9.8    8.10  )-----------( 155      ( 09 Jun 2022 05:30 )             31.3     06-09    141  |  92<L>  |  38<H>  ----------------------------<  181<H>  4.0   |  40<H>  |  1.17    Ca    9.5      09 Jun 2022 05:30  Phos  4.2     06-09  Mg     2.5     06-09      Serum Pro-Brain Natriuretic Peptide: 5655 pg/mL (06.03.22 @ 16:18)  Lipid Profile (06.04.22 @ 06:35)    Cholesterol, Serum: 86 mg/dL    Triglycerides, Serum: 50 mg/dL    HDL Cholesterol, Serum: 48 mg/dL    Non HDL Cholesterol: 38 mg/dL    LDL Cholesterol Calculated: 28 mg/dL    HgA1c: 6.9% (06.04.22 @ 06:35)  TSH:  Interventional Cardiology PA Adult Progress Note    Subjective Assessment: Patient seen and assessed at bedside. Denies chest pain, palpitations, shortness of breath, PND, orthopnea or any other complaints. No acute events overnight.    ROS Negative except as per Subjective and HPI  	  MEDICATIONS:  buMETAnide Injectable 1 milliGRAM(s) IV Push every 12 hours  metoprolol succinate ER 25 milliGRAM(s) Oral every 24 hours  spironolactone 25 milliGRAM(s) Oral every 24 hours    ALBUTerol    90 MICROgram(s) HFA Inhaler 2 Puff(s) Inhalation every 6 hours PRN  budesonide 160 MICROgram(s)/formoterol 4.5 MICROgram(s) Inhaler 2 Puff(s) Inhalation two times a day  montelukast 10 milliGRAM(s) Oral every 24 hours    polyethylene glycol 3350 17 Gram(s) Oral two times a day  senna 2 Tablet(s) Oral at bedtime    atorvastatin 10 milliGRAM(s) Oral at bedtime  dextrose 50% Injectable 25 Gram(s) IV Push once  dextrose 50% Injectable 12.5 Gram(s) IV Push once  dextrose 50% Injectable 25 Gram(s) IV Push once  dextrose Oral Gel 15 Gram(s) Oral once PRN  finasteride 5 milliGRAM(s) Oral at bedtime  glucagon  Injectable 1 milliGRAM(s) IntraMuscular once  insulin lispro (ADMELOG) corrective regimen sliding scale   SubCutaneous Before meals and at bedtime    apixaban 10 milliGRAM(s) Oral every 12 hours  aspirin  chewable 81 milliGRAM(s) Oral every 24 hours  chlorhexidine 4% Liquid 1 Application(s) Topical <User Schedule>  dextrose 5%. 1000 milliLiter(s) IV Continuous <Continuous>  dextrose 5%. 1000 milliLiter(s) IV Continuous <Continuous>      PHYSICAL EXAM:  TELEMETRY:  T(C): 36.5 (06-10-22 @ 06:13), Max: 37.7 (06-09-22 @ 09:00)  HR: 62 (06-10-22 @ 06:15) (59 - 70)  BP: 128/60 (06-09-22 @ 14:28) (107/68 - 128/62)  RR: 21 (06-10-22 @ 06:15) (20 - 27)  SpO2: 98% (06-10-22 @ 06:15) (93% - 99%)  Wt(kg): --  I&O's Summary    09 Jun 2022 07:01  -  10 Silvestre 2022 07:00  --------------------------------------------------------  IN: 10 mL / OUT: 1635 mL / NET: -1625 mL    Stoll:  Central/PICC/Mid Line:                                         Appearance: Normal	  HEENT:   Normal oral mucosa, PERRL, EOMI	  Neck: Supple, + JVD  Cardiovascular: Normal S1 S2, No JVD, No murmurs,   Respiratory: Poor inspiratory effort. Decreased breath sounds. Bilateral crackles.   Gastrointestinal:  Soft, Non-tender, + BS	  Skin: No rashes, No ecchymoses, No cyanosis  Extremities: Normal range of motion, No clubbing, cyanosis. 2+ pitting b/l edema.  Vascular: Peripheral pulses palpable 2+ bilaterally  Neurologic: Non-focal  Psychiatry: A & O x 3, Mood & affect appropriate    	    ECG:  	  RADIOLOGY:   DIAGNOSTIC TESTING:  [ ] Echocardiogram:  [ ]  Catheterization:  [ ] Stress Test:    [ ] KOREY  OTHER: 	    LABS:	 	  CARDIAC MARKERS:  Troponin T 0.11 --> 0.12 --> 0.14 --> 0.15 --> 0.17 --> 0.19                          9.8    8.10  )-----------( 155      ( 09 Jun 2022 05:30 )             31.3     06-09    141  |  92<L>  |  38<H>  ----------------------------<  181<H>  4.0   |  40<H>  |  1.17    Ca    9.5      09 Jun 2022 05:30  Phos  4.2     06-09  Mg     2.5     06-09      Serum Pro-Brain Natriuretic Peptide: 5655 pg/mL (06.03.22 @ 16:18)  Lipid Profile (06.04.22 @ 06:35)    Cholesterol, Serum: 86 mg/dL    Triglycerides, Serum: 50 mg/dL    HDL Cholesterol, Serum: 48 mg/dL    Non HDL Cholesterol: 38 mg/dL    LDL Cholesterol Calculated: 28 mg/dL    HgA1c: 6.9% (06.04.22 @ 06:35)  TSH:

## 2022-06-10 NOTE — PROGRESS NOTE ADULT - PROBLEM SELECTOR PLAN 4
Patient made himself a DNR/DNI with no feeding tube. Plan to discuss [facility] with daughters when present at bedside.   - Congregation/Spiritual practice:  [employer]levism    - Coping: [ x] well [ ] with difficulty [ ] poor coping   - Support system: [ x] strong [ ] adequate [ ] inadequate  - All questions answered, emotional support provided  -  primary team   - Please contact Palliative Medicine 24/7 at [phone] for any acute symptoms or further questions  - Will continue to follow with you Patient made himself a DNR/DNI with no feeding tube. Plan to discuss [facility] with daughters when present at bedside. Palliative Social spoke to daughter and plan to submit Application to [facility].   - Protestant/Spiritual practice:  [employer]    - Coping: [ x] well [ ] with difficulty [ ] poor coping   - Support system: [ x] strong [ ] adequate [ ] inadequate  - All questions answered, emotional support provided  -  primary team   - Please contact Palliative Medicine 24/7 at [phone] for any acute symptoms or further questions  - Will continue to follow with you

## 2022-06-11 NOTE — PROGRESS NOTE ADULT - PROBLEM SELECTOR PLAN 5
Palliative team following   -DNR/DNI, MOLST in chart.   - no tubes, Limited medical interventions, Send to hospital as needed, trial of IV fluids and a use antibiotics if an infection occurs  -pt and daughter want to continue tele, Qshift vitals and every other day labs     F: comfort  E: Replete electrolytes as needed for K<4 and Mg<2  N: DASH Diet    DVT PPX: Eliquis 10 mg BID (initial tx dose for DVTs)  Dispo: pending discussion w/ family re: [facility]

## 2022-06-11 NOTE — PROGRESS NOTE ADULT - SUBJECTIVE AND OBJECTIVE BOX
Interventional Cardiology PA Adult Progress Note    Subjective Assessment: Patient seen and examined at bedside, resting comfortably w/o complaints  ROS negative except per HPI and subjective  	  MEDICATIONS:  metoprolol succinate ER 25 milliGRAM(s) Oral every 24 hours  spironolactone 25 milliGRAM(s) Oral every 24 hours  torsemide 40 milliGRAM(s) Oral daily  ALBUTerol    90 MICROgram(s) HFA Inhaler 2 Puff(s) Inhalation every 6 hours PRN  budesonide 160 MICROgram(s)/formoterol 4.5 MICROgram(s) Inhaler 2 Puff(s) Inhalation two times a day  montelukast 10 milliGRAM(s) Oral every 24 hours  polyethylene glycol 3350 17 Gram(s) Oral two times a day  senna 2 Tablet(s) Oral at bedtime  atorvastatin 10 milliGRAM(s) Oral at bedtime  finasteride 5 milliGRAM(s) Oral at bedtime  glucagon  Injectable 1 milliGRAM(s) IntraMuscular once  insulin lispro (ADMELOG) corrective regimen sliding scale   SubCutaneous Before meals and at bedtime  apixaban 10 milliGRAM(s) Oral every 12 hours  aspirin  chewable 81 milliGRAM(s) Oral every 24 hours  dextrose 5%. 1000 milliLiter(s) IV Continuous <Continuous>  dextrose 5%. 1000 milliLiter(s) IV Continuous <Continuous>	    [PHYSICAL EXAM:  TELEMETRY:  T(C): 36.2 (06-11-22 @ 09:35), Max: 36.3 (06-10-22 @ 22:03)  HR: 60 (06-11-22 @ 08:44) (59 - 62)  BP: 116/58 (06-11-22 @ 08:44) (106/54 - 132/64)  RR: 18 (06-11-22 @ 08:44) (16 - 19)  SpO2: 100% (06-11-22 @ 08:44) (94% - 100%)    I&O's Summary    10 Silvestre 2022 07:01  -  11 Jun 2022 07:00  --------------------------------------------------------  IN: 660 mL / OUT: 1900 mL / NET: -1240 mL    11 Jun 2022 07:01  -  11 Jun 2022 10:04  --------------------------------------------------------  IN: 180 mL / OUT: 0 mL / NET: 180 mL        Stoll: Yes                                     Appearance: Normal	  HEENT:   Normal oral mucosa, PERRL, EOMI	  Neck: Supple, + JVD  Cardiovascular: Normal S1 S2, No JVD, No murmurs,   Respiratory: decreased breath sounds, poor inspiratory effort  Gastrointestinal:  Soft, Non-tender, + BS	  Skin: No rashes, No ecchymoses, No cyanosis  Extremities: Normal range of motion, No clubbing, cyanosis or 1+ edema  Vascular: Peripheral pulses palpable 2+ bilaterally  Neurologic: Non-focal  Psychiatry: resting        LABS:	 	  CARDIAC MARKERS:                                  9.1    8.73  )-----------( 158      ( 11 Jun 2022 05:30 )             28.9     06-11    139  |  92<L>  |  41<H>  ----------------------------<  141<H>  4.3   |  38<H>  |  1.20    Ca    8.8      11 Jun 2022 05:30  Mg     2.3     06-11      proBNP:   Lipid Profile:   HgA1c:   TSH:       ASSESSMENT/PLAN: 	        DVT ppx:  Dispo:

## 2022-06-11 NOTE — PROGRESS NOTE ADULT - PROBLEM SELECTOR PLAN 4
-US 6/5/22: New b/l LE DVTs   -Eliquis 10 mg BID (initial tx dose 6/5-6/12 in AM), Eliquis 5mg BID (starting 6/12 PM, ordered) Palliative team following   -DNR/DNI, MOLST in chart.   - no tubes, Limited medical interventions, Send to hospital as needed, trial of IV fluids and a use antibiotics if an infection occurs  -pt and daughter want to continue tele, Qshift vitals and every other day labs     F: comfort  E: Replete electrolytes as needed for K<4 and Mg<2  N: DASH Diet    DVT PPX: Eliquis 10 mg BID (initial tx dose for DVTs)  Dispo: Family considering [facility], Application sent and will meet w/ patient and family on Monday 6/13.

## 2022-06-11 NOTE — PROGRESS NOTE ADULT - PROBLEM SELECTOR PLAN 3
Sepsis 2/2 aspiration PNA and esophageal dysmotility. T max 101.5 with WBC 13.04 6/5. MAPs 50s-60s.   -UA negative, RLL consolidation on CXR. MRSA negative. ESR, CRP, and procal elevated. Also with RUQ pain, now resolved.  -s/p Zosyn and de-escalated to CTX 2g q24hr x 5days (last dose 6/9)  -RUQ US negative 6/5  -BCx 6/5: NGTD -US 6/5/22: New b/l LE DVTs   -Eliquis 10 mg BID (initial tx dose 6/5-6/12 in AM), Eliquis 5mg BID (starting 6/12 PM, ordered)

## 2022-06-11 NOTE — PROGRESS NOTE ADULT - ASSESSMENT
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 Brown), pHTN (sees Dr Tuttle), HFpEF (EF 60% echo 2020), who presented from rehab 6/3 c/o SOB, hypoxia, swelling of arms legs and scrotum, and 1 week of intermittent hemoptysis. Admitted to CCU for acute on chronic HFpEF exacerbation, on BiPAP and Bumex gtt. Diamox PRN. Course c/b fever 6/5, MAPs in 50s, s/p levophed, s/p Bumex gtt. s/p Zosyn (MRSA negative) and de-escalated to CTX (last day 6/9 PM). Likely aspiration PNA. Weaned to HFNC 6/5 and currently on 3L NC 6/8 with BiPAP at night. Further c/b b/l DVTs on 6/5, started on Eliquis (initial treatment dose 10 mg BID until 6/12 am). Barium swallow 6/7 with esophageal dysmotility. NGT placed 6/7, tube feeds started 6/8 now d/c'd as patient/family opted for comfort feeds, made DNR/DNI (MOLST in chart), NGT removed. Medicine transfer rejected and stepped down to cardiac tele 6/9/22 for disposition planning for [facility].

## 2022-06-11 NOTE — PROGRESS NOTE ADULT - PROBLEM SELECTOR PLAN 1
HFpEF (60% by ECHO 2020), on admission BNP 5655, crackles, CXR with congestion, s/p Bipap and HFNC  -Currently satting 93 % on 3L NC, bipap at night   -Home meds: toprol 25 mg QD, torsemide 20 mg QD  -s/p bumex gtt and transitioned to Bumex 1 mg IV BID 6/9/22 (last dose 6/10 PM). Will be transitioned to Torsemide 40mg qD starting 6/11/22.  -home Toprol held 2/2 dysphagia and hypotension, restarted home Toprol 25mg QD 6/10/22  -limited TTE 6/3: LV function grossly nl, dilated RV with reduced function, mild-mod TR, PASP 57mmHg, trivial pericardial effusion.  -TTE 6/6: EF 55-60%, mod LVH, dilated RV with reduced function, slightly dilated RA, mild AR, mild-mod TR, severe  pHTN (PASP 76mmHg), trivial pericardial effusion. HFpEF (60% by ECHO 2020), on admission BNP 5655, CXR with congestion, s/p Bipap and HFNC  -Currently satting 96 % on 3L NC, bipap at night   -Home meds: toprol 25 mg QD, torsemide 20 mg QD  -s/p bumex gtt and IV bumex - now transitioned to Torsemide 40mg QD starting 6/11/22.  -c/w Toprol 25mg QD and Spironolactone 25mg QD  -TTE 6/6: EF 55-60%, mod LVH, dilated RV with reduced function, slightly dilated RA, mild AR, mild-mod TR, severe  pHTN (PASP 76mmHg), trivial pericardial effusion.  - Dodie remains in placed - consider TOV

## 2022-06-11 NOTE — PROGRESS NOTE ADULT - PROBLEM SELECTOR PLAN 2
Hypercapnic/hypoxic respiratory failure in setting of acute on chronic HFpEF exacerbation and aspiration PNA  - satting 93% on 3L NC, BiPAP qhs, encourage CPAP qhs on dc  -c/w symbicort, duonebs PRN, montelukast 10 mg QD  -FEES 6/6 w/o dysphagia but concern for regurgitation  -barium swallow 6/7 w/  achalasia vs pseudoachalasia and esophageal dysmotility  -NGT placed 6/7, s/p feeds with Jevity 1.2 and d/c 6/9 as family/patient opted for comfort feeds presented w/ Hypercapnic/hypoxic respiratory failure in setting of acute on chronic HFpEF exacerbation and aspiration PNA.  Dx Sepsis on admit 2/2 aspiration PNA and esophogeal motility.  Sepsis now resolved.   -Leukocytosis resolved, remains afebrile.   -UA negative, RLL consolidation on CXR. MRSA negative. ESR, CRP, and procal elevated. Also with RUQ pain, now resolved.  -s/p Zosyn and de-escalated to CTX 2g q24hr x 5days (last dose 6/9)  -c/w symbicort, duonebs PRN, montelukast 10 mg QD  -RUQ US negative 6/5  -BCx 6/5: NGTD    **Dysphagia  -FEES 6/6 w/o dysphagia but concern for regurgitation  -barium swallow 6/7 w/  achalasia vs pseudoachalasia and esophageal dysmotility  -NGT placed 6/7, s/p feeds with Jevity 1.2 and d/c 6/9 as family/patient opted for comfort feeds and NGT since removed.

## 2022-06-12 NOTE — PROGRESS NOTE ADULT - ASSESSMENT
89 y/o male w/ PMHx TRACIE (non-compliant w/ CPAP, uses 2-4L O2 at bedtime), Asthma (no prior intubations/hospitalizations), HTN, HLD, type II DM, Complete Heart Block (s/p Medtronig PPM in 10/2018, follows w/ Dr. Brown), Pulmonary Hypertension (follows w/ Dr. Tuttle), and HF preserved EF (EF 60% by Echo 2020) who presented to [facility] ED from rehab on 06/03/2022 due to SOB, hypoxia, swelling of his arms/legs/scrotum, and one week of intermittent hemoptysis, and was subsequently admitted to CCU for acute on chronic heart failure exacerbation s/p BiPAP, Bumex gtt, and Diamox PRN. Hospital course c/b fevers on 06/05/2022 w/ MAPs in 50's, s/p Levophed gtt and Zosyn, de-escalated antibiotics to Ceftriaxone w/ last dose on 06/09/2022, likely in the setting of aspiration pneumonia and weaned oxygen, currently on 3L NC w/ BiPAP at night. Hospital course further complicated by bilateral DVT's found on 06/05/2022, placed on Eliquis 10 mg BID until 06/12/2022 AM, then transitioned to Eliquis 5 mg BID. Hosptial course further complicated by barium swallow on 06/07/2022 showed esophageal dysmotility, NGT placed 06/07/2022 and tube feeds started on 06/08/2022, now discontinued as patient and family opted for comfort feeds, NGT removed, and patient was made DNR/DNI, and stepped down to cardiology/telemetry on 06/09/2022 for dispo planning for [facility].

## 2022-06-12 NOTE — PROGRESS NOTE ADULT - PROBLEM SELECTOR PLAN 4
Palliative team consulted and following   - previously NGT tube placed and tube feedings initiated, now discontinued and NGT removed   - patient made DNR/DNI, MOLST placed in chart   - patient wishes includ no tubes, limited medical interventions, send to hospital as needed, trial of IV fluids, and use antibiotics if infection occurs   - at this time patient and patient's daughter wish to continue telemetry monitoring w/ q-shift vital signs and every other day labs (next lab date 06/13/2022)  - Application sent to [facility], pending family meeting Monday (06/13/2022)    F: Comfort  E: Replete as needed (K<4, Mag <2)  N: DASH   VTE PPX: Eliquis   Dispo: probable [place] Palliative team consulted and following   - previously NGT tube placed and tube feedings initiated, now discontinued and NGT removed   - patient made DNR/DNI, MOLST placed in chart   - patient wishes includ no tubes, limited medical interventions, send to hospital as needed, trial of IV fluids, and use antibiotics if infection occurs   - at this time patient and patient's daughter wish to continue telemetry monitoring w/ q-shift vital signs and every other day labs (next lab date 06/13/2022)  - Application sent to [facility], pending family meeting Monday (06/13/2022)    F: Comfort  E: Replete as needed (K<4, Mag <2)  N: DASH   VTE PPX: Eliquis   Dispo: probable d/c to [facility] (pending family meeting Monday, application sent)

## 2022-06-12 NOTE — DISCHARGE NOTE PROVIDER - DETAILS OF MALNUTRITION DIAGNOSIS/DIAGNOSES
This patient has been assessed with a concern for Malnutrition and was treated during this hospitalization for the following Nutrition diagnosis/diagnoses:     -  06/04/2022: Morbid obesity (BMI > 40)

## 2022-06-12 NOTE — DISCHARGE NOTE PROVIDER - NSDCMRMEDTOKEN_GEN_ALL_CORE_FT
Advair Diskus 250 mcg-50 mcg inhalation powder: 1 puff(s) inhaled 2 times a day  Albuterol (Eqv-Proventil HFA) 90 mcg/inh inhalation aerosol: 2 puff(s) inhaled every 6 hours, As Needed  aspirin 81 mg oral delayed release tablet: 1 tab(s) orally once a day  atorvastatin 40 mg oral tablet: 1 tab(s) orally once a day   azelastine 137 mcg/inh (0.1%) nasal spray: 2 spray(s) nasal 2 times a day  finasteride 5 mg oral tablet: 1 tab(s) orally once a day  metFORMIN 1000 mg oral tablet: 1 tab(s) orally 2 times a day  Toprol-XL 25 mg oral tablet, extended release: 1 tab(s) orally once a day  torsemide 20 mg oral tablet: 1 tab(s) orally 2 times a day    Advair Diskus 250 mcg-50 mcg inhalation powder: 1 puff(s) inhaled 2 times a day  Albuterol (Eqv-Proventil HFA) 90 mcg/inh inhalation aerosol: 2 puff(s) inhaled every 6 hours, As Needed  apixaban 5 mg oral tablet: 1 tab(s) orally every 12 hours  aspirin 81 mg oral delayed release tablet: 1 tab(s) orally once a day  atorvastatin 10 mg oral tablet: 1 tab(s) orally once a day (at bedtime)  azelastine 137 mcg/inh (0.1%) nasal spray: 2 spray(s) nasal 2 times a day  finasteride 5 mg oral tablet: 1 tab(s) orally once a day  metFORMIN 1000 mg oral tablet: 1 tab(s) orally 2 times a day  montelukast 10 mg oral tablet: 1 tab(s) orally every 24 hours  polyethylene glycol 3350 oral powder for reconstitution: 17 gram(s) orally 2 times a day  senna oral tablet: 2 tab(s) orally once a day (at bedtime)  spironolactone 25 mg oral tablet: 1 tab(s) orally every 24 hours  Toprol-XL 25 mg oral tablet, extended release: 1 tab(s) orally once a day  torsemide 40 mg oral tablet: 1 tab(s) orally once a day

## 2022-06-12 NOTE — DISCHARGE NOTE PROVIDER - HOSPITAL COURSE
INCOMPLETE     89 y/o male w/ PMHx TRACIE (non-compliant w/ CPAP, uses 2-4L O2 at bedtime), Asthma (no prior intubations/hospitalizations), HTN, HLD, type II DM, Complete Heart Block (s/p Medtronig PPM in 10/2018, follows w/ Dr. Brown), Pulmonary Hypertension (follows w/ Dr. Tuttle), and HF preserved EF (EF 60% by Echo 2020) who presented to [facility] ED from rehab center due to SOB, hypoxia, swelling of his upper/lower extremities and scrotum, and one week of reported hemoptysis. Patient was at that time admitted to CCU for acute on chronic heart failure exacerbation requiring BiPAP and Bumex gtt as well as Diamox PRN. Patient's hospital course was complicated by fevers on 06/05/2022 w/ MAPs in the 50's, and patient required Levophed gtt as well as started on Zosyn IV for antibiotic coverage, which was eventually de-escalated to Ceftriaxone for suspected aspiration pneumonia, and patient completed their course of antibiotics on 06/09/2022. Patient has now also been weaned from oxygen and is satting well on 3L nasal cannula w/ BiPAP at night. Patient's hospital course was further complicated by new bilateral DVT's that were found on Venous Duplex on 06/05/2022 and patient was initiated on Eliquis 10 mg BID and transitioned to Eliquis 5 mg BID on 06/12/2022 AM. Patient also underwent a barium swallow on 06/07/2022 which showed esophageal dysmotility, and NG tube was placed w/ tube feeds initiated on 06/08/2022. However, patient and patient's family subsequently opted to make the patient DNR/DNI. MOLST form was filled out, placed in chart, palliative team was consulted, NG tube was removed and tube feeds were discontinued w/ initiation of comfort foods, and patient was stepped down to cardiology/telemetry on 06/09/2022. Stoll catheter was removed on 06/11/2022 and patient passed trial of void. Patient's application for [facility] was submitted and ______________.     Patient was seen and examined at bedside on _______ and __________. Labs were reviewed. No significant events were noted on telemetry monitor. Patient has been medically cleared for discharge as per  _________. Patient was provided w/ appropriate discharge instructions including medication regimen and follow up. Any prescriptions the patient requires refills on upon discharge have been e-prescribed to patient's preferred pharmacy.    VS Stable  Gen: NAD, A&O x3  Cards: RRR, clear S1 and S2 without murmur  Pulm: CTA B/L without w/r/r  Vascukar: Peripheral pulses 2+ B/L  Abd: soft, NT  Ext: no LE edema or ulcerations B/L 89 y/o male w/ PMHx TRACIE (non-compliant w/ CPAP, uses 2-4L O2 at bedtime), Asthma (no prior intubations/hospitalizations), HTN, HLD, type II DM, Complete Heart Block (s/p Medtronig PPM in 10/2018, follows w/ Dr. Brown), Pulmonary Hypertension (follows w/ Dr. Tuttle), and HF preserved EF (EF 60% by Echo 2020) who presented to [facility] ED from rehab center due to SOB, hypoxia, swelling of his upper/lower extremities and scrotum, and one week of reported hemoptysis. Patient was at that time admitted to CCU for acute on chronic heart failure exacerbation requiring BiPAP and Bumex gtt as well as Diamox PRN. Patient's hospital course was complicated by fevers on 06/05/2022 w/ MAPs in the 50's, and patient required Levophed gtt as well as started on Zosyn IV for antibiotic coverage, which was eventually de-escalated to Ceftriaxone for suspected aspiration pneumonia, and patient completed their course of antibiotics on 06/09/2022. Patient has now also been weaned from oxygen and is satting well on 3L nasal cannula w/ BiPAP at night. Patient's hospital course was further complicated by new bilateral DVT's that were found on Venous Duplex on 06/05/2022 and patient was initiated on Eliquis 10 mg BID and transitioned to Eliquis 5 mg BID on 06/12/2022 AM. Patient also underwent a barium swallow on 06/07/2022 which showed esophageal dysmotility, and NG tube was placed w/ tube feeds initiated on 06/08/2022. However, patient and patient's family subsequently opted to make the patient DNR/DNI. MOLST form was filled out, placed in chart, palliative team was consulted, NG tube was removed and tube feeds were discontinued w/ initiation of comfort foods, and patient was stepped down to cardiology/telemetry on 06/09/2022. Stoll catheter was removed on 06/11/2022 and patient passed trial of void. Patient's application for [facility] was submitted and ______________.     Patient was seen and examined at bedside on _______ and __________. Labs were reviewed. No significant events were noted on telemetry monitor. Patient has been medically cleared for discharge as per  _________. Patient was provided w/ appropriate discharge instructions including medication regimen and follow up. Any prescriptions the patient requires refills on upon discharge have been e-prescribed to patient's preferred pharmacy.         89 y/o male w/ PMHx TRACIE (non-compliant w/ CPAP, uses 2-4L O2 at bedtime), Asthma (no prior intubations/hospitalizations), HTN, HLD, type II DM, Complete Heart Block (s/p Medtronig PPM in 10/2018, follows w/ Dr. Brown), Pulmonary Hypertension (follows w/ Dr. Tuttle), and HF preserved EF (EF 55-60% by Echo6/2022), who presented to [facility] ED from rehab center due to SOB, hypoxia, swelling of his upper/lower extremities and scrotum, and one week of reported hemoptysis. Admitted to CCU for acute on chronic HFpEF exacerbation requiring BiPAP and diuresis w/ Bumex gtt as well as Diamox PRN. Hospital course was complicated by fevers on 06/05/2022 w/ MAPs in the 50's, and patient required Levophed gtt as well as started on Zosyn IV for antibiotic coverage, which was eventually de-escalated to Ceftriaxone for suspected aspiration pneumonia, and patient completed their course of antibiotics on 06/09/2022. Patient returned to baseline of O2 on 3L nasal cannula w/ BiPAP at night. Patient's hospital course was further complicated by new bilateral DVT's that were found on LE Venous Duplex on 06/05/2022 and patient was initiated on Eliquis 10 mg BID and transitioned to Eliquis 5 mg BID on 06/12/2022. Patient also underwent a barium swallow on 06/07/2022 which showed esophageal dysmotility, and NG tube was placed w/ tube feeds initiated on 06/08/2022. However, patient and patient's family subsequently opted to make the patient DNR/DNI. MOLST form was filled out, placed in chart, Palliative Care team was consulted. NG tube was removed and tube feeds were discontinued w/ initiation of comfort foods. Patient was stepped down to cardiac telemetry on 06/09/2022. Stoll catheter was removed on 06/11/2022 and patient passed trial of void. Pt and family wish to go to [facility] after hospital discharge, application accepted with bed available.    Patient was seen and examined at bedside on day of discharge. Labs were reviewed. No significant events were noted on telemetry monitor. Patient has been medically cleared for discharge as per Dr. You. Patient was provided w/ appropriate discharge instructions including medication regimen and follow up.

## 2022-06-12 NOTE — DISCHARGE NOTE PROVIDER - NSDCCPCAREPLAN_GEN_ALL_CORE_FT
PRINCIPAL DISCHARGE DIAGNOSIS  Diagnosis: Acute on chronic diastolic congestive heart failure  Assessment and Plan of Treatment: You were admitted to the hospital for shortness of breath. An echocardiogram or ultrasound of your heart was performed which showed an ejection fraction or pumping function of your heart to be 55-60% with diastolic dysfunction, which you have a known history of. Heart failure is a condition in which the heart does not pump or fill with blood well. This causes the heart to lag behind in its job of moving blood throughout the body. This can lead to symptoms such as swelling, trouble breathing, and feeling tired. You were given intravenous (through your IV) Bumex to get rid of the fluid in your lungs and to help you breathe better. Please take Torsemide 40mg daily and Spironolactone 25mg daily as prescribed without missing doses. Please maintain a low salt diet (less than 2grams per day). Weigh yourself daily and report any weight gain over 2 pounds/day to your Doctor.        SECONDARY DISCHARGE DIAGNOSES  Diagnosis: Pneumonia, aspiration  Assessment and Plan of Treatment: - When you were admitted to the hospital you were found to have pneumonia which is an infection in the lung. You completed a course of intravenous (through your IV ) antibiotics for which the infection has since resovled.    Diagnosis: Acute deep vein thrombosis (DVT)  Assessment and Plan of Treatment: - You were found to have DVTs (clots) in your lower extremities for which you were started on a blood thinner. Please continue Eliquis 5mg twice a day.     PRINCIPAL DISCHARGE DIAGNOSIS  Diagnosis: Acute on chronic diastolic congestive heart failure  Assessment and Plan of Treatment: You were admitted to the hospital for shortness of breath. An echocardiogram or ultrasound of your heart was performed which showed an ejection fraction or pumping function of your heart to be 55-60% with diastolic dysfunction, which you have a known history of. Heart failure is a condition in which the heart does not pump or fill with blood well. This causes the heart to lag behind in its job of moving blood throughout the body. This can lead to symptoms such as swelling, trouble breathing, and feeling tired. You were given intravenous (through your IV) Bumex to get rid of the fluid in your lungs and to help you breathe better. Please take water pill Torsemide 40mg daily and Spironolactone 25mg daily as prescribed without missing doses. Please maintain a low salt diet (less than 2 grams per day). Weigh yourself daily and report any weight gain over 2 pounds/day to your Doctor.        SECONDARY DISCHARGE DIAGNOSES  Diagnosis: Pneumonia, aspiration  Assessment and Plan of Treatment: When you were admitted to the hospital you were found to have pneumonia which is an infection in the lung. You completed a course of intravenous (through your IV ) antibiotics for which the infection has since resolved.    Diagnosis: Acute deep vein thrombosis (DVT)  Assessment and Plan of Treatment: You were found to have DVTs (clots) in your lower extremities for which you were started on a blood thinner. Please continue Eliquis 5mg twice a day.     PRINCIPAL DISCHARGE DIAGNOSIS  Diagnosis: Acute on chronic diastolic congestive heart failure  Assessment and Plan of Treatment: You were admitted to the hospital for shortness of breath. An echocardiogram or ultrasound of your heart was performed which showed an ejection fraction or pumping function of your heart to be 55-60% with diastolic dysfunction, which you have a known history of. Heart failure is a condition in which the heart does not pump or fill with blood well. This causes the heart to lag behind in its job of moving blood throughout the body. This can lead to symptoms such as swelling, trouble breathing, and feeling tired. You were given intravenous (through your IV) Bumex to get rid of the fluid in your lungs and to help you breathe better. Please take water pill Torsemide 40mg daily and Spironolactone 25mg daily as prescribed without missing doses. Please maintain a low salt diet (less than 2 grams per day). Weigh yourself daily and report any weight gain over 2 pounds/day to your Doctor.        SECONDARY DISCHARGE DIAGNOSES  Diagnosis: Pneumonia, aspiration  Assessment and Plan of Treatment: When you were admitted to the hospital you were found to have pneumonia which is an infection in the lung. You completed a course of intravenous (through your IV ) antibiotics for which the infection has since resolved.    Diagnosis: Acute deep vein thrombosis (DVT)  Assessment and Plan of Treatment: You were found to have DVTs (blood clots) in your legs for which you were started on a blood thinner. Please continue Eliquis 5mg twice a day.

## 2022-06-12 NOTE — PROGRESS NOTE ADULT - PROBLEM SELECTOR PLAN 2
RESOLVED; Presented w/ SOB and hypoxia, likely multifactorial w/ CHF (see above) and aspiration pneumonia   - Barrium swallow on 06/07/2022 showed esophageal dysmotility  - Blood Cultures from 06/05/2022 no growth to date   - RUQ US negative on 06/05/2022  - UA negative  - CXR w/ RLL consolidation  - s/p Zosyn, de-escalated to Ceftriaxone, completed antibiotic treatment 06/09/2022   - remains afebrile and no current elevated WBC   - continue Symbicort 160 mcg-4.5 mcg BID, Montelukast 10 mg daily, and Duonebs PRN    **Dysphagia  -FEES 6/6 w/o dysphagia but concern for regurgitation  -barium swallow 6/7 w/  achalasia vs pseudoachalasia and esophageal dysmotility  -NGT placed 6/7, s/p feeds with Jevity 1.2 and d/c 6/9 as family/patient opted for comfort feeds and NGT since removed. RESOLVED; Presented w/ SOB and hypoxia, likely multifactorial w/ CHF (see above) and aspiration pneumonia   - Barrium swallow on 06/07/2022 showed esophageal dysmotility  - Blood Cultures from 06/05/2022 no growth to date   - RUQ US negative on 06/05/2022  - UA negative  - CXR w/ RLL consolidation  - s/p Zosyn, de-escalated to Ceftriaxone, completed antibiotic treatment 06/09/2022   - remains afebrile and no current elevated WBC   - continue Symbicort 160 mcg-4.5 mcg BID, Montelukast 10 mg daily, and Duonebs PRN

## 2022-06-12 NOTE — PROGRESS NOTE ADULT - SUBJECTIVE AND OBJECTIVE BOX
Interventional Cardiology PA Adult Progress Note  INCOMPLETE   C.C.: SOB, Hypoxia    Subjective Assessment: Patient seen and examined at bedside, _____________.     ROS Negative except as per Subjective and HPI  	  MEDICATIONS:  metoprolol succinate ER 25 milliGRAM(s) Oral every 24 hours  spironolactone 25 milliGRAM(s) Oral every 24 hours  torsemide 40 milliGRAM(s) Oral daily  ALBUTerol    90 MICROgram(s) HFA Inhaler 2 Puff(s) Inhalation every 6 hours PRN  budesonide 160 MICROgram(s)/formoterol 4.5 MICROgram(s) Inhaler 2 Puff(s) Inhalation two times a day  montelukast 10 milliGRAM(s) Oral every 24 hours  polyethylene glycol 3350 17 Gram(s) Oral two times a day  senna 2 Tablet(s) Oral at bedtime  atorvastatin 10 milliGRAM(s) Oral at bedtime  dextrose 50% Injectable 25 Gram(s) IV Push once  dextrose 50% Injectable 12.5 Gram(s) IV Push once  dextrose 50% Injectable 25 Gram(s) IV Push once  dextrose Oral Gel 15 Gram(s) Oral once PRN  finasteride 5 milliGRAM(s) Oral at bedtime  glucagon  Injectable 1 milliGRAM(s) IntraMuscular once  insulin lispro (ADMELOG) corrective regimen sliding scale   SubCutaneous Before meals and at bedtime  apixaban 5 milliGRAM(s) Oral every 12 hours  aspirin  chewable 81 milliGRAM(s) Oral every 24 hours  dextrose 5%. 1000 milliLiter(s) IV Continuous <Continuous>  dextrose 5%. 1000 milliLiter(s) IV Continuous <Continuous>      PHYSICAL EXAM:  TELEMETRY: No events.   T(C): 36.2 (06-12-22 @ 07:06), Max: 36.6 (06-11-22 @ 18:49)  HR: 64 (06-12-22 @ 06:25) (60 - 64)  BP: 111/59 (06-12-22 @ 05:30) (111/59 - 137/65)  RR: 18 (06-12-22 @ 06:25) (17 - 18)  SpO2: 95% (06-12-22 @ 06:25) (94% - 100%)    I&O's Summary    11 Jun 2022 07:01  -  12 Jun 2022 07:00  --------------------------------------------------------  IN: 900 mL / OUT: 850 mL / NET: 50 mL                                     Appearance: Normal	  HEENT:   Normal oral mucosa, PERRL, EOMI	  Neck: Supple, + JVD/ - JVD; Carotid Bruit   Cardiovascular: Normal S1 S2, No JVD, No murmurs,   Respiratory: Lungs clear to auscultation/Decreased Breath Sounds/No Rales, Rhonchi, Wheezing	  Gastrointestinal:  Soft, Non-tender, + BS	  Skin: No rashes, No ecchymoses, No cyanosis  Extremities: Normal range of motion, No clubbing, cyanosis or edema  Vascular: Peripheral pulses palpable 2+ bilaterally  Neurologic: Non-focal  Psychiatry: A & O x 3, Mood & affect appropriate    	    ECG:  	  RADIOLOGY:   DIAGNOSTIC TESTING:  [ ] Echocardiogram:  [ ]  Catheterization:  [ ] Stress Test:    [ ] KOREY  	    LABS: NO LABS ON 06/12/2022, NEXT LAB DRAW ON 06/13/2022.      Interventional Cardiology PA Adult Progress Note    C.C.: SOB, Hypoxia    Subjective Assessment: Patient seen and examined at bedside, denied any complaints, and was in good spirits laying comfortably in bed w/o signs of acute distress. Patient reports only needing some sleep.     ROS Negative except as per Subjective and HPI  	  MEDICATIONS:  metoprolol succinate ER 25 milliGRAM(s) Oral every 24 hours  spironolactone 25 milliGRAM(s) Oral every 24 hours  torsemide 40 milliGRAM(s) Oral daily  ALBUTerol    90 MICROgram(s) HFA Inhaler 2 Puff(s) Inhalation every 6 hours PRN  budesonide 160 MICROgram(s)/formoterol 4.5 MICROgram(s) Inhaler 2 Puff(s) Inhalation two times a day  montelukast 10 milliGRAM(s) Oral every 24 hours  polyethylene glycol 3350 17 Gram(s) Oral two times a day  senna 2 Tablet(s) Oral at bedtime  atorvastatin 10 milliGRAM(s) Oral at bedtime  dextrose 50% Injectable 25 Gram(s) IV Push once  dextrose 50% Injectable 12.5 Gram(s) IV Push once  dextrose 50% Injectable 25 Gram(s) IV Push once  dextrose Oral Gel 15 Gram(s) Oral once PRN  finasteride 5 milliGRAM(s) Oral at bedtime  glucagon  Injectable 1 milliGRAM(s) IntraMuscular once  insulin lispro (ADMELOG) corrective regimen sliding scale   SubCutaneous Before meals and at bedtime  apixaban 5 milliGRAM(s) Oral every 12 hours  aspirin  chewable 81 milliGRAM(s) Oral every 24 hours  dextrose 5%. 1000 milliLiter(s) IV Continuous <Continuous>  dextrose 5%. 1000 milliLiter(s) IV Continuous <Continuous>      PHYSICAL EXAM:  TELEMETRY: No events.   T(C): 36.2 (06-12-22 @ 07:06), Max: 36.6 (06-11-22 @ 18:49)  HR: 64 (06-12-22 @ 06:25) (60 - 64)  BP: 111/59 (06-12-22 @ 05:30) (111/59 - 137/65)  RR: 18 (06-12-22 @ 06:25) (17 - 18)  SpO2: 95% (06-12-22 @ 06:25) (94% - 100%)    I&O's Summary    11 Jun 2022 07:01  -  12 Jun 2022 07:00  --------------------------------------------------------  IN: 900 mL / OUT: 850 mL / NET: 50 mL                                     Appearance: NAD   HEENT: Normal oral mucosa, PERRL, EOMI	  Neck: Supple, - JVD  Cardiovascular: Normal S1/S2, No JVD, No murmurs,   Respiratory: Poor inspiratory effort, mildly diminished breath sounds  Gastrointestinal: Soft, Non-tender  Skin: No rashes, No ecchymoses, No cyanosis  Extremities: Normal range of motion, No clubbing, cyanosis, 1+ pitting edema bilateral   Vascular: Peripheral pulses palpable 2+ bilaterally  Neurologic: Non-focal  Psychiatry: A & O x 3, Mood & affect appropriate    	    ECG:  	  RADIOLOGY:   DIAGNOSTIC TESTING:  [ ] Echocardiogram:  [ ]  Catheterization:  [ ] Stress Test:    [ ] KOREY  	    LABS: NO LABS ON 06/12/2022, NEXT LAB DRAW ON 06/13/2022.

## 2022-06-12 NOTE — PROGRESS NOTE ADULT - PROBLEM SELECTOR PLAN 3
Endorsed swelling of legs on admission   - LE Duplex on 06/05/2022 showed new bilateral DVT's   - initiated on Eliquis 10 mg BID until 06/12/2022 AM, now transitioned to Eliquis 5 mg BID

## 2022-06-12 NOTE — PROGRESS NOTE ADULT - PROBLEM SELECTOR PLAN 1
Presented for SOB and hypoxia, BNP 5K, CXR w/ congestion, admitted to CCU initially w/ BiPAP and Bumex gtt   - Echo (06/06/2022) showed EF 55-60%, moderate LV hypertrophy, dilated RV w/ reduced function, slightly dilated RA, mild AR, mild-moderate TR, severe pulmonary hypertension (PASP 76 mmHg), and trivial pericardial effusion   - currently satting 96% on 4L NC w/ BiPAP at night   - s/p Bumex gtt, now transitioned to Torsemide 40 mg daily   - continue home Toprol XL 25 mg daily   - initiated on Spironolactone 25 mg daily   - s/p ABDON castro passed 06/11/2022

## 2022-06-12 NOTE — DISCHARGE NOTE PROVIDER - PROVIDER TOKENS
PROVIDER:[TOKEN:[[id number]],FOLLOWUP:[1 week]],PROVIDER:[TOKEN:[[id number]],FOLLOWUP:[Routine]] PROVIDER:[TOKEN:[[id number]],SCHEDULEDAPPT:[06/28/2022],SCHEDULEDAPPTTIME:[04:30 PM],ESTABLISHEDPATIENT:[T]],PROVIDER:[TOKEN:[[id number]],FOLLOWUP:[Routine]]

## 2022-06-12 NOTE — DISCHARGE NOTE PROVIDER - CARE PROVIDER_API CALL
Bull Tuttle)  Internal Medicine  [address] [place] [address], [address] [place] [address]  Phone: [phone]  Fax: [phone]  Follow Up Time: 1 week    Elin Brown)  Cardiac Electrophysiology; Cardiovascular Disease; Internal Medicine  [address] [place] [address]  Phone: [phone]  Fax: [phone]  Follow Up Time: Routine   Bull Tuttle)  Internal Medicine  [address], [address] [place] [address]  Phone: [phone]  Fax: [phone]  Established Patient  Scheduled Appointment: 06/28/2022 04:30 PM    Elin Brown)  Cardiac Electrophysiology; Cardiovascular Disease; Internal Medicine  [address] [place] [address]  Phone: [phone]  Fax: [phone]  Follow Up Time: Routine

## 2022-06-13 NOTE — PROGRESS NOTE ADULT - PROBLEM SELECTOR PLAN 4
Patient made himself a DNR/DNI with no feeding tube. Daughters are waiting for patient to continue to be evaluated by PT prior to making any decisions about [facility] versus [facility]. Daughters to go visit [facility] before making decision to go there.   - [employer]ist/Spiritual practice:  [employer]    - Coping: [ x] well [ ] with difficulty [ ] poor coping   - Support system: [ x] strong [ ] adequate [ ] inadequate  - All questions answered, emotional support provided  -  primary team   - Please contact Palliative Medicine 24/7 at [phone] for any acute symptoms or further questions  - Will continue to follow with you

## 2022-06-13 NOTE — PROGRESS NOTE ADULT - PROBLEM SELECTOR PLAN 1
P/W SOB and hypoxia. BNP 5K  - ECHO 6/6: EF 55-60%, moderate LV hypertrophy, dilated RV w/ reduced function, slightly dilated RA, mild AR, mild-moderate TR, severe pulmonary hypertension (PASP 76 mmHg), and trivial pericardial effusion   - s/p IV Bumex gtt. c/w Torsemide 40mg   - c/w Metoprolol Succinate 25mg and Spironolactone 25mg    - core measures. Strict I/Os, daily weights

## 2022-06-13 NOTE — PROGRESS NOTE ADULT - SUBJECTIVE AND OBJECTIVE BOX
CARDIOLOGY NP PROGRESS NOTE    Subjective:  Received pt awake in bed eating breakfast in NAD. Denies CP/SOB, dizziness/diaphoresis, n/v, palpitations.  Remainder ROS otherwise negative.    Overnight Events:  No acute events overnight     TELEMETRY: SB-SR HR 50s-60s           VITAL SIGNS:  T(C): 36.1 (06-13-22 @ 13:53), Max: 36.7 (06-13-22 @ 10:36)  HR: 72 (06-13-22 @ 11:37) (56 - 72)  BP: 118/57 (06-13-22 @ 11:37) (101/51 - 134/65)  RR: 18 (06-13-22 @ 11:37) (16 - 19)  SpO2: 95% (06-13-22 @ 11:37) (93% - 99%)  Wt(kg): --    I&O's Summary    12 Jun 2022 07:01  -  13 Jun 2022 07:00  --------------------------------------------------------  IN: 540 mL / OUT: 1150 mL / NET: -610 mL    13 Jun 2022 07:01  -  13 Jun 2022 14:37  --------------------------------------------------------  IN: 240 mL / OUT: 0 mL / NET: 240 mL          PHYSICAL EXAM:    General: A/ox 3, No acute Distress  Neck: Supple, NO JVD  Cardiac: S1 S2, No M/R/G  Pulmonary: CTAB, Breathing unlabored, No Rhonchi/Rales/Wheezing  Abdomen: Soft, Non -tender, +BS x 4 quads  Extremities: No Rashes, No edema  Neuro: A/o x 3, No focal deficits          LABS:                          9.5    8.39  )-----------( 197      ( 13 Jun 2022 08:04 )             29.7                              06-13    136  |  91<L>  |  40<H>  ----------------------------<  131<H>  4.2   |  38<H>  |  1.10    Ca    9.3      13 Jun 2022 08:04  Mg     2.2     06-13                                CAPILLARY BLOOD GLUCOSE      POCT Blood Glucose.: 181 mg/dL (13 Jun 2022 11:15)  POCT Blood Glucose.: 145 mg/dL (13 Jun 2022 06:45)  POCT Blood Glucose.: 145 mg/dL (13 Jun 2022 06:45)  POCT Blood Glucose.: 227 mg/dL (12 Jun 2022 21:17)  POCT Blood Glucose.: 163 mg/dL (12 Jun 2022 16:56)            Allergies:  lisinopril (Angioedema)    MEDICATIONS  (STANDING):  apixaban 5 milliGRAM(s) Oral every 12 hours  aspirin  chewable 81 milliGRAM(s) Oral every 24 hours  atorvastatin 10 milliGRAM(s) Oral at bedtime  budesonide 160 MICROgram(s)/formoterol 4.5 MICROgram(s) Inhaler 2 Puff(s) Inhalation two times a day  dextrose 5%. 1000 milliLiter(s) (50 mL/Hr) IV Continuous <Continuous>  dextrose 5%. 1000 milliLiter(s) (100 mL/Hr) IV Continuous <Continuous>  dextrose 50% Injectable 25 Gram(s) IV Push once  dextrose 50% Injectable 12.5 Gram(s) IV Push once  dextrose 50% Injectable 25 Gram(s) IV Push once  finasteride 5 milliGRAM(s) Oral at bedtime  glucagon  Injectable 1 milliGRAM(s) IntraMuscular once  insulin lispro (ADMELOG) corrective regimen sliding scale   SubCutaneous Before meals and at bedtime  metoprolol succinate ER 25 milliGRAM(s) Oral every 24 hours  montelukast 10 milliGRAM(s) Oral every 24 hours  polyethylene glycol 3350 17 Gram(s) Oral two times a day  senna 2 Tablet(s) Oral at bedtime  spironolactone 25 milliGRAM(s) Oral every 24 hours  torsemide 40 milliGRAM(s) Oral daily    MEDICATIONS  (PRN):  ALBUTerol    90 MICROgram(s) HFA Inhaler 2 Puff(s) Inhalation every 6 hours PRN Shortness of Breath and/or Wheezing  dextrose Oral Gel 15 Gram(s) Oral once PRN Blood Glucose LESS THAN 70 milliGRAM(s)/deciliter        DIAGNOSTIC TESTS:

## 2022-06-13 NOTE — PROGRESS NOTE ADULT - PROBLEM SELECTOR PLAN 3
On admission, VANESSA LE edema   - LE Duplex 06/05/2022: new bilateral DVT's   - initiated on Eliquis 10 mg BID until 06/12/2022 AM, now transitioned to Eliquis 5 mg BID

## 2022-06-13 NOTE — PROGRESS NOTE ADULT - PROBLEM SELECTOR PLAN 2
RESOLVED; CXR w/ RLL consolidation  - remains afebrile without leukocytosis and non-toxic appearing   - Barium swallow 06/07/2022: Esophageal dysmotility  - Blood Cultures 6/5/22 NGTD   - RUQ US negative on 06/05/2022  - UA negative  - s/p IV abx (end date 6/9/22)   - s/p Zosyn, de-escalated to Ceftriaxone, completed antibiotic treatment 06/09/2022   - continue Symbicort 160 mcg-4.5 mcg BID, Montelukast 10 mg daily, and Duonebs PRN

## 2022-06-13 NOTE — PROGRESS NOTE ADULT - ASSESSMENT
89 y/oM, DNR/DNI, PMHx TRACIE (non-compliant w/ CPAP, uses 2-4L O2 at bedtime), Asthma (no prior intubations/hospitalizations), HTN, HLD, type II DM, Complete Heart Block (s/p Medtronic PPM in 10/2018, follows w/ Dr. Brown), Pulmonary Hypertension (follows w/ Dr. Tuttle), and HF preserved EF (EF 60% by Echo 2020) presented to ED 6/3/22 w/SOB, admitted to CCU for acute on chronic diastolic HF exacerbation, s/p IV diuresis whose hospital course c/b aspiration PNA, now s/p IV abx. Hospital course further c/b Bilateral DVTs, now on Eliquis and esophageal dysmotility for which he was made DNR/DNI and stepped down to tele 6/9/22 for dispo planning.

## 2022-06-13 NOTE — PROGRESS NOTE ADULT - PROBLEM SELECTOR PLAN 3
Patient with a history of CHF, and has EF was 60% in 2020. Was a patient of Dr. Tuttle, but couldn't make it to the office and was seen by [facility] visiting doctors. Bumex drip discontinued and patient on PO Bumex. Continue care as per cardiology team.

## 2022-06-13 NOTE — PROGRESS NOTE ADULT - PROBLEM SELECTOR PLAN 5
Patient last assessed: 6/10/2022  to manage: GOC/AD, symptoms, and support.  30 Minutes; Start: 0700am End: 0730am  , of non-face-to-face prolonged service provided that relates to (face-to-face) care that has or will occur and ongoing patient management, including one or more of the following:   - Reviewed records from other physicians or other health care professional services, including one or more of the following: other medical records and diagnostic / radiology study results

## 2022-06-13 NOTE — PROGRESS NOTE ADULT - PROBLEM SELECTOR PLAN 4
Palliative team consulted and following   - previously NGT tube placed and tube feedings initiated, now discontinued and NGT removed   - patient made DNR/DNI, MOLST placed in chart   - patient wishes include no tubes, limited medical interventions, send to hospital as needed, trial of IV fluids, and use antibiotics if infection occurs   - at this time patient and patient's daughter wish to continue telemetry monitoring w/ q-shift vital signs and every other day labs (next lab date 06/15/2022)  - Application sent to [facility], pending family meeting Monday (06/13/2022)    F: Comfort  E: Replete as needed (K<4, Mag <2)  N: DASH   VTE PPX: Eliquis   Dispo: probable d/c to [facility] (pending family meeting)

## 2022-06-13 NOTE — PROGRESS NOTE ADULT - SUBJECTIVE AND OBJECTIVE BOX
CORY SAMUELS             [id number]    CC: SOB    HPI:  89M h/o HTN, HLD, NIDDM, asthma, (denies intubations/hospitalizations), BPH, 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 Brown), chronic CHF (EF 60% echo 2020), TRACIE (nonadherent to CPAP) who presented from rehab with c/o SOB, hypoxia, swelling of arms legs and scrotum, and 1 week of intermittent hemoptysis. Admitted to [facility] Nov 2020 for CHF exacerbation (, EF 60% borderline reduced RV systolic function) with contraction alkalosis, improved on lasix and diamox, dc'd on torsemide 20 po BID. Torsemide was decreased to 20 QD in Feb with encouragement of low-salt diet.   Patient has been unable to come to Dr. Tuttle 2/2 poor functionality, was being followed by a [facility] visiting doctor who was managing his diuretics. At best this year able to walk 12 total steps with walker before becoming SOB. He was admitted to [facility] last month for CHF exacerbation and was discharged to rehab 2 weeks ago where he was getting PO lasix. He was becoming more SOB for the past 2 days requiring supplemental O2 (5L per daughter). Daughter brought to [facility] for eval. On arrival to CCU endorsed improvement in breathing, feeling cold, hungry and thirsty. Denied fever, cough, chest pain, abdominal pain, recent melena or hematochezia    In the ED:  VS: Afebrile. HR 59-68, -150/63-81, RR 20, 96% RA --> 97% on BiPAP  Labs: Hgb 9.6 (baseline 12-13), bicarb 43, BUN 40, Cr 1.01, PT 15.7, INR 1.32, Trop 0.11, Cpk/CKMB wnl, BNP 5655, pCO2 on VBG 84 and on ABG 79. UA negative. COVID negative.  ECG: Paced  Imaging: CXR with b/l haziness and increased vacular congestion  Interventions: Placed on NC --> BiPAP at 10/5 (03 Jun 2022 18:05)    SUBJECTIVE: Patient resting in bed, waiting to work with Physical therapy. No distress noted.     ROS:  DYSPNEA (Marilu): 0  NAUS/VOM: N	  SECRETIONS: N	  AGITATION: N  Pain (Y/N):     N  -Provocation/Palliation: n/a   -Quality/Quantity: n/a  -Radiating: n/a  -Severity: n/a  -Timing/Frequency:  n/a  -Impact on ADLs: n/a    OTHER REVIEW OF SYSTEMS: + weakness   UNABLE TO OBTAIN  due to: n/a     PEx:  T(C): 36.1 (06-13-22 @ 13:53), Max: 36.7 (06-13-22 @ 10:36)  HR: 72 (06-13-22 @ 11:37) (56 - 72)  BP: 118/57 (06-13-22 @ 11:37) (101/51 - 134/65)  RR: 18 (06-13-22 @ 11:37) (16 - 19)  SpO2: 95% (06-13-22 @ 11:37) (93% - 99%)  Wt(kg): 147.1kG    GENERAL:  [x ]Alert  [ x]Oriented x 2    [ ]Lethargic  [ ]Cachexia  [ ]Unarousable  [x ]Verbal  [ ]Non-Verbal  Behavioral:   [ ] Anxiety  [ ] Delirium [ ] Agitation [x ] Other - calm   HEENT:  [x ]Normal   [ ]Dry mouth   [ ]ET Tube/Trach  [ ]Oral lesions  PULMONARY:   [ ]Clear  [ ]Tachypnea  [ ]Audible excessive secretions   [x ]Rhonchi anteriorly        [ ]Right [ ]Left [ ]Bilateral  [ ]Crackles        [ ]Right [ ]Left [ ]Bilateral  [ ]Wheezing     [ ]Right [ ]Left [ ]Bilateral  CARDIOVASCULAR:    [x ]Regular [ ]Irregular [ ]Tachy  [ ]Kolby [ ]Murmur [ ]Other  GASTROINTESTINAL:  [x ]Soft  [ ]Distended   [ ]+BS  [x ]Non tender [ ]Tender  [ ]PEG [X ]NGT  Last BM:    GENITOURINARY:  [ ]Normal [x ] Incontinent   [ ]Oliguria/Anuria   [ ]Stoll  MUSCULOSKELETAL:   [ ]Normal   [ ]Weakness  [x ]Bed/Wheelchair bound [x]Edema  - +1 b/l LE extremities   NEUROLOGIC:   [ ]No focal deficits  [x ] mild Cognitive impairment  [ ] Dysphagia [ ]Dysarthria [ ] Paresis [ ]Other   SKIN:   [ ]Normal   [x ]Pressure ulcer(s) - stage 2 sacrum  [ ]Rash    ALLERGIES: lisinopril (Angioedema)      OPIATE NAÏVE (Y/N): Y    MEDICATIONS: REVIEWED  MEDICATIONS  (STANDING):  apixaban 5 milliGRAM(s) Oral every 12 hours  aspirin  chewable 81 milliGRAM(s) Oral every 24 hours  atorvastatin 10 milliGRAM(s) Oral at bedtime  budesonide 160 MICROgram(s)/formoterol 4.5 MICROgram(s) Inhaler 2 Puff(s) Inhalation two times a day  dextrose 5%. 1000 milliLiter(s) (50 mL/Hr) IV Continuous <Continuous>  dextrose 5%. 1000 milliLiter(s) (100 mL/Hr) IV Continuous <Continuous>  dextrose 50% Injectable 25 Gram(s) IV Push once  dextrose 50% Injectable 12.5 Gram(s) IV Push once  dextrose 50% Injectable 25 Gram(s) IV Push once  finasteride 5 milliGRAM(s) Oral at bedtime  glucagon  Injectable 1 milliGRAM(s) IntraMuscular once  insulin lispro (ADMELOG) corrective regimen sliding scale   SubCutaneous Before meals and at bedtime  metoprolol succinate ER 25 milliGRAM(s) Oral every 24 hours  montelukast 10 milliGRAM(s) Oral every 24 hours  polyethylene glycol 3350 17 Gram(s) Oral two times a day  senna 2 Tablet(s) Oral at bedtime  spironolactone 25 milliGRAM(s) Oral every 24 hours  torsemide 40 milliGRAM(s) Oral daily    MEDICATIONS  (PRN):  ALBUTerol    90 MICROgram(s) HFA Inhaler 2 Puff(s) Inhalation every 6 hours PRN Shortness of Breath and/or Wheezing  dextrose Oral Gel 15 Gram(s) Oral once PRN Blood Glucose LESS THAN 70 milliGRAM(s)/deciliter    LABS: REVIEWED  CBC:                        9.5    8.39  )-----------( 197      ( 13 Jun 2022 08:04 )             29.7     CMP:    06-13    136  |  91<L>  |  40<H>  ----------------------------<  131<H>  4.2   |  38<H>  |  1.10    Ca    9.3      13 Jun 2022 08:04  Mg     2.2     06-13    IMAGING: REVIEWED    ADVANCED DIRECTIVES:            DNR DNI            MOLST      DECISION MAKER: Patient defers decision making to daughters  LEGAL SURROGATE: Briannalisette Samuels [phone] and Madhuri Lopez [phone]    PSYCHOSOCIAL-SPIRITUAL ASSESSMENT:       Reviewed       Care plan unchanged     GOALS OF CARE DISCUSSION       Palliative care info/counseling provided	           Family meeting       Advanced Directives addressed please see Advance Care Planning Note	           See previous Palliative Medicine Note       Documentation of GOC: DNR/DNI  	      AGENCY CHOICE DISCUSSED:           [facility]           REFERRALS	        Unit SW/Case Mgmt       PT/OT

## 2022-06-14 NOTE — CHART NOTE - NSCHARTNOTEFT_GEN_A_CORE
Admitting Diagnosis:   Patient is a 89y old  Male who presents with a chief complaint of CHF Exacerbation (12 Jun 2022 08:32)    PAST MEDICAL & SURGICAL HISTORY:  Diabetes    Asthma    Prostate disorder    Acute on chronic systolic congestive heart failure    Acute myocardial infarction    HLD (hyperlipidemia)    TRACIE (obstructive sleep apnea)    HTN (hypertension)    Trifascicular block    Dysphagia    S/P TURP    History of left cataract surgery    History of right cataract surgery    Cardiac pacemaker    Current Nutrition Order: Regular diet (comfort/pleasure feeds)    PO Intake: Good (%) [ x ]  Fair (50-75%) [   ] Poor (<25%) [   ]    GI Issues: None noted    Pain: Denies pain    Skin Integrity: Multiple PIs (6/12) - Sacrum stage 2, L upper inner leg Stage 2; Fabrizio Score = 13    Labs:   06-13    136  |  91<L>  |  40<H>  ----------------------------<  131<H>  4.2   |  38<H>  |  1.10    Ca    9.3      13 Jun 2022 08:04  Mg     2.2     06-13    CAPILLARY BLOOD GLUCOSE  POCT Blood Glucose.: 181 mg/dL (13 Jun 2022 11:15) <H>  POCT Blood Glucose.: 145 mg/dL (13 Jun 2022 06:45)  POCT Blood Glucose.: 145 mg/dL (13 Jun 2022 06:45)  POCT Blood Glucose.: 227 mg/dL (12 Jun 2022 21:17) <H>  POCT Blood Glucose.: 163 mg/dL (12 Jun 2022 16:56)    Medications:  MEDICATIONS  (STANDING):  apixaban 5 milliGRAM(s) Oral every 12 hours  aspirin  chewable 81 milliGRAM(s) Oral every 24 hours  atorvastatin 10 milliGRAM(s) Oral at bedtime  budesonide 160 MICROgram(s)/formoterol 4.5 MICROgram(s) Inhaler 2 Puff(s) Inhalation two times a day  dextrose 5%. 1000 milliLiter(s) (50 mL/Hr) IV Continuous <Continuous>  dextrose 5%. 1000 milliLiter(s) (100 mL/Hr) IV Continuous <Continuous>  dextrose 50% Injectable 25 Gram(s) IV Push once  dextrose 50% Injectable 12.5 Gram(s) IV Push once  dextrose 50% Injectable 25 Gram(s) IV Push once  finasteride 5 milliGRAM(s) Oral at bedtime  glucagon  Injectable 1 milliGRAM(s) IntraMuscular once  insulin lispro (ADMELOG) corrective regimen sliding scale   SubCutaneous Before meals and at bedtime  metoprolol succinate ER 25 milliGRAM(s) Oral every 24 hours  montelukast 10 milliGRAM(s) Oral every 24 hours  polyethylene glycol 3350 17 Gram(s) Oral two times a day  senna 2 Tablet(s) Oral at bedtime  spironolactone 25 milliGRAM(s) Oral every 24 hours  torsemide 40 milliGRAM(s) Oral daily    MEDICATIONS  (PRN):  ALBUTerol    90 MICROgram(s) HFA Inhaler 2 Puff(s) Inhalation every 6 hours PRN Shortness of Breath and/or Wheezing  dextrose Oral Gel 15 Gram(s) Oral once PRN Blood Glucose LESS THAN 70 milliGRAM(s)/deciliter    Weight: 324lbs Height: 5'11 BMI: 45.3 kg/m2    Weight Change: No new weights to assess  6/3 147.1kg  6/6 138kg  6/7 142.5kg  **weight fluctuations likely attributed to fluid shifts    Estimated energy needs: IBW used due to above 120% of IBW, increased protein due to PU. Adjusted for age.   Calorie needs (20-27kcal/kg) 1560-2106kcal  Protein needs: (1.2-1.4gm/kg) 94-109gm  Fluids per MD discretion due to CHF.    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 Brown), chronic CHF (EF 60% echo 2020), TRACIE (nonadherent to CPAP, uses 2-4L O2 qhs) who presented from rehab 6/3 with c/o SOB, hypoxia, swelling of arms legs and scrotum, and 1 week of intermittent hemoptysis found to have acute CHF exacerbation. Admitted on BiPAP. Bumex gtt started. Diamox being given prn. Patient became febrile 6/5 with transient desat, then MAPs decreased to 50s, briefly on levophed now off, Bumex gtt on hold 6/5-6/6, now restarted. Zosyn started (MRSA negative) and full w/u sent, switched to CTX as likely source aspiration PNA. Weaned to HFNC 6/5 and then NC 6/8, continuing with BiPAP at night. Noted also to have unequal LE swelling, found to have b/l DVTs on 6/5, started on Eliquis. Pt seen by SLP for FEES assessment 6/6 recommending easy to chew/thin liquid diet then for Barium swallow 6/7 with esophageal dysmotility. NGT placed 6/7, tube feeds started 6/8 then d/c'd 6/9 following Palliative care discussion with pt/family who opted for pleasure/comfort feeds, made DNR/DNI, NGT removed. MOLST completed. Palliative following.     Pt seen at bedside for follow-up assessment, sitting up in bed, accompanied by daughter at bedside. Per flowsheets, last +BM not documented. Pt/daughter report pt with good appetite, completing >75% of meal tray, however reports menu fatigue. Writer obtained pts food preferences and will honor as able. Pt daughter requested oral nutrition supplement Glucerna once daily. 2+ edema L/R leg. Tc pain. Made aware RD remains available. RD to follow up per protocol. See nutrition recommendations below.     Previous Nutrition Diagnosis:   Increased nutrient needs r/t increased demand for protein and nutrients AEB: PU demands    Active [ X ]  Resolved [   ]    Goal: Meet >75% of EER consistently    Recommendations:  1. Continue Regular diet as ordered. Pt is on comfort/pleasure feeds. Obtained food preferences and will honor as able.  2. Provide vanilla Glucerna once daily (220 kcal, 10g protein, 200mL H2O per serving)   3. Adjust insulin regimen prn to promote euglycemia  4. Adjust bowel regimen to promote stool regularity    5. Monitor labs, GI distress skin integrity; trend daily weights given pt on water pill  6. Nutrition to stay in line with GOC at all times    Risk Level: High [   ] Moderate [ x ] Low [   ]
PALLIATIVE MEDICINE COORDINATION OF CARE NOTE FOR CORY CLIFTON    Patient last assessed: __6/13/2022__  to manage: GOC/AD, Symptoms, and Support was recommended: __hospice____    __30____ Minutes; Start: _0830a____  End: _0900am___, of non-face-to-face prolonged service provided that relates to (face-to-face) care that has or will occur and ongoing patient management, including one or more of the following:   - Reviewed records from other physicians or other health care professional services, including one or more of the following: other medical records and diagnostic / radiology study results     HPI:  89M h/o HTN, HLD, NIDDM, asthma, (denies intubations/hospitalizations), BPH, 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 Brown), chronic CHF (EF 60% echo 2020), TRACIE (nonadherent to CPAP) who presented from rehab with c/o SOB, hypoxia, swelling of arms legs and scrotum, and 1 week of intermittent hemoptysis. Admitted to [facility] Nov 2020 for CHF exacerbation (, EF 60% borderline reduced RV systolic function) with contraction alkalosis, improved on lasix and diamox, dc'd on torsemide 20 po BID. Torsemide was decreased to 20 QD in Feb with encouragement of low-salt diet.   Patient has been unable to come to Dr. Tuttle 2/2 poor functionality, was being followed by a [facility] visiting doctor who was managing his diuretics. At best this year able to walk 12 total steps with walker before becoming SOB. He was admitted to [facility] last month for CHF exacerbation and was discharged to rehab 2 weeks ago where he was getting PO lasix. He was becoming more SOB for the past 2 days requiring supplemental O2 (5L per daughter). Daughter brought to [facility] for eval. On arrival to CCU endorsed improvement in breathing, feeling cold, hungry and thirsty. Denied fever, cough, chest pain, abdominal pain, recent melena or hematochezia    In the ED:  VS: Afebrile. HR 59-68, -150/63-81, RR 20, 96% RA --> 97% on BiPAP  Labs: Hgb 9.6 (baseline 12-13), bicarb 43, BUN 40, Cr 1.01, PT 15.7, INR 1.32, Trop 0.11, Cpk/CKMB wnl, BNP 5655, pCO2 on VBG 84 and on ABG 79. UA negative. COVID negative.  ECG: Paced  Imaging: CXR with b/l haziness and increased vacular congestion  Interventions: Placed on NC --> BiPAP at 10/5 (03 Jun 2022 18:05)      - Other: Medication reviewed.    The patient HAS NOT used PRN's in the last 24h.    MEDICATIONS  (STANDING):  apixaban 5 milliGRAM(s) Oral every 12 hours  aspirin  chewable 81 milliGRAM(s) Oral every 24 hours  atorvastatin 10 milliGRAM(s) Oral at bedtime  budesonide 160 MICROgram(s)/formoterol 4.5 MICROgram(s) Inhaler 2 Puff(s) Inhalation two times a day  dextrose 5%. 1000 milliLiter(s) (100 mL/Hr) IV Continuous <Continuous>  dextrose 5%. 1000 milliLiter(s) (50 mL/Hr) IV Continuous <Continuous>  dextrose 50% Injectable 25 Gram(s) IV Push once  dextrose 50% Injectable 12.5 Gram(s) IV Push once  dextrose 50% Injectable 25 Gram(s) IV Push once  finasteride 5 milliGRAM(s) Oral at bedtime  glucagon  Injectable 1 milliGRAM(s) IntraMuscular once  insulin lispro (ADMELOG) corrective regimen sliding scale   SubCutaneous Before meals and at bedtime  metoprolol succinate ER 25 milliGRAM(s) Oral every 24 hours  montelukast 10 milliGRAM(s) Oral every 24 hours  polyethylene glycol 3350 17 Gram(s) Oral two times a day  senna 2 Tablet(s) Oral at bedtime  spironolactone 25 milliGRAM(s) Oral every 24 hours  torsemide 40 milliGRAM(s) Oral daily    MEDICATIONS  (PRN):  ALBUTerol    90 MICROgram(s) HFA Inhaler 2 Puff(s) Inhalation every 6 hours PRN Shortness of Breath and/or Wheezing  dextrose Oral Gel 15 Gram(s) Oral once PRN Blood Glucose LESS THAN 70 milliGRAM(s)/deciliter      - Other: Advanced directives     DNR/DNI     MOLST in patients chart     No documented HCP form found on patient window     No Living will / POA / Advance directives found on [place] / patient window     [facility] notes on [facility] 6/9/2022    - Other: Coordination/Plan of care     __1__ admissions in 1 year     Current admission LOS: _11__ days     LACE score: _14___ ADVANCE ILLNESS PATIENT.     Patient previously seen by palliative medicine consult service.      Discussed with Primary team.    Family in agreement to [facility]. Patient was medically accepted and is waiting for a bed to become available. Primary team is aware.
PALLIATIVE MEDICINE COORDINATION OF CARE NOTE FOR CORY SAMUELS  [  ] ED Trigger   [  ] MICU Trigger     [ X ] Consult    [  ] AI Comanagement    Never seen by palliative medicine.    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 Brown), chronic CHF (EF 60% echo 2020), TRACIE (nonadherent to CPAP) who presented from rehab with c/o SOB, hypoxia, swelling of arms legs and scrotum, and 1 week of intermittent hemoptysis. Admitted to [facility] Nov 2020 for CHF exacerbation (, EF 60% borderline reduced RV systolic function) with contraction alkalosis, improved on lasix and diamox, dc'd on torsemide 20 po BID. Torsemide was decreased to 20 QD in Feb with encouragement of low-salt diet.   Patient has been unable to come to Dr. Tuttle 2/2 poor functionality, was being followed by a [facility] visiting doctor who was managing his diuretics. At best this year able to walk 12 total steps with walker before becoming SOB. He was admitted to [facility] last month for CHF exacerbation and was discharged to rehab 2 weeks ago where he was getting PO lasix. He was becoming more SOB for the past 2 days requiring supplemental O2 (5L per daughter). Daughter brought to [facility] for eval. On arrival to CCU endorsed improvement in breathing, feeling cold, hungry and thirsty. Denied fever, cough, chest pain, abdominal pain, recent melena or hematochezia    In the ED:  VS: Afebrile. HR 59-68, -150/63-81, RR 20, 96% RA --> 97% on BiPAP  Labs: Hgb 9.6 (baseline 12-13), bicarb 43, BUN 40, Cr 1.01, PT 15.7, INR 1.32, Trop 0.11, Cpk/CKMB wnl, BNP 5655, pCO2 on VBG 84 and on ABG 79. UA negative. COVID negative.  ECG: Paced  Imaging: CXR with b/l haziness and increased vacular congestion  Interventions: Placed on NC --> BiPAP at 10/5 (03 Jun 2022 18:05)      - Other: [facility] reviewed.    Rx found on [facility] review. Ref #:  [id number]    - Other: Medication reviewed.    The patient HAS NOT used PRN's in the last 24h.    MEDICATIONS  (STANDING):  apixaban 10 milliGRAM(s) Oral every 12 hours  aspirin enteric coated 81 milliGRAM(s) Oral daily  atorvastatin 10 milliGRAM(s) Oral at bedtime  budesonide 160 MICROgram(s)/formoterol 4.5 MICROgram(s) Inhaler 2 Puff(s) Inhalation two times a day  buMETAnide Infusion 0.5 mG/Hr (2.5 mL/Hr) IV Continuous <Continuous>  cefTRIAXone   IVPB 2000 milliGRAM(s) IV Intermittent every 24 hours  chlorhexidine 4% Liquid 1 Application(s) Topical <User Schedule>  dextrose 5%. 1000 milliLiter(s) (50 mL/Hr) IV Continuous <Continuous>  dextrose 5%. 1000 milliLiter(s) (100 mL/Hr) IV Continuous <Continuous>  dextrose 50% Injectable 25 Gram(s) IV Push once  dextrose 50% Injectable 12.5 Gram(s) IV Push once  dextrose 50% Injectable 25 Gram(s) IV Push once  finasteride 5 milliGRAM(s) Oral daily  glucagon  Injectable 1 milliGRAM(s) IntraMuscular once  insulin lispro (ADMELOG) corrective regimen sliding scale   SubCutaneous Before meals and at bedtime  montelukast 10 milliGRAM(s) Oral daily  norepinephrine Infusion 0.05 MICROgram(s)/kG/Min (13.8 mL/Hr) IV Continuous <Continuous>  spironolactone 25 milliGRAM(s) Oral every 24 hours    MEDICATIONS  (PRN):  ALBUTerol    90 MICROgram(s) HFA Inhaler 2 Puff(s) Inhalation every 6 hours PRN Shortness of Breath and/or Wheezing  dextrose Oral Gel 15 Gram(s) Oral once PRN Blood Glucose LESS THAN 70 milliGRAM(s)/deciliter      - Other: Advanced directives     Full Code     No documented MOLST form found on patient window     HCP form found on patient window 10/30/2018 admit tab page  3 abbe Samuels [phone]     No Living will / POA / Advance directives found on [place] / patient window     No documented GOC notes on [facility]    - Other: Coordination/Plan of care     _1___ admissions in 1 year     Current admission LOS: __3_ days     LACE score: _12___ ADVANCE ILLNESS PATIENT.     Patient NOT previously seen by palliative medicine consult service.      Discussed with CCU team.  Consult request for: "  GOC and aspiration vs. comfort feeding? likely aspirating and still would like to eat  "    Full consult to follow within 24h.
Admitting Diagnosis:   Patient is a 89y old  Male who presents with a chief complaint of SOB (08 Jun 2022 11:25)      PAST MEDICAL & SURGICAL HISTORY:  Diabetes      Asthma      Prostate disorder      Acute on chronic systolic congestive heart failure      Acute myocardial infarction      HLD (hyperlipidemia)      TRACIE (obstructive sleep apnea)      HTN (hypertension)      Trifascicular block      Dysphagia      S/P TURP      History of left cataract surgery      History of right cataract surgery      Cardiac pacemaker          Current Nutrition Order: Jevity 1.2 110ml x18hrs (running at 30ml/hr), free water 100ml q4hrs   Goal feeds to provide 1980ml, 2376kcal, 110gm protein, 2198ml free water    PO Intake: Good (%) [   ]  Fair (50-75%) [   ] Poor (<25%) [   ] - N/A    GI Issues: No N/V/D/C, BMs daily    Pain: Pain noted, controlled    Skin Integrity:  Fabrizio 13  2+ edema L/R ankle  Stage 2 pressure ulcer- sacrum  Stage 2 pressure ulcer-L upper leg    Labs:   06-08    135  |  90<L>  |  34<H>  ----------------------------<  120<H>  3.9   |  34<H>  |  1.22    Ca    9.0      08 Jun 2022 05:13  Phos  4.3     06-08  Mg     2.2     06-08    TPro  6.6  /  Alb  2.7<L>  /  TBili  0.6  /  DBili  x   /  AST  25  /  ALT  17  /  AlkPhos  56  06-08    CAPILLARY BLOOD GLUCOSE      POCT Blood Glucose.: 158 mg/dL (08 Jun 2022 17:01)  POCT Blood Glucose.: 156 mg/dL (08 Jun 2022 16:13)  POCT Blood Glucose.: 136 mg/dL (08 Jun 2022 12:15)  POCT Blood Glucose.: 127 mg/dL (07 Jun 2022 21:24)      Medications:  MEDICATIONS  (STANDING):  apixaban 10 milliGRAM(s) Enteral Tube every 12 hours  aspirin  chewable 81 milliGRAM(s) Oral every 24 hours  atorvastatin 10 milliGRAM(s) Oral at bedtime  budesonide 160 MICROgram(s)/formoterol 4.5 MICROgram(s) Inhaler 2 Puff(s) Inhalation two times a day  buMETAnide Infusion 1 mG/Hr (5 mL/Hr) IV Continuous <Continuous>  cefTRIAXone   IVPB 2000 milliGRAM(s) IV Intermittent every 24 hours  chlorhexidine 4% Liquid 1 Application(s) Topical <User Schedule>  dextrose 5%. 1000 milliLiter(s) (50 mL/Hr) IV Continuous <Continuous>  dextrose 5%. 1000 milliLiter(s) (100 mL/Hr) IV Continuous <Continuous>  dextrose 50% Injectable 25 Gram(s) IV Push once  dextrose 50% Injectable 12.5 Gram(s) IV Push once  dextrose 50% Injectable 25 Gram(s) IV Push once  finasteride 5 milliGRAM(s) Oral at bedtime  glucagon  Injectable 1 milliGRAM(s) IntraMuscular once  insulin lispro (ADMELOG) corrective regimen sliding scale   SubCutaneous Before meals and at bedtime  metoprolol tartrate 12.5 milliGRAM(s) Oral every 12 hours  montelukast 10 milliGRAM(s) Oral every 24 hours  spironolactone 25 milliGRAM(s) Oral every 24 hours    MEDICATIONS  (PRN):  ALBUTerol    90 MICROgram(s) HFA Inhaler 2 Puff(s) Inhalation every 6 hours PRN Shortness of Breath and/or Wheezing  dextrose Oral Gel 15 Gram(s) Oral once PRN Blood Glucose LESS THAN 70 milliGRAM(s)/deciliter    Birth Anthropometrics:  Height for BMI (FEET)	5 Feet  Height for BMI (INCHES)	11 Inch(s)  Height for BMI (CENTIMETERS)	180.34 Centimeter(s)  Weight for BMI (lbs)	324 lb  Weight for BMI (kg)	147 kg  Body Mass Index	45.1    Weight Change:   6/3 147.1kg  6/6 138kg  6/7 142.5kg  **weight fluctuations likely attributed to fluid shiftts    Estimated energy needs:   Calorie needs (20-27kcal/kg) 1560-2106kcal  Protein needs: (1.2-1.4gm/kg) 94-109gm  Fluids per MD discretion due to CHF. Adjusted for age. 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 Brown), chronic CHF (EF 60% echo 2020), TRACIE (nonadherent to CPAP, uses 2-4L O2 qhs) who presented from rehab 6/3 with c/o SOB, hypoxia, swelling of arms legs and scrotum, and 1 week of intermittent hemoptysis found to have acute CHF exacerbation. Admitted on BiPAP. Bumex gtt started. Diamox being given prn. Patient became febrile 6/5 with transient desat, then MAPs decreased to 50s, briefly on levophed now off, Bumex gtt on hold 6/5-6/6, now restarted. Zosyn started (MRSA negative) and full w/u sent, switched to CTX as likely source aspiration PNA. Weaned to HFNC 6/5 and then NC 6/8, continuing with BiPAP at night. Noted also to have unequal LE swelling, found to have b/l DVTs on 6/5, started on Eliquis. Barium swallow 6/7 with esophageal dysmotility. NGT placed 6/7, tube feeds started 6/8, pending further GOC discussions.    Pt assessed at bedside. PO diet held, NGT placed 6/7. Enteral feeds of Jevity 1.2 started, now running at 30ml/hr. Thus far tolerated well with no issues. Per team pt on bipap overnight therefore feeds to only run 8am-9pm (13hrs). Enteral recommendations and adjustments below. RD to follow.     Previous Nutrition Diagnosis:  Increased nutrient needs r/t increased demand for protein and nutrients AEB: PU demands    Active [ X ]  Resolved [   ]    Goal: Meet >75% of EER consistently    Recommendations:  1. Recommend change enteral regimen to Jevity 1.5 100ml/hr x13hrs + 1 LPS to provide 1300ml, 2050kcal, 98gm protein, 988ml free water  ----Additional free water boluses per team  2. Diet as medically feasible and per SLP  3. Pain and bowel regimen per team  4. F/u nutrition education prn    Education: Deferred    Risk Level: High [ X ] Moderate [   ] Low [   ]

## 2022-06-14 NOTE — PROGRESS NOTE ADULT - NUTRITIONAL ASSESSMENT
This patient has been assessed with a concern for Malnutrition and has been determined to have a diagnosis/diagnoses of Morbid obesity (BMI > 40).    This patient is being managed with:   Diet DASH/TLC-  Sodium & Cholesterol Restricted  Easy to Chew (EASYTOCHEW)  Entered: Jun 6 2022  1:48PM    
This patient has been assessed with a concern for Malnutrition and has been determined to have a diagnosis/diagnoses of Morbid obesity (BMI > 40).    This patient is being managed with:   Diet NPO with Tube Feed-  Tube Feeding Modality: Nasogastric  Jevity 1.2 Lan (JEVITY1.2RTH)  Total Volume for 24 Hours (mL): 1980  Total Number of Cans: 9  Continuous  Starting Tube Feed Rate {mL per Hour}: 10  Increase Tube Feed Rate by (mL): 10     Every 4 hours  Until Goal Tube Feed Rate (mL per Hour): 110  Tube Feed Duration (in Hours): 18  Tube Feed Start Time: 08:00  Tube Feed Stop Time: 21:00  Free Water Flush  Bolus   Total Volume per Flush (mL): 100   Frequency: Every 4 Hours  Entered: Jun 8 2022  7:46AM    
This patient has been assessed with a concern for Malnutrition and has been determined to have a diagnosis/diagnoses of Morbid obesity (BMI > 40).    This patient is being managed with:   Diet NPO with Tube Feed-  Tube Feeding Modality: Nasogastric  Jevity 1.5 Lan (JEVITY1.5)  Total Volume for 24 Hours (mL): 1300  Total Number of Cans: 6  Continuous  Starting Tube Feed Rate {mL per Hour}: 30  Until Goal Tube Feed Rate (mL per Hour): 100  Tube Feed Duration (in Hours): 13  Tube Feed Start Time: 08:00  Tube Feed Stop Time: 21:00  Liquid Protein Supplement     Qty per Day:  1  Entered: Jun 9 2022  7:07AM    
This patient has been assessed with a concern for Malnutrition and has been determined to have a diagnosis/diagnoses of Morbid obesity (BMI > 40).    This patient is being managed with:   Diet NPO-  Entered: Jun 5 2022  7:04PM    
This patient has been assessed with a concern for Malnutrition and has been determined to have a diagnosis/diagnoses of Morbid obesity (BMI > 40).    This patient is being managed with:   Diet NPO-  Entered: Jun 5 2022  8:43AM    
This patient has been assessed with a concern for Malnutrition and has been determined to have a diagnosis/diagnoses of Morbid obesity (BMI > 40).    This patient is being managed with:   Diet Regular-  Entered: Silvestre  9 2022 10:25AM    
This patient has been assessed with a concern for Malnutrition and has been determined to have a diagnosis/diagnoses of Morbid obesity (BMI > 40).    This patient is being managed with:   Diet Regular-  Entered: Silvestre  9 2022 10:25AM    
This patient has been assessed with a concern for Malnutrition and has been determined to have a diagnosis/diagnoses of Morbid obesity (BMI > 40).    This patient is being managed with:   Diet Regular-  Supplement Feeding Modality:  Oral  Ensure Enlive Cans or Servings Per Day:  1       Frequency:  Daily  Entered: Jun 13 2022  4:01PM    
This patient has been assessed with a concern for Malnutrition and has been determined to have a diagnosis/diagnoses of Morbid obesity (BMI > 40).    This patient is being managed with:   Diet Regular-  Entered: Silvestre  9 2022 10:25AM    

## 2022-06-14 NOTE — PROGRESS NOTE ADULT - PROVIDER SPECIALTY LIST ADULT
CCU
Palliative Care
CCU
Palliative Care
CCU
Intervent Cardiology
Cardiology
Intervent Cardiology
Palliative Care
Cardiology
Intervent Cardiology
Palliative Care
Cardiology
Palliative Care

## 2022-06-14 NOTE — PROGRESS NOTE ADULT - PROBLEM SELECTOR PLAN 4
Palliative team consulted and following   - previously NGT tube placed and tube feedings initiated, now discontinued and NGT removed   - patient made DNR/DNI, MOLST placed in chart   - patient wishes include no tubes, limited medical interventions, send to hospital as needed, trial of IV fluids, and use antibiotics if infection occurs   - at this time patient and patient's daughter wish to continue telemetry monitoring w/ q-shift vital signs and every other day labs (next lab date 06/15/2022)  - Application sent to [facility]- patient accepted and now awaiting bed     F: Comfort  E: Replete as needed (K<4, Mag <2)  N: DASH   VTE PPX: Eliquis   Dispo: probable d/c to [facility] (pending family meeting)

## 2022-06-14 NOTE — PROGRESS NOTE ADULT - PROBLEM SELECTOR PLAN 2
RESOLVED; CXR w/ RLL consolidation  - remains afebrile without leukocytosis and non-toxic appearing   - Barium swallow 06/07/2022: Esophageal dysmotility  - Blood Cultures 6/5/22 NGTD   - RUQ US negative on 06/05/2022  - UA negative  - s/p IV abx (end date 6/9/22); s/p Zosyn, de-escalated to Ceftriaxone, completed antibiotic treatment 06/09/2022   - continue Symbicort 160 mcg-4.5 mcg BID, Montelukast 10 mg daily, and Duonebs PRN

## 2022-06-14 NOTE — PROGRESS NOTE ADULT - PROBLEM SELECTOR PROBLEM 1
Acute on chronic diastolic congestive heart failure
Debility
Acute on chronic diastolic congestive heart failure
Debility
Acute on chronic diastolic congestive heart failure
Debility
Respiratory failure
Debility

## 2022-06-14 NOTE — PROGRESS NOTE ADULT - NS ATTEND AMEND GEN_ALL_CORE FT
89M with HTN, HLD, NIDDM, asthma, (denies intubations/hospitalizations), BPH, 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 Brown), chronic CHF (EF 60% echo 2020), TRACIE (nonadherent to CPAP, uses 2-4L O2 qhs) who presented from rehab 6/3 with c/o SOB, hypoxia, swelling of arms legs and scrotum, and 1 week of intermittent hemoptysis found to have acute CHF exacerbation. Admitted on BiPAP. Bumex gtt, levo and Zosyn started for sirs (MRSA negative) and full w/u sent, switched to CTX as likely source aspiration PNA. Weaned to HFNC 6/5 and then NC 6/8, continuing with BiPAP at night. Noted also to have unequal LE swelling, dx with b/l DVTs on 6/5, started on Eliquis. Barium swallow 6/7 with esophageal dysmotility. NGT placed 6/7, tube feeds started 6/8 then d/c'd 6/9 as patient/family opted for comfort feeds, made DNR/DNI, NGT removed. Now pending placement at [place]. Palliative care assisting  Plan for:  Torsemide 40mg po daily, Metoprolol 25XL daily, Spironolactone 25mg po daily  Eliquis 10 BID x 7D then 5mg po BID for LE DVTs tx  DNR DNI qod labs, Comfort feedings permitted by family and patient, aspiration precautions  Palliative care assisting with [place] placement, awaiting family visit to facility   Tamia Monterroso M.D.  Cardiology Attending.
89M with HTN, HLD, NIDDM, asthma, (denies intubations/hospitalizations), BPH, 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 Brown), chronic CHF (EF 60% echo 2020), TRACIE (nonadherent to CPAP, uses 2-4L O2 qhs) who presented from rehab 6/3 with c/o SOB, hypoxia, swelling of arms legs and scrotum, and 1 week of intermittent hemoptysis found to have acute CHF exacerbation. Admitted on BiPAP. Bumex gtt, levo and Zosyn started for sirs (MRSA negative) and full w/u sent, switched to CTX as likely source aspiration PNA. Weaned to HFNC 6/5 and then NC 6/8, continuing with BiPAP at night. Noted also to have unequal LE swelling, dx with b/l DVTs on 6/5, started on Eliquis. Barium swallow 6/7 with esophageal dysmotility. NGT placed 6/7, tube feeds started 6/8 then d/c'd 6/9 as patient/family opted for comfort feeds, made DNR/DNI, NGT removed. Now pending placement at [place]. Palliative care assisting  ROS: Patient denies cardiopulmonary symptoms. The patient specifically denies CP, SOB, JEONG. reporting good appetite.  Euvolemic on exam    Plan for:  Torsemide 40mg po daily, Metoprolol 25XL daily, Spironolactone 25mg po daily  Eliquis 10 BID x 7D then 5mg po BID for LE DVTs tx  DNR DNI qod labs, Comfort feedings permitted by family and patient, aspiration precautions  Palliative care assisting with [place] placement, medically accepted to [facility] - awaiting bed availability  Lab holiday, DC telemetry, TID vitals while awaiting bed at [place]  Tamia Monterroso M.D.  Cardiology Attending.
lethargic  end of life care  no jvd  poor airway entry  no edema    continue current management  awaiting hospice care at [facility]
89M with HTN, HLD, NIDDM, asthma, (denies intubations/hospitalizations), BPH, 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 Brown), chronic CHF (EF 60% echo 2020), TRACIE (nonadherent to CPAP, uses 2-4L O2 qhs) who presented from rehab 6/3 with c/o SOB, hypoxia, swelling of arms legs and scrotum, and 1 week of intermittent hemoptysis found to have acute CHF exacerbation. Admitted on BiPAP. Bumex gtt, levo and Zosyn started for sirs (MRSA negative) and full w/u sent, switched to CTX as likely source aspiration PNA. Weaned to HFNC 6/5 and then NC 6/8, continuing with BiPAP at night. Noted also to have unequal LE swelling, dx with b/l DVTs on 6/5, started on Eliquis. Barium swallow 6/7 with esophageal dysmotility. NGT placed 6/7, tube feeds started 6/8 then d/c'd 6/9 as patient/family opted for comfort feeds, made DNR/DNI, NGT removed. Now pending placement at [place]. Palliative care assisting  ROS: Patient denies cardiopulmonary symptoms currently, c/o thirst requesting juice  Physical Exam notable for: elderly patient laying in bed in NAD, flat neck veins, clear lungs, overly nourished abdomen, no fluid wave detected, no pretibial pitting edema, no ankle edema  Plan for:  Transition to Torsemide 40mg po daily to maintain euvolemia  Metoprolol 25XL daily  Spironolactone 25mg po daily  Eliquis 10 BID x 7D then 5mg po BID for LE DVTs tx  DNR DNI qod labs, Comfort feedings permitted by family and patient, aspiration precautions  Palliative care assisting with [place] placement, will have interval update 6/13  Tamia Monterroso M.D.  Cardiology Attending
lethargic    no jvd  poor airway entry   no edema    remove castro catheter  awating placement in [place]

## 2022-06-14 NOTE — PROGRESS NOTE ADULT - SUBJECTIVE AND OBJECTIVE BOX
CARDIOLOGY NP PROGRESS NOTE    Subjective:  Received pt awake in bed eating breakfast in NAD. Denies CP/SOB, dizziness/diaphoresis, n/v, palpitations.  Remainder ROS otherwise negative.    Overnight Events: No acute events overnight       VITAL SIGNS:  T(C): 36.2 (06-14-22 @ 13:43), Max: 36.8 (06-13-22 @ 20:31)  HR: 55 (06-14-22 @ 11:30) (51 - 60)  BP: 97/51 (06-14-22 @ 11:30) (97/51 - 141/58)  RR: 16 (06-14-22 @ 11:30) (15 - 18)  SpO2: 100% (06-14-22 @ 11:30) (94% - 100%)  Wt(kg): --    I&O's Summary    13 Jun 2022 07:01  -  14 Jun 2022 07:00  --------------------------------------------------------  IN: 240 mL / OUT: 500 mL / NET: -260 mL    14 Jun 2022 07:01  -  14 Jun 2022 14:39  --------------------------------------------------------  IN: 240 mL / OUT: 0 mL / NET: 240 mL          PHYSICAL EXAM:    General: A/ox 3, No acute Distress  Neck: Supple, NO JVD  Cardiac: S1 S2, No M/R/G  Pulmonary: CTAB, Breathing unlabored, No Rhonchi/Rales/Wheezing  Abdomen: Soft, Non -tender, +BS x 4 quads  Extremities: No Rashes, No edema  Neuro: A/o x 3, No focal deficits          LABS:                          9.5    8.39  )-----------( 197      ( 13 Jun 2022 08:04 )             29.7                              06-13    136  |  91<L>  |  40<H>  ----------------------------<  131<H>  4.2   |  38<H>  |  1.10    Ca    9.3      13 Jun 2022 08:04  Mg     2.2     06-13                                CAPILLARY BLOOD GLUCOSE      POCT Blood Glucose.: 269 mg/dL (14 Jun 2022 11:17)  POCT Blood Glucose.: 137 mg/dL (14 Jun 2022 06:41)  POCT Blood Glucose.: 265 mg/dL (13 Jun 2022 22:04)  POCT Blood Glucose.: 164 mg/dL (13 Jun 2022 16:59)  POCT Blood Glucose.: 182 mg/dL (13 Jun 2022 16:55)            Allergies:  lisinopril (Angioedema)    MEDICATIONS  (STANDING):  apixaban 5 milliGRAM(s) Oral every 12 hours  aspirin  chewable 81 milliGRAM(s) Oral every 24 hours  atorvastatin 10 milliGRAM(s) Oral at bedtime  budesonide 160 MICROgram(s)/formoterol 4.5 MICROgram(s) Inhaler 2 Puff(s) Inhalation two times a day  dextrose 5%. 1000 milliLiter(s) (100 mL/Hr) IV Continuous <Continuous>  dextrose 5%. 1000 milliLiter(s) (50 mL/Hr) IV Continuous <Continuous>  dextrose 50% Injectable 25 Gram(s) IV Push once  dextrose 50% Injectable 12.5 Gram(s) IV Push once  dextrose 50% Injectable 25 Gram(s) IV Push once  finasteride 5 milliGRAM(s) Oral at bedtime  glucagon  Injectable 1 milliGRAM(s) IntraMuscular once  insulin lispro (ADMELOG) corrective regimen sliding scale   SubCutaneous Before meals and at bedtime  metoprolol succinate ER 25 milliGRAM(s) Oral every 24 hours  montelukast 10 milliGRAM(s) Oral every 24 hours  polyethylene glycol 3350 17 Gram(s) Oral two times a day  senna 2 Tablet(s) Oral at bedtime  spironolactone 25 milliGRAM(s) Oral every 24 hours  torsemide 40 milliGRAM(s) Oral daily    MEDICATIONS  (PRN):  ALBUTerol    90 MICROgram(s) HFA Inhaler 2 Puff(s) Inhalation every 6 hours PRN Shortness of Breath and/or Wheezing  dextrose Oral Gel 15 Gram(s) Oral once PRN Blood Glucose LESS THAN 70 milliGRAM(s)/deciliter        DIAGNOSTIC TESTS:

## 2022-06-14 NOTE — PROGRESS NOTE ADULT - PROBLEM SELECTOR PLAN 3
On admission, BL LE edema   - LE Duplex 06/05/2022: new bilateral DVT's   - initiated on Eliquis 10 mg BID until 06/12/2022 AM, and then transitioned to Eliquis 5 mg BID

## 2022-06-14 NOTE — PROGRESS NOTE ADULT - ASSESSMENT
89 y/oM, DNR/DNI, PMHx TRACIE (non-compliant w/ CPAP, uses 2-4L O2 at bedtime), Asthma (no prior intubations/hospitalizations), HTN, HLD, type II DM, Complete Heart Block (s/p Medtronic PPM in 10/2018, follows w/ Dr. Brown), Pulmonary Hypertension (follows w/ Dr. Tuttle), and HF preserved EF (EF 60% by Echo 2020) presented to ED 6/3/22 w/SOB, admitted to CCU for acute on chronic diastolic HF exacerbation, s/p IV diuresis whose hospital course c/b aspiration PNA, now s/p IV abx. Hospital course further c/b Bilateral DVTs, now on Eliquis and esophageal dysmotility for which he was made DNR/DNI and stepped down to tele 6/9/22 for dispo planning. Awaiting bed at [place] (was accepted on 6/14)

## 2022-06-14 NOTE — PROGRESS NOTE ADULT - NS ATTEND OPT1 GEN_ALL_CORE
I independently performed the documented:
I attest my time as attending is greater than 50% of the total combined time spent on qualifying patient care activities by the PA/NP and attending.
I attest my time as attending is greater than 50% of the total combined time spent on qualifying patient care activities by the PA/NP and attending.

## 2022-06-15 NOTE — DISCHARGE NOTE NURSING/CASE MANAGEMENT/SOCIAL WORK - NSDCPEFALRISK_GEN_ALL_CORE
For information on Fall & Injury Prevention, visit: [email or web address] OR  [email or web address] OR  https://www.cdc.gov/steadi/patient.html

## 2022-06-15 NOTE — DISCHARGE NOTE NURSING/CASE MANAGEMENT/SOCIAL WORK - NSDCPEPT PROEDMA_GEN_ALL_CORE
Pt called stating that he received no relief from the recent set of facet injections and that he was not interested in getting another set. Noted will discuss further options at next visit 02/23/17 at 1120.   Yes

## 2022-06-15 NOTE — DISCHARGE NOTE NURSING/CASE MANAGEMENT/SOCIAL WORK - PATIENT PORTAL LINK FT
You can access the FollowMyHealth Patient Portal offered by [facility] by registering at the following website: [email or web address]. By joining [employer]’s FollowMyHealth portal, you will also be able to view your health information using other applications (apps) compatible with our system.

## 2023-01-08 NOTE — PATIENT PROFILE ADULT - ARE SIGNIFICANT INDICATORS COMPLETE.
POD# 5 Awake, alert. No complaints. Denies CP, palpitations, SOB at rest, dizziness/lightheadedness. Vitals:    01/08/23 0430 01/08/23 0550 01/08/23 0600 01/08/23 0730   BP:       Pulse: (!) 104  62    Resp:  16     Temp:       TempSrc:       SpO2:    97%   Weight:       Height:         RA       Intake/Output Summary (Last 24 hours) at 1/8/2023 0759  Last data filed at 1/8/2023 0600  Gross per 24 hour   Intake 720 ml   Output 200 ml   Net 520 ml         No BM since pre op     Recent Labs     01/06/23  0542 01/07/23  0649 01/08/23  0651   WBC 11.2 11.9* 11.4   HGB 9.0* 9.3* 10.3*   HCT 28.5* 29.4* 31.9*    312 456*      Recent Labs     01/06/23  0542 01/07/23  0649 01/08/23  0651   BUN 20 24* 24*   CREATININE 0.7 0.7 0.7         Telemetry: SR      PE  Cardiac: RRR  Lungs: decreased bases  Chest incision with intact EFRAÍN DSD. [place] H[place] Sternum stable. Prior chest tube site incisions C/D/I, no erythema with intact sutures. Epicardial pacing wires present and secure. Abd: Soft, nontender, +BS  Ext: Incisions C/D/I, approximated, no erythema, + edema            A/P: POD# 5    CAD/NSTEMI  --Stable s/p CABG x3 on (LIMA-LAD, CryoVein-OM, CryoVein-RCA)/MYNOR exclusion with 35mm AtriClip on 1/3/2023  --Post op BRISEIDA reveals 50% EF on epinephrine  --TTE recheck- with EF of 45-50% mild MR and moderate TR- no WCD on DC   --Scr stable post op 0.7   --ASA/statin/BB - plavix on hold for possible eliquis, plans to start Eliquis today   --reinforce sternal precautions  --continue epicardial pacing wires  -- All CTs out       2. Expected acute blood loss anemia secondary to open heart surgery  --stable 10.3         3. Post Op Afib  -- RVR afib overnight with rate 150s-160s- additional 12.5mg of PO Toprol given without effect.   Cardizem gtt initiated  -- Re-verted to SR this Am with rate in low 60s   --continue BB with hold parameters - Toprol XL to 37.5mg BID- will not increase at this time with current rate  --received amio bolus, amio gtt x 24 hours completed   --On amio PO 400mg tid  -- Plans to start Eliquis today         4. Expected acute pulmonary insufficiency in the setting following surgery  --continue duonebs with ezpap  --encourage C&DB, SMI  --currently on RA  --diurese PO bumex        5. HFrEF/ICM  --EF~40; required epi post-op  --Toprol XL 37.5mg BID  -- No ACE or ARB per Dr Willie Elizalde   --PO bumex        6. Constipation--expected delayed return of bowel function  --secondary to anesthesia, narcotics, decreased oral intake, and decreased physical activity   --no BM since prior to surgery  --Continue daily MOM/oral bisacodyl until +BM and senna-s as scheduled. --Give supp today   --Encouraged continued increase in oral intake and activity.           7. Expected deconditioning in the setting following surgery  --Increase activity as tolerated  --PT/OT           DVT prophylaxis:  --continue bilateral knee high FABIOLA hose  --continue PCDs  --continue progressive ambulation  --lovenox for dvt prophylaxis and continue knee high FABIOLA hose/pcds/progressive ambulation        Dispo: home vs. Rehab pending progression in activity level         This patient's case and care plan was discussed with the attending surgeon Yes

## 2023-06-12 NOTE — DISCHARGE NOTE ADULT - MEDICATION SUMMARY - MEDICATIONS TO TAKE
Benefits, risks, and possible complications of procedure explained to patient/caregiver who verbalized understanding and gave written consent. Benefits, risks, and possible complications of procedure explained to patient/caregiver who verbalized understanding and gave verbal consent. I will START or STAY ON the medications listed below when I get home from the hospital:    finasteride 5 mg oral tablet  -- 1 tab(s) by mouth once a day  -- Indication: For BPH (benign prostatic hyperplasia)    aspirin 81 mg oral delayed release tablet  -- 1 tab(s) by mouth once a day  -- Indication: For Primary prevention for coronary disease     metFORMIN 1000 mg oral tablet  -- 1 tab(s) by mouth 2 times a day  -- Indication: For Diabetes    Advair Diskus 250 mcg-50 mcg inhalation powder  -- 1 puff(s) inhaled 2 times a day  -- Indication: For Asthma    Lasix 40 mg oral tablet  -- 1 tab(s) by mouth once a day  -- Indication: For Lower extremity edema    azelastine 137 mcg/inh (0.1%) nasal spray  -- 2 spray(s) into nose 2 times a day  -- Indication: For Asthma    Flovent 110 mcg/inh inhalation aerosol with adapter  -- 2 puff(s) inhaled every 8 hours, As Needed  -- Indication: For Asthma

## 2023-09-09 NOTE — ED ADULT NURSE NOTE - ED CARDIAC CAPILLARY REFILL
2 seconds or less
Bed/Stretcher in lowest position, wheels locked, appropriate side rails in place/Call bell, personal items and telephone in reach/Instruct patient to call for assistance before getting out of bed/chair/stretcher/Non-slip footwear applied when patient is off stretcher/[place] to call system/Physically safe environment - no spills, clutter or unnecessary equipment/Purposeful proactive rounding/Room/bathroom lighting operational, light cord in reach

## 2025-02-03 NOTE — ED PROVIDER NOTE - RATE
RN spoke with patient. MNconveyedresults: RN conveyed results of  ECHO. Patient demonstrated understanding by repeating back information correctly.  patient.denies any other questions at this time. Writer encouraged  patient. to contact us with any questions, concerns, or changes in condition.        34